# Patient Record
Sex: MALE | Race: WHITE | NOT HISPANIC OR LATINO | Employment: OTHER | URBAN - METROPOLITAN AREA
[De-identification: names, ages, dates, MRNs, and addresses within clinical notes are randomized per-mention and may not be internally consistent; named-entity substitution may affect disease eponyms.]

---

## 2018-01-15 ENCOUNTER — CONVERSION ENCOUNTER (OUTPATIENT)
Dept: PODIATRY | Facility: CLINIC | Age: 73
End: 2018-01-15

## 2018-01-15 ENCOUNTER — OFFICE VISIT CONVERTED (OUTPATIENT)
Dept: PODIATRY | Facility: CLINIC | Age: 73
End: 2018-01-15
Attending: PODIATRIST

## 2018-01-30 ENCOUNTER — CONVERSION ENCOUNTER (OUTPATIENT)
Dept: FAMILY MEDICINE CLINIC | Facility: CLINIC | Age: 73
End: 2018-01-30

## 2018-01-30 ENCOUNTER — OFFICE VISIT CONVERTED (OUTPATIENT)
Dept: FAMILY MEDICINE CLINIC | Facility: CLINIC | Age: 73
End: 2018-01-30
Attending: FAMILY MEDICINE

## 2018-02-07 ENCOUNTER — CONVERSION ENCOUNTER (OUTPATIENT)
Dept: PODIATRY | Facility: CLINIC | Age: 73
End: 2018-02-07

## 2018-02-07 ENCOUNTER — PROCEDURE VISIT CONVERTED (OUTPATIENT)
Dept: PODIATRY | Facility: CLINIC | Age: 73
End: 2018-02-07
Attending: PODIATRIST

## 2018-03-16 ENCOUNTER — PROCEDURE VISIT CONVERTED (OUTPATIENT)
Dept: PODIATRY | Facility: CLINIC | Age: 73
End: 2018-03-16
Attending: PODIATRIST

## 2018-03-21 ENCOUNTER — OFFICE VISIT CONVERTED (OUTPATIENT)
Dept: SURGERY | Facility: CLINIC | Age: 73
End: 2018-03-21
Attending: NURSE PRACTITIONER

## 2018-04-09 ENCOUNTER — CONVERSION ENCOUNTER (OUTPATIENT)
Dept: PODIATRY | Facility: CLINIC | Age: 73
End: 2018-04-09

## 2018-04-09 ENCOUNTER — PROCEDURE VISIT CONVERTED (OUTPATIENT)
Dept: PODIATRY | Facility: CLINIC | Age: 73
End: 2018-04-09
Attending: PODIATRIST

## 2018-04-23 ENCOUNTER — CONVERSION ENCOUNTER (OUTPATIENT)
Dept: PODIATRY | Facility: CLINIC | Age: 73
End: 2018-04-23

## 2018-04-23 ENCOUNTER — PROCEDURE VISIT CONVERTED (OUTPATIENT)
Dept: PODIATRY | Facility: CLINIC | Age: 73
End: 2018-04-23
Attending: PODIATRIST

## 2018-06-01 ENCOUNTER — PROCEDURE VISIT CONVERTED (OUTPATIENT)
Dept: PODIATRY | Facility: CLINIC | Age: 73
End: 2018-06-01
Attending: PODIATRIST

## 2018-06-22 ENCOUNTER — PROCEDURE VISIT CONVERTED (OUTPATIENT)
Dept: PODIATRY | Facility: CLINIC | Age: 73
End: 2018-06-22
Attending: PODIATRIST

## 2018-07-19 ENCOUNTER — PROCEDURE VISIT CONVERTED (OUTPATIENT)
Dept: PODIATRY | Facility: CLINIC | Age: 73
End: 2018-07-19
Attending: PODIATRIST

## 2018-08-06 ENCOUNTER — OFFICE VISIT CONVERTED (OUTPATIENT)
Dept: FAMILY MEDICINE CLINIC | Facility: CLINIC | Age: 73
End: 2018-08-06
Attending: FAMILY MEDICINE

## 2018-08-06 ENCOUNTER — CONVERSION ENCOUNTER (OUTPATIENT)
Dept: FAMILY MEDICINE CLINIC | Facility: CLINIC | Age: 73
End: 2018-08-06

## 2018-08-10 ENCOUNTER — PROCEDURE VISIT CONVERTED (OUTPATIENT)
Dept: PODIATRY | Facility: CLINIC | Age: 73
End: 2018-08-10
Attending: PODIATRIST

## 2018-08-31 ENCOUNTER — PROCEDURE VISIT CONVERTED (OUTPATIENT)
Dept: PODIATRY | Facility: CLINIC | Age: 73
End: 2018-08-31
Attending: PODIATRIST

## 2018-09-21 ENCOUNTER — PROCEDURE VISIT CONVERTED (OUTPATIENT)
Dept: PODIATRY | Facility: CLINIC | Age: 73
End: 2018-09-21
Attending: PODIATRIST

## 2018-09-28 ENCOUNTER — OFFICE VISIT CONVERTED (OUTPATIENT)
Dept: CARDIOLOGY | Facility: CLINIC | Age: 73
End: 2018-09-28
Attending: INTERNAL MEDICINE

## 2018-09-28 ENCOUNTER — CONVERSION ENCOUNTER (OUTPATIENT)
Dept: CARDIOLOGY | Facility: CLINIC | Age: 73
End: 2018-09-28

## 2018-10-31 ENCOUNTER — CONVERSION ENCOUNTER (OUTPATIENT)
Dept: CARDIOLOGY | Facility: CLINIC | Age: 73
End: 2018-10-31
Attending: INTERNAL MEDICINE

## 2018-11-02 ENCOUNTER — CONVERSION ENCOUNTER (OUTPATIENT)
Dept: PODIATRY | Facility: CLINIC | Age: 73
End: 2018-11-02

## 2018-11-02 ENCOUNTER — PROCEDURE VISIT CONVERTED (OUTPATIENT)
Dept: PODIATRY | Facility: CLINIC | Age: 73
End: 2018-11-02
Attending: PODIATRIST

## 2018-11-30 ENCOUNTER — CONVERSION ENCOUNTER (OUTPATIENT)
Dept: PODIATRY | Facility: CLINIC | Age: 73
End: 2018-11-30

## 2018-11-30 ENCOUNTER — PROCEDURE VISIT CONVERTED (OUTPATIENT)
Dept: PODIATRY | Facility: CLINIC | Age: 73
End: 2018-11-30
Attending: PODIATRIST

## 2018-12-21 ENCOUNTER — PROCEDURE VISIT CONVERTED (OUTPATIENT)
Dept: PODIATRY | Facility: CLINIC | Age: 73
End: 2018-12-21
Attending: PODIATRIST

## 2018-12-26 ENCOUNTER — OFFICE VISIT CONVERTED (OUTPATIENT)
Dept: FAMILY MEDICINE CLINIC | Facility: CLINIC | Age: 73
End: 2018-12-26
Attending: NURSE PRACTITIONER

## 2018-12-26 ENCOUNTER — CONVERSION ENCOUNTER (OUTPATIENT)
Dept: FAMILY MEDICINE CLINIC | Facility: CLINIC | Age: 73
End: 2018-12-26

## 2019-01-11 ENCOUNTER — PROCEDURE VISIT CONVERTED (OUTPATIENT)
Dept: PODIATRY | Facility: CLINIC | Age: 74
End: 2019-01-11
Attending: PODIATRIST

## 2019-01-11 ENCOUNTER — CONVERSION ENCOUNTER (OUTPATIENT)
Dept: PODIATRY | Facility: CLINIC | Age: 74
End: 2019-01-11

## 2019-02-15 ENCOUNTER — PROCEDURE VISIT CONVERTED (OUTPATIENT)
Dept: PODIATRY | Facility: CLINIC | Age: 74
End: 2019-02-15
Attending: PODIATRIST

## 2019-02-15 ENCOUNTER — CONVERSION ENCOUNTER (OUTPATIENT)
Dept: PODIATRY | Facility: CLINIC | Age: 74
End: 2019-02-15

## 2019-02-18 ENCOUNTER — OFFICE VISIT CONVERTED (OUTPATIENT)
Dept: FAMILY MEDICINE CLINIC | Facility: CLINIC | Age: 74
End: 2019-02-18
Attending: FAMILY MEDICINE

## 2019-02-18 ENCOUNTER — HOSPITAL ENCOUNTER (OUTPATIENT)
Dept: FAMILY MEDICINE CLINIC | Facility: CLINIC | Age: 74
Discharge: HOME | End: 2019-02-18
Attending: FAMILY MEDICINE

## 2019-02-18 ENCOUNTER — CONVERSION ENCOUNTER (OUTPATIENT)
Dept: FAMILY MEDICINE CLINIC | Facility: CLINIC | Age: 74
End: 2019-02-18

## 2019-02-18 LAB
ALBUMIN SERPL-MCNC: 4.3 G/DL (ref 3.5–5)
ALBUMIN/GLOB SERPL: 1.3 {RATIO} (ref 1.4–2.6)
ALP SERPL-CCNC: 75 U/L (ref 56–155)
ALT SERPL-CCNC: 20 U/L (ref 10–40)
ANION GAP SERPL CALC-SCNC: 15 MMOL/L (ref 8–19)
AST SERPL-CCNC: 27 U/L (ref 15–50)
BILIRUB SERPL-MCNC: 0.44 MG/DL (ref 0.2–1.3)
BUN SERPL-MCNC: 18 MG/DL (ref 5–25)
BUN/CREAT SERPL: 14 {RATIO} (ref 6–20)
CALCIUM SERPL-MCNC: 9.6 MG/DL (ref 8.7–10.4)
CHLORIDE SERPL-SCNC: 104 MMOL/L (ref 99–111)
CHOLEST SERPL-MCNC: 95 MG/DL (ref 107–200)
CHOLEST/HDLC SERPL: 2.4 {RATIO} (ref 3–6)
CONV CO2: 28 MMOL/L (ref 22–32)
CONV TOTAL PROTEIN: 7.6 G/DL (ref 6.3–8.2)
CREAT UR-MCNC: 1.25 MG/DL (ref 0.7–1.2)
EST. AVERAGE GLUCOSE BLD GHB EST-MCNC: 160 MG/DL
GFR SERPLBLD BASED ON 1.73 SQ M-ARVRAT: 57 ML/MIN/{1.73_M2}
GLOBULIN UR ELPH-MCNC: 3.3 G/DL (ref 2–3.5)
GLUCOSE SERPL-MCNC: 89 MG/DL (ref 70–99)
HBA1C MFR BLD: 7.2 % (ref 3.5–5.7)
HDLC SERPL-MCNC: 39 MG/DL (ref 40–60)
LDLC SERPL CALC-MCNC: 35 MG/DL (ref 70–100)
OSMOLALITY SERPL CALC.SUM OF ELEC: 297 MOSM/KG (ref 273–304)
POTASSIUM SERPL-SCNC: 3.9 MMOL/L (ref 3.5–5.3)
SODIUM SERPL-SCNC: 143 MMOL/L (ref 135–147)
TRIGL SERPL-MCNC: 104 MG/DL (ref 40–150)
URATE SERPL-MCNC: 5.1 MG/DL (ref 3.5–8.5)
VLDLC SERPL-MCNC: 21 MG/DL (ref 5–37)

## 2019-02-20 LAB
CONV HEPATITIS C AB WITH REFLEX TO CONFIRMATION: 0.1 S/CO RATIO (ref 0–0.9)
CONV HEPATITIS COMMENT: NORMAL

## 2019-03-01 ENCOUNTER — PROCEDURE VISIT CONVERTED (OUTPATIENT)
Dept: PODIATRY | Facility: CLINIC | Age: 74
End: 2019-03-01
Attending: PODIATRIST

## 2019-03-22 ENCOUNTER — PROCEDURE VISIT CONVERTED (OUTPATIENT)
Dept: PODIATRY | Facility: CLINIC | Age: 74
End: 2019-03-22
Attending: PODIATRIST

## 2019-03-22 ENCOUNTER — CONVERSION ENCOUNTER (OUTPATIENT)
Dept: PODIATRY | Facility: CLINIC | Age: 74
End: 2019-03-22

## 2019-04-15 ENCOUNTER — OFFICE VISIT CONVERTED (OUTPATIENT)
Dept: FAMILY MEDICINE CLINIC | Facility: CLINIC | Age: 74
End: 2019-04-15
Attending: FAMILY MEDICINE

## 2019-04-15 ENCOUNTER — CONVERSION ENCOUNTER (OUTPATIENT)
Dept: FAMILY MEDICINE CLINIC | Facility: CLINIC | Age: 74
End: 2019-04-15

## 2019-04-16 ENCOUNTER — PROCEDURE VISIT CONVERTED (OUTPATIENT)
Dept: PODIATRY | Facility: CLINIC | Age: 74
End: 2019-04-16
Attending: PODIATRIST

## 2019-05-03 ENCOUNTER — HOSPITAL ENCOUNTER (OUTPATIENT)
Dept: PERIOP | Facility: HOSPITAL | Age: 74
Setting detail: HOSPITAL OUTPATIENT SURGERY
Discharge: HOME | End: 2019-05-03
Attending: PODIATRIST

## 2019-05-03 LAB
GLUCOSE BLD-MCNC: 110 MG/DL (ref 70–99)
GLUCOSE BLD-MCNC: 125 MG/DL (ref 70–99)

## 2019-05-07 ENCOUNTER — OFFICE VISIT CONVERTED (OUTPATIENT)
Dept: PODIATRY | Facility: CLINIC | Age: 74
End: 2019-05-07
Attending: PODIATRIST

## 2019-05-08 ENCOUNTER — OFFICE VISIT CONVERTED (OUTPATIENT)
Dept: CARDIOLOGY | Facility: CLINIC | Age: 74
End: 2019-05-08
Attending: INTERNAL MEDICINE

## 2019-05-08 ENCOUNTER — CONVERSION ENCOUNTER (OUTPATIENT)
Dept: CARDIOLOGY | Facility: CLINIC | Age: 74
End: 2019-05-08

## 2019-05-21 ENCOUNTER — OFFICE VISIT CONVERTED (OUTPATIENT)
Dept: PODIATRY | Facility: CLINIC | Age: 74
End: 2019-05-21
Attending: PODIATRIST

## 2019-08-05 ENCOUNTER — PROCEDURE VISIT CONVERTED (OUTPATIENT)
Dept: PODIATRY | Facility: CLINIC | Age: 74
End: 2019-08-05
Attending: PODIATRIST

## 2019-08-26 ENCOUNTER — PROCEDURE VISIT CONVERTED (OUTPATIENT)
Dept: PODIATRY | Facility: CLINIC | Age: 74
End: 2019-08-26
Attending: PODIATRIST

## 2019-08-27 ENCOUNTER — OFFICE VISIT CONVERTED (OUTPATIENT)
Dept: FAMILY MEDICINE CLINIC | Facility: CLINIC | Age: 74
End: 2019-08-27
Attending: FAMILY MEDICINE

## 2019-08-27 ENCOUNTER — HOSPITAL ENCOUNTER (OUTPATIENT)
Dept: FAMILY MEDICINE CLINIC | Facility: CLINIC | Age: 74
Discharge: HOME | End: 2019-08-27
Attending: FAMILY MEDICINE

## 2019-08-27 ENCOUNTER — CONVERSION ENCOUNTER (OUTPATIENT)
Dept: FAMILY MEDICINE CLINIC | Facility: CLINIC | Age: 74
End: 2019-08-27

## 2019-08-27 LAB
EST. AVERAGE GLUCOSE BLD GHB EST-MCNC: 160 MG/DL
HBA1C MFR BLD: 7.2 % (ref 3.5–5.7)

## 2019-09-16 ENCOUNTER — PROCEDURE VISIT CONVERTED (OUTPATIENT)
Dept: PODIATRY | Facility: CLINIC | Age: 74
End: 2019-09-16
Attending: PODIATRIST

## 2019-09-16 ENCOUNTER — CONVERSION ENCOUNTER (OUTPATIENT)
Dept: PODIATRY | Facility: CLINIC | Age: 74
End: 2019-09-16

## 2019-10-21 ENCOUNTER — PROCEDURE VISIT CONVERTED (OUTPATIENT)
Dept: PODIATRY | Facility: CLINIC | Age: 74
End: 2019-10-21
Attending: PODIATRIST

## 2019-11-20 ENCOUNTER — OFFICE VISIT CONVERTED (OUTPATIENT)
Dept: CARDIOLOGY | Facility: CLINIC | Age: 74
End: 2019-11-20
Attending: INTERNAL MEDICINE

## 2019-11-25 ENCOUNTER — CONVERSION ENCOUNTER (OUTPATIENT)
Dept: PODIATRY | Facility: CLINIC | Age: 74
End: 2019-11-25

## 2019-11-25 ENCOUNTER — PROCEDURE VISIT CONVERTED (OUTPATIENT)
Dept: PODIATRY | Facility: CLINIC | Age: 74
End: 2019-11-25
Attending: PODIATRIST

## 2020-01-08 ENCOUNTER — PROCEDURE VISIT CONVERTED (OUTPATIENT)
Dept: PODIATRY | Facility: CLINIC | Age: 75
End: 2020-01-08
Attending: PODIATRIST

## 2020-02-10 ENCOUNTER — HOSPITAL ENCOUNTER (OUTPATIENT)
Dept: GENERAL RADIOLOGY | Facility: HOSPITAL | Age: 75
Discharge: HOME | End: 2020-02-10
Attending: PODIATRIST

## 2020-02-10 ENCOUNTER — OFFICE VISIT CONVERTED (OUTPATIENT)
Dept: PODIATRY | Facility: CLINIC | Age: 75
End: 2020-02-10
Attending: PODIATRIST

## 2020-02-14 ENCOUNTER — HOSPITAL ENCOUNTER (OUTPATIENT)
Dept: PERIOP | Facility: HOSPITAL | Age: 75
Setting detail: HOSPITAL OUTPATIENT SURGERY
Discharge: HOME | End: 2020-02-14
Attending: PODIATRIST

## 2020-02-14 LAB
GLUCOSE BLD-MCNC: 57 MG/DL (ref 70–99)
GLUCOSE BLD-MCNC: 96 MG/DL (ref 70–99)

## 2020-02-19 ENCOUNTER — CONVERSION ENCOUNTER (OUTPATIENT)
Dept: PODIATRY | Facility: CLINIC | Age: 75
End: 2020-02-19

## 2020-02-19 ENCOUNTER — OFFICE VISIT CONVERTED (OUTPATIENT)
Dept: PODIATRY | Facility: CLINIC | Age: 75
End: 2020-02-19
Attending: PODIATRIST

## 2020-02-27 ENCOUNTER — OFFICE VISIT CONVERTED (OUTPATIENT)
Dept: FAMILY MEDICINE CLINIC | Facility: CLINIC | Age: 75
End: 2020-02-27
Attending: FAMILY MEDICINE

## 2020-02-27 ENCOUNTER — CONVERSION ENCOUNTER (OUTPATIENT)
Dept: FAMILY MEDICINE CLINIC | Facility: CLINIC | Age: 75
End: 2020-02-27

## 2020-02-27 ENCOUNTER — HOSPITAL ENCOUNTER (OUTPATIENT)
Dept: FAMILY MEDICINE CLINIC | Facility: CLINIC | Age: 75
Discharge: HOME | End: 2020-02-27
Attending: FAMILY MEDICINE

## 2020-02-27 LAB
ALBUMIN SERPL-MCNC: 4.2 G/DL (ref 3.5–5)
ALBUMIN/GLOB SERPL: 1.3 {RATIO} (ref 1.4–2.6)
ALP SERPL-CCNC: 81 U/L (ref 56–155)
ALT SERPL-CCNC: 16 U/L (ref 10–40)
ANION GAP SERPL CALC-SCNC: 24 MMOL/L (ref 8–19)
AST SERPL-CCNC: 23 U/L (ref 15–50)
BASOPHILS # BLD AUTO: 0.06 10*3/UL (ref 0–0.2)
BASOPHILS NFR BLD AUTO: 0.6 % (ref 0–3)
BILIRUB SERPL-MCNC: 0.41 MG/DL (ref 0.2–1.3)
BUN SERPL-MCNC: 20 MG/DL (ref 5–25)
BUN/CREAT SERPL: 18 {RATIO} (ref 6–20)
CALCIUM SERPL-MCNC: 9.8 MG/DL (ref 8.7–10.4)
CHLORIDE SERPL-SCNC: 101 MMOL/L (ref 99–111)
CHOLEST SERPL-MCNC: 92 MG/DL (ref 107–200)
CHOLEST/HDLC SERPL: 2.5 {RATIO} (ref 3–6)
CONV ABS IMM GRAN: 0.08 10*3/UL (ref 0–0.2)
CONV CO2: 19 MMOL/L (ref 22–32)
CONV CREATININE URINE, RANDOM: 149.2 MG/DL (ref 10–300)
CONV IMMATURE GRAN: 0.8 % (ref 0–1.8)
CONV MICROALBUM.,U,RANDOM: <12 MG/L (ref 0–20)
CONV TOTAL PROTEIN: 7.5 G/DL (ref 6.3–8.2)
CREAT UR-MCNC: 1.11 MG/DL (ref 0.7–1.2)
DEPRECATED RDW RBC AUTO: 46.1 FL (ref 35.1–43.9)
EOSINOPHIL # BLD AUTO: 0.38 10*3/UL (ref 0–0.7)
EOSINOPHIL # BLD AUTO: 3.9 % (ref 0–7)
ERYTHROCYTE [DISTWIDTH] IN BLOOD BY AUTOMATED COUNT: 13.2 % (ref 11.6–14.4)
EST. AVERAGE GLUCOSE BLD GHB EST-MCNC: 157 MG/DL
GFR SERPLBLD BASED ON 1.73 SQ M-ARVRAT: >60 ML/MIN/{1.73_M2}
GLOBULIN UR ELPH-MCNC: 3.3 G/DL (ref 2–3.5)
GLUCOSE SERPL-MCNC: 150 MG/DL (ref 70–99)
HBA1C MFR BLD: 7.1 % (ref 3.5–5.7)
HCT VFR BLD AUTO: 42.3 % (ref 42–52)
HDLC SERPL-MCNC: 37 MG/DL (ref 40–60)
HGB BLD-MCNC: 14.1 G/DL (ref 14–18)
LDLC SERPL CALC-MCNC: 34 MG/DL (ref 70–100)
LYMPHOCYTES # BLD AUTO: 2.17 10*3/UL (ref 1–5)
LYMPHOCYTES NFR BLD AUTO: 22.3 % (ref 20–45)
MCH RBC QN AUTO: 31.7 PG (ref 27–31)
MCHC RBC AUTO-ENTMCNC: 33.3 G/DL (ref 33–37)
MCV RBC AUTO: 95.1 FL (ref 80–96)
MICROALBUMIN/CREAT UR: 8 MG/G{CRE} (ref 0–25)
MONOCYTES # BLD AUTO: 0.89 10*3/UL (ref 0.2–1.2)
MONOCYTES NFR BLD AUTO: 9.1 % (ref 3–10)
NEUTROPHILS # BLD AUTO: 6.17 10*3/UL (ref 2–8)
NEUTROPHILS NFR BLD AUTO: 63.3 % (ref 30–85)
NRBC CBCN: 0 % (ref 0–0.7)
OSMOLALITY SERPL CALC.SUM OF ELEC: 295 MOSM/KG (ref 273–304)
PLATELET # BLD AUTO: 241 10*3/UL (ref 130–400)
PMV BLD AUTO: 9.7 FL (ref 9.4–12.4)
POTASSIUM SERPL-SCNC: 4.4 MMOL/L (ref 3.5–5.3)
PSA SERPL-MCNC: 1.71 NG/ML (ref 0–4)
RBC # BLD AUTO: 4.45 10*6/UL (ref 4.7–6.1)
SODIUM SERPL-SCNC: 140 MMOL/L (ref 135–147)
TRIGL SERPL-MCNC: 104 MG/DL (ref 40–150)
TSH SERPL-ACNC: 2.52 M[IU]/L (ref 0.27–4.2)
URATE SERPL-MCNC: 4.8 MG/DL (ref 3.5–8.5)
VLDLC SERPL-MCNC: 21 MG/DL (ref 5–37)
WBC # BLD AUTO: 9.75 10*3/UL (ref 4.8–10.8)

## 2020-03-04 ENCOUNTER — OFFICE VISIT CONVERTED (OUTPATIENT)
Dept: PODIATRY | Facility: CLINIC | Age: 75
End: 2020-03-04
Attending: PODIATRIST

## 2020-08-13 ENCOUNTER — OFFICE VISIT CONVERTED (OUTPATIENT)
Dept: SURGERY | Facility: CLINIC | Age: 75
End: 2020-08-13
Attending: NURSE PRACTITIONER

## 2020-08-13 ENCOUNTER — CONVERSION ENCOUNTER (OUTPATIENT)
Dept: SURGERY | Facility: CLINIC | Age: 75
End: 2020-08-13

## 2020-08-25 ENCOUNTER — OFFICE VISIT CONVERTED (OUTPATIENT)
Dept: FAMILY MEDICINE CLINIC | Facility: CLINIC | Age: 75
End: 2020-08-25
Attending: FAMILY MEDICINE

## 2020-08-25 ENCOUNTER — CONVERSION ENCOUNTER (OUTPATIENT)
Dept: FAMILY MEDICINE CLINIC | Facility: CLINIC | Age: 75
End: 2020-08-25

## 2020-08-25 ENCOUNTER — HOSPITAL ENCOUNTER (OUTPATIENT)
Dept: LAB | Facility: HOSPITAL | Age: 75
Discharge: HOME | End: 2020-08-25
Attending: FAMILY MEDICINE

## 2020-08-25 LAB
EST. AVERAGE GLUCOSE BLD GHB EST-MCNC: 137 MG/DL
HBA1C MFR BLD: 6.4 % (ref 3.5–5.7)

## 2020-10-14 ENCOUNTER — HOSPITAL ENCOUNTER (OUTPATIENT)
Dept: PREADMISSION TESTING | Facility: HOSPITAL | Age: 75
Discharge: HOME | End: 2020-10-14
Attending: SURGERY

## 2020-10-16 LAB — SARS-COV-2 RNA SPEC QL NAA+PROBE: NOT DETECTED

## 2020-10-19 ENCOUNTER — HOSPITAL ENCOUNTER (OUTPATIENT)
Dept: GASTROENTEROLOGY | Facility: HOSPITAL | Age: 75
Setting detail: HOSPITAL OUTPATIENT SURGERY
Discharge: HOME | End: 2020-10-19
Attending: SURGERY

## 2020-10-19 LAB — GLUCOSE BLD-MCNC: 157 MG/DL (ref 70–99)

## 2020-11-16 ENCOUNTER — OFFICE VISIT CONVERTED (OUTPATIENT)
Dept: SURGERY | Facility: CLINIC | Age: 75
End: 2020-11-16
Attending: SURGERY

## 2021-01-21 ENCOUNTER — OFFICE VISIT CONVERTED (OUTPATIENT)
Dept: CARDIOLOGY | Facility: CLINIC | Age: 76
End: 2021-01-21
Attending: INTERNAL MEDICINE

## 2021-03-30 ENCOUNTER — CONVERSION ENCOUNTER (OUTPATIENT)
Dept: FAMILY MEDICINE CLINIC | Facility: CLINIC | Age: 76
End: 2021-03-30

## 2021-03-30 ENCOUNTER — HOSPITAL ENCOUNTER (OUTPATIENT)
Dept: FAMILY MEDICINE CLINIC | Facility: CLINIC | Age: 76
Discharge: HOME | End: 2021-03-30
Attending: FAMILY MEDICINE

## 2021-03-30 ENCOUNTER — OFFICE VISIT CONVERTED (OUTPATIENT)
Dept: FAMILY MEDICINE CLINIC | Facility: CLINIC | Age: 76
End: 2021-03-30
Attending: FAMILY MEDICINE

## 2021-03-30 LAB
BILIRUB UR QL STRIP: NEGATIVE
COLOR UR: YELLOW
CONV CLARITY OF URINE: CLEAR
CONV PROTEIN IN URINE BY AUTOMATED TEST STRIP: NEGATIVE
CONV UROBILINOGEN IN URINE BY AUTOMATED TEST STRIP: NORMAL
EST. AVERAGE GLUCOSE BLD GHB EST-MCNC: 128 MG/DL
GLUCOSE UR QL: NEGATIVE
HBA1C MFR BLD: 6.1 % (ref 3.5–5.7)
HGB UR QL STRIP: NEGATIVE
KETONES UR QL STRIP: NEGATIVE
LEUKOCYTE ESTERASE UR QL STRIP: NEGATIVE
NITRITE UR QL STRIP: NEGATIVE
PH UR STRIP.AUTO: 5.5 [PH]
SP GR UR: 1.03

## 2021-03-31 LAB
ALBUMIN SERPL-MCNC: 4.1 G/DL (ref 3.5–5)
ALBUMIN/GLOB SERPL: 1.3 {RATIO} (ref 1.4–2.6)
ALP SERPL-CCNC: 80 U/L (ref 56–155)
ALT SERPL-CCNC: 16 U/L (ref 10–40)
ANION GAP SERPL CALC-SCNC: 18 MMOL/L (ref 8–19)
AST SERPL-CCNC: 27 U/L (ref 15–50)
BASOPHILS # BLD AUTO: 0.05 10*3/UL (ref 0–0.2)
BASOPHILS NFR BLD AUTO: 0.6 % (ref 0–3)
BILIRUB SERPL-MCNC: 0.37 MG/DL (ref 0.2–1.3)
BUN SERPL-MCNC: 22 MG/DL (ref 5–25)
BUN/CREAT SERPL: 18 {RATIO} (ref 6–20)
CALCIUM SERPL-MCNC: 9.6 MG/DL (ref 8.7–10.4)
CHLORIDE SERPL-SCNC: 105 MMOL/L (ref 99–111)
CHOLEST SERPL-MCNC: 86 MG/DL (ref 107–200)
CHOLEST/HDLC SERPL: 2.1 {RATIO} (ref 3–6)
CONV ABS IMM GRAN: 0.06 10*3/UL (ref 0–0.2)
CONV CO2: 21 MMOL/L (ref 22–32)
CONV CREATININE URINE, RANDOM: 109.5 MG/DL (ref 10–300)
CONV IMMATURE GRAN: 0.7 % (ref 0–1.8)
CONV MICROALBUM.,U,RANDOM: <12 MG/L (ref 0–20)
CONV TOTAL PROTEIN: 7.2 G/DL (ref 6.3–8.2)
CREAT UR-MCNC: 1.22 MG/DL (ref 0.7–1.2)
DEPRECATED RDW RBC AUTO: 47.9 FL (ref 35.1–43.9)
EOSINOPHIL # BLD AUTO: 0.37 10*3/UL (ref 0–0.7)
EOSINOPHIL # BLD AUTO: 4.1 % (ref 0–7)
ERYTHROCYTE [DISTWIDTH] IN BLOOD BY AUTOMATED COUNT: 13.8 % (ref 11.6–14.4)
GFR SERPLBLD BASED ON 1.73 SQ M-ARVRAT: 58 ML/MIN/{1.73_M2}
GLOBULIN UR ELPH-MCNC: 3.1 G/DL (ref 2–3.5)
GLUCOSE SERPL-MCNC: 100 MG/DL (ref 70–99)
HCT VFR BLD AUTO: 41.7 % (ref 42–52)
HDLC SERPL-MCNC: 41 MG/DL (ref 40–60)
HGB BLD-MCNC: 13.4 G/DL (ref 14–18)
LDLC SERPL CALC-MCNC: 19 MG/DL (ref 70–100)
LYMPHOCYTES # BLD AUTO: 1.78 10*3/UL (ref 1–5)
LYMPHOCYTES NFR BLD AUTO: 20 % (ref 20–45)
MCH RBC QN AUTO: 30.5 PG (ref 27–31)
MCHC RBC AUTO-ENTMCNC: 32.1 G/DL (ref 33–37)
MCV RBC AUTO: 95 FL (ref 80–96)
MICROALBUMIN/CREAT UR: 11 MG/G{CRE} (ref 0–25)
MONOCYTES # BLD AUTO: 0.89 10*3/UL (ref 0.2–1.2)
MONOCYTES NFR BLD AUTO: 10 % (ref 3–10)
NEUTROPHILS # BLD AUTO: 5.77 10*3/UL (ref 2–8)
NEUTROPHILS NFR BLD AUTO: 64.6 % (ref 30–85)
NRBC CBCN: 0 % (ref 0–0.7)
OSMOLALITY SERPL CALC.SUM OF ELEC: 293 MOSM/KG (ref 273–304)
PLATELET # BLD AUTO: 242 10*3/UL (ref 130–400)
PMV BLD AUTO: 10 FL (ref 9.4–12.4)
POTASSIUM SERPL-SCNC: 4.2 MMOL/L (ref 3.5–5.3)
PSA SERPL-MCNC: 1.51 NG/ML (ref 0–4)
RBC # BLD AUTO: 4.39 10*6/UL (ref 4.7–6.1)
SODIUM SERPL-SCNC: 140 MMOL/L (ref 135–147)
TRIGL SERPL-MCNC: 132 MG/DL (ref 40–150)
TSH SERPL-ACNC: 2.06 M[IU]/L (ref 0.27–4.2)
VLDLC SERPL-MCNC: 26 MG/DL (ref 5–37)
WBC # BLD AUTO: 8.92 10*3/UL (ref 4.8–10.8)

## 2021-05-10 NOTE — H&P
History and Physical      Patient Name: Edi Weston   Patient ID: [id number]   Sex: Male   YOB: 1945    Primary Care Provider: Jean Marie Joaquin III MD   Referring Provider: Ralph Whyte DPM    Visit Date: August 13, 2020    Provider: DIONNA Marinelli   Location[facility]   Location Address: [address] [place] [address]  [id number]   Location Phone: [phone]          Chief Complaint  · Requesting colonoscopy  · Age 50 or over  · FH of colon cancer  · Here today for a pre-surgical colon screening visit  · Personal History of Polyps      History Of Present Illness  The patient is a 75 year old /White male presenting [facility] the [facility] on a referral from Dimitry Hughes MD.   Edi Weston needs to have a screening colonoscopy.   Patient states that they have had a colonoscopy. 2 years ago   Patient currently complains of: no complaints   Patient Does have family history of colon cancer. Father and Grandfather      Patient presents today on a referral from Dr. Dimitry Hughes. Patient denies any abdominal pain, diarrhea or rectal bleeding. Admits to a family history of colon cancer with his father and paternal grandfather. Admits to a history of colonic polyps.    6/2018 - Colonoscopy ([facility]): Proximal Ascending - Tubulovillous adenoma; Ascending - tubular adenoma; Hepatic flexure - tubular adenoma; grade I internal hemorrhoids.     9/2015 - Colonoscopy ([facility]): Hepatic flexure - tubular adenoma; Cecum - tubular adenoma; Proximal Ascending - tubular adenoma.    9/2010 - Colonoscopy ([facility]): Transverse - Hyperplastic.     12/2005 - Colonoscopy ([facility]): Sigmoid - tubular adenoma & hyperplastic.    10/1999 - Colonoscopy ([facility]): Normal colon.            Past Medical History  Disease Name Date Onset Notes   Aftercare following surgery 02/19/2020 --    Arthritis --  --    Cellulitis of right lower limb --  --    Colon Polyps --  --    Decubitus ulcer of foot,  stage 1 08/10/2018 --    Decubitus ulcer of foot, stage 3 02/07/2018 --    Diabetes --  --    Diabetes Mellitus, Type II, Uncontrolled --  --    Foot pain, left 08/10/2018 --    Foot pain, right --  --    Foot ulcer 07/19/2018 --    Gout --  --    Hammertoe 12/30/2019 --    High blood pressure --  --    High cholesterol 02/18/2019 --    Hyperlipidemia --  --    Hypertension, essential --  --    Ingrowing nail 11/02/2018 --    Medication management 02/18/2019 --    Nail dystrophy --  --    PAT (paroxysmal atrial tachycardia) 08/06/2018 --    Pressure ulcer, stage 1 --  --    Tinea unguium --  --    Type 2 diabetes mellitus with foot ulcer --  --    Type 2 diabetes mellitus with polyneuropathy --  --    Type 2 diabetes, controlled, with peripheral neuropathy 08/10/2018 --          Past Surgical History  Procedure Name Date Notes   Colonoscopy --  --    Gastric banding 2007 --          Medication List  Name Date Started Instructions   allopurinol 300 mg oral tablet 02/27/2020 TAKE 1 TABLET BY MOUTH ONCE DAILY   aspirin 81 mg oral tablet,delayed release (DR/EC)  take 1 tablet (81 mg) by oral route once daily   Centrum Silver 0.4-300-250 mg-mcg-mcg oral tablet  take 1 tablet by oral route daily   glyburide 5 mg oral tablet 02/27/2020 TAKE 1/2 (ONE-HALF) TABLET BY MOUTH ONCE DAILY BEFORE BREAKFAST   hydrochlorothiazide 12.5 mg oral tablet 02/27/2020 TAKE 1 TABLET BY MOUTH ONCE DAILY   lisinopril 20 mg oral tablet 02/27/2020 TAKE 1 TABLET BY MOUTH ONCE DAILY   metformin 500 mg oral tablet extended release 24 hr 02/27/2020 TAKE 1 TABLET BY MOUTH ONCE DAILY WITH EVENING MEAL   metoprolol succinate 25 mg oral tablet extended release 24 hr 06/08/2020 TAKE 1 TABLET BY MOUTH ONCE DAILY   mupirocin 2 % topical ointment 04/10/2019 APPLY TO THE AFFECTED AREA EVERY DAY FOR 14 DAYS.   One touch ultra blue Test Strips 08/08/2018 Test one time daily   simvastatin 20 mg oral tablet 02/27/2020 TAKE 1 TABLET BY MOUTH IN THE EVENING  "  Suprep Bowel Prep Kit 17.5-3.13-1.6 gram oral recon soln 08/13/2020 take as directed         Allergy List  Allergen Name Date Reaction Notes   SULFA (SULFONAMIDES) --  --  --        Allergies Reconciled  Family Medical History  Disease Name Relative/Age Notes   Colon Cancer Father/   --    Renal Calculus Mother/   Mother   Family history of colon cancer Father/50s  Grandfather (paternal)/60s   Father/50s; Grandfather (paternal)/60s   -Father's Family History Unknown Father/   Father   -Mother's Family History Unknown Mother/   Mother         Social History  Finding Status Start/Stop Quantity Notes   Advance Care Plan/Surrogate Decision Maker scanned into EMR --  --/-- --  --    Alcohol Current some day --/-- --  06/26/2017 -    Exercises regularly --  --/-- --  --     --  --/-- --  --    Moderate Amount of Exercise (1-3 times weekly) --  --/-- --  --    Tobacco Former 17/29 2 pk QD Smoked X 20 years Quit 1990         Review of Systems  · Constitutional  o Denies  o : fever, chills  · Eyes  o Denies  o : yellowish discoloration of eyes  · HENT  o Denies  o : difficulty swallowing  · Cardiovascular  o Denies  o : chest pain, chest pain on exertion  · Respiratory  o Denies  o : shortness of breath  · Gastrointestinal  o Denies  o : nausea, vomiting, diarrhea, constipation  · Genitourinary  o Denies  o : abnormal color of urine  · Integument  o Denies  o : rash  · Neurologic  o Denies  o : tingling or numbness  · Musculoskeletal  o Denies  o : joint pain  · Endocrine  o Denies  o : weight gain, weight loss      Vitals  Date Time BP Position Site L\R Cuff Size HR RR TEMP (F) WT  HT  BMI kg/m2 BSA m2 O2 Sat        08/13/2020 01:17 PM       16  259lbs 8oz 6'  5\" 30.77 2.53           Physical Examination  · Constitutional  o Appearance  o : well developed, well-nourished, patient in no apparent distress  · Head and Face  o Head  o :   § Inspection  § : atraumatic, normocephalic  o Face  o :   § Inspection  § : no " facial lesions  · Eyes  o Conjunctivae  o : conjunctivae normal  o Sclerae  o : sclerae white  · Neck  o Inspection/Palpation  o : normal appearance, no masses or tenderness, trachea midline  · Respiratory  o Respiratory Effort  o : breathing unlabored  · Skin and Subcutaneous Tissue  o General Inspection  o : no lesions present, no areas of discoloration, skin turgor normal, texture normal  · Neurologic  o Mental Status Examination  o :   § Orientation  § : grossly oriented to person, place and time  § Attention  § : attention normal, concentration abilities normal  § Fund of Knowledge  § : fund of knowledge within normal limits, patient aware of current events  o Gait and Station  o : normal gait, able to stand without difficulty  · Psychiatric  o Judgement and Insight  o : judgment and insight intact  o Mood and Affect  o : mood normal, affect appropriate              Assessment  · Family History of Colon Cancer     V16.0/Z80.0  · Personal history of colonic polyps     V12.72/Z86.010  · Screening for colon cancer     V76.51/Z12.11  · Pre-op testing     V72.84/Z01.818      Plan  · Orders  o Consent for Colonoscopy Screening-Possible risk/complications, benefits, and alternatives to surgical or invasive procedure have been explained to patient and/or legal guardian. -Patient has been evaluated and can tolerate anesthesia and/or sedation. Risks, benefits, and alternatives to anesthesia and sedation have been explained to patient and/or legal guardian. () - V76.51/Z12.11, V12.72/Z86.010, V16.0/Z80.0, V72.84/Z01.818 - 10/19/2020  o [facility] Pre-Op Covid-19 Screening ([id number]) - V72.84/Z01.818 - 10/14/2020   [address] @ 12:45pm  · Medications  o Medications have been Reconciled  o Transition of Care or Provider Policy  · Instructions  o Surgical Facility: [facility]  o Handouts Provided Pre-Procedure Instructions including date, time, and location of procedure.   o PLAN: Proceeed with colonoscopy.  Patient understands risks/benefits and is willing to proceed.   o ***Surgical Orders***  o RISK AND BENEFITS:  o Given these options, the patient has verbally expressed an understanding of the risks of the surgery and finds these risks acceptable. Will proceed with surgery as soon as possible.  o O.R. PREP: Per protocol   o IV: Per Anesthesia  o Please sign permit for: Colonoscopy with possible biopsies by Dr. Villarreal.  o The above History and Physical Examination has been completed within 30 days of admission.  o ***Patient Status***  o Outpatient  o Follow up in the in the office post procedure.  o Advise patient he would need COVID-19 testing prior to procedure. Encouraged patient to self-isolate in between testing and procedure. Patient verbalizes understanding and is willing to proceed.   o Electronically Identified Patient Education Materials Provided Electronically  · Disposition  o Call or Return if symptoms worsen or persist.            Electronically Signed by: DIONNA Marinelli -Author on August 13, 2020 03:19:09 PM

## 2021-05-13 NOTE — PROGRESS NOTES
Progress Note      Patient Name: Edi Weston   Patient ID: [id number]   Sex: Male   YOB: 1945    Primary Care Provider: Jean Marie Joaquin III MD   Referring Provider: Ralph Whyte DPM    Visit Date: November 16, 2020    Provider: Jed Villarreal MD   Location: [facility]   Location Address: [address] [place] [address]  [id number]   Location Phone: [phone]          Chief Complaint  · Follow Up Surgery      History Of Present Illness     Mr. Weston is seen in follow-up status post colonoscopy. He was found to have a normal colon. He has a strong family history of colon cancer and colonic polyps.       Past Medical History  Aftercare following surgery; Arthritis; Cellulitis of right lower limb; Colon Polyps; Decubitus ulcer of foot, stage 1; Decubitus ulcer of foot, stage 3; Diabetes; Diabetes Mellitus, Type II, Uncontrolled; Foot pain, left; Foot pain, right; Foot ulcer; Gout; Hammertoe; High blood pressure; High cholesterol; History of colonoscopy with polypectomy; Hyperlipidemia; Hypertension, essential; Ingrowing nail; Medication management; Nail dystrophy; PAT (paroxysmal atrial tachycardia); Pressure ulcer, stage 1; Tinea unguium; Type 2 diabetes mellitus with foot ulcer; Type 2 diabetes mellitus with polyneuropathy; Type 2 diabetes, controlled, with peripheral neuropathy         Past Surgical History  Colonoscopy; Gastric banding         Medication List  allopurinol 300 mg oral tablet; Centrum Silver 0.4-300-250 mg-mcg-mcg oral tablet; glyburide 5 mg oral tablet; hydrochlorothiazide 12.5 mg oral tablet; lisinopril 20 mg oral tablet; metformin 500 mg oral tablet extended release 24 hr; metoprolol succinate 25 mg oral tablet extended release 24 hr; mupirocin 2 % topical ointment; One touch ultra blue Test Strips; simvastatin 20 mg oral tablet         Allergy List  SULFA (SULFONAMIDES)         Family Medical History  Colon Cancer; Renal Calculus; Family history of  "colon cancer; -Father's Family History Unknown; -Mother's Family History Unknown         Social History  Advance Care Plan/Surrogate Decision Maker scanned into EMR; Alcohol (Current some day); Exercises regularly; ; Moderate Amount of Exercise (1-3 times weekly); Tobacco (Former)         Review of Systems  · Cardiovascular  o Denies  o : chest pain on exertion, shortness of breath, lower extremity swelling  · Respiratory  o Denies  o : wheezing, chronic cough, coughing up blood  · Gastrointestinal  o Denies  o : diarrhea, chronic abdominal pain, reflux symptoms      Vitals  Date Time BP Position Site L\R Cuff Size HR RR TEMP (F) WT  HT  BMI kg/m2 BSA m2 O2 Sat FR L/min FiO2 HC       11/16/2020 01:56 PM       14  257lbs 0oz 6'  5\" 30.48 2.52                 Assessment  · Encounter for examination following surgery     V67.00/Z09      Plan  · Medications  o Medications have been Reconciled  o Transition of Care or Provider Policy     I have recommended a follow-up colonoscopy in three years.             Electronically Signed by: Tracy Clark-, -Author on November 17, 2020 03:29:17 PM  Electronically Co-signed by: Jed Villarreal MD -Reviewer on November 18, 2020 11:28:35 AM  "

## 2021-05-13 NOTE — PROGRESS NOTES
Progress Note      Patient Name: Edi Weston   Patient ID: [id number]   Sex: Male   YOB: 1945    Primary Care Provider: Jean Marie Joaquin III MD   Referring Provider: Ralph Whyte DPLOPEZ    Visit Date: August 25, 2020    Provider: Jean Marie Joaquin III MD   Location: [facility]   Location Address: [address] [place] [address]  [id number]   Location Phone: [phone]          Chief Complaint  · 6 month follow up dm type II      History Of Present Illness  Edi Weston is a 74 year old /White male with a history of type 2 diabetes, gout and hypertension. The patient is here for a six month follow up.      HPI     patient is a 75-year-old with type 2 diabetes, has gout, has hypertension and has had  2018 3 tubular adenomas one tubulovillous.  here for his 6-month checkup.    Review of systems     cardiovascular no chest pain no palpitations patient is exercising  Respiratory no shortness of breath no dyspnea on exertion.  GI 1 tubulovillous adenoma and 2 tubular adenoma less than 2018  Another colonoscopy this year by Dr. Villarreal.    Musculoskeletal toes since he had the surgery On his toes.    Physical exam    pulse ox is 98% heart rate is 74 temperature 97.4 blood pressure 130/60 weight is 216 this is a 1 pound weight pain  General no distress  Cardiovascular regular rhythm no murmur  Respiratory no increased work of breathing lungs clear and equal bilaterally no wheezes no rales no rhonchi      Assessment     #1 type 2 diabetes needs A1c   #2 hypertension controlled   #3 3 tubular adenomas to be scoped this year      Plan     as above recheck 6 months a hemoglobin A1c   flu shot in October   coronavirus vaccine if available first part of the year       Past Medical History  Disease Name Date Onset Notes   Aftercare following surgery 02/19/2020 --    Arthritis --  --    Cellulitis of right lower limb --  --    Colon Polyps --  --    Decubitus ulcer of foot, stage 1  08/10/2018 --    Decubitus ulcer of foot, stage 3 02/07/2018 --    Diabetes --  --    Diabetes Mellitus, Type II, Uncontrolled --  --    Foot pain, left 08/10/2018 --    Foot pain, right --  --    Foot ulcer 07/19/2018 --    Gout --  --    Hammertoe 12/30/2019 --    High blood pressure --  --    High cholesterol 02/18/2019 --    History of colonoscopy with polypectomy 08/25/2020 2018 tubular adenoma. colonoscopy scheduled for 10/19/2020 with dr day   Hyperlipidemia --  --    Hypertension, essential --  --    Ingrowing nail 11/02/2018 --    Medication management 02/18/2019 --    Nail dystrophy --  --    PAT (paroxysmal atrial tachycardia) 08/06/2018 --    Pressure ulcer, stage 1 --  --    Tinea unguium --  --    Type 2 diabetes mellitus with foot ulcer --  --    Type 2 diabetes mellitus with polyneuropathy --  --    Type 2 diabetes, controlled, with peripheral neuropathy 08/10/2018 --          Past Surgical History  Procedure Name Date Notes   Colonoscopy --  --    Gastric banding 2007 --          Medication List  Name Date Started Instructions   allopurinol 300 mg oral tablet 08/25/2020 TAKE 1 TABLET BY MOUTH ONCE DAILY   Centrum Silver 0.4-300-250 mg-mcg-mcg oral tablet  take 1 tablet by oral route daily   glyburide 5 mg oral tablet 08/25/2020 TAKE 1/2 (ONE-HALF) TABLET BY MOUTH ONCE DAILY BEFORE BREAKFAST   hydrochlorothiazide 12.5 mg oral tablet 08/25/2020 TAKE 1 TABLET BY MOUTH ONCE DAILY   lisinopril 20 mg oral tablet 08/25/2020 TAKE 1 TABLET BY MOUTH ONCE DAILY   metformin 500 mg oral tablet extended release 24 hr 08/25/2020 TAKE 1 TABLET BY MOUTH ONCE DAILY WITH EVENING MEAL   metoprolol succinate 25 mg oral tablet extended release 24 hr 06/08/2020 TAKE 1 TABLET BY MOUTH ONCE DAILY   mupirocin 2 % topical ointment 04/10/2019 APPLY TO THE AFFECTED AREA EVERY DAY FOR 14 DAYS.   One touch ultra blue Test Strips 08/08/2018 Test one time daily   simvastatin 20 mg oral tablet 08/25/2020 TAKE 1 TABLET BY MOUTH IN  THE EVENING         Allergy List  Allergen Name Date Reaction Notes   SULFA (SULFONAMIDES) --  --  --          Family Medical History  Disease Name Relative/Age Notes   Colon Cancer Father/   --    Renal Calculus Mother/   Mother   Family history of colon cancer Father/50s  Grandfather (paternal)/60s   Father/50s; Grandfather (paternal)/60s   -Father's Family History Unknown Father/   Father   -Mother's Family History Unknown Mother/   Mother         Social History  Finding Status Start/Stop Quantity Notes   Advance Care Plan/Surrogate Decision Maker scanned into EMR --  --/-- --  --    Alcohol Current some day --/-- --  06/26/2017 -    Exercises regularly --  --/-- --  --     --  --/-- --  --    Moderate Amount of Exercise (1-3 times weekly) --  --/-- --  --    Tobacco Former 17/29 2 pk QD Smoked X 20 years Quit 1990         Immunizations  NameDate Admin Mfg Trade Name Lot Number Route Inj VIS Given VIS Publication   Hepatitis A02/19/2019 SKB HAVRIX-ADULT  NE NE 08/27/2019    Comments: pharmacy   Hepatitis A08/06/2018 NE Not Entered  NE NE     Comments: #1   Ykrtdtzij82/04/2019 [facility]C FLUZONE-HIGH DOSE [id number] IM LA 10/04/2019    Comments: Patient received HD flu vaccine at [facility].   Wppqeuanl84/16/2019 NE Fluarix, quadrivalent, preservative free  NE NE     Comments:    Bylcmdjem17/12/2013 NE Not Entered  NE NE 10/14/2013    Comments:    Prevnar 1301/05/2017 [facility] PREVNAR 13 [id number] IM LD 01/05/2017 11/05/2015   Comments:    Qwnlqybw05/02/2019 NE Not Entered  NE NE 02/18/2019    Comments: shingrix 2   Apxpqiqp88/12/2018 NE Not Entered  NE NE 02/18/2019    Comments: shingrix 1         Review of Systems  · Constitutional  o * See HPI  · Eyes  o * See HPI  · HENT  o * See HPI  · Breasts  o * See HPI  · Cardiovascular  o * See HPI  · Respiratory  o * See HPI  · Gastrointestinal  o * See HPI  · Genitourinary  o * See HPI  · Integument  o * See HPI  · Neurologic  o * See HPI  · Musculoskeletal  o * See  "HPI  · Endocrine  o * See HPI  · Psychiatric  o * See HPI  · Heme-Lymph  o * See HPI  · Allergic-Immunologic  o * See HPI      Vitals  Date Time BP Position Site L\R Cuff Size HR RR TEMP (F) WT  HT  BMI kg/m2 BSA m2 O2 Sat HC       08/27/2019 02:20 /60 Sitting       276lbs 0oz 6'  5\" 32.73 2.61     08/25/2020 02:03 /60 Sitting    74 - R  97.4 260lbs 0oz 6'  5\" 30.83 2.53 98 %              Results  · In-Office Procedures  o Lab procedure  § IOP - Urinalysis without Microscopy (Clinitek) [facility] ([id number])   § Color Ur: Yellow   § Clarity Ur: Clear   § Glucose Ur Ql Strip: Negative   § Bilirub Ur Ql Strip: Negative   § Ketones Ur Ql Strip: Negative   § Sp Gr Ur Qn: 1.020   § Hgb Ur Ql Strip: Negative   § pH Ur-LsCnc: 5.0   § Prot Ur Ql Strip: Negative   § Urobilinogen Ur Strip-mCnc: 0.2 E.U./dL   § Nitrite Ur Ql Strip: Negative   § WBC Est Ur Ql Strip: Negative       Assessment  · Screening for colon cancer     V76.51/Z12.11  · High cholesterol     272.0/E78.00  · Medication management     V58.69/Z79.899  · Type 2 diabetes, controlled, with peripheral neuropathy       Type 2 diabetes mellitus with diabetic polyneuropathy     250.60/E11.42  · Gout     274.9/M10.9  · History of colonoscopy with polypectomy       Other specified postprocedural states     V45.89/Z98.890  Personal history of colonic polyps     V45.89/Z86.010  2018 tubular adenoma. colonoscopy scheduled for 10/19/2020 with dr day    Problems Reconciled  Plan  · Orders  o Hgb A1c [facility] ([id number]) - V58.69/Z79.899, 250.60/E11.42 - 08/25/2020  o ACO-39: Current medications updated and reviewed () - - 08/25/2020  o ACO-19: Colorectal cancer screening results documented and reviewed (3017F) - - 08/25/2020  o ACO-41: Dilated Diabetic eye exam completed this year and results in chart/reviewed (2022F) - V58.69/Z79.899, 250.60/E11.42 - 08/25/2020  · Medications  o allopurinol 300 mg oral tablet   SIG: TAKE 1 TABLET BY MOUTH ONCE DAILY   DISP: (90) Tablet with " 1 refills  Refilled on 08/25/2020     o glyburide 5 mg oral tablet   SIG: TAKE 1/2 (ONE-HALF) TABLET BY MOUTH ONCE DAILY BEFORE BREAKFAST   DISP: (45) Tablet with 1 refills  Refilled on 08/25/2020     o hydrochlorothiazide 12.5 mg oral tablet   SIG: TAKE 1 TABLET BY MOUTH ONCE DAILY   DISP: (90) Tablet with 1 refills  Refilled on 08/25/2020     o lisinopril 20 mg oral tablet   SIG: TAKE 1 TABLET BY MOUTH ONCE DAILY   DISP: (90) Tablet with 1 refills  Refilled on 08/25/2020     o metformin 500 mg oral tablet extended release 24 hr   SIG: TAKE 1 TABLET BY MOUTH ONCE DAILY WITH EVENING MEAL   DISP: (90) Tablet with 1 refills  Refilled on 08/25/2020     o simvastatin 20 mg oral tablet   SIG: TAKE 1 TABLET BY MOUTH IN THE EVENING   DISP: (90) Tablet with 1 refills  Refilled on 08/25/2020     o Medications have been Reconciled  o Transition of Care or Provider Policy  · Instructions  o Patient is taking medications as prescribed and doing well.   o Take all medications as prescribed/directed.  o Patient instructed/educated on their diet and exercise program.  o Patient was educated/instructed on their diagnosis, treatment and medications prior to discharge from the clinic today.  o Bring all medicines with their bottles to each office visit.  o Time spent with the patient was 23 minutes, more than 50% face to face.  o Chronic conditions reviewed and taken into consideration for today's treatment plan.            Electronically Signed by: Fátima Summers, -Author on August 25, 2020 07:09:26 PM  Electronically Co-signed by: Jean Marie Joaquin III MD -Reviewer on August 26, 2020 12:06:47 PM

## 2021-05-14 VITALS
SYSTOLIC BLOOD PRESSURE: 112 MMHG | HEIGHT: 77 IN | TEMPERATURE: 96.5 F | WEIGHT: 262 LBS | DIASTOLIC BLOOD PRESSURE: 66 MMHG | OXYGEN SATURATION: 99 % | HEART RATE: 63 BPM | BODY MASS INDEX: 30.94 KG/M2

## 2021-05-14 VITALS
HEIGHT: 77 IN | HEART RATE: 63 BPM | WEIGHT: 256 LBS | BODY MASS INDEX: 30.23 KG/M2 | SYSTOLIC BLOOD PRESSURE: 118 MMHG | DIASTOLIC BLOOD PRESSURE: 56 MMHG

## 2021-05-14 VITALS
WEIGHT: 260 LBS | BODY MASS INDEX: 30.7 KG/M2 | TEMPERATURE: 97.4 F | HEART RATE: 74 BPM | SYSTOLIC BLOOD PRESSURE: 130 MMHG | DIASTOLIC BLOOD PRESSURE: 60 MMHG | OXYGEN SATURATION: 98 % | HEIGHT: 77 IN

## 2021-05-14 VITALS — RESPIRATION RATE: 14 BRPM | BODY MASS INDEX: 30.34 KG/M2 | HEIGHT: 77 IN | WEIGHT: 257 LBS

## 2021-05-14 NOTE — PROGRESS NOTES
Progress Note      Patient Name: Edi Weston   Patient ID: [id number]   Sex: Male   YOB: 1945    Primary Care Provider: Jean Marie Joaquin III MD   Referring Provider: Ralph Whyte DPLOPEZ    Visit Date: March 30, 2021    Provider: Jean Marie Joaquin III MD   Location: [facility]   Location Address: [address] [place] [address]  [id number]   Location Phone: [phone]          Chief Complaint  · Adult General Male Physical Exam      History Of Present Illness  Edi Weston is a 76 year old /White male who presents for evaluation and treatment of: complete male physical      HPI patient is 76 type 2 diabetes history of gout, had colon polyps colonoscopy 2020, has hypertension.   here for his yearly physical.    Review of systems     ENT patient is eyes checked yearly.    Cardiovascular no chest pain no palpitations  Respiratory no shortness of breath no dyspnea on exertion  GI patient colonoscopy in 2020  Musculoskeletal doing well since he had his toe surgery.  No ulcers.      Physical exam     weight is 262 this is a 6 pound weight gain  Blood pressure 112/66 temperature 96.5 pulse ox 99 heart rate 63  General no distress  HEENT sclera conjunctiva clear.  TMs are negative.  No oral lesions.  Neck no adenopathy no thyromegaly no bruits  Respiratory no increased work of breathing lungs clear and equal bilaterally no rales no rhonchi no wheezes  Cardiovascular regular rhythm no murmur  Abdomen soft nontender there is a mass where he has this balloon for his pants.  No abnormal pulsations  Genitalia testicles are normal this is normal  Rectal 1+ prostate hypertrophy no masses no nodules  Skin no lesions that are precancerous or cancerous  Neurologic mentation is normal speech is normal gait is normal  Feet filament test is 05 on the left and 05 on the right good pulses  Musculoskeletal hip show good rotation internal and external knees show no  significant crepitations    Assessment   #1type 2 diabetes blood work is done   #2 hypertension controlled   #3 status post gastric banding doing well    Plan   recheck 6 months   await blood work       Past Medical History  Disease Name Date Onset Notes   Aftercare following surgery 02/19/2020 --    Arthritis --  --    Cellulitis of right lower limb --  --    Colon Polyps --  --    Decubitus ulcer of foot, stage 1 08/10/2018 --    Decubitus ulcer of foot, stage 3 02/07/2018 --    Diabetes --  --    Diabetes Mellitus, Type II, Uncontrolled --  --    Foot pain, left 08/10/2018 --    Foot pain, right --  --    Foot ulcer 07/19/2018 --    Gout --  --    Hammertoe 12/30/2019 --    High blood pressure --  --    High cholesterol 02/18/2019 --    History of colonoscopy with polypectomy 08/25/2020 2018 tubular adenoma. colonoscopy scheduled for 10/19/2020 with dr day   Hyperlipidemia --  --    Hypertension, essential --  --    Ingrowing nail 11/02/2018 --    Medication management 02/18/2019 --    Nail dystrophy --  --    PAT (paroxysmal atrial tachycardia) 08/06/2018 --    Pressure ulcer, stage 1 --  --    Tinea unguium --  --    Type 2 diabetes mellitus with foot ulcer --  --    Type 2 diabetes mellitus with polyneuropathy --  --    Type 2 diabetes, controlled, with peripheral neuropathy 08/10/2018 --          Past Surgical History  Procedure Name Date Notes   Colonoscopy --  --    Gastric banding 2007 --          Medication List  Name Date Started Instructions   allopurinol 300 mg oral tablet 03/15/2021 Take 1 tablet by mouth once daily   Centrum Silver 0.4-300-250 mg-mcg-mcg oral tablet  take 1 tablet by oral route daily   glyburide 5 mg oral tablet 03/15/2021 TAKE 1/2 (ONE-HALF) TABLET BY MOUTH ONCE DAILY BEFORE BREAKFAST   hydrochlorothiazide 12.5 mg oral tablet 03/15/2021 Take 1 tablet by mouth once daily   lisinopril 20 mg oral tablet 03/15/2021 Take 1 tablet by mouth once daily   metformin 500 mg oral tablet  extended release 24 hr 08/25/2020 TAKE 1 TABLET BY MOUTH ONCE DAILY WITH EVENING MEAL   metoprolol succinate 25 mg oral tablet extended release 24 hr 06/08/2020 TAKE 1 TABLET BY MOUTH ONCE DAILY   mupirocin 2 % topical ointment 04/10/2019 APPLY TO THE AFFECTED AREA EVERY DAY FOR 14 DAYS.   One touch ultra blue Test Strips 08/08/2018 Test one time daily   simvastatin 20 mg oral tablet 03/15/2021 Take 1 tablet by mouth in the evening         Allergy List  Allergen Name Date Reaction Notes   SULFA (SULFONAMIDES) --  --  --        Allergies Reconciled  Family Medical History  Disease Name Relative/Age Notes   Colon Cancer Father/   --    Renal Calculus Mother/   Mother   Family history of colon cancer Father/50s  Grandfather (paternal)/60s   Father/50s; Grandfather (paternal)/60s   -Father's Family History Unknown Father/   Father   -Mother's Family History Unknown Mother/   Mother         Social History  Finding Status Start/Stop Quantity Notes   Advance Care Plan/Surrogate Decision Maker scanned into EMR --  --/-- --  --    Alcohol Current some day --/-- --  11/16/2020 - 06/26/2017 -    Exercises regularly --  --/-- --  --     --  --/-- --  --    Moderate Amount of Exercise (1-3 times weekly) --  --/-- --  --    Tobacco Former 17/29 2 pk QD Smoked X 20 years Quit 1990         Immunizations  NameDate Admin Mfg Trade Name Lot Number Route Inj VIS Given VIS Publication   COVID [id number]/12/2021 NE Pfizer-BioN[employer] COVID-19 Vaccine  NE NE 03/30/2021    Comments:    COVID Igpqdd1202/19/2021 NE Pfizer-BioN[employer] COVID-19 Vaccine  NE NE 03/30/2021    Comments:    Hepatitis A02/19/2019 SKB HAVRIX-ADULT  NE NE 08/27/2019    Comments: pharmacy   Hepatitis A08/06/2018 NE Not Entered  NE NE     Comments: #1   Rkvvnwjyf53/04/2019 PMC FLUZONE-HIGH DOSE [id number] IM LA 10/04/2019    Comments: Patient received HD flu vaccine at [facility].   Prevnar 1301/05/2017 [facility] PREVNAR 13 [id number] IM LD 01/05/2017 11/05/2015   Comments:   "  Jewhugzc80/02/2019 NE Not Entered  NE NE 02/18/2019    Comments: shingrix 2   Pjdjegeq22/12/2018 NE Not Entered  NE NE 02/18/2019    Comments: shingrix 1         Vitals  Date Time BP Position Site L\R Cuff Size HR RR TEMP (F) WT  HT  BMI kg/m2 BSA m2 O2 Sat FR L/min FiO2 HC       03/30/2021 03:47 /66 Sitting    63 - R  96.5 262lbs 0oz 6'  5\" 31.07 2.54 99 %  21%              Results  · In-Office Procedures  o Lab procedure  § IOP - Urinalysis without Microscopy (Clinitek) [facility] (06375)   § Color Ur: Yellow   § Clarity Ur: Clear   § Glucose Ur Ql Strip: Negative   § Bilirub Ur Ql Strip: Negative   § Ketones Ur Ql Strip: Negative   § Sp Gr Ur Qn: 1.030   § Hgb Ur Ql Strip: Negative   § pH Ur-LsCnc: 5.5   § Prot Ur Ql Strip: Negative   § Urobilinogen Ur Strip-mCnc: 0.2 E.U./dL   § Nitrite Ur Ql Strip: Negative   § WBC Est Ur Ql Strip: Negative       Assessment  · General Medical Exam, Adult (CPE)     V70.0/Z00.00  · Screening for depression     V79.0/Z13.89  · Annual physical exam     V70.0/Z00.00  · Medication management     V58.69/Z79.899  · Hypertension, essential     401.9/I10  · Diabetes mellitus out of control     250.02/E11.65  · Encounter for prostate cancer screening     V76.44/Z12.5  · Encounter for abdominal aortic aneurysm screening     V81.2/Z13.6      Plan  · Orders  o Hgb A1c [facility] (95625) - 250.02/E11.65, V58.69/Z79.899, V70.0/Z00.00 - 03/30/2021  o ACO-18: Negative screen for clinical depression using a standardized tool () - - 03/30/2021  o ACO-15: Pneumococcal Vaccine Administered or Previously Received [facility] (4040F) - - 03/30/2021  o ACO-14: Influenza immunization administered or previously received [facility] () - - 03/30/2021  o ACO-19: Colorectal cancer screening results documented and reviewed (3017F) - - 03/30/2021  o ACO-39: Current medications updated and reviewed (1159F, ) - - 03/30/2021  o Male Physical Primary Care Panel (CMP, CBC, TSH, Lipid, PSA) [facility] (73254, 77830, 52734, " 47564, 45002, ) - 250.02/E11.65, V76.44/Z12.5, V81.2/Z13.6, V70.0/Z00.00 - 03/30/2021  o Microalbumin urine (31360) - 250.02/E11.65, V58.69/Z79.899 - 03/30/2021  · Medications  o allopurinol 300 mg oral tablet   SIG: Take 1 tablet by mouth once daily   DISP: (90) Tablet with 3 refills  Refilled on 03/30/2021     o glyburide 5 mg oral tablet   SIG: TAKE 1/2 (ONE-HALF) TABLET BY MOUTH ONCE DAILY BEFORE BREAKFAST   DISP: (45) Tablet with 1 refills  Refilled on 03/30/2021     o hydrochlorothiazide 12.5 mg oral tablet   SIG: Take 1 tablet by mouth once daily   DISP: (90) Tablet with 3 refills  Refilled on 03/30/2021     o lisinopril 20 mg oral tablet   SIG: Take 1 tablet by mouth once daily   DISP: (90) Tablet with 3 refills  Refilled on 03/30/2021     o metformin 500 mg oral tablet extended release 24 hr   SIG: TAKE 1 TABLET BY MOUTH ONCE DAILY WITH EVENING MEAL for 90 days   DISP: (90) Tablet with 1 refills  Refilled on 03/30/2021     o simvastatin 20 mg oral tablet   SIG: Take 1 tablet by mouth in the evening   DISP: (90) Tablet with 1 refills  Refilled on 03/30/2021     o Medications have been Reconciled  o Transition of Care or Provider Policy  · Instructions  o Depression Screen completed and scanned into the EMR under the designated folder within the patient's documents.  o Today's PHQ-9 result is _0__  o Reviewed health maintenance flowsheet and updated information. Orders were placed and/or patient's response was documented.  o Patient is taking medications as prescribed and doing well.   o Take all medications as prescribed/directed.  o Patient was educated/instructed on their diagnosis, treatment and medications prior to discharge from the clinic today.  o Patient was instructed to exercise regularly.  o Call the office with any concerns or questions.  o Bring all medicines with their bottles to each office visit.  o Minutes spent with patient including greater than 50% in Education/Counseling/Care  Coordination.            Electronically Signed by: Lori Hernandez, -Author on March 30, 2021 05:58:05 PM  Electronically Co-signed by: Jean Marie Joaquin III MD -Reviewer on March 30, 2021 05:59:07 PM

## 2021-05-14 NOTE — PROGRESS NOTES
"   Progress Note      Patient Name: Edi Weston   Patient ID: [id number]   Sex: Male   YOB: 1945    Primary Care Provider: Jean Marie Joaquin III MD   Referring Provider: Ralph Whyte DPM    Visit Date: January 21, 2021    Provider: Kar Ledezma MD   Location: [facility]   Location Address: [address] [place] [address], [facility]   [place], [place]  [id number]   Location Phone: [phone]          Chief Complaint     SVT.       History Of Present Illness  REFERRING CARE PROVIDER: Ralph Whyte DPM   Edi Weston is a 75 year old /White male with diabetes, hypertension, paroxysmal SVT who has been doing very well symptom wise. He has not had any recurrent tachycardic episodes sustaining in nature. He has also been having increased exercise and weight loss of 34 pounds since the last visit. The patient denies any chest pain or shortness of breath.   PAST MEDICAL HISTORY: Diabetes mellitus; Hypertension; Paroxysmal supraventricular tachycardia; Polyps.   PSYCHOSOCIAL HISTORY: Denies mood changes or depression. Rarely consumes alcohol. Previously smoked, but quit.   CURRENT MEDICATIONS: include . The dosage and frequency of the medications were reviewed with the patient.      ALLERGIES: Sulfa (sulfonamides).       Review of Systems  · Cardiovascular  o Denies  o : palpitations (fast, fluttering, or skipping beats), swelling (feet, ankles, hands), shortness of breath while walking or lying flat, chest pain or angina pectoris   · Respiratory  o Admits  o : asthma or wheezing  o Denies  o : chronic or frequent cough      Vitals  Date Time BP Position Site L\R Cuff Size HR RR TEMP (F) WT  HT  BMI kg/m2 BSA m2 O2 Sat FR L/min FiO2 HC       01/21/2021 09:48 /56 Sitting    63 - R   256lbs 0oz 6'  5\" 30.36 2.51             Physical Examination  · Constitutional  o Appearance  o : Awake, alert, in no acute distress.   · Eyes  o Conjunctivae  o : Normal.  · Ears, Nose, Mouth and Throat  o Oral " Cavity  o :   § Oral Mucosa  § : Normal.  · Neck  o Inspection/Palpation  o : No JVD. Good carotid upstroke. No thyromegaly.  · Respiratory  o Respiratory  o : Good respiratory effort. Clear to percussion and auscultation.  · Cardiovascular  o Heart  o :   § Auscultation of Heart  § : S1, S2 normal. Regular rate and rhythm without murmurs, gallops, or rubs.  o Peripheral Vascular System  o :   § Extremities  § : Good femoral and pedal pulses. No pedal edema.  · Gastrointestinal  o Abdominal Examination  o : Soft. No tenderness or masses felt. No hepatosplenomegaly. Abdominal aorta is not palpable.  · EKG  o EKG  o : Was performed in the office today.  o Indications  o : SVT.  o Results  o : Showed normal sinus rhythm with first degree AV block and a nonspecific intraventricular conduction delay.   o Comparison  o : Essentially unchanged from his prior EKG, QRS is mildly increased in prolongation.          Assessment     ASSESSMENT AND PLAN:   1.  Paroxysmal SVT, symptomatically stable. No recurrent tachycardic spells. Continue with ongoing metoprolol dose.  2.  Obesity. The patient has had a significant amount of weight loss and increase in activity level since last visit.                Electronically Signed by: Rhea Pineda-Indu, OT -Author on January 29, 2021 09:06:58 AM  Electronically Co-signed by: Kar Ledezma MD -Reviewer on February 1, 2021 03:23:25 PM

## 2021-05-15 VITALS
WEIGHT: 279 LBS | SYSTOLIC BLOOD PRESSURE: 120 MMHG | DIASTOLIC BLOOD PRESSURE: 70 MMHG | HEIGHT: 77 IN | BODY MASS INDEX: 32.94 KG/M2

## 2021-05-15 VITALS
DIASTOLIC BLOOD PRESSURE: 60 MMHG | SYSTOLIC BLOOD PRESSURE: 120 MMHG | HEIGHT: 77 IN | BODY MASS INDEX: 32.59 KG/M2 | WEIGHT: 276 LBS

## 2021-05-15 VITALS — WEIGHT: 259.5 LBS | RESPIRATION RATE: 16 BRPM | HEIGHT: 77 IN | BODY MASS INDEX: 30.64 KG/M2

## 2021-05-15 VITALS
SYSTOLIC BLOOD PRESSURE: 119 MMHG | OXYGEN SATURATION: 98 % | HEART RATE: 54 BPM | BODY MASS INDEX: 34.83 KG/M2 | WEIGHT: 286 LBS | DIASTOLIC BLOOD PRESSURE: 70 MMHG | HEIGHT: 76 IN

## 2021-05-15 VITALS
HEIGHT: 77 IN | WEIGHT: 276 LBS | DIASTOLIC BLOOD PRESSURE: 80 MMHG | BODY MASS INDEX: 32.59 KG/M2 | SYSTOLIC BLOOD PRESSURE: 130 MMHG | HEART RATE: 62 BPM

## 2021-05-15 VITALS
SYSTOLIC BLOOD PRESSURE: 132 MMHG | HEART RATE: 64 BPM | OXYGEN SATURATION: 98 % | HEIGHT: 77 IN | DIASTOLIC BLOOD PRESSURE: 82 MMHG | WEIGHT: 280 LBS | BODY MASS INDEX: 33.06 KG/M2

## 2021-05-15 VITALS
DIASTOLIC BLOOD PRESSURE: 71 MMHG | HEART RATE: 59 BPM | SYSTOLIC BLOOD PRESSURE: 113 MMHG | WEIGHT: 278 LBS | HEIGHT: 77 IN | OXYGEN SATURATION: 100 % | BODY MASS INDEX: 32.82 KG/M2

## 2021-05-15 VITALS
HEART RATE: 63 BPM | DIASTOLIC BLOOD PRESSURE: 79 MMHG | SYSTOLIC BLOOD PRESSURE: 131 MMHG | WEIGHT: 286 LBS | HEIGHT: 77 IN | OXYGEN SATURATION: 96 % | BODY MASS INDEX: 33.77 KG/M2

## 2021-05-15 VITALS
SYSTOLIC BLOOD PRESSURE: 132 MMHG | DIASTOLIC BLOOD PRESSURE: 88 MMHG | WEIGHT: 280 LBS | BODY MASS INDEX: 33.06 KG/M2 | HEIGHT: 77 IN | HEART RATE: 62 BPM

## 2021-05-15 VITALS
WEIGHT: 284 LBS | OXYGEN SATURATION: 97 % | BODY MASS INDEX: 33.53 KG/M2 | HEART RATE: 51 BPM | SYSTOLIC BLOOD PRESSURE: 128 MMHG | DIASTOLIC BLOOD PRESSURE: 72 MMHG | HEIGHT: 77 IN

## 2021-05-15 VITALS
HEART RATE: 61 BPM | OXYGEN SATURATION: 98 % | SYSTOLIC BLOOD PRESSURE: 129 MMHG | HEIGHT: 77 IN | WEIGHT: 280 LBS | BODY MASS INDEX: 33.06 KG/M2 | DIASTOLIC BLOOD PRESSURE: 73 MMHG

## 2021-05-15 VITALS
BODY MASS INDEX: 32.47 KG/M2 | HEART RATE: 58 BPM | SYSTOLIC BLOOD PRESSURE: 140 MMHG | DIASTOLIC BLOOD PRESSURE: 73 MMHG | HEIGHT: 77 IN | OXYGEN SATURATION: 99 % | WEIGHT: 275 LBS

## 2021-05-15 VITALS
WEIGHT: 276 LBS | SYSTOLIC BLOOD PRESSURE: 120 MMHG | HEIGHT: 77 IN | BODY MASS INDEX: 32.59 KG/M2 | DIASTOLIC BLOOD PRESSURE: 70 MMHG

## 2021-05-15 VITALS
DIASTOLIC BLOOD PRESSURE: 72 MMHG | OXYGEN SATURATION: 100 % | HEART RATE: 66 BPM | HEIGHT: 77 IN | BODY MASS INDEX: 33.65 KG/M2 | SYSTOLIC BLOOD PRESSURE: 145 MMHG | WEIGHT: 285 LBS

## 2021-05-15 VITALS
SYSTOLIC BLOOD PRESSURE: 125 MMHG | HEIGHT: 77 IN | BODY MASS INDEX: 32.71 KG/M2 | HEART RATE: 59 BPM | WEIGHT: 277 LBS | OXYGEN SATURATION: 99 % | DIASTOLIC BLOOD PRESSURE: 71 MMHG

## 2021-05-15 VITALS
DIASTOLIC BLOOD PRESSURE: 64 MMHG | BODY MASS INDEX: 32.71 KG/M2 | HEART RATE: 57 BPM | OXYGEN SATURATION: 98 % | WEIGHT: 277 LBS | HEIGHT: 77 IN | SYSTOLIC BLOOD PRESSURE: 120 MMHG

## 2021-05-15 VITALS
DIASTOLIC BLOOD PRESSURE: 72 MMHG | BODY MASS INDEX: 33.65 KG/M2 | SYSTOLIC BLOOD PRESSURE: 113 MMHG | OXYGEN SATURATION: 99 % | HEIGHT: 77 IN | HEART RATE: 71 BPM | WEIGHT: 285 LBS

## 2021-05-15 VITALS
BODY MASS INDEX: 33.18 KG/M2 | HEART RATE: 61 BPM | HEIGHT: 77 IN | WEIGHT: 281 LBS | OXYGEN SATURATION: 97 % | SYSTOLIC BLOOD PRESSURE: 111 MMHG | DIASTOLIC BLOOD PRESSURE: 71 MMHG

## 2021-05-15 VITALS
HEART RATE: 58 BPM | WEIGHT: 276 LBS | SYSTOLIC BLOOD PRESSURE: 127 MMHG | OXYGEN SATURATION: 100 % | DIASTOLIC BLOOD PRESSURE: 61 MMHG | HEIGHT: 77 IN | BODY MASS INDEX: 32.59 KG/M2

## 2021-05-16 VITALS
WEIGHT: 275 LBS | SYSTOLIC BLOOD PRESSURE: 105 MMHG | BODY MASS INDEX: 32.47 KG/M2 | HEIGHT: 77 IN | OXYGEN SATURATION: 98 % | HEART RATE: 49 BPM | DIASTOLIC BLOOD PRESSURE: 64 MMHG

## 2021-05-16 VITALS
DIASTOLIC BLOOD PRESSURE: 64 MMHG | BODY MASS INDEX: 33.06 KG/M2 | WEIGHT: 280 LBS | OXYGEN SATURATION: 99 % | SYSTOLIC BLOOD PRESSURE: 108 MMHG | HEART RATE: 52 BPM | HEIGHT: 77 IN

## 2021-05-16 VITALS
DIASTOLIC BLOOD PRESSURE: 86 MMHG | WEIGHT: 282 LBS | SYSTOLIC BLOOD PRESSURE: 130 MMHG | HEART RATE: 66 BPM | HEIGHT: 77 IN | BODY MASS INDEX: 33.3 KG/M2

## 2021-05-16 VITALS — BODY MASS INDEX: 32.82 KG/M2 | WEIGHT: 278 LBS | HEIGHT: 77 IN | OXYGEN SATURATION: 97 % | HEART RATE: 85 BPM

## 2021-05-16 VITALS
SYSTOLIC BLOOD PRESSURE: 107 MMHG | OXYGEN SATURATION: 98 % | HEART RATE: 54 BPM | BODY MASS INDEX: 32.94 KG/M2 | DIASTOLIC BLOOD PRESSURE: 66 MMHG | HEIGHT: 77 IN | WEIGHT: 279 LBS

## 2021-05-16 VITALS
DIASTOLIC BLOOD PRESSURE: 72 MMHG | SYSTOLIC BLOOD PRESSURE: 120 MMHG | WEIGHT: 282 LBS | HEIGHT: 76 IN | BODY MASS INDEX: 34.34 KG/M2

## 2021-05-16 VITALS
HEIGHT: 76 IN | DIASTOLIC BLOOD PRESSURE: 74 MMHG | WEIGHT: 276 LBS | SYSTOLIC BLOOD PRESSURE: 110 MMHG | BODY MASS INDEX: 33.61 KG/M2

## 2021-05-16 VITALS — HEIGHT: 76 IN | BODY MASS INDEX: 30.44 KG/M2 | HEART RATE: 64 BPM | OXYGEN SATURATION: 98 % | WEIGHT: 250 LBS

## 2021-05-16 VITALS
HEART RATE: 59 BPM | HEIGHT: 77 IN | SYSTOLIC BLOOD PRESSURE: 113 MMHG | BODY MASS INDEX: 31.64 KG/M2 | DIASTOLIC BLOOD PRESSURE: 72 MMHG | OXYGEN SATURATION: 96 % | WEIGHT: 268 LBS

## 2021-05-16 VITALS
OXYGEN SATURATION: 97 % | DIASTOLIC BLOOD PRESSURE: 69 MMHG | WEIGHT: 281 LBS | BODY MASS INDEX: 33.18 KG/M2 | SYSTOLIC BLOOD PRESSURE: 117 MMHG | HEART RATE: 88 BPM | HEIGHT: 77 IN

## 2021-05-16 VITALS
SYSTOLIC BLOOD PRESSURE: 113 MMHG | RESPIRATION RATE: 18 BRPM | TEMPERATURE: 98 F | WEIGHT: 280 LBS | BODY MASS INDEX: 33.06 KG/M2 | DIASTOLIC BLOOD PRESSURE: 81 MMHG | HEART RATE: 72 BPM | OXYGEN SATURATION: 97 % | HEIGHT: 77 IN

## 2021-05-16 VITALS — HEIGHT: 77 IN | RESPIRATION RATE: 16 BRPM | WEIGHT: 279 LBS | BODY MASS INDEX: 32.94 KG/M2

## 2021-05-16 VITALS — WEIGHT: 285 LBS | HEART RATE: 87 BPM | BODY MASS INDEX: 33.65 KG/M2 | OXYGEN SATURATION: 96 % | HEIGHT: 77 IN

## 2021-05-16 VITALS
SYSTOLIC BLOOD PRESSURE: 118 MMHG | HEIGHT: 77 IN | WEIGHT: 280 LBS | DIASTOLIC BLOOD PRESSURE: 70 MMHG | BODY MASS INDEX: 33.06 KG/M2

## 2021-05-16 VITALS
DIASTOLIC BLOOD PRESSURE: 77 MMHG | HEIGHT: 77 IN | HEART RATE: 61 BPM | BODY MASS INDEX: 33.44 KG/M2 | OXYGEN SATURATION: 98 % | SYSTOLIC BLOOD PRESSURE: 127 MMHG | WEIGHT: 283.25 LBS | TEMPERATURE: 97.7 F

## 2021-05-16 VITALS — OXYGEN SATURATION: 98 % | WEIGHT: 280 LBS | HEIGHT: 77 IN | BODY MASS INDEX: 33.06 KG/M2 | HEART RATE: 65 BPM

## 2021-05-16 VITALS — WEIGHT: 273 LBS | HEIGHT: 77 IN | HEART RATE: 66 BPM | OXYGEN SATURATION: 100 % | BODY MASS INDEX: 32.23 KG/M2

## 2021-05-16 VITALS
HEART RATE: 55 BPM | WEIGHT: 282 LBS | OXYGEN SATURATION: 100 % | SYSTOLIC BLOOD PRESSURE: 126 MMHG | BODY MASS INDEX: 33.3 KG/M2 | DIASTOLIC BLOOD PRESSURE: 79 MMHG | HEIGHT: 77 IN

## 2021-05-16 VITALS — HEIGHT: 76 IN | WEIGHT: 282 LBS | BODY MASS INDEX: 34.34 KG/M2 | OXYGEN SATURATION: 97 % | HEART RATE: 69 BPM

## 2021-05-16 VITALS — HEART RATE: 74 BPM | WEIGHT: 278 LBS | HEIGHT: 77 IN | OXYGEN SATURATION: 97 % | BODY MASS INDEX: 32.82 KG/M2

## 2021-05-16 VITALS
HEIGHT: 77 IN | SYSTOLIC BLOOD PRESSURE: 122 MMHG | HEART RATE: 54 BPM | BODY MASS INDEX: 33.65 KG/M2 | OXYGEN SATURATION: 100 % | WEIGHT: 285 LBS | DIASTOLIC BLOOD PRESSURE: 77 MMHG

## 2021-05-16 VITALS — HEIGHT: 77 IN | BODY MASS INDEX: 33.06 KG/M2 | WEIGHT: 280 LBS | OXYGEN SATURATION: 98 % | HEART RATE: 68 BPM

## 2021-05-16 VITALS — BODY MASS INDEX: 34.46 KG/M2 | HEIGHT: 76 IN | WEIGHT: 283 LBS | OXYGEN SATURATION: 98 % | HEART RATE: 59 BPM

## 2021-09-30 ENCOUNTER — OFFICE VISIT (OUTPATIENT)
Dept: FAMILY MEDICINE CLINIC | Facility: CLINIC | Age: 76
End: 2021-09-30

## 2021-09-30 VITALS
BODY MASS INDEX: 31.76 KG/M2 | HEART RATE: 60 BPM | TEMPERATURE: 97.9 F | HEIGHT: 77 IN | OXYGEN SATURATION: 97 % | SYSTOLIC BLOOD PRESSURE: 120 MMHG | WEIGHT: 269 LBS | DIASTOLIC BLOOD PRESSURE: 68 MMHG

## 2021-09-30 DIAGNOSIS — Z79.899 MEDICATION MANAGEMENT: Primary | ICD-10-CM

## 2021-09-30 DIAGNOSIS — E11.42 TYPE 2 DIABETES, CONTROLLED, WITH PERIPHERAL NEUROPATHY (HCC): ICD-10-CM

## 2021-09-30 PROBLEM — M20.40 HAMMERTOE: Status: ACTIVE | Noted: 2019-12-30

## 2021-09-30 PROBLEM — Z98.890 HISTORY OF COLONOSCOPY WITH POLYPECTOMY: Status: ACTIVE | Noted: 2020-08-25

## 2021-09-30 PROBLEM — Z86.0100 HISTORY OF COLONOSCOPY WITH POLYPECTOMY: Status: ACTIVE | Noted: 2020-08-25

## 2021-09-30 PROBLEM — Z86.010 HISTORY OF COLONOSCOPY WITH POLYPECTOMY: Status: ACTIVE | Noted: 2020-08-25

## 2021-09-30 PROBLEM — I47.1 PAT (PAROXYSMAL ATRIAL TACHYCARDIA): Status: ACTIVE | Noted: 2018-08-06

## 2021-09-30 PROBLEM — I47.19 PAT (PAROXYSMAL ATRIAL TACHYCARDIA): Status: ACTIVE | Noted: 2018-08-06

## 2021-09-30 PROCEDURE — 99214 OFFICE O/P EST MOD 30 MIN: CPT | Performed by: FAMILY MEDICINE

## 2021-09-30 RX ORDER — GLYBURIDE 2.5 MG/1
2.5 TABLET ORAL
Qty: 90 TABLET | Refills: 3 | Status: SHIPPED | OUTPATIENT
Start: 2021-09-30 | End: 2022-03-29 | Stop reason: SDUPTHER

## 2021-09-30 RX ORDER — SIMVASTATIN 20 MG
20 TABLET ORAL EVERY EVENING
COMMUNITY
Start: 2021-09-09 | End: 2021-09-30 | Stop reason: SDUPTHER

## 2021-09-30 RX ORDER — METFORMIN HYDROCHLORIDE 500 MG/1
500 TABLET, EXTENDED RELEASE ORAL EVERY EVENING
COMMUNITY
End: 2021-09-30 | Stop reason: SDUPTHER

## 2021-09-30 RX ORDER — GLYBURIDE 2.5 MG/1
2.5 TABLET ORAL
COMMUNITY
End: 2021-09-30 | Stop reason: SDUPTHER

## 2021-09-30 RX ORDER — HYDROCHLOROTHIAZIDE 12.5 MG/1
12.5 TABLET ORAL DAILY
COMMUNITY
Start: 2021-09-09 | End: 2021-09-30 | Stop reason: DRUGHIGH

## 2021-09-30 RX ORDER — LISINOPRIL AND HYDROCHLOROTHIAZIDE 20; 12.5 MG/1; MG/1
1 TABLET ORAL DAILY
COMMUNITY
End: 2021-09-30 | Stop reason: SDUPTHER

## 2021-09-30 RX ORDER — METOPROLOL SUCCINATE 25 MG/1
25 TABLET, EXTENDED RELEASE ORAL DAILY
COMMUNITY
Start: 2021-07-29 | End: 2021-09-30 | Stop reason: SDUPTHER

## 2021-09-30 RX ORDER — METOPROLOL SUCCINATE 25 MG/1
25 TABLET, EXTENDED RELEASE ORAL DAILY
Qty: 30 TABLET | Refills: 4 | Status: SHIPPED | OUTPATIENT
Start: 2021-09-30 | End: 2022-02-08 | Stop reason: SDUPTHER

## 2021-09-30 RX ORDER — METFORMIN HYDROCHLORIDE 500 MG/1
500 TABLET, EXTENDED RELEASE ORAL EVERY EVENING
Qty: 90 TABLET | Refills: 3 | Status: SHIPPED | OUTPATIENT
Start: 2021-09-30 | End: 2022-03-29 | Stop reason: SDUPTHER

## 2021-09-30 RX ORDER — SIMVASTATIN 20 MG
20 TABLET ORAL EVERY EVENING
Qty: 90 TABLET | Refills: 3 | Status: SHIPPED | OUTPATIENT
Start: 2021-09-30 | End: 2022-03-29 | Stop reason: SDUPTHER

## 2021-09-30 RX ORDER — LISINOPRIL AND HYDROCHLOROTHIAZIDE 20; 12.5 MG/1; MG/1
1 TABLET ORAL DAILY
Qty: 90 TABLET | Refills: 3 | Status: SHIPPED | OUTPATIENT
Start: 2021-09-30 | End: 2022-03-29 | Stop reason: SDUPTHER

## 2021-09-30 RX ORDER — ALLOPURINOL 300 MG/1
300 TABLET ORAL DAILY
COMMUNITY
End: 2022-02-08

## 2021-09-30 NOTE — PROGRESS NOTES
Chief Complaint  Diabetes (6 month follow up)    Subjective          Edi Weston presents to [facility]  History of Present Illness  76-year-old type 2 diabetes history of gout hypertension here for his 6-month evaluation his diabetes    Allergies   Allergen Reactions   • Sulfa Antibiotics Unknown - Low Severity     Unsure of reaction        Past Surgical History:   • COLONOSCOPY   • GASTRIC BANDING       Social History     Tobacco Use   • Smoking status: Former Smoker     Packs/day: 2.00     Years: 20.00     Pack years: 40.00     Start date:      Quit date:      Years since quittin.7   • Smokeless tobacco: Never Used   • Tobacco comment: AGE STARTED 19 QUIT AGE 31 - SMOKED X 20 YEARS QUIT    Substance Use Topics   • Alcohol use: Yes     Comment: CURRENT SOME DAY 2020,2017       Family History   Problem Relation Age of Onset   • Nephrolithiasis Mother         RENAL CALCULUS   • Other Mother         MOTHERS'S FAMILY HISTORY UNKOWN   • Colon cancer Father 50        FAMILY HISTORY OF COLON CANCER   • Other Father         FATHER'S FAMILY HISTORY UNKNOWN   • Colon cancer Paternal Grandfather 60        FAMILY HISTORY OF COLON CANCER         Health Maintenance Due   Topic Date Due   • TDAP/TD VACCINES (1 - Tdap) Never done   • ZOSTER VACCINE (1 of 2) 1997   • Pneumococcal Vaccine 65+ (1 of 2 - PPSV23) 2017   • ANNUAL WELLNESS VISIT  Never done   • DIABETIC FOOT EXAM  2021   • DIABETIC EYE EXAM  2021   • LIPID PANEL  2021   • HEMOGLOBIN A1C  2021      Last Completed Colonoscopy          COLORECTAL CANCER SCREENING  Next due on 10/19/2030    10/19/2020  Outside Claim: CA COLORECTAL SCRN; HI RISK IND    2018  Outside Claim: CA COLONOSCOPY W/BIOPSY SINGLE/MULTIPLE,CA COLSC FLX W/RMVL OF TUMOR POLYP LESION SNARE TQ    2015  Outside Claim: CA COLONOSCOPY W/BIOPSY SINGLE/MULTIPLE                Review of Systems  "  GI patient had about a 7 pound weight gain is exercising understands diet patient had a tubular adenoma colonoscopy 2018 possibly 2020  Psych discussed cognitive issues discussed driving discussed medication for close relative of his  Cardiovascular patient is exercising no chest pain no palpitation  Respiratory no shortness of breath dyspnea on exertion  Feet no more problems with his toes and no attacks of gout since his been on the allopurinol wonders if he needs to stay on it and he certainly does will stay on the allopurinol 300 mg daily  Objective     Vitals:    09/30/21 1500   BP: 120/68   Pulse: 60   Temp: 97.9 °F (36.6 °C)   SpO2: 97%   Weight: 122 kg (269 lb)   Height: 195.6 cm (77\")     Body mass index is 31.9 kg/m².      Physical Exam  General no distress  Cardiovascular regular rhythm no murmur  Respiratory lungs clear and equal bilaterally  Result Review :              Assessment and Plan    Type 2 diabetes 6-month check patient is doing well needs A1c #2 hypertension well controlled #3 gout is well controlled continue allopurinol do not stop it #4 counseling cognitive issues with about close relative                Patient was given instructions and counseling regarding his condition or for health maintenance advice. Please see specific information pulled into the AVS if appropriate.     "

## 2021-09-30 NOTE — TELEPHONE ENCOUNTER
Patient stated that he has been taking 1-2 tablets a day of the Metoprolol 25 MG because he was having episodes of a rapid heart rate which he thinks is because he was drinking caffeine. He needs a refill on his medication because of how much he has been taking.      He is suppose to be on 25 MG 1 QD.     Patient last seen on 01.21.21. Medication was document at that visit.       Next OV  02.08.22

## 2021-10-06 ENCOUNTER — LAB (OUTPATIENT)
Dept: FAMILY MEDICINE CLINIC | Facility: CLINIC | Age: 76
End: 2021-10-06

## 2021-10-06 DIAGNOSIS — Z79.899 MEDICATION MANAGEMENT: ICD-10-CM

## 2021-10-06 DIAGNOSIS — Z23 NEED FOR INFLUENZA VACCINATION: Primary | ICD-10-CM

## 2021-10-06 DIAGNOSIS — E11.42 TYPE 2 DIABETES, CONTROLLED, WITH PERIPHERAL NEUROPATHY (HCC): ICD-10-CM

## 2021-10-06 LAB — HBA1C MFR BLD: 6.5 % (ref 4.8–5.6)

## 2021-10-06 PROCEDURE — 83036 HEMOGLOBIN GLYCOSYLATED A1C: CPT | Performed by: FAMILY MEDICINE

## 2021-10-06 PROCEDURE — 90686 IIV4 VACC NO PRSV 0.5 ML IM: CPT | Performed by: FAMILY MEDICINE

## 2021-10-06 PROCEDURE — G0008 ADMIN INFLUENZA VIRUS VAC: HCPCS | Performed by: FAMILY MEDICINE

## 2021-12-14 ENCOUNTER — TELEPHONE (OUTPATIENT)
Dept: CARDIOLOGY | Facility: CLINIC | Age: 76
End: 2021-12-14

## 2021-12-14 DIAGNOSIS — I47.1 PAROXYSMAL SVT (SUPRAVENTRICULAR TACHYCARDIA) (HCC): Primary | ICD-10-CM

## 2021-12-14 DIAGNOSIS — R00.2 PALPITATIONS: ICD-10-CM

## 2021-12-14 NOTE — TELEPHONE ENCOUNTER
Received VM from patient regarding afib symptoms    SW patient. Patient states the las 10 days he has been getting notifications from his watch that he is in afib. Patient states he is getting fatigued easily. Patient states when his watch alerts him sometimes his HR is in low 100s.     Advised I would return call with recommendations.

## 2021-12-14 NOTE — TELEPHONE ENCOUNTER
Could be his SVT making that alarm, will obtain 24 hour holter monitor and he can take extra dose of his metoprolol for now for HR over 100

## 2021-12-27 ENCOUNTER — TELEPHONE (OUTPATIENT)
Dept: CARDIOLOGY | Facility: CLINIC | Age: 76
End: 2021-12-27

## 2021-12-27 NOTE — TELEPHONE ENCOUNTER
Spoke to the pt about her holter results. Pt states he already knew he was in Afib. Pt is hesitant to start anticoagulant and wants to know what/if there are any alternatives.

## 2021-12-27 NOTE — TELEPHONE ENCOUNTER
----- Message from DIONNA Sexton sent at 12/27/2021  1:24 PM EST -----  Notify pt that he is having episodes of atrial fibrillation and needs to be on anticoagulation. CHADS VASC score 3. Recommend Eliquis to reduce risk of CVA. Continue current dose of metoprolol. Keep Feb appt

## 2021-12-27 NOTE — TELEPHONE ENCOUNTER
Have made multiple attempts to reach patient, I have left voicemail message to call me back. Will try again tomorrow

## 2021-12-28 ENCOUNTER — TELEPHONE (OUTPATIENT)
Dept: CARDIOLOGY | Facility: CLINIC | Age: 76
End: 2021-12-28

## 2021-12-28 NOTE — TELEPHONE ENCOUNTER
Spoke with patient regarding holter monitor results showing atrial fibrillation. His CHADS VASC score is 3 so anticoagulation is recommended. He agreed to Eliquis 5 mg twice daily. He verbalizes understanding of diagnosis and treatment plan. All questions answered at this time.

## 2021-12-30 ENCOUNTER — TELEPHONE (OUTPATIENT)
Dept: CARDIOLOGY | Facility: CLINIC | Age: 76
End: 2021-12-30

## 2021-12-30 ENCOUNTER — TELEPHONE (OUTPATIENT)
Dept: FAMILY MEDICINE CLINIC | Facility: CLINIC | Age: 76
End: 2021-12-30

## 2021-12-30 NOTE — TELEPHONE ENCOUNTER
Patient lvm stating that Eliquis is too expensive.     Tried to return his call, no answer. His VM system was not setup.

## 2021-12-30 NOTE — TELEPHONE ENCOUNTER
Caller: Edi Weston    Relationship to patient: Self    Best call back number: [phone]    Patient is needing: PATIENT HAS A-FIB AND HIS CARDIOLOGIST DOCTOR SEVEN WANTS TO PUT HIM ON A BLOOD THINNGER. PATIENT HAS SOME CONCERNS AND WOULD LIKE TO SPEAK WITH DOCTOR AMBROCIO BEFORE BEGINNING THIS MEDICATION. PLEASE CALL TO ADVISE.

## 2022-01-03 ENCOUNTER — TELEPHONE (OUTPATIENT)
Dept: FAMILY MEDICINE CLINIC | Facility: CLINIC | Age: 77
End: 2022-01-03

## 2022-01-03 NOTE — TELEPHONE ENCOUNTER
Caller: Edi Weston    Relationship to patient: Self    Best call back number:[phone] PATIENT WOULD LIKE A CALL BACK    Patient is needing: PATIENT CALLED IN AND SAID HE WOULD LIKE TO TALK WITH DR. ALBRECHT SPECIFICALLY ABOUT HIS AFIB AND HIS BLOOD THINNER. PLEASE CALL PATIENT AND ADVISE.

## 2022-02-08 ENCOUNTER — PREP FOR SURGERY (OUTPATIENT)
Dept: OTHER | Facility: HOSPITAL | Age: 77
End: 2022-02-08

## 2022-02-08 ENCOUNTER — OFFICE VISIT (OUTPATIENT)
Dept: CARDIOLOGY | Facility: CLINIC | Age: 77
End: 2022-02-08

## 2022-02-08 VITALS
SYSTOLIC BLOOD PRESSURE: 102 MMHG | WEIGHT: 277 LBS | DIASTOLIC BLOOD PRESSURE: 69 MMHG | HEIGHT: 77 IN | HEART RATE: 59 BPM | BODY MASS INDEX: 32.71 KG/M2

## 2022-02-08 DIAGNOSIS — I44.7 LBBB (LEFT BUNDLE BRANCH BLOCK): ICD-10-CM

## 2022-02-08 DIAGNOSIS — I48.19 PERSISTENT ATRIAL FIBRILLATION: Primary | ICD-10-CM

## 2022-02-08 PROCEDURE — 93000 ELECTROCARDIOGRAM COMPLETE: CPT | Performed by: INTERNAL MEDICINE

## 2022-02-08 PROCEDURE — 99214 OFFICE O/P EST MOD 30 MIN: CPT | Performed by: INTERNAL MEDICINE

## 2022-02-08 RX ORDER — SODIUM CHLORIDE 0.9 % (FLUSH) 0.9 %
3 SYRINGE (ML) INJECTION EVERY 12 HOURS SCHEDULED
Status: CANCELLED | OUTPATIENT
Start: 2022-02-08

## 2022-02-08 RX ORDER — SODIUM CHLORIDE 0.9 % (FLUSH) 0.9 %
10 SYRINGE (ML) INJECTION AS NEEDED
Status: CANCELLED | OUTPATIENT
Start: 2022-02-08

## 2022-02-08 RX ORDER — METOPROLOL SUCCINATE 25 MG/1
25 TABLET, EXTENDED RELEASE ORAL DAILY
Qty: 90 TABLET | Refills: 3 | Status: SHIPPED | OUTPATIENT
Start: 2022-02-08 | End: 2022-03-29 | Stop reason: SDUPTHER

## 2022-02-08 NOTE — ASSESSMENT & PLAN NOTE
Chronic in nature atypical left bundle branch block unchanged from prior EKG previous ischemic work-up has been normal repeat echocardiogram

## 2022-02-08 NOTE — PROGRESS NOTES
Chief Complaint  Paroxysmal SVT and PAT    Subjective    Patient developing increased symptoms of fatigue in December Holter monitor at that time revealed evidence of atrial fibrillation with fairly well controlled rate.  The patient has been started on Eliquis therapy and tolerating well but still persistent shortness of breath and dyspnea exertion he can no longer do his previous activities such as walking 3 miles a day only can make it about a mile or mile and a half before becoming too tired.  He denies any anginal chest discomfort if any syncope or presyncopal spells      Past Medical History:   Diagnosis Date   • Arthritis    • Cellulitis of right lower limb    • Colon polyps    • Decubitus ulcer of foot, stage 1 08/10/2018   • Decubitus ulcer of foot, stage 3 ([facility]) 02/07/2018   • Foot pain, left 08/10/2018   • Foot pain, right    • Gout    • Hammertoe 12/30/2019   • High blood pressure    • History of colonoscopy with polypectomy 08/25/2020    2018 TUBULAR ADENOMA, COLONOSCOPY SCHEDULED FOR 10/19/2020 WITH DR SULTANA   • Ingrowing nail 11/02/2018   • Medication management 02/18/2019   • Nail dystrophy    • PAT (paroxysmal atrial tachycardia) ([facility]) 08/06/2018   • Pressure ulcer, stage 1    • Tinea unguium    • Type 2 diabetes mellitus with foot ulcer ([facility])    • Type 2 diabetes mellitus with polyneuropathy ([facility])    • Type 2 diabetes, controlled, with peripheral neuropathy ([facility]) 08/10/2018         Current Outpatient Medications:   •  apixaban (ELIQUIS) 5 MG tablet tablet, Take 1 tablet by mouth 2 (Two) Times a Day., Disp: 180 tablet, Rfl: 1  •  glyburide (DIAbeta) 2.5 MG tablet, Take 1 tablet by mouth Daily With Breakfast., Disp: 90 tablet, Rfl: 3  •  lisinopril-hydrochlorothiazide (PRINZIDE,ZESTORETIC) 20-12.5 MG per tablet, Take 1 tablet by mouth Daily., Disp: 90 tablet, Rfl: 3  •  metFORMIN ER (GLUCOPHAGE-XR) 500 MG 24 hr tablet, Take 1 tablet by mouth Every Evening., Disp: 90 tablet, Rfl: 3  •  metoprolol  "succinate XL (TOPROL-XL) 25 MG 24 hr tablet, Take 1 tablet by mouth Daily., Disp: 90 tablet, Rfl: 3  •  simvastatin (ZOCOR) 20 MG tablet, Take 1 tablet by mouth Every Evening., Disp: 90 tablet, Rfl: 3    Medications Discontinued During This Encounter   Medication Reason   • allopurinol (ZYLOPRIM) 300 MG tablet *Therapy completed   • metoprolol succinate XL (TOPROL-XL) 25 MG 24 hr tablet Reorder     Allergies   Allergen Reactions   • Sulfa Antibiotics Unknown - Low Severity     Unsure of reaction        Social History     Tobacco Use   • Smoking status: Former Smoker     Packs/day: 2.00     Years: 20.00     Pack years: 40.00     Start date:      Quit date:      Years since quittin.1   • Smokeless tobacco: Never Used   • Tobacco comment: AGE STARTED 19 QUIT AGE 31 - SMOKED X 20 YEARS QUIT    Vaping Use   • Vaping Use: Never used   Substance Use Topics   • Alcohol use: Yes     Comment: CURRENT SOME DAY 2020,2017   • Drug use: Never       Family History   Problem Relation Age of Onset   • Nephrolithiasis Mother         RENAL CALCULUS   • Other Mother         MOTHERS'S FAMILY HISTORY UNKOWN   • Colon cancer Father 50        FAMILY HISTORY OF COLON CANCER   • Other Father         FATHER'S FAMILY HISTORY UNKNOWN   • Colon cancer Paternal Grandfather 60        FAMILY HISTORY OF COLON CANCER         Objective     /69   Pulse 59   Ht 195.6 cm (77\")   Wt 126 kg (277 lb)   BMI 32.85 kg/m²       Physical Exam    General Appearance:   · no acute distress  · Alert and oriented x3  HENT:   · lips not cyanotic  · Atraumatic  Neck:  · No jvd   · supple  Respiratory:  · no respiratory distress  · normal breath sounds  · no rales  Cardiovascular:  · Irregularly irregular  · no S3, no S4   · no murmur  · no rub  Extremities  · No cyanosis  · lower extremity edema: none    Skin:   · warm, dry  · No rashes      Result Review :     No results found for: PROBNP  CMP    CMP 3/30/21   Glucose 100 (A) "   BUN 22   Creatinine 1.22 (A)   Sodium 140   Potassium 4.2   Chloride 105   Calcium 9.6   Albumin 4.1   Total Bilirubin 0.37   Alkaline Phosphatase 80   AST (SGOT) 27   ALT (SGPT) 16   (A) Abnormal value            CBC w/diff    CBC w/Diff 3/30/21   WBC 8.92   RBC 4.39 (A)   Hemoglobin 13.4 (A)   Hematocrit 41.7 (A)   MCV 95.0   MCH 30.5   MCHC 32.1 (A)   RDW 13.8   Platelets 242   Neutrophil Rel % 64.6   Lymphocyte Rel % 20.0   Monocyte Rel % 10.0   Eosinophil Rel % 4.1   Basophil Rel % 0.6   (A) Abnormal value             Lab Results   Component Value Date    TSH 2.060 03/30/2021      No results found for: FREET4   No results found for: DDIMERQUANT  No results found for: MG   No results found for: DIGOXIN   No results found for: TROPONINT        Lipid Panel    Lipid Panel 3/30/21   Total Cholesterol 86 (A)   Triglycerides 132   HDL Cholesterol 41   VLDL Cholesterol 26   LDL Cholesterol  19 (A)   (A) Abnormal value       Comments are available for some flowsheets but are not being displayed.           No results found for: POCTROP         ECG 12 Lead    Date/Time: 2/8/2022 2:04 PM  Performed by: Kar Ledezma MD  Authorized by: Kar Ledezma MD   Comparison: compared with previous ECG   Comparison to previous ECG: New onset atrial fibrillation  Rhythm: atrial fibrillation  Comments: Left bundle branch block                   Diagnoses and all orders for this visit:    1. Persistent atrial fibrillation (HCC) (Primary)  Assessment & Plan:  Patient with new onset persistent atrial fibrillation which is a progression from her previous SVT and ectopy with symptomatic change recommended a trial of cardioversion discussed risk-benefit alternatives he was agreeable he has been anticoagulated for over 4 weeks on Eliquis so no MALU is needed.  We will continue just with Toprol for the time being with a possible addition of antiarrhythmics based on how he does clinically after cardioversion.  Recommend repeating  echocardiogram as well as has been several years    Orders:  -     Adult Transthoracic Echo Complete W/ Cont if Necessary Per Protocol; Future    2. LBBB (left bundle branch block)  Assessment & Plan:  Chronic in nature atypical left bundle branch block unchanged from prior EKG previous ischemic work-up has been normal repeat echocardiogram      Other orders  -     metoprolol succinate XL (TOPROL-XL) 25 MG 24 hr tablet; Take 1 tablet by mouth Daily.  Dispense: 90 tablet; Refill: 3  -     ECG 12 Lead          Follow Up     Return in about 3 months (around 5/8/2022) for EKG with F/U.          Patient was given instructions and counseling regarding his condition or for health maintenance advice. Please see specific information pulled into the AVS if appropriate.

## 2022-02-08 NOTE — ASSESSMENT & PLAN NOTE
Patient with new onset persistent atrial fibrillation which is a progression from her previous SVT and ectopy with symptomatic change recommended a trial of cardioversion discussed risk-benefit alternatives he was agreeable he has been anticoagulated for over 4 weeks on Eliquis so no MALU is needed.  We will continue just with Toprol for the time being with a possible addition of antiarrhythmics based on how he does clinically after cardioversion.  Recommend repeating echocardiogram as well as has been several years

## 2022-02-08 NOTE — H&P (VIEW-ONLY)
Chief Complaint  Paroxysmal SVT and PAT    Subjective    Patient developing increased symptoms of fatigue in December Holter monitor at that time revealed evidence of atrial fibrillation with fairly well controlled rate.  The patient has been started on Eliquis therapy and tolerating well but still persistent shortness of breath and dyspnea exertion he can no longer do his previous activities such as walking 3 miles a day only can make it about a mile or mile and a half before becoming too tired.  He denies any anginal chest discomfort if any syncope or presyncopal spells      Past Medical History:   Diagnosis Date   • Arthritis    • Cellulitis of right lower limb    • Colon polyps    • Decubitus ulcer of foot, stage 1 08/10/2018   • Decubitus ulcer of foot, stage 3 ([facility]) 02/07/2018   • Foot pain, left 08/10/2018   • Foot pain, right    • Gout    • Hammertoe 12/30/2019   • High blood pressure    • History of colonoscopy with polypectomy 08/25/2020    2018 TUBULAR ADENOMA, COLONOSCOPY SCHEDULED FOR 10/19/2020 WITH DR SULTANA   • Ingrowing nail 11/02/2018   • Medication management 02/18/2019   • Nail dystrophy    • PAT (paroxysmal atrial tachycardia) ([facility]) 08/06/2018   • Pressure ulcer, stage 1    • Tinea unguium    • Type 2 diabetes mellitus with foot ulcer ([facility])    • Type 2 diabetes mellitus with polyneuropathy ([facility])    • Type 2 diabetes, controlled, with peripheral neuropathy ([facility]) 08/10/2018         Current Outpatient Medications:   •  apixaban (ELIQUIS) 5 MG tablet tablet, Take 1 tablet by mouth 2 (Two) Times a Day., Disp: 180 tablet, Rfl: 1  •  glyburide (DIAbeta) 2.5 MG tablet, Take 1 tablet by mouth Daily With Breakfast., Disp: 90 tablet, Rfl: 3  •  lisinopril-hydrochlorothiazide (PRINZIDE,ZESTORETIC) 20-12.5 MG per tablet, Take 1 tablet by mouth Daily., Disp: 90 tablet, Rfl: 3  •  metFORMIN ER (GLUCOPHAGE-XR) 500 MG 24 hr tablet, Take 1 tablet by mouth Every Evening., Disp: 90 tablet, Rfl: 3  •  metoprolol  "succinate XL (TOPROL-XL) 25 MG 24 hr tablet, Take 1 tablet by mouth Daily., Disp: 90 tablet, Rfl: 3  •  simvastatin (ZOCOR) 20 MG tablet, Take 1 tablet by mouth Every Evening., Disp: 90 tablet, Rfl: 3    Medications Discontinued During This Encounter   Medication Reason   • allopurinol (ZYLOPRIM) 300 MG tablet *Therapy completed   • metoprolol succinate XL (TOPROL-XL) 25 MG 24 hr tablet Reorder     Allergies   Allergen Reactions   • Sulfa Antibiotics Unknown - Low Severity     Unsure of reaction        Social History     Tobacco Use   • Smoking status: Former Smoker     Packs/day: 2.00     Years: 20.00     Pack years: 40.00     Start date:      Quit date:      Years since quittin.1   • Smokeless tobacco: Never Used   • Tobacco comment: AGE STARTED 19 QUIT AGE 31 - SMOKED X 20 YEARS QUIT    Vaping Use   • Vaping Use: Never used   Substance Use Topics   • Alcohol use: Yes     Comment: CURRENT SOME DAY 2020,2017   • Drug use: Never       Family History   Problem Relation Age of Onset   • Nephrolithiasis Mother         RENAL CALCULUS   • Other Mother         MOTHERS'S FAMILY HISTORY UNKOWN   • Colon cancer Father 50        FAMILY HISTORY OF COLON CANCER   • Other Father         FATHER'S FAMILY HISTORY UNKNOWN   • Colon cancer Paternal Grandfather 60        FAMILY HISTORY OF COLON CANCER         Objective     /69   Pulse 59   Ht 195.6 cm (77\")   Wt 126 kg (277 lb)   BMI 32.85 kg/m²       Physical Exam    General Appearance:   · no acute distress  · Alert and oriented x3  HENT:   · lips not cyanotic  · Atraumatic  Neck:  · No jvd   · supple  Respiratory:  · no respiratory distress  · normal breath sounds  · no rales  Cardiovascular:  · Irregularly irregular  · no S3, no S4   · no murmur  · no rub  Extremities  · No cyanosis  · lower extremity edema: none    Skin:   · warm, dry  · No rashes      Result Review :     No results found for: PROBNP  CMP    CMP 3/30/21   Glucose 100 (A) "   BUN 22   Creatinine 1.22 (A)   Sodium 140   Potassium 4.2   Chloride 105   Calcium 9.6   Albumin 4.1   Total Bilirubin 0.37   Alkaline Phosphatase 80   AST (SGOT) 27   ALT (SGPT) 16   (A) Abnormal value            CBC w/diff    CBC w/Diff 3/30/21   WBC 8.92   RBC 4.39 (A)   Hemoglobin 13.4 (A)   Hematocrit 41.7 (A)   MCV 95.0   MCH 30.5   MCHC 32.1 (A)   RDW 13.8   Platelets 242   Neutrophil Rel % 64.6   Lymphocyte Rel % 20.0   Monocyte Rel % 10.0   Eosinophil Rel % 4.1   Basophil Rel % 0.6   (A) Abnormal value             Lab Results   Component Value Date    TSH 2.060 03/30/2021      No results found for: FREET4   No results found for: DDIMERQUANT  No results found for: MG   No results found for: DIGOXIN   No results found for: TROPONINT        Lipid Panel    Lipid Panel 3/30/21   Total Cholesterol 86 (A)   Triglycerides 132   HDL Cholesterol 41   VLDL Cholesterol 26   LDL Cholesterol  19 (A)   (A) Abnormal value       Comments are available for some flowsheets but are not being displayed.           No results found for: POCTROP         ECG 12 Lead    Date/Time: 2/8/2022 2:04 PM  Performed by: Kar Ledezma MD  Authorized by: Kar Ledezma MD   Comparison: compared with previous ECG   Comparison to previous ECG: New onset atrial fibrillation  Rhythm: atrial fibrillation  Comments: Left bundle branch block                   Diagnoses and all orders for this visit:    1. Persistent atrial fibrillation (HCC) (Primary)  Assessment & Plan:  Patient with new onset persistent atrial fibrillation which is a progression from her previous SVT and ectopy with symptomatic change recommended a trial of cardioversion discussed risk-benefit alternatives he was agreeable he has been anticoagulated for over 4 weeks on Eliquis so no MALU is needed.  We will continue just with Toprol for the time being with a possible addition of antiarrhythmics based on how he does clinically after cardioversion.  Recommend repeating  echocardiogram as well as has been several years    Orders:  -     Adult Transthoracic Echo Complete W/ Cont if Necessary Per Protocol; Future    2. LBBB (left bundle branch block)  Assessment & Plan:  Chronic in nature atypical left bundle branch block unchanged from prior EKG previous ischemic work-up has been normal repeat echocardiogram      Other orders  -     metoprolol succinate XL (TOPROL-XL) 25 MG 24 hr tablet; Take 1 tablet by mouth Daily.  Dispense: 90 tablet; Refill: 3  -     ECG 12 Lead          Follow Up     Return in about 3 months (around 5/8/2022) for EKG with F/U.          Patient was given instructions and counseling regarding his condition or for health maintenance advice. Please see specific information pulled into the AVS if appropriate.

## 2022-02-14 ENCOUNTER — HOSPITAL ENCOUNTER (OUTPATIENT)
Dept: INFUSION THERAPY | Facility: HOSPITAL | Age: 77
Discharge: HOME | End: 2022-02-14
Attending: INTERNAL MEDICINE | Admitting: INTERNAL MEDICINE

## 2022-02-14 VITALS
HEART RATE: 58 BPM | HEIGHT: 77 IN | DIASTOLIC BLOOD PRESSURE: 82 MMHG | SYSTOLIC BLOOD PRESSURE: 103 MMHG | WEIGHT: 274.47 LBS | RESPIRATION RATE: 20 BRPM | BODY MASS INDEX: 32.41 KG/M2 | TEMPERATURE: 97.8 F | OXYGEN SATURATION: 99 %

## 2022-02-14 DIAGNOSIS — I48.19 PERSISTENT ATRIAL FIBRILLATION: ICD-10-CM

## 2022-02-14 LAB
ANION GAP SERPL CALCULATED.3IONS-SCNC: 8.8 MMOL/L (ref 5–15)
BUN SERPL-MCNC: 26 MG/DL (ref 8–23)
BUN/CREAT SERPL: 20.8 (ref 7–25)
CALCIUM SPEC-SCNC: 8.9 MG/DL (ref 8.6–10.5)
CHLORIDE SERPL-SCNC: 108 MMOL/L (ref 98–107)
CO2 SERPL-SCNC: 23.2 MMOL/L (ref 22–29)
CREAT SERPL-MCNC: 1.25 MG/DL (ref 0.76–1.27)
GFR SERPL CREATININE-BSD FRML MDRD: 56 ML/MIN/1.73
GLUCOSE SERPL-MCNC: 152 MG/DL (ref 65–99)
INR PPP: 1.03 (ref 2–3)
POTASSIUM SERPL-SCNC: 4.1 MMOL/L (ref 3.5–5.2)
PROTHROMBIN TIME: 10.8 SECONDS (ref 9.4–12)
QT INTERVAL: 470 MS
SODIUM SERPL-SCNC: 140 MMOL/L (ref 136–145)

## 2022-02-14 PROCEDURE — 92960 CARDIOVERSION ELECTRIC EXT: CPT | Performed by: INTERNAL MEDICINE

## 2022-02-14 PROCEDURE — 93010 ELECTROCARDIOGRAM REPORT: CPT | Performed by: INTERNAL MEDICINE

## 2022-02-14 PROCEDURE — 25010000002 MORPHINE PER 10 MG: Performed by: INTERNAL MEDICINE

## 2022-02-14 PROCEDURE — 93005 ELECTROCARDIOGRAM TRACING: CPT | Performed by: INTERNAL MEDICINE

## 2022-02-14 PROCEDURE — 80048 BASIC METABOLIC PNL TOTAL CA: CPT | Performed by: INTERNAL MEDICINE

## 2022-02-14 PROCEDURE — 25010000002 AMIODARONE IN DEXTROSE 5% 150-4.21 MG/100ML-% SOLUTION: Performed by: INTERNAL MEDICINE

## 2022-02-14 PROCEDURE — 85610 PROTHROMBIN TIME: CPT | Performed by: INTERNAL MEDICINE

## 2022-02-14 PROCEDURE — 25010000002 MIDAZOLAM PER 1 MG: Performed by: INTERNAL MEDICINE

## 2022-02-14 PROCEDURE — 25010000002 AMIODARONE PER 30 MG: Performed by: INTERNAL MEDICINE

## 2022-02-14 PROCEDURE — 92960 CARDIOVERSION ELECTRIC EXT: CPT

## 2022-02-14 RX ORDER — SODIUM CHLORIDE 0.9 % (FLUSH) 0.9 %
3 SYRINGE (ML) INJECTION EVERY 12 HOURS SCHEDULED
Status: DISCONTINUED | OUTPATIENT
Start: 2022-02-14 | End: 2022-02-14 | Stop reason: HOSPADM

## 2022-02-14 RX ORDER — MIDAZOLAM HYDROCHLORIDE 1 MG/ML
8 INJECTION INTRAMUSCULAR; INTRAVENOUS ONCE
Status: COMPLETED | OUTPATIENT
Start: 2022-02-14 | End: 2022-02-14

## 2022-02-14 RX ORDER — SODIUM CHLORIDE 0.9 % (FLUSH) 0.9 %
10 SYRINGE (ML) INJECTION AS NEEDED
Status: DISCONTINUED | OUTPATIENT
Start: 2022-02-14 | End: 2022-02-14 | Stop reason: HOSPADM

## 2022-02-14 RX ADMIN — MIDAZOLAM HYDROCHLORIDE 4 MG: 1 INJECTION, SOLUTION INTRAMUSCULAR; INTRAVENOUS at 10:33

## 2022-02-14 RX ADMIN — SODIUM CHLORIDE, PRESERVATIVE FREE 3 ML: 5 INJECTION INTRAVENOUS at 10:34

## 2022-02-14 RX ADMIN — AMIODARONE HYDROCHLORIDE 150 MG: 1.5 INJECTION, SOLUTION INTRAVENOUS at 11:22

## 2022-02-14 RX ADMIN — AMIODARONE HYDROCHLORIDE 1 MG/MIN: 50 INJECTION, SOLUTION INTRAVENOUS at 11:34

## 2022-02-14 RX ADMIN — MORPHINE SULFATE 2 MG: 4 INJECTION INTRAVENOUS at 10:34

## 2022-02-14 NOTE — PROCEDURES
[facility]   CARDIOVERSION PROCEDURE REPORT      Patient: Edi Weston  : 1945  MRN: [id number]    Procedure Date:  22    Cardiologist:   Kar Ledezma MD    Indication:  1. Persistent atrial flutter, symptomatic    Procedure performed: The patient signed a written informed consent and was brought to a monitored telemetry bed. They were given IV conscious sedation per the nursing notes. They underwent synchronized DC cardioversion with 200 joules which was successful in converting to normal sinus rhythm.  He was noted to have intermittent brief SVT episodes in the 120s for 10-20 beats reportedly on the monitor for which he was started on amiodarone therapy.  The patient was monitored afterwards for an uneventful course. They were no immediate complications.      Conclusions: Atrial flutter status post cardioversion successfully back to normal sinus rhythm but with paroxysmal SVT episodes in the 120s range converting to normal sinus rhythm intermittently    Recommendations:   1. IV amiodarone load  2. Eliquis 5 twice daily  3. Toprol 25 mg once a day  4. Possible addition of Multaq depending on whether or not SVT settle down        Kar Ledezma MD    22  10:43 EST

## 2022-03-01 ENCOUNTER — OFFICE VISIT (OUTPATIENT)
Dept: CARDIOLOGY | Facility: CLINIC | Age: 77
End: 2022-03-01

## 2022-03-01 VITALS
SYSTOLIC BLOOD PRESSURE: 144 MMHG | DIASTOLIC BLOOD PRESSURE: 74 MMHG | HEIGHT: 77 IN | WEIGHT: 277 LBS | HEART RATE: 61 BPM | BODY MASS INDEX: 32.71 KG/M2

## 2022-03-01 DIAGNOSIS — I48.19 PERSISTENT ATRIAL FIBRILLATION: Primary | ICD-10-CM

## 2022-03-01 DIAGNOSIS — I10 HYPERTENSION, ESSENTIAL: ICD-10-CM

## 2022-03-01 DIAGNOSIS — E78.2 MIXED HYPERLIPIDEMIA: ICD-10-CM

## 2022-03-01 PROBLEM — K63.5 COLON POLYPS: Status: ACTIVE | Noted: 2022-03-01

## 2022-03-01 PROBLEM — E78.5 HYPERLIPIDEMIA: Status: ACTIVE | Noted: 2022-03-01

## 2022-03-01 PROCEDURE — 93000 ELECTROCARDIOGRAM COMPLETE: CPT | Performed by: NURSE PRACTITIONER

## 2022-03-01 PROCEDURE — 99213 OFFICE O/P EST LOW 20 MIN: CPT | Performed by: NURSE PRACTITIONER

## 2022-03-01 NOTE — PROGRESS NOTES
Chief Complaint  Atrial Fibrillation    Subjective     {Problem List  Visit Diagnosis   Encounters  Notes  Medications  Labs  Result Review Imaging  Media :23}       History of Present Illness  Edi Weston is a 77-year-old white/ male patient who presents to the office today for follow-up after having cardioversion on 2/14/2022. He has persistent atrial fibrillation, hypertension, and hyperlipidemia. He reports that he has been able to steadily increase his physical activity without any issues. He reports compliance with his medications. He denies any chest pain, shortness of breath, lightheadedness/dizziness, palpitations, or edema.    PMH  Past Medical History:   Diagnosis Date   • Arthritis    • Atrial fibrillation ([facility])    • Cellulitis of right lower limb    • Colon polyps    • Decubitus ulcer of foot, stage 1 08/10/2018   • Decubitus ulcer of foot, stage 3 ([facility]) 02/07/2018   • Foot pain, left 08/10/2018   • Foot pain, right    • Gout    • Hammertoe 12/30/2019   • High blood pressure    • History of colonoscopy with polypectomy 08/25/2020    2018 TUBULAR ADENOMA, COLONOSCOPY SCHEDULED FOR 10/19/2020 WITH DR SULTANA   • Ingrowing nail 11/02/2018   • Medication management 02/18/2019   • Nail dystrophy    • PAT (paroxysmal atrial tachycardia) ([facility]) 08/06/2018   • Pressure ulcer, stage 1    • Tinea unguium    • Type 2 diabetes mellitus with foot ulcer ([facility])    • Type 2 diabetes mellitus with polyneuropathy ([facility])    • Type 2 diabetes, controlled, with peripheral neuropathy ([facility]) 08/10/2018         ALLERGY  Allergies   Allergen Reactions   • Sulfa Antibiotics Unknown - Low Severity     Unsure of reaction          SURGICALHX  Past Surgical History:   Procedure Laterality Date   • ABDOMINAL SURGERY      lap band   • COLONOSCOPY     • GASTRIC BANDING  2007          SOC  Social History     Socioeconomic History   • Marital status:    Tobacco Use   • Smoking status: Former Smoker     Packs/day:  "2.00     Years: 20.00     Pack years: 40.00     Start date:      Quit date:      Years since quittin.1   • Smokeless tobacco: Never Used   • Tobacco comment: AGE STARTED 19 QUIT AGE 31 - SMOKED X 20 YEARS QUIT    Vaping Use   • Vaping Use: Never used   Substance and Sexual Activity   • Alcohol use: Yes     Comment: CURRENT SOME DAY 2020,2017   • Drug use: Never     Comment: almost never drinks anymore   • Sexual activity: Defer         FAMHX  Family History   Problem Relation Age of Onset   • Nephrolithiasis Mother         RENAL CALCULUS   • Other Mother         MOTHERS'S FAMILY HISTORY UNKOWN   • Colon cancer Father 50        FAMILY HISTORY OF COLON CANCER   • Other Father         FATHER'S FAMILY HISTORY UNKNOWN   • Colon cancer Paternal Grandfather 60        FAMILY HISTORY OF COLON CANCER         Current outpatient and discharge medications have been reconciled for the patient.  Reviewed by: DIONNA Sexton  Current Outpatient Medications on File Prior to Visit   Medication Sig   • apixaban (ELIQUIS) 5 MG tablet tablet Take 1 tablet by mouth 2 (Two) Times a Day.   • glyburide (DIAbeta) 2.5 MG tablet Take 1 tablet by mouth Daily With Breakfast.   • lisinopril-hydrochlorothiazide (PRINZIDE,ZESTORETIC) 20-12.5 MG per tablet Take 1 tablet by mouth Daily.   • metFORMIN ER (GLUCOPHAGE-XR) 500 MG 24 hr tablet Take 1 tablet by mouth Every Evening.   • metoprolol succinate XL (TOPROL-XL) 25 MG 24 hr tablet Take 1 tablet by mouth Daily.   • simvastatin (ZOCOR) 20 MG tablet Take 1 tablet by mouth Every Evening.     No current facility-administered medications on file prior to visit.         Objective   /74   Pulse 61   Ht 195.6 cm (77\")   Wt 126 kg (277 lb)   BMI 32.85 kg/m²       Physical Exam  Constitutional:       Appearance: He is obese.   HENT:      Head: Normocephalic.   Neck:      Vascular: No carotid bruit.   Cardiovascular:      Rate and Rhythm: Normal " rate and regular rhythm.      Pulses: Normal pulses.      Heart sounds: Normal heart sounds. No murmur heard.      Pulmonary:      Effort: Pulmonary effort is normal.      Breath sounds: Normal breath sounds.   Musculoskeletal:      Cervical back: Neck supple.      Right lower leg: No edema.      Left lower leg: No edema.   Skin:     General: Skin is dry.      Capillary Refill: Capillary refill takes less than 2 seconds.   Neurological:      Mental Status: He is alert and oriented to person, place, and time.   Psychiatric:         Behavior: Behavior normal.       ECG 12 Lead    Date/Time: 3/1/2022 9:29 AM  Performed by: Jamila Ty APRN  Authorized by: Florencio Castro MD   Rhythm: sinus rhythm  Rate: normal  BPM: 58  Conduction: left anterior fascicular block  ST Segments: ST segments normal  T Waves: T waves normal  QRS axis: normal  Other findings comments: TN interval 248    Clinical impression: abnormal EKG          Result Review :   The following data was reviewed by: DIONNA Mcpherson on 03/01/2022:  No results found for: PROBNP  CMP    CMP 2/14/22   Glucose 152 (A)   BUN 26 (A)   Creatinine 1.25   eGFR Non African Am 56 (A)   Sodium 140   Potassium 4.1   Chloride 108 (A)   Calcium 8.9   Albumin    Total Bilirubin    Alkaline Phosphatase    AST (SGOT)    ALT (SGPT)    (A) Abnormal value            CBC w/diff    CBC w/Diff 3/30/21   WBC 8.92   RBC 4.39 (A)   Hemoglobin 13.4 (A)   Hematocrit 41.7 (A)   MCV 95.0   MCH 30.5   MCHC 32.1 (A)   RDW 13.8   Platelets 242   Neutrophil Rel % 64.6   Lymphocyte Rel % 20.0   Monocyte Rel % 10.0   Eosinophil Rel % 4.1   Basophil Rel % 0.6   (A) Abnormal value             Lab Results   Component Value Date    TSH 2.060 03/30/2021      No results found for: FREET4   No results found for: DDIMERQUANT  No results found for: MG   No results found for: DIGOXIN   No results found for: TROPONINT        Lipid Panel    Lipid Panel 3/30/21   Total Cholesterol 86 (A)    Triglycerides 132   HDL Cholesterol 41   VLDL Cholesterol 26   LDL Cholesterol  19 (A)   (A) Abnormal value       Comments are available for some flowsheets but are not being displayed.              Assessment and Plan    Diagnoses and all orders for this visit:    1. Persistent atrial fibrillation  (Primary)  EKG in office today shows sinus rhythm with rate of 58 bpm. Patient denies any symptoms of going back into atrial fibrillation since his cardioversion. For now we will continue metoprolol 25 mg daily and Eliquis 5 mg twice daily.    2. Hypertension, essential  Currently controlled and without adverse effects from medication, continue lisinopril HCTZ 20-12.5 mg daily.    3. Mixed hyperlipidemia  Last lipid panel was 3/30/2021 with LDL of 19 which is within his goal range, continue simvastatin 20 mg nightly.    Other orders  -     ECG 12 Lead      Follow Up   Return for Keep May follow up with Dr Ledezma.    Patient was given instructions and counseling regarding his condition or for health maintenance advice. Please see specific information pulled into the AVS if appropriate.     Edi Weston  reports that he quit smoking about 35 years ago. He started smoking about 57 years ago. He has a 40.00 pack-year smoking history. He has never used smokeless tobacco.           Jamila Ty, DIONNA  03/01/22  08:37 EST    Dictated Utilizing Dragon Dictation

## 2022-03-29 DIAGNOSIS — E11.65 TYPE 2 DIABETES MELLITUS WITH HYPERGLYCEMIA, WITHOUT LONG-TERM CURRENT USE OF INSULIN: ICD-10-CM

## 2022-03-29 DIAGNOSIS — I10 HYPERTENSION, ESSENTIAL: Primary | ICD-10-CM

## 2022-03-29 DIAGNOSIS — E11.42 TYPE 2 DIABETES, CONTROLLED, WITH PERIPHERAL NEUROPATHY: ICD-10-CM

## 2022-03-29 DIAGNOSIS — E78.2 MIXED HYPERLIPIDEMIA: ICD-10-CM

## 2022-03-29 RX ORDER — METOPROLOL SUCCINATE 25 MG/1
25 TABLET, EXTENDED RELEASE ORAL DAILY
Qty: 90 TABLET | Refills: 0 | Status: SHIPPED | OUTPATIENT
Start: 2022-03-29 | End: 2022-09-27

## 2022-03-29 RX ORDER — GLYBURIDE 2.5 MG/1
2.5 TABLET ORAL
Qty: 90 TABLET | Refills: 0 | Status: SHIPPED | OUTPATIENT
Start: 2022-03-29 | End: 2022-04-04 | Stop reason: SDUPTHER

## 2022-03-29 RX ORDER — LISINOPRIL AND HYDROCHLOROTHIAZIDE 20; 12.5 MG/1; MG/1
1 TABLET ORAL DAILY
Qty: 90 TABLET | Refills: 0 | Status: SHIPPED | OUTPATIENT
Start: 2022-03-29 | End: 2022-04-04 | Stop reason: SDUPTHER

## 2022-03-29 RX ORDER — SIMVASTATIN 20 MG
20 TABLET ORAL EVERY EVENING
Qty: 90 TABLET | Refills: 0 | Status: SHIPPED | OUTPATIENT
Start: 2022-03-29 | End: 2022-04-04 | Stop reason: SDUPTHER

## 2022-03-29 RX ORDER — METFORMIN HYDROCHLORIDE 500 MG/1
500 TABLET, EXTENDED RELEASE ORAL EVERY EVENING
Qty: 90 TABLET | Refills: 0 | Status: SHIPPED | OUTPATIENT
Start: 2022-03-29 | End: 2022-04-04 | Stop reason: SDUPTHER

## 2022-04-04 ENCOUNTER — OFFICE VISIT (OUTPATIENT)
Dept: FAMILY MEDICINE CLINIC | Facility: CLINIC | Age: 77
End: 2022-04-04

## 2022-04-04 VITALS
OXYGEN SATURATION: 98 % | HEART RATE: 50 BPM | DIASTOLIC BLOOD PRESSURE: 70 MMHG | BODY MASS INDEX: 33.06 KG/M2 | HEIGHT: 77 IN | SYSTOLIC BLOOD PRESSURE: 122 MMHG | TEMPERATURE: 98.5 F | WEIGHT: 280 LBS

## 2022-04-04 DIAGNOSIS — Z79.899 MEDICATION MANAGEMENT: ICD-10-CM

## 2022-04-04 DIAGNOSIS — I48.19 PERSISTENT ATRIAL FIBRILLATION: ICD-10-CM

## 2022-04-04 DIAGNOSIS — Z00.00 ROUTINE ADULT HEALTH MAINTENANCE: Primary | ICD-10-CM

## 2022-04-04 DIAGNOSIS — E11.42 TYPE 2 DIABETES, CONTROLLED, WITH PERIPHERAL NEUROPATHY: ICD-10-CM

## 2022-04-04 DIAGNOSIS — I10 HYPERTENSION, ESSENTIAL: ICD-10-CM

## 2022-04-04 DIAGNOSIS — E11.65 TYPE 2 DIABETES MELLITUS WITH HYPERGLYCEMIA, WITHOUT LONG-TERM CURRENT USE OF INSULIN: ICD-10-CM

## 2022-04-04 DIAGNOSIS — E78.2 MIXED HYPERLIPIDEMIA: ICD-10-CM

## 2022-04-04 PROCEDURE — 99214 OFFICE O/P EST MOD 30 MIN: CPT | Performed by: FAMILY MEDICINE

## 2022-04-04 RX ORDER — SIMVASTATIN 20 MG
20 TABLET ORAL EVERY EVENING
Qty: 90 TABLET | Refills: 3 | Status: SHIPPED | OUTPATIENT
Start: 2022-04-04 | End: 2023-04-04 | Stop reason: SDUPTHER

## 2022-04-04 RX ORDER — METFORMIN HYDROCHLORIDE 500 MG/1
500 TABLET, EXTENDED RELEASE ORAL EVERY EVENING
Qty: 90 TABLET | Refills: 3 | Status: SHIPPED | OUTPATIENT
Start: 2022-04-04 | End: 2023-04-04 | Stop reason: SDUPTHER

## 2022-04-04 RX ORDER — GLYBURIDE 2.5 MG/1
2.5 TABLET ORAL
Qty: 90 TABLET | Refills: 3 | Status: SHIPPED | OUTPATIENT
Start: 2022-04-04 | End: 2023-03-08

## 2022-04-04 RX ORDER — LISINOPRIL AND HYDROCHLOROTHIAZIDE 20; 12.5 MG/1; MG/1
1 TABLET ORAL DAILY
Qty: 90 TABLET | Refills: 3 | Status: SHIPPED | OUTPATIENT
Start: 2022-04-04 | End: 2023-03-08

## 2022-04-04 NOTE — PROGRESS NOTES
Chief Complaint  Med Management (Yearly routine check and lab work)    SUBJECTIVE  Edi Weston presents to [facility]    77-year-old type 2 diabetes atrial fibrillation on Eliquis 5 mg twice daily history of colon polyps gout    PAST MEDICAL HISTORY  Allergies   Allergen Reactions   • Sulfa Antibiotics Unknown - Low Severity     Unsure of reaction        Past Surgical History:   • ABDOMINAL SURGERY    lap band   • COLONOSCOPY   • GASTRIC BANDING       Social History     Tobacco Use   • Smoking status: Former Smoker     Packs/day: 2.00     Years: 20.00     Pack years: 40.00     Start date:      Quit date:      Years since quittin.2   • Smokeless tobacco: Never Used   • Tobacco comment: AGE STARTED 19 QUIT AGE 31 - SMOKED X 20 YEARS QUIT    Substance Use Topics   • Alcohol use: Not Currently     Comment: CURRENT SOME DAY 2020,2017       Family History   Problem Relation Age of Onset   • Nephrolithiasis Mother         RENAL CALCULUS   • Other Mother         MOTHERS'S FAMILY HISTORY UNKOWN   • Colon cancer Father 50        FAMILY HISTORY OF COLON CANCER   • Other Father         FATHER'S FAMILY HISTORY UNKNOWN   • Colon cancer Paternal Grandfather 60        FAMILY HISTORY OF COLON CANCER         Health Maintenance Due   Topic Date Due   • TDAP/TD VACCINES (1 - Tdap) Never done   • ZOSTER VACCINE (1 of 2) 1997   • Pneumococcal Vaccine 65+ (1 of 1 - PPSV23) 2018   • ANNUAL WELLNESS VISIT  Never done   • LIPID PANEL  2021   • URINE MICROALBUMIN  2022        Last Completed Colonoscopy          COLORECTAL CANCER SCREENING (COLONOSCOPY - Every 10 Years) Next due on 2018  Outside Claim: [place] COLONOSCOPY W/BIOPSY SINGLE/MULTIPLE,PA COLSC FLX W/RMVL OF TUMOR POLYP LESION SNARE TQ    2015  Outside Claim: [place] COLONOSCOPY W/BIOPSY SINGLE/MULTIPLE                REVIEW OF SYSTEMS    Cardiovascular no chest pain no  "palpitation discussed exercise discussed increasing the level of a double time walk would be very quite safe last echocardiogram showed problems with systolic function  Respiratory no shortness of breath or dyspnea exertion  GI discussed plant-based diet history of polyps colonoscopy 2018 not due till 2023    OBJECTIVE  Vitals:    04/04/22 1548   BP: 122/70   Pulse: 50   Temp: 98.5 °F (36.9 °C)   SpO2: 98%   Weight: 127 kg (280 lb)   Height: 195.6 cm (77\")     Body mass index is 33.2 kg/m².    PHYSICAL EXAM    General no distress  Cardiovascular regular rhythm no murmur  Lungs clear equal bilaterally  Abdomen soft nontender no abnormal pulsations ultrasound 2.04 cm here in the office  Skin showed no cancerous or precancerous lesions    ASSESSMENT & PLAN  Diagnoses and all orders for this visit:    1. Routine adult health maintenance (Primary)  -     Lipid Panel; Future  -     Comprehensive Metabolic Panel; Future  -     TSH; Future  -     CBC & Differential; Future  -     Microalbumin / Creatinine Urine Ratio - Urine, Clean Catch    2. Medication management  -     Lipid Panel; Future  -     Comprehensive Metabolic Panel; Future  -     TSH; Future  -     CBC & Differential; Future  -     Microalbumin / Creatinine Urine Ratio - Urine, Clean Catch    3. Mixed hyperlipidemia  -     Lipid Panel; Future  -     Comprehensive Metabolic Panel; Future  -     TSH; Future  -     CBC & Differential; Future  -     Microalbumin / Creatinine Urine Ratio - Urine, Clean Catch  -     simvastatin (ZOCOR) 20 MG tablet; Take 1 tablet by mouth Every Evening.  Dispense: 90 tablet; Refill: 3    4. Hypertension, essential  -     Lipid Panel; Future  -     Comprehensive Metabolic Panel; Future  -     TSH; Future  -     CBC & Differential; Future  -     Microalbumin / Creatinine Urine Ratio - Urine, Clean Catch  -     lisinopril-hydrochlorothiazide (PRINZIDE,ZESTORETIC) 20-12.5 MG per tablet; Take 1 tablet by mouth Daily.  Dispense: 90 tablet; " Refill: 3    5. Type 2 diabetes, controlled, with peripheral neuropathy (HCC)  -     Lipid Panel; Future  -     Comprehensive Metabolic Panel; Future  -     TSH; Future  -     CBC & Differential; Future  -     Microalbumin / Creatinine Urine Ratio - Urine, Clean Catch    6. Persistent atrial fibrillation  -     Lipid Panel; Future  -     Comprehensive Metabolic Panel; Future  -     TSH; Future  -     CBC & Differential; Future  -     Microalbumin / Creatinine Urine Ratio - Urine, Clean Catch    7. Type 2 diabetes mellitus with hyperglycemia, without long-term current use of insulin (HCC)  -     Lipid Panel; Future  -     Comprehensive Metabolic Panel; Future  -     TSH; Future  -     CBC & Differential; Future  -     Microalbumin / Creatinine Urine Ratio - Urine, Clean Catch  -     glyburide (DIAbeta) 2.5 MG tablet; Take 1 tablet by mouth Daily With Breakfast.  Dispense: 90 tablet; Refill: 3  -     metFORMIN ER (GLUCOPHAGE-XR) 500 MG 24 hr tablet; Take 1 tablet by mouth Every Evening.  Dispense: 90 tablet; Refill: 3          2 diabetes needs an A1c hypertension well controlled #3 colon polyps last colonoscopy 2018 not due 2023  Obesity plant-based          Patient was given instructions and counseling regarding his condition or for health maintenance advice. Please see specific information pulled into the AVS if appropriate.

## 2022-04-13 ENCOUNTER — CLINICAL SUPPORT (OUTPATIENT)
Dept: FAMILY MEDICINE CLINIC | Facility: CLINIC | Age: 77
End: 2022-04-13

## 2022-04-13 DIAGNOSIS — E11.65 TYPE 2 DIABETES MELLITUS WITH HYPERGLYCEMIA, WITHOUT LONG-TERM CURRENT USE OF INSULIN: ICD-10-CM

## 2022-04-13 DIAGNOSIS — Z79.899 MEDICATION MANAGEMENT: ICD-10-CM

## 2022-04-13 DIAGNOSIS — I10 HYPERTENSION, ESSENTIAL: ICD-10-CM

## 2022-04-13 DIAGNOSIS — E11.42 TYPE 2 DIABETES, CONTROLLED, WITH PERIPHERAL NEUROPATHY: ICD-10-CM

## 2022-04-13 DIAGNOSIS — Z00.00 ROUTINE ADULT HEALTH MAINTENANCE: ICD-10-CM

## 2022-04-13 DIAGNOSIS — I48.19 PERSISTENT ATRIAL FIBRILLATION: ICD-10-CM

## 2022-04-13 DIAGNOSIS — E78.2 MIXED HYPERLIPIDEMIA: ICD-10-CM

## 2022-04-13 LAB
ALBUMIN UR-MCNC: <1.2 MG/DL
BASOPHILS # BLD AUTO: 0.04 10*3/MM3 (ref 0–0.2)
BASOPHILS NFR BLD AUTO: 0.4 % (ref 0–1.5)
CREAT UR-MCNC: 136.2 MG/DL
DEPRECATED RDW RBC AUTO: 46.4 FL (ref 37–54)
EOSINOPHIL # BLD AUTO: 0.3 10*3/MM3 (ref 0–0.4)
EOSINOPHIL NFR BLD AUTO: 2.9 % (ref 0.3–6.2)
ERYTHROCYTE [DISTWIDTH] IN BLOOD BY AUTOMATED COUNT: 13.9 % (ref 12.3–15.4)
HCT VFR BLD AUTO: 39.5 % (ref 37.5–51)
HGB BLD-MCNC: 13.3 G/DL (ref 13–17.7)
IMM GRANULOCYTES # BLD AUTO: 0.05 10*3/MM3 (ref 0–0.05)
IMM GRANULOCYTES NFR BLD AUTO: 0.5 % (ref 0–0.5)
LYMPHOCYTES # BLD AUTO: 1.51 10*3/MM3 (ref 0.7–3.1)
LYMPHOCYTES NFR BLD AUTO: 14.7 % (ref 19.6–45.3)
MCH RBC QN AUTO: 30.9 PG (ref 26.6–33)
MCHC RBC AUTO-ENTMCNC: 33.7 G/DL (ref 31.5–35.7)
MCV RBC AUTO: 91.6 FL (ref 79–97)
MICROALBUMIN/CREAT UR: NORMAL MG/G{CREAT}
MONOCYTES # BLD AUTO: 1.01 10*3/MM3 (ref 0.1–0.9)
MONOCYTES NFR BLD AUTO: 9.9 % (ref 5–12)
NEUTROPHILS NFR BLD AUTO: 7.33 10*3/MM3 (ref 1.7–7)
NEUTROPHILS NFR BLD AUTO: 71.6 % (ref 42.7–76)
NRBC BLD AUTO-RTO: 0 /100 WBC (ref 0–0.2)
PLATELET # BLD AUTO: 201 10*3/MM3 (ref 140–450)
PMV BLD AUTO: 9.4 FL (ref 6–12)
RBC # BLD AUTO: 4.31 10*6/MM3 (ref 4.14–5.8)
WBC NRBC COR # BLD: 10.24 10*3/MM3 (ref 3.4–10.8)

## 2022-04-13 PROCEDURE — 84443 ASSAY THYROID STIM HORMONE: CPT | Performed by: FAMILY MEDICINE

## 2022-04-13 PROCEDURE — 85025 COMPLETE CBC W/AUTO DIFF WBC: CPT | Performed by: FAMILY MEDICINE

## 2022-04-13 PROCEDURE — 82570 ASSAY OF URINE CREATININE: CPT | Performed by: FAMILY MEDICINE

## 2022-04-13 PROCEDURE — 82043 UR ALBUMIN QUANTITATIVE: CPT | Performed by: FAMILY MEDICINE

## 2022-04-13 PROCEDURE — 36415 COLL VENOUS BLD VENIPUNCTURE: CPT | Performed by: FAMILY MEDICINE

## 2022-04-13 PROCEDURE — 80053 COMPREHEN METABOLIC PANEL: CPT | Performed by: FAMILY MEDICINE

## 2022-04-13 PROCEDURE — 80061 LIPID PANEL: CPT | Performed by: FAMILY MEDICINE

## 2022-04-14 LAB
ALBUMIN SERPL-MCNC: 4.2 G/DL (ref 3.5–5.2)
ALBUMIN/GLOB SERPL: 1.9 G/DL
ALP SERPL-CCNC: 67 U/L (ref 39–117)
ALT SERPL W P-5'-P-CCNC: 20 U/L (ref 1–41)
ANION GAP SERPL CALCULATED.3IONS-SCNC: 11.1 MMOL/L (ref 5–15)
AST SERPL-CCNC: 28 U/L (ref 1–40)
BILIRUB SERPL-MCNC: 0.4 MG/DL (ref 0–1.2)
BUN SERPL-MCNC: 28 MG/DL (ref 8–23)
BUN/CREAT SERPL: 23.9 (ref 7–25)
CALCIUM SPEC-SCNC: 9.4 MG/DL (ref 8.6–10.5)
CHLORIDE SERPL-SCNC: 106 MMOL/L (ref 98–107)
CHOLEST SERPL-MCNC: 86 MG/DL (ref 0–200)
CO2 SERPL-SCNC: 24.9 MMOL/L (ref 22–29)
CREAT SERPL-MCNC: 1.17 MG/DL (ref 0.76–1.27)
EGFRCR SERPLBLD CKD-EPI 2021: 65 ML/MIN/1.73
GLOBULIN UR ELPH-MCNC: 2.2 GM/DL
GLUCOSE SERPL-MCNC: 94 MG/DL (ref 65–99)
HDLC SERPL-MCNC: 33 MG/DL (ref 40–60)
LDLC SERPL CALC-MCNC: 31 MG/DL (ref 0–100)
LDLC/HDLC SERPL: 0.87 {RATIO}
POTASSIUM SERPL-SCNC: 3.9 MMOL/L (ref 3.5–5.2)
PROT SERPL-MCNC: 6.4 G/DL (ref 6–8.5)
SODIUM SERPL-SCNC: 142 MMOL/L (ref 136–145)
TRIGL SERPL-MCNC: 122 MG/DL (ref 0–150)
TSH SERPL DL<=0.05 MIU/L-ACNC: 2.08 UIU/ML (ref 0.27–4.2)
VLDLC SERPL-MCNC: 22 MG/DL (ref 5–40)

## 2022-05-11 ENCOUNTER — OFFICE VISIT (OUTPATIENT)
Dept: CARDIOLOGY | Facility: CLINIC | Age: 77
End: 2022-05-11

## 2022-05-11 VITALS
SYSTOLIC BLOOD PRESSURE: 124 MMHG | BODY MASS INDEX: 32.82 KG/M2 | WEIGHT: 278 LBS | HEIGHT: 77 IN | DIASTOLIC BLOOD PRESSURE: 70 MMHG | HEART RATE: 56 BPM

## 2022-05-11 DIAGNOSIS — I44.7 LBBB (LEFT BUNDLE BRANCH BLOCK): ICD-10-CM

## 2022-05-11 DIAGNOSIS — I10 HYPERTENSION, ESSENTIAL: ICD-10-CM

## 2022-05-11 DIAGNOSIS — I48.0 PAROXYSMAL ATRIAL FIBRILLATION: Primary | ICD-10-CM

## 2022-05-11 DIAGNOSIS — E11.42 TYPE 2 DIABETES, CONTROLLED, WITH PERIPHERAL NEUROPATHY: ICD-10-CM

## 2022-05-11 PROCEDURE — 99214 OFFICE O/P EST MOD 30 MIN: CPT | Performed by: INTERNAL MEDICINE

## 2022-05-11 NOTE — PROGRESS NOTES
Chief Complaint  Atrial Fibrillation, Hypertension, and Hyperlipidemia    Subjective    Patient is feeling better symptomatically after cardioversion has maintained normal sinus rhythm since then denies any tachycardia problems    Past Medical History:   Diagnosis Date   • Arthritis    • Atrial fibrillation (Formerly [facility])    • Cellulitis of right lower limb    • Colon polyps    • Decubitus ulcer of foot, stage 1 08/10/2018   • Decubitus ulcer of foot, stage 3 (Formerly [facility]) 02/07/2018   • Foot pain, left 08/10/2018   • Foot pain, right    • Gout    • Hammertoe 12/30/2019   • High blood pressure    • History of colonoscopy with polypectomy 08/25/2020    2018 TUBULAR ADENOMA, COLONOSCOPY SCHEDULED FOR 10/19/2020 WITH DR SULTANA   • Ingrowing nail 11/02/2018   • Medication management 02/18/2019   • Nail dystrophy    • PAT (paroxysmal atrial tachycardia) (Formerly [facility]) 08/06/2018   • Pressure ulcer, stage 1    • Tinea unguium    • Type 2 diabetes mellitus with foot ulcer (Formerly [facility])    • Type 2 diabetes mellitus with polyneuropathy (Formerly [facility])    • Type 2 diabetes, controlled, with peripheral neuropathy ([facility]ly [facility]) 08/10/2018         Current Outpatient Medications:   •  apixaban (ELIQUIS) 5 MG tablet tablet, Take 1 tablet by mouth 2 (Two) Times a Day., Disp: 180 tablet, Rfl: 1  •  glyburide (DIAbeta) 2.5 MG tablet, Take 1 tablet by mouth Daily With Breakfast., Disp: 90 tablet, Rfl: 3  •  lisinopril-hydrochlorothiazide (PRINZIDE,ZESTORETIC) 20-12.5 MG per tablet, Take 1 tablet by mouth Daily., Disp: 90 tablet, Rfl: 3  •  metFORMIN ER (GLUCOPHAGE-XR) 500 MG 24 hr tablet, Take 1 tablet by mouth Every Evening., Disp: 90 tablet, Rfl: 3  •  metoprolol succinate XL (TOPROL-XL) 25 MG 24 hr tablet, Take 1 tablet by mouth Daily., Disp: 90 tablet, Rfl: 0  •  simvastatin (ZOCOR) 20 MG tablet, Take 1 tablet by mouth Every Evening., Disp: 90 tablet, Rfl: 3    There are no discontinued medications.  Allergies   Allergen Reactions   • Sulfa Antibiotics Unknown - Low Severity      "Unsure of reaction        Social History     Tobacco Use   • Smoking status: Former Smoker     Packs/day: 2.00     Years: 20.00     Pack years: 40.00     Start date:      Quit date:      Years since quittin.3   • Smokeless tobacco: Never Used   • Tobacco comment: AGE STARTED 19 QUIT AGE 31 - SMOKED X 20 YEARS QUIT    Vaping Use   • Vaping Use: Never used   Substance Use Topics   • Alcohol use: Not Currently     Comment: CURRENT SOME DAY 2020,2017   • Drug use: Never       Family History   Problem Relation Age of Onset   • Nephrolithiasis Mother         RENAL CALCULUS   • Other Mother         MOTHERS'S FAMILY HISTORY UNKOWN   • Colon cancer Father 50        FAMILY HISTORY OF COLON CANCER   • Other Father         FATHER'S FAMILY HISTORY UNKNOWN   • Colon cancer Paternal Grandfather 60        FAMILY HISTORY OF COLON CANCER         Objective     /70   Pulse 56   Ht 195.6 cm (77\")   Wt 126 kg (278 lb)   BMI 32.97 kg/m²       Physical Exam    General Appearance:   · no acute distress  · Alert and oriented x3  HENT:   · lips not cyanotic  · Atraumatic  Neck:  · No jvd   · supple  Respiratory:  · no respiratory distress  · normal breath sounds  · no rales  Cardiovascular:  · Regular rate and rhythm  · no S3, no S4   · no murmur  · no rub  Extremities  · No cyanosis  · lower extremity edema: none    Skin:   · warm, dry  · No rashes      Result Review :     No results found for: PROBNP  CMP    CMP 22   Glucose 152 (A) 94   BUN 26 (A) 28 (A)   Creatinine 1.25 1.17   eGFR Non African Am 56 (A)    Sodium 140 142   Potassium 4.1 3.9   Chloride 108 (A) 106   Calcium 8.9 9.4   Albumin  4.20   Total Bilirubin  0.4   Alkaline Phosphatase  67   AST (SGOT)  28   ALT (SGPT)  20   (A) Abnormal value            CBC w/diff    CBC w/Diff 22   WBC 10.24   RBC 4.31   Hemoglobin 13.3   Hematocrit 39.5   MCV 91.6   MCH 30.9   MCHC 33.7   RDW 13.9   Platelets 201   Neutrophil Rel % 71.6 "   Immature Granulocyte Rel % 0.5   Lymphocyte Rel % 14.7 (A)   Monocyte Rel % 9.9   Eosinophil Rel % 2.9   Basophil Rel % 0.4   (A) Abnormal value             Lab Results   Component Value Date    TSH 2.080 04/13/2022      No results found for: FREET4   No results found for: DDIMERQUANT  No results found for: MG   No results found for: DIGOXIN   No results found for: TROPONINT        Lipid Panel    Lipid Panel 4/13/22   Total Cholesterol 86   Triglycerides 122   HDL Cholesterol 33 (A)   VLDL Cholesterol 22   LDL Cholesterol  31   LDL/HDL Ratio 0.87   (A) Abnormal value            No results found for: POCTROP    Results for orders placed in visit on 04/05/22    Adult Transthoracic Echo Complete W/ Cont if Necessary Per Protocol    Interpretation Summary  · Left ventricular wall thickness is consistent with mild concentric hypertrophy.  · Estimated left ventricular EF was in agreement with the calculated left ventricular EF. Left ventricular ejection fraction appears to be 56 - 60%. Left ventricular systolic function is normal.  · Left ventricular diastolic function was normal.  · Estimated right ventricular systolic pressure from tricuspid regurgitation is normal (<35 mmHg).                 Diagnoses and all orders for this visit:    1. Paroxysmal atrial fibrillation (HCC) (Primary)  Assessment & Plan:  Maintaining normal sinus rhythm continue Toprol 25 once a day and Eliquis 5 twice daily for CVA prevention.  We will hold off on antiarrhythmics to see how long patient maintains normal sinus rhythm encourage exercise and weight loss      2. Type 2 diabetes, controlled, with peripheral neuropathy (HCC)    3. Hypertension, essential  Assessment & Plan:  Well-controlled continue lisinopril hydrochlorothiazide 20/12.5 mg and Toprol 25 once a day dosing      4. LBBB (left bundle branch block)          Follow Up     Return in about 6 months (around 11/11/2022) for EKG with F/U.          Patient was given instructions and  counseling regarding his condition or for health maintenance advice. Please see specific information pulled into the AVS if appropriate.

## 2022-05-11 NOTE — ASSESSMENT & PLAN NOTE
Well-controlled continue lisinopril hydrochlorothiazide 20/12.5 mg and Toprol 25 once a day dosing

## 2022-05-11 NOTE — ASSESSMENT & PLAN NOTE
Maintaining normal sinus rhythm continue Toprol 25 once a day and Eliquis 5 twice daily for CVA prevention.  We will hold off on antiarrhythmics to see how long patient maintains normal sinus rhythm encourage exercise and weight loss

## 2022-05-17 ENCOUNTER — OFFICE VISIT (OUTPATIENT)
Dept: FAMILY MEDICINE CLINIC | Facility: CLINIC | Age: 77
End: 2022-05-17

## 2022-05-17 VITALS
HEART RATE: 64 BPM | SYSTOLIC BLOOD PRESSURE: 136 MMHG | TEMPERATURE: 97.2 F | HEIGHT: 77 IN | BODY MASS INDEX: 32.59 KG/M2 | DIASTOLIC BLOOD PRESSURE: 70 MMHG | WEIGHT: 276 LBS | OXYGEN SATURATION: 99 %

## 2022-05-17 DIAGNOSIS — R05.9 COUGH: ICD-10-CM

## 2022-05-17 DIAGNOSIS — E11.42 TYPE 2 DIABETES, CONTROLLED, WITH PERIPHERAL NEUROPATHY: Primary | ICD-10-CM

## 2022-05-17 DIAGNOSIS — Z79.899 MEDICATION MANAGEMENT: ICD-10-CM

## 2022-05-17 LAB
EXPIRATION DATE: NORMAL
HBA1C MFR BLD: 6.5 % (ref 4.8–5.6)
INTERNAL CONTROL: NORMAL
Lab: NORMAL
SARS-COV-2 AG UPPER RESP QL IA.RAPID: NOT DETECTED

## 2022-05-17 PROCEDURE — 36415 COLL VENOUS BLD VENIPUNCTURE: CPT | Performed by: FAMILY MEDICINE

## 2022-05-17 PROCEDURE — 87426 SARSCOV CORONAVIRUS AG IA: CPT | Performed by: FAMILY MEDICINE

## 2022-05-17 PROCEDURE — 83036 HEMOGLOBIN GLYCOSYLATED A1C: CPT | Performed by: FAMILY MEDICINE

## 2022-05-17 PROCEDURE — 99214 OFFICE O/P EST MOD 30 MIN: CPT | Performed by: FAMILY MEDICINE

## 2022-05-17 RX ORDER — PREDNISONE 20 MG/1
20 TABLET ORAL DAILY
Qty: 7 TABLET | Refills: 0 | Status: SHIPPED | OUTPATIENT
Start: 2022-05-17 | End: 2022-05-24

## 2022-05-17 NOTE — PROGRESS NOTES
Chief Complaint  Cough (Cough for several weeks and is moving into the bronchials.  )    SUBJECTIVE  Edi Weston presents to [facility]    77-year-old type 2 diabetes history of A. fib still on Eliquis told him not to stop the Eliquis till Dr. Ledezma told him to pretension has been picking at his right great toe got infected but is looking better I was not hurting him he was just trying to peel off some cuticle had a cough for several weeks not getting worse no fever no chills tired of cough    PAST MEDICAL HISTORY  Allergies   Allergen Reactions   • Sulfa Antibiotics Unknown - Low Severity     Unsure of reaction        Past Surgical History:   • ABDOMINAL SURGERY    lap band   • COLONOSCOPY   • GASTRIC BANDING       Social History     Tobacco Use   • Smoking status: Former Smoker     Packs/day: 2.00     Years: 20.00     Pack years: 40.00     Start date:      Quit date:      Years since quittin.3   • Smokeless tobacco: Never Used   • Tobacco comment: AGE STARTED 19 QUIT AGE 31 - SMOKED X 20 YEARS QUIT    Substance Use Topics   • Alcohol use: Not Currently     Comment: CURRENT SOME DAY 2020,2017       Family History   Problem Relation Age of Onset   • Nephrolithiasis Mother         RENAL CALCULUS   • Other Mother         MOTHERS'S FAMILY HISTORY UNKOWN   • Colon cancer Father 50        FAMILY HISTORY OF COLON CANCER   • Other Father         FATHER'S FAMILY HISTORY UNKNOWN   • Colon cancer Paternal Grandfather 60        FAMILY HISTORY OF COLON CANCER         Health Maintenance Due   Topic Date Due   • TDAP/TD VACCINES (1 - Tdap) Never done   • ZOSTER VACCINE (1 of 2) 1997   • Pneumococcal Vaccine 65+ (2 - PPSV23 or PCV20) 2018   • ANNUAL WELLNESS VISIT  Never done   • HEMOGLOBIN A1C  2022        Last Completed Colonoscopy          COLORECTAL CANCER SCREENING (COLONOSCOPY - Every 10 Years) Next due on 2018  Outside  "Claim: [place] COLONOSCOPY W/BIOPSY SINGLE/MULTIPLE,[place] COLSC FLX W/RMVL OF TUMOR POLYP LESION SNARE TQ    09/08/2015  Outside Claim: [place] COLONOSCOPY W/BIOPSY SINGLE/MULTIPLE                REVIEW OF SYSTEMS    Cardiovascular no chest pain no palpitations  Respiratory no shortness of breath or dyspnea on exertion  Skin right great toe did bleed some but is looking better now been putting ointment on it    OBJECTIVE  Vitals:    05/17/22 1418   BP: 136/70   Pulse: 64   Temp: 97.2 °F (36.2 °C)   SpO2: 99%   Weight: 125 kg (276 lb)   Height: 195.6 cm (77\")     Body mass index is 32.73 kg/m².    PHYSICAL EXAM    General no distress  Cardiovascular regular rhythm no murmur no atrial fib now lungs clear and equal bilaterally no rales no rhonchi no wheezes  Skin toe shows areas clean some old blood trimmed off a bit of excess cuticle no reason for any antibiotics no sign of an ingrown nail    Patient swabbed for COVID and was negative    ASSESSMENT & PLAN  Diagnoses and all orders for this visit:    1. Type 2 diabetes, controlled, with peripheral neuropathy (HCC) (Primary)  -     ORDER: Hemoglobin A1c    2. Medication management  -     ORDER: Hemoglobin A1c    3. Cough  -     POCT SARS-CoV-2 Antigen SADE    Other orders  -     glucose blood test strip; 1 each by Other route Daily. Use as instructed  Dispense: 100 each; Refill: 12  -     predniSONE (DELTASONE) 20 MG tablet; Take 1 tablet by mouth Daily for 7 days.  Dispense: 7 tablet; Refill: 0          URI not COVID prednisone 20 mg daily for 7 days return if fever #2 trauma the cuticle self-induced wound looks clean no reason for any further therapy type 2 diabetes needs an A1c            Patient was given instructions and counseling regarding his condition or for health maintenance advice. Please see specific information pulled into the AVS if appropriate.   "

## 2022-05-19 DIAGNOSIS — E11.65 TYPE 2 DIABETES MELLITUS WITH HYPERGLYCEMIA, WITHOUT LONG-TERM CURRENT USE OF INSULIN: Primary | ICD-10-CM

## 2022-09-27 ENCOUNTER — TELEPHONE (OUTPATIENT)
Dept: CARDIOLOGY | Facility: CLINIC | Age: 77
End: 2022-09-27

## 2022-09-27 RX ORDER — METOPROLOL SUCCINATE 50 MG/1
50 TABLET, EXTENDED RELEASE ORAL DAILY
Qty: 90 TABLET | Refills: 3 | Status: SHIPPED | OUTPATIENT
Start: 2022-09-27

## 2022-09-27 RX ORDER — APIXABAN 5 MG/1
TABLET, FILM COATED ORAL
Qty: 180 TABLET | Refills: 3 | Status: SHIPPED | OUTPATIENT
Start: 2022-09-27

## 2022-09-27 NOTE — TELEPHONE ENCOUNTER
Received VM from patient     PRINCESS patient. Patient states he is having a lot of break through increased HR of 160 and is having to take a extra metoprolol frequently. Patient currently taking Toprol 25 mg daily.    Please advise

## 2022-10-04 ENCOUNTER — OFFICE VISIT (OUTPATIENT)
Dept: FAMILY MEDICINE CLINIC | Facility: CLINIC | Age: 77
End: 2022-10-04

## 2022-10-04 VITALS
HEIGHT: 77 IN | WEIGHT: 286 LBS | HEART RATE: 65 BPM | DIASTOLIC BLOOD PRESSURE: 72 MMHG | BODY MASS INDEX: 33.77 KG/M2 | OXYGEN SATURATION: 95 % | SYSTOLIC BLOOD PRESSURE: 124 MMHG | TEMPERATURE: 98.5 F

## 2022-10-04 DIAGNOSIS — Z23 NEED FOR INFLUENZA VACCINATION: ICD-10-CM

## 2022-10-04 DIAGNOSIS — Z79.899 MEDICATION MANAGEMENT: ICD-10-CM

## 2022-10-04 DIAGNOSIS — E11.42 TYPE 2 DIABETES, CONTROLLED, WITH PERIPHERAL NEUROPATHY: Primary | ICD-10-CM

## 2022-10-04 LAB — HBA1C MFR BLD: 7.6 % (ref 4.8–5.6)

## 2022-10-04 PROCEDURE — 90677 PCV20 VACCINE IM: CPT | Performed by: FAMILY MEDICINE

## 2022-10-04 PROCEDURE — 91312 COVID-19 (PFIZER) BIVALENT BOOSTER 12+YRS: CPT | Performed by: FAMILY MEDICINE

## 2022-10-04 PROCEDURE — 90686 IIV4 VACC NO PRSV 0.5 ML IM: CPT | Performed by: FAMILY MEDICINE

## 2022-10-04 PROCEDURE — 83036 HEMOGLOBIN GLYCOSYLATED A1C: CPT | Performed by: FAMILY MEDICINE

## 2022-10-04 PROCEDURE — 0124A COVID-19 (PFIZER) BIVALENT BOOSTER 12+YRS: CPT | Performed by: FAMILY MEDICINE

## 2022-10-04 PROCEDURE — G0008 ADMIN INFLUENZA VIRUS VAC: HCPCS | Performed by: FAMILY MEDICINE

## 2022-10-04 PROCEDURE — 99213 OFFICE O/P EST LOW 20 MIN: CPT | Performed by: FAMILY MEDICINE

## 2022-10-04 PROCEDURE — 36415 COLL VENOUS BLD VENIPUNCTURE: CPT | Performed by: FAMILY MEDICINE

## 2022-10-04 PROCEDURE — G0009 ADMIN PNEUMOCOCCAL VACCINE: HCPCS | Performed by: FAMILY MEDICINE

## 2022-10-04 NOTE — PROGRESS NOTES
Chief Complaint  Diabetes and Hypertension    SUBJECTIVE  Edi Weston presents to [facility]    Patient is 77 years old type 2 diabetes hypertension atrial fib now in sinus after cardioversion tubular adenomas      PAST MEDICAL HISTORY  Allergies   Allergen Reactions   • Sulfa Antibiotics Unknown - Low Severity     Unsure of reaction        Past Surgical History:   • ABDOMINAL SURGERY    lap band   • COLONOSCOPY   • GASTRIC BANDING       Social History     Tobacco Use   • Smoking status: Former Smoker     Packs/day: 2.00     Years: 20.00     Pack years: 40.00     Start date:      Quit date:      Years since quittin.7   • Smokeless tobacco: Never Used   • Tobacco comment: AGE STARTED 19 QUIT AGE 31 - SMOKED X 20 YEARS QUIT    Substance Use Topics   • Alcohol use: Not Currently     Comment: CURRENT SOME DAY 2020,2017       Family History   Problem Relation Age of Onset   • Nephrolithiasis Mother         RENAL CALCULUS   • Other Mother         MOTHERS'S FAMILY HISTORY UNKOWN   • Colon cancer Father 50        FAMILY HISTORY OF COLON CANCER   • Other Father         FATHER'S FAMILY HISTORY UNKNOWN   • Colon cancer Paternal Grandfather 60        FAMILY HISTORY OF COLON CANCER         Health Maintenance Due   Topic Date Due   • TDAP/TD VACCINES (1 - Tdap) Never done   • ZOSTER VACCINE (1 of 2) 1997   • ANNUAL WELLNESS VISIT  Never done   • AAA SCREEN (ONE-TIME)  Never done        Last Completed Colonoscopy          COLORECTAL CANCER SCREENING (COLONOSCOPY - Every 10 Years) Next due on 2018  Outside Claim: [place] COLONOSCOPY W/BIOPSY SINGLE/MULTIPLE,[place] COLSC FLX W/RMVL OF TUMOR POLYP LESION SNARE TQ    2015  Outside Claim: [place] COLONOSCOPY W/BIOPSY SINGLE/MULTIPLE                REVIEW OF SYSTEMS    Cardiovascular no chest pain sometimes has palpitations with heart rate going up to 150 cardiologist is increase his beta-blocker line    "respiratory no shortness of breath dyspnea exertion  Musculoskeletal feet are both doing well  GI colonoscopy 2020 due again in 2025 no polyps in 2020 but history of polyps before the    OBJECTIVE  Vitals:    10/04/22 1306   BP: 124/72   BP Location: Left arm   Patient Position: Sitting   Cuff Size: Adult   Pulse: 65   Temp: 98.5 °F (36.9 °C)   TempSrc: Temporal   SpO2: 95%   Weight: 130 kg (286 lb)   Height: 195.6 cm (77\")     Body mass index is 33.91 kg/m².    PHYSICAL EXAM    General no distress  Cardiovascular regular rhythm no murmur patient is now in sinus and not atrial fib  Respiratory lungs clear and equal bilaterally    ASSESSMENT & PLAN  Diagnoses and all orders for this visit:    1. Type 2 diabetes, controlled, with peripheral neuropathy (HCC) (Primary)  -     Hemoglobin A1c    2. Medication management  -     Hemoglobin A1c    3. Need for influenza vaccination  -     FluLaval/Fluzone >6 mos (6338-0520)    Other orders  -     Cancel: Fluzone High-Dose 65+yrs (1716-3631)  -     COVID-19 Bivalent Booster (Pfizer) 12+yrs  -     Pneumococcal Conjugate Vaccine 20-Valent (PCV20)          Type 2 diabetes needs an A1c pretension well controlled tubular adenomas colonoscopy colonoscopy due 2025 needs his omicron booster his flu and his pneumococcal vaccine          Patient was given instructions and counseling regarding his condition or for health maintenance advice. Please see specific information pulled into the AVS if appropriate.   "

## 2022-10-05 DIAGNOSIS — R73.09 ELEVATED GLUCOSE: Primary | ICD-10-CM

## 2022-11-30 ENCOUNTER — OFFICE VISIT (OUTPATIENT)
Dept: CARDIOLOGY | Facility: CLINIC | Age: 77
End: 2022-11-30

## 2022-11-30 VITALS
HEIGHT: 77 IN | WEIGHT: 289.8 LBS | HEART RATE: 56 BPM | DIASTOLIC BLOOD PRESSURE: 77 MMHG | BODY MASS INDEX: 34.22 KG/M2 | SYSTOLIC BLOOD PRESSURE: 134 MMHG

## 2022-11-30 DIAGNOSIS — I48.0 PAROXYSMAL ATRIAL FIBRILLATION: Primary | ICD-10-CM

## 2022-11-30 DIAGNOSIS — I44.7 LBBB (LEFT BUNDLE BRANCH BLOCK): ICD-10-CM

## 2022-11-30 DIAGNOSIS — I10 HYPERTENSION, ESSENTIAL: ICD-10-CM

## 2022-11-30 PROCEDURE — 93000 ELECTROCARDIOGRAM COMPLETE: CPT | Performed by: INTERNAL MEDICINE

## 2022-11-30 PROCEDURE — 99214 OFFICE O/P EST MOD 30 MIN: CPT | Performed by: INTERNAL MEDICINE

## 2022-11-30 NOTE — ASSESSMENT & PLAN NOTE
Currently maintaining normal sinus rhythm continue with his Toprol 50 daily dosing.  In addition patient Eliquis 5 twice daily

## 2022-11-30 NOTE — PROGRESS NOTES
Chief Complaint  Atrial Fibrillation, Follow-up, Hypertension, and Hyperlipidemia    Subjective    Patient has done well since last visit has had 1 breakthrough episode of atrial fibrillation since increasing his beta-blocker.  Denies any change in his breathing capacity no chest pain    Past Medical History:   Diagnosis Date   • Arthritis    • Atrial fibrillation ([facility])    • Cellulitis of right lower limb    • Colon polyps    • Decubitus ulcer of foot, stage 1 08/10/2018   • Decubitus ulcer of foot, stage 3 ([facility]) 02/07/2018   • Foot pain, left 08/10/2018   • Foot pain, right    • Gout    • Hammertoe 12/30/2019   • High blood pressure    • History of colonoscopy with polypectomy 08/25/2020    2018 TUBULAR ADENOMA, COLONOSCOPY SCHEDULED FOR 10/19/2020 WITH DR SULTANA   • Ingrowing nail 11/02/2018   • Medication management 02/18/2019   • Nail dystrophy    • PAT (paroxysmal atrial tachycardia) ([facility]) 08/06/2018   • Pressure ulcer, stage 1    • Tinea unguium    • Type 2 diabetes mellitus with foot ulcer ([facility])    • Type 2 diabetes mellitus with polyneuropathy ([facility])    • Type 2 diabetes, controlled, with peripheral neuropathy ([facility]) 08/10/2018         Current Outpatient Medications:   •  Eliquis 5 MG tablet tablet, Take 1 tablet by mouth twice daily, Disp: 180 tablet, Rfl: 3  •  glucose blood test strip, 1 each by Other route Daily. Testing blood sugar daily, Disp: 100 each, Rfl: 3  •  glyburide (DIAbeta) 2.5 MG tablet, Take 1 tablet by mouth Daily With Breakfast., Disp: 90 tablet, Rfl: 3  •  lisinopril-hydrochlorothiazide (PRINZIDE,ZESTORETIC) 20-12.5 MG per tablet, Take 1 tablet by mouth Daily., Disp: 90 tablet, Rfl: 3  •  metFORMIN ER (GLUCOPHAGE-XR) 500 MG 24 hr tablet, Take 1 tablet by mouth Every Evening., Disp: 90 tablet, Rfl: 3  •  metoprolol succinate XL (TOPROL-XL) 50 MG 24 hr tablet, Take 1 tablet by mouth Daily., Disp: 90 tablet, Rfl: 3  •  mupirocin (BACTROBAN) 2 % ointment, Apply 1 application topically to the  "appropriate area as directed 3 (Three) Times a Day., Disp: , Rfl:   •  simvastatin (ZOCOR) 20 MG tablet, Take 1 tablet by mouth Every Evening., Disp: 90 tablet, Rfl: 3    There are no discontinued medications.  Allergies   Allergen Reactions   • Sulfa Antibiotics Unknown - Low Severity     Unsure of reaction        Social History     Tobacco Use   • Smoking status: Former     Packs/day: 2.00     Years: 20.00     Pack years: 40.00     Types: Cigarettes     Start date:      Quit date:      Years since quittin.9   • Smokeless tobacco: Never   • Tobacco comments:     AGE STARTED 19 QUIT AGE 31 - SMOKED X 20 YEARS QUIT    Vaping Use   • Vaping Use: Never used   Substance Use Topics   • Alcohol use: Not Currently     Comment: CURRENT SOME DAY 2020,2017   • Drug use: Never       Family History   Problem Relation Age of Onset   • Nephrolithiasis Mother         RENAL CALCULUS   • Other Mother         MOTHERS'S FAMILY HISTORY UNKOWN   • Colon cancer Father 50        FAMILY HISTORY OF COLON CANCER   • Other Father         FATHER'S FAMILY HISTORY UNKNOWN   • Colon cancer Paternal Grandfather 60        FAMILY HISTORY OF COLON CANCER         Objective     /77   Pulse 56   Ht 195.6 cm (77\")   Wt 131 kg (289 lb 12.8 oz)   BMI 34.37 kg/m²       Physical Exam    General Appearance:   · no acute distress  · Alert and oriented x3  HENT:   · lips not cyanotic  · Atraumatic  Neck:  · No jvd   · supple  Respiratory:  · no respiratory distress  · normal breath sounds  · no rales  Cardiovascular:  · Regular rate and rhythm  · no S3, no S4   · no murmur  · no rub  Extremities  · No cyanosis  · lower extremity edema: none    Skin:   · warm, dry  · No rashes      Result Review :     No results found for: PROBNP  CMP    CMP 22   Glucose 152 (A) 94   BUN 26 (A) 28 (A)   Creatinine 1.25 1.17   eGFR Non African Am 56 (A)    Sodium 140 142   Potassium 4.1 3.9   Chloride 108 (A) 106   Calcium 8.9 " 9.4   Albumin  4.20   Total Bilirubin  0.4   Alkaline Phosphatase  67   AST (SGOT)  28   ALT (SGPT)  20   (A) Abnormal value            CBC w/diff    CBC w/Diff 4/13/22   WBC 10.24   RBC 4.31   Hemoglobin 13.3   Hematocrit 39.5   MCV 91.6   MCH 30.9   MCHC 33.7   RDW 13.9   Platelets 201   Neutrophil Rel % 71.6   Immature Granulocyte Rel % 0.5   Lymphocyte Rel % 14.7 (A)   Monocyte Rel % 9.9   Eosinophil Rel % 2.9   Basophil Rel % 0.4   (A) Abnormal value             Lab Results   Component Value Date    TSH 2.080 04/13/2022      No results found for: FREET4   No results found for: DDIMERQUANT  No results found for: MG   No results found for: DIGOXIN   No results found for: TROPONINT        Lipid Panel    Lipid Panel 4/13/22   Total Cholesterol 86   Triglycerides 122   HDL Cholesterol 33 (A)   VLDL Cholesterol 22   LDL Cholesterol  31   LDL/HDL Ratio 0.87   (A) Abnormal value            No results found for: POCTROP    Results for orders placed in visit on 04/05/22    Adult Transthoracic Echo Complete W/ Cont if Necessary Per Protocol    Interpretation Summary  · Left ventricular wall thickness is consistent with mild concentric hypertrophy.  · Estimated left ventricular EF was in agreement with the calculated left ventricular EF. Left ventricular ejection fraction appears to be 56 - 60%. Left ventricular systolic function is normal.  · Left ventricular diastolic function was normal.  · Estimated right ventricular systolic pressure from tricuspid regurgitation is normal (<35 mmHg).       ECG 12 Lead    Date/Time: 11/30/2022 3:35 PM  Performed by: Kar Ledezma MD  Authorized by: Kar Ledezma MD   Comparison: compared with previous ECG   Similar to previous ECG  Rhythm: sinus rhythm  Conduction: 1st degree AV block and non-specific intraventricular conduction delay                   Diagnoses and all orders for this visit:    1. Paroxysmal atrial fibrillation (HCC) (Primary)  Assessment & Plan:  Currently  maintaining normal sinus rhythm continue with his Toprol 50 daily dosing.  In addition patient Eliquis 5 twice daily          2. LBBB (left bundle branch block)    3. Hypertension, essential  Assessment & Plan:  Blood pressure at goal range continue with lisinopril HCTZ 20/12.5 daily and Toprol 50 mg a day dosing            Follow Up     Return in about 6 months (around 5/30/2023).          Patient was given instructions and counseling regarding his condition or for health maintenance advice. Please see specific information pulled into the AVS if appropriate.

## 2022-11-30 NOTE — ASSESSMENT & PLAN NOTE
Blood pressure at goal range continue with lisinopril HCTZ 20/12.5 daily and Toprol 50 mg a day dosing

## 2022-12-19 DIAGNOSIS — E11.65 TYPE 2 DIABETES MELLITUS WITH HYPERGLYCEMIA, WITHOUT LONG-TERM CURRENT USE OF INSULIN: ICD-10-CM

## 2023-01-24 ENCOUNTER — TELEPHONE (OUTPATIENT)
Dept: FAMILY MEDICINE CLINIC | Facility: CLINIC | Age: 78
End: 2023-01-24
Payer: MEDICARE

## 2023-01-24 DIAGNOSIS — E11.65 TYPE 2 DIABETES MELLITUS WITH HYPERGLYCEMIA, WITHOUT LONG-TERM CURRENT USE OF INSULIN: ICD-10-CM

## 2023-01-24 NOTE — TELEPHONE ENCOUNTER
Caller: Edi Weston    Relationship: Self    Best call back number: [phone]    What is the best time to reach you: ANYTIME    Who are you requesting to speak with (clinical staff, provider,  specific staff member): HARDEEP    Do you know the name of the person who called:PATIENT    What was the call regarding: PATIENT WOULD LIKE A CALLBACK TO DISCUSS ISSUES WITH HARDEEP. PLEASE CALL PATIENT BACK AND ADVISE.    I WAS UNABLE TO WARM TRANSFER THIS CALL.    Do you require a callback: YES

## 2023-03-08 DIAGNOSIS — I10 HYPERTENSION, ESSENTIAL: ICD-10-CM

## 2023-03-08 DIAGNOSIS — E11.65 TYPE 2 DIABETES MELLITUS WITH HYPERGLYCEMIA, WITHOUT LONG-TERM CURRENT USE OF INSULIN: ICD-10-CM

## 2023-03-08 RX ORDER — GLYBURIDE 2.5 MG/1
TABLET ORAL
Qty: 90 TABLET | Refills: 0 | Status: SHIPPED | OUTPATIENT
Start: 2023-03-08 | End: 2023-04-04 | Stop reason: SDUPTHER

## 2023-03-08 RX ORDER — LISINOPRIL AND HYDROCHLOROTHIAZIDE 20; 12.5 MG/1; MG/1
TABLET ORAL
Qty: 90 TABLET | Refills: 0 | Status: SHIPPED | OUTPATIENT
Start: 2023-03-08 | End: 2023-03-16

## 2023-03-13 PROCEDURE — 93005 ELECTROCARDIOGRAM TRACING: CPT | Performed by: EMERGENCY MEDICINE

## 2023-03-13 PROCEDURE — 99284 EMERGENCY DEPT VISIT MOD MDM: CPT

## 2023-03-13 PROCEDURE — 93005 ELECTROCARDIOGRAM TRACING: CPT

## 2023-03-13 RX ORDER — SODIUM CHLORIDE 0.9 % (FLUSH) 0.9 %
10 SYRINGE (ML) INJECTION AS NEEDED
Status: DISCONTINUED | OUTPATIENT
Start: 2023-03-13 | End: 2023-03-14 | Stop reason: HOSPADM

## 2023-03-14 ENCOUNTER — HOSPITAL ENCOUNTER (EMERGENCY)
Facility: HOSPITAL | Age: 78
Discharge: HOME | End: 2023-03-14
Attending: EMERGENCY MEDICINE | Admitting: EMERGENCY MEDICINE
Payer: MEDICARE

## 2023-03-14 ENCOUNTER — APPOINTMENT (OUTPATIENT)
Dept: GENERAL RADIOLOGY | Facility: HOSPITAL | Age: 78
End: 2023-03-14
Payer: MEDICARE

## 2023-03-14 VITALS
OXYGEN SATURATION: 95 % | HEIGHT: 76 IN | DIASTOLIC BLOOD PRESSURE: 92 MMHG | HEART RATE: 81 BPM | BODY MASS INDEX: 35.33 KG/M2 | WEIGHT: 290.13 LBS | TEMPERATURE: 98 F | RESPIRATION RATE: 16 BRPM | SYSTOLIC BLOOD PRESSURE: 131 MMHG

## 2023-03-14 DIAGNOSIS — J40 BRONCHITIS: Primary | ICD-10-CM

## 2023-03-14 DIAGNOSIS — I50.9 CONGESTIVE HEART FAILURE, UNSPECIFIED HF CHRONICITY, UNSPECIFIED HEART FAILURE TYPE: ICD-10-CM

## 2023-03-14 LAB
ALBUMIN SERPL-MCNC: 4 G/DL (ref 3.5–5.2)
ALBUMIN/GLOB SERPL: 1.3 G/DL
ALP SERPL-CCNC: 106 U/L (ref 39–117)
ALT SERPL W P-5'-P-CCNC: 17 U/L (ref 1–41)
ANION GAP SERPL CALCULATED.3IONS-SCNC: 11.2 MMOL/L (ref 5–15)
AST SERPL-CCNC: 22 U/L (ref 1–40)
BASOPHILS # BLD AUTO: 0.05 10*3/MM3 (ref 0–0.2)
BASOPHILS NFR BLD AUTO: 0.6 % (ref 0–1.5)
BILIRUB SERPL-MCNC: 0.5 MG/DL (ref 0–1.2)
BUN SERPL-MCNC: 20 MG/DL (ref 8–23)
BUN/CREAT SERPL: 19.4 (ref 7–25)
CALCIUM SPEC-SCNC: 8.8 MG/DL (ref 8.6–10.5)
CHLORIDE SERPL-SCNC: 104 MMOL/L (ref 98–107)
CO2 SERPL-SCNC: 23.8 MMOL/L (ref 22–29)
CREAT SERPL-MCNC: 1.03 MG/DL (ref 0.76–1.27)
D-LACTATE SERPL-SCNC: 1.2 MMOL/L (ref 0.5–2)
DEPRECATED RDW RBC AUTO: 44.2 FL (ref 37–54)
EGFRCR SERPLBLD CKD-EPI 2021: 75.3 ML/MIN/1.73
EOSINOPHIL # BLD AUTO: 0.61 10*3/MM3 (ref 0–0.4)
EOSINOPHIL NFR BLD AUTO: 7.5 % (ref 0.3–6.2)
ERYTHROCYTE [DISTWIDTH] IN BLOOD BY AUTOMATED COUNT: 13.7 % (ref 12.3–15.4)
FLUAV AG NPH QL: NEGATIVE
FLUBV AG NPH QL IA: NEGATIVE
GLOBULIN UR ELPH-MCNC: 3 GM/DL
GLUCOSE SERPL-MCNC: 142 MG/DL (ref 65–99)
HCT VFR BLD AUTO: 38 % (ref 37.5–51)
HGB BLD-MCNC: 12.7 G/DL (ref 13–17.7)
HOLD SPECIMEN: NORMAL
IMM GRANULOCYTES # BLD AUTO: 0.04 10*3/MM3 (ref 0–0.05)
IMM GRANULOCYTES NFR BLD AUTO: 0.5 % (ref 0–0.5)
LYMPHOCYTES # BLD AUTO: 1.11 10*3/MM3 (ref 0.7–3.1)
LYMPHOCYTES NFR BLD AUTO: 13.7 % (ref 19.6–45.3)
MCH RBC QN AUTO: 29.8 PG (ref 26.6–33)
MCHC RBC AUTO-ENTMCNC: 33.4 G/DL (ref 31.5–35.7)
MCV RBC AUTO: 89.2 FL (ref 79–97)
MONOCYTES # BLD AUTO: 0.75 10*3/MM3 (ref 0.1–0.9)
MONOCYTES NFR BLD AUTO: 9.3 % (ref 5–12)
NEUTROPHILS NFR BLD AUTO: 5.52 10*3/MM3 (ref 1.7–7)
NEUTROPHILS NFR BLD AUTO: 68.4 % (ref 42.7–76)
NRBC BLD AUTO-RTO: 0 /100 WBC (ref 0–0.2)
NT-PROBNP SERPL-MCNC: 1489 PG/ML (ref 0–1800)
PLATELET # BLD AUTO: 162 10*3/MM3 (ref 140–450)
PMV BLD AUTO: 8.9 FL (ref 6–12)
POTASSIUM SERPL-SCNC: 3.9 MMOL/L (ref 3.5–5.2)
PROT SERPL-MCNC: 7 G/DL (ref 6–8.5)
QT INTERVAL: 389 MS
RBC # BLD AUTO: 4.26 10*6/MM3 (ref 4.14–5.8)
S PYO AG THROAT QL: NEGATIVE
SARS-COV-2 RNA RESP QL NAA+PROBE: NOT DETECTED
SODIUM SERPL-SCNC: 139 MMOL/L (ref 136–145)
TROPONIN T SERPL HS-MCNC: 20 NG/L
TROPONIN T SERPL HS-MCNC: 22 NG/L
WBC NRBC COR # BLD: 8.08 10*3/MM3 (ref 3.4–10.8)
WHOLE BLOOD HOLD COAG: NORMAL
WHOLE BLOOD HOLD SPECIMEN: NORMAL

## 2023-03-14 PROCEDURE — 83880 ASSAY OF NATRIURETIC PEPTIDE: CPT

## 2023-03-14 PROCEDURE — 87880 STREP A ASSAY W/OPTIC: CPT

## 2023-03-14 PROCEDURE — 96374 THER/PROPH/DIAG INJ IV PUSH: CPT

## 2023-03-14 PROCEDURE — 71045 X-RAY EXAM CHEST 1 VIEW: CPT

## 2023-03-14 PROCEDURE — 87081 CULTURE SCREEN ONLY: CPT | Performed by: EMERGENCY MEDICINE

## 2023-03-14 PROCEDURE — U0004 COV-19 TEST NON-CDC HGH THRU: HCPCS

## 2023-03-14 PROCEDURE — 87804 INFLUENZA ASSAY W/OPTIC: CPT

## 2023-03-14 PROCEDURE — 84484 ASSAY OF TROPONIN QUANT: CPT

## 2023-03-14 PROCEDURE — U0005 INFEC AGEN DETEC AMPLI PROBE: HCPCS

## 2023-03-14 PROCEDURE — 36415 COLL VENOUS BLD VENIPUNCTURE: CPT

## 2023-03-14 PROCEDURE — 85025 COMPLETE CBC W/AUTO DIFF WBC: CPT

## 2023-03-14 PROCEDURE — 94640 AIRWAY INHALATION TREATMENT: CPT

## 2023-03-14 PROCEDURE — 25010000002 METHYLPREDNISOLONE PER 125 MG: Performed by: NURSE PRACTITIONER

## 2023-03-14 PROCEDURE — 84484 ASSAY OF TROPONIN QUANT: CPT | Performed by: EMERGENCY MEDICINE

## 2023-03-14 PROCEDURE — 87040 BLOOD CULTURE FOR BACTERIA: CPT

## 2023-03-14 PROCEDURE — 83605 ASSAY OF LACTIC ACID: CPT

## 2023-03-14 PROCEDURE — C9803 HOPD COVID-19 SPEC COLLECT: HCPCS | Performed by: EMERGENCY MEDICINE

## 2023-03-14 PROCEDURE — 94799 UNLISTED PULMONARY SVC/PX: CPT

## 2023-03-14 PROCEDURE — 80053 COMPREHEN METABOLIC PANEL: CPT

## 2023-03-14 PROCEDURE — 83036 HEMOGLOBIN GLYCOSYLATED A1C: CPT | Performed by: FAMILY MEDICINE

## 2023-03-14 RX ORDER — IPRATROPIUM BROMIDE AND ALBUTEROL SULFATE 2.5; .5 MG/3ML; MG/3ML
3 SOLUTION RESPIRATORY (INHALATION) ONCE
Status: COMPLETED | OUTPATIENT
Start: 2023-03-14 | End: 2023-03-14

## 2023-03-14 RX ORDER — AZITHROMYCIN 250 MG/1
TABLET, FILM COATED ORAL
Qty: 6 TABLET | Refills: 0 | Status: SHIPPED | OUTPATIENT
Start: 2023-03-14 | End: 2023-04-04

## 2023-03-14 RX ORDER — METHYLPREDNISOLONE SODIUM SUCCINATE 125 MG/2ML
125 INJECTION, POWDER, LYOPHILIZED, FOR SOLUTION INTRAMUSCULAR; INTRAVENOUS ONCE
Status: COMPLETED | OUTPATIENT
Start: 2023-03-14 | End: 2023-03-14

## 2023-03-14 RX ORDER — ALBUTEROL SULFATE 90 UG/1
2 AEROSOL, METERED RESPIRATORY (INHALATION) EVERY 4 HOURS PRN
Qty: 8 G | Refills: 0 | Status: SHIPPED | OUTPATIENT
Start: 2023-03-14

## 2023-03-14 RX ADMIN — METHYLPREDNISOLONE SODIUM SUCCINATE 125 MG: 125 INJECTION, POWDER, FOR SOLUTION INTRAMUSCULAR; INTRAVENOUS at 02:01

## 2023-03-14 RX ADMIN — IPRATROPIUM BROMIDE AND ALBUTEROL SULFATE 3 ML: .5; 2.5 SOLUTION RESPIRATORY (INHALATION) at 02:03

## 2023-03-14 NOTE — ED NOTES
Nurses Note:     Patient to ed via triage with c/o shortness of breath; patient bought a home O2 monitor that showed his O2 saturation at 88%; Patient admits to have had congestion for a few days. Patient states that he took another mucinex prior to arrival and feels much better.     Patient does have a very slight wheeze bilaterally on expiration. Patient admits to having a mild productive cough but suggests that it is mucus that he swallows.

## 2023-03-14 NOTE — ED NOTES
Nurses Note:     After administration of duo-neb patient has extensively more expiratory wheezing bilaterally.

## 2023-03-14 NOTE — ED PROVIDER NOTES
Time: 3:48 AM EDT  Date of encounter:  3/13/2023  Independent Historian/Clinical History and Information was obtained by:   Patient  Chief Complaint: shortness of breath    History is limited by: N/A    History of Present Illness:  Patient is a 78 y.o. year old male who presents to the emergency department for evaluation of shortness of breath.  Patient states he checked his oxygen at home and it was upper 80s.  He does admit to some congestion.  He has had a nonproductive cough.  He has been taking decongestant for it.  He denies any known history of lung disease.  He does have a history of atrial fibrillation.  He denies any fever, chills, nausea, vomiting, diarrhea, abdominal pain. He is on eliquis      HPI    Patient Care Team  Primary Care Provider: Jean Marie Joaquin III, MD    Past Medical History:     Allergies   Allergen Reactions   • Sulfa Antibiotics Unknown - Low Severity     Unsure of reaction     Past Medical History:   Diagnosis Date   • Arthritis    • Atrial fibrillation ([facility])    • Cellulitis of right lower limb    • Colon polyps    • Decubitus ulcer of foot, stage 1 08/10/2018   • Decubitus ulcer of foot, stage 3 ([facility]) 02/07/2018   • Foot pain, left 08/10/2018   • Foot pain, right    • Gout    • Hammertoe 12/30/2019   • High blood pressure    • History of colonoscopy with polypectomy 08/25/2020    2018 TUBULAR ADENOMA, COLONOSCOPY SCHEDULED FOR 10/19/2020 WITH DR SULTANA   • Ingrowing nail 11/02/2018   • Medication management 02/18/2019   • Nail dystrophy    • PAT (paroxysmal atrial tachycardia) ([facility]) 08/06/2018   • Pressure ulcer, stage 1    • Tinea unguium    • Type 2 diabetes mellitus with foot ulcer ([facility])    • Type 2 diabetes mellitus with polyneuropathy ([facility])    • Type 2 diabetes, controlled, with peripheral neuropathy ([facility]) 08/10/2018     Past Surgical History:   Procedure Laterality Date   • ABDOMINAL SURGERY      lap band   • COLONOSCOPY     • GASTRIC BANDING  2007     Family History   Problem  Relation Age of Onset   • Nephrolithiasis Mother         RENAL CALCULUS   • Other Mother         MOTHERS'S FAMILY HISTORY UNKOWN   • Colon cancer Father 50        FAMILY HISTORY OF COLON CANCER   • Other Father         FATHER'S FAMILY HISTORY UNKNOWN   • Colon cancer Paternal Grandfather 60        FAMILY HISTORY OF COLON CANCER        Home Medications:  Prior to Admission medications    Medication Sig Start Date End Date Taking? Authorizing Provider   Eliquis 5 MG tablet tablet Take 1 tablet by mouth twice daily 22   Jamila Ty APRN   glucose blood test strip 1 each by Other route Daily. Testing blood sugar daily 23   Jean Marie Joaquin III, MD   glyburide (DIAbeta) 2.5 MG tablet Take 1 tablet by mouth once daily with breakfast 3/8/23   Jean Marie Joaquin III, MD   lisinopril-hydrochlorothiazide (PRINZIDE,ZESTORETIC) 20-12.5 MG per tablet Take 1 tablet by mouth once daily 3/8/23   Jean Marie Joaquin III, MD   metFORMIN ER (GLUCOPHAGE-XR) 500 MG 24 hr tablet Take 1 tablet by mouth Every Evening. 22   Jean Marie Joaquin III, MD   metoprolol succinate XL (TOPROL-XL) 50 MG 24 hr tablet Take 1 tablet by mouth Daily. 22   Jamila Ty APRN   mupirocin (BACTROBAN) 2 % ointment Apply 1 application topically to the appropriate area as directed 3 (Three) Times a Day.    Provider, MD Cesar   simvastatin (ZOCOR) 20 MG tablet Take 1 tablet by mouth Every Evening. 22   Jean Marie Joaquin III, MD        Social History:   Social History     Tobacco Use   • Smoking status: Former     Packs/day: 2.00     Years: 20.00     Pack years: 40.00     Types: Cigarettes     Start date:      Quit date:      Years since quittin.2   • Smokeless tobacco: Never   • Tobacco comments:     AGE STARTED 19 QUIT AGE 31 - SMOKED X 20 YEARS QUIT    Vaping Use   • Vaping Use: Never used   Substance Use Topics   • Alcohol use: Not Currently     Comment: CURRENT SOME DAY 2020,2017   • Drug use: Never  "        Review of Systems:  Review of Systems   Constitutional: Negative for chills and fever.   HENT: Positive for congestion. Negative for ear pain and sore throat.    Eyes: Negative for pain.   Respiratory: Positive for cough and shortness of breath. Negative for chest tightness.    Cardiovascular: Negative for chest pain.   Gastrointestinal: Negative for abdominal pain, diarrhea, nausea and vomiting.   Genitourinary: Negative for flank pain and hematuria.   Musculoskeletal: Negative for joint swelling.   Skin: Negative for pallor.   Neurological: Negative for seizures and headaches.   All other systems reviewed and are negative.       Physical Exam:  /92   Pulse 81   Temp 98 °F (36.7 °C) (Oral)   Resp 16   Ht 193 cm (76\")   Wt 132 kg (290 lb 2 oz)   SpO2 95%   BMI 35.32 kg/m²     Physical Exam  Constitutional:       Appearance: Normal appearance.   HENT:      Head: Normocephalic and atraumatic.      Nose: Nose normal.      Mouth/Throat:      Mouth: Mucous membranes are moist.   Eyes:      Extraocular Movements: Extraocular movements intact.      Conjunctiva/sclera: Conjunctivae normal.      Pupils: Pupils are equal, round, and reactive to light.   Cardiovascular:      Rate and Rhythm: Normal rate. Rhythm irregular.      Pulses: Normal pulses.      Heart sounds: Normal heart sounds.   Pulmonary:      Effort: Pulmonary effort is normal.      Breath sounds: Wheezing present.   Abdominal:      General: There is no distension.      Palpations: Abdomen is soft.      Tenderness: There is no abdominal tenderness.   Musculoskeletal:         General: Normal range of motion.      Cervical back: Normal range of motion.   Skin:     General: Skin is warm and dry.      Capillary Refill: Capillary refill takes less than 2 seconds.   Neurological:      General: No focal deficit present.      Mental Status: He is alert and oriented to person, place, and time. Mental status is at baseline.   Psychiatric:         Mood " and Affect: Mood normal.         Behavior: Behavior normal.                  Procedures:  Procedures      Medical Decision Making:      Comorbidities that affect care:    Atrial Fibrillation, Diabetes    External Notes reviewed:    Previous Clinic Note: Patient was seen by cardiology on 11/30/22 for HTN and atrial fibrillation      The following orders were placed and all results were independently analyzed by me:  Orders Placed This Encounter   Procedures   • Blood Culture - Blood,   • Blood Culture - Blood,   • Influenza Antigen, Rapid - Swab, Nasopharynx   • Rapid Strep A Screen - Swab, Throat   • COVID-19,APTIMA PANTHER(TIMMY),[facility], NP/OP SWAB IN UTM/VTM/SALINE TRANSPORT MEDIA,24 HR TAT - Swab, Nasopharynx   • Beta Strep Culture, Throat - Swab, Throat   • XR Chest 1 View   • [employer] Draw   • Comprehensive Metabolic Panel   • BNP   • Single High Sensitivity Troponin T   • Lactic Acid, Plasma   • CBC Auto Differential   • High Sensitivity Troponin T   • Undress & Gown   • Continuous Pulse Oximetry   • Vital Signs   • ECG 12 Lead ED Triage Standing Order; SOA   • CBC & Differential   • Green Top (Gel)   • Lavender Top   • Gold Top - SST   • Light Blue Top   • Extra Tubes   • Red Top       Medications Given in the Emergency Department:  Medications   methylPREDNISolone sodium succinate (SOLU-Medrol) injection 125 mg (125 mg Intravenous Given 3/14/23 0201)   ipratropium-albuterol (DUO-NEB) nebulizer solution 3 mL (3 mL Nebulization Given 3/14/23 0203)        ED Course:    ED Course as of 03/14/23 1508   Tue Mar 14, 2023   0354 ECG 12 Lead ED Triage Standing Order; SOA  Atrial fibrillation with rate of 84.  No acute ST elevation.  Nonspecific T wave abnormality.  Normal QTc.  EKG interpreted by me. [LD]      ED Course User Index  [LD] Todd Nguyen MD       Labs:    Lab Results (last 24 hours)     Procedure Component Value Units Date/Time    CBC & Differential [[id number]]  (Abnormal) Collected: 03/14/23  0005    Specimen: Blood Updated: 03/14/23 0016    Narrative:      The following orders were created for panel order CBC & Differential.  Procedure                               Abnormality         Status                     ---------                               -----------         ------                     CBC Auto Differential[[id number]]        Abnormal            Final result                 Please view results for these tests on the individual orders.    Comprehensive Metabolic Panel [[id number]]  (Abnormal) Collected: 03/14/23 0005    Specimen: Blood Updated: 03/14/23 0043     Glucose 142 mg/dL      BUN 20 mg/dL      Creatinine 1.03 mg/dL      Sodium 139 mmol/L      Potassium 3.9 mmol/L      Chloride 104 mmol/L      CO2 23.8 mmol/L      Calcium 8.8 mg/dL      Total Protein 7.0 g/dL      Albumin 4.0 g/dL      ALT (SGPT) 17 U/L      AST (SGOT) 22 U/L      Alkaline Phosphatase 106 U/L      Total Bilirubin 0.5 mg/dL      Globulin 3.0 gm/dL      A/G Ratio 1.3 g/dL      BUN/Creatinine Ratio 19.4     Anion Gap 11.2 mmol/L      eGFR 75.3 mL/min/1.73     Narrative:      GFR Normal >60  Chronic Kidney Disease <60  Kidney Failure <15    The GFR formula is only valid for adults with stable renal function between ages 18 and 70.    BNP [[id number]]  (Normal) Collected: 03/14/23 0005    Specimen: Blood Updated: 03/14/23 0039     proBNP 1,489.0 pg/mL     Narrative:      Among patients with dyspnea, NT-proBNP is highly sensitive for the detection of acute congestive heart failure. In addition NT-proBNP of <300 pg/ml effectively rules out acute congestive heart failure with 99% negative predictive value.      Single High Sensitivity Troponin T [[id number]]  (Abnormal) Collected: 03/14/23 0005    Specimen: Blood Updated: 03/14/23 0043     HS Troponin T 22 ng/L     Narrative:      High Sensitive Troponin T Reference Range:  <10.0 ng/L- Negative Female for AMI  <15.0 ng/L- Negative Male for AMI  >=10 - Abnormal Female indicating  possible myocardial injury.  >=15 - Abnormal Male indicating possible myocardial injury.   Clinicians would have to utilize clinical acumen, EKG, Troponin, and serial changes to determine if it is an Acute Myocardial Infarction or myocardial injury due to an underlying chronic condition.         Blood Culture - Blood, Arm, Left [[id number]] Collected: 03/14/23 0005    Specimen: Blood from Arm, Left Updated: 03/14/23 0011    Lactic Acid, Plasma [[id number]]  (Normal) Collected: 03/14/23 0005    Specimen: Blood Updated: 03/14/23 0041     Lactate 1.2 mmol/L     CBC Auto Differential [[id number]]  (Abnormal) Collected: 03/14/23 0005    Specimen: Blood Updated: 03/14/23 0016     WBC 8.08 10*3/mm3      RBC 4.26 10*6/mm3      Hemoglobin 12.7 g/dL      Hematocrit 38.0 %      MCV 89.2 fL      MCH 29.8 pg      MCHC 33.4 g/dL      RDW 13.7 %      RDW-SD 44.2 fl      MPV 8.9 fL      Platelets 162 10*3/mm3      Neutrophil % 68.4 %      Lymphocyte % 13.7 %      Monocyte % 9.3 %      Eosinophil % 7.5 %      Basophil % 0.6 %      Immature Grans % 0.5 %      Neutrophils, Absolute 5.52 10*3/mm3      Lymphocytes, Absolute 1.11 10*3/mm3      Monocytes, Absolute 0.75 10*3/mm3      Eosinophils, Absolute 0.61 10*3/mm3      Basophils, Absolute 0.05 10*3/mm3      Immature Grans, Absolute 0.04 10*3/mm3      nRBC 0.0 /100 WBC     Influenza Antigen, Rapid - Swab, Nasopharynx [[id number]]  (Normal) Collected: 03/14/23 0013    Specimen: Swab from Nasopharynx Updated: 03/14/23 0050     Influenza A Ag, EIA Negative     Influenza B Ag, EIA Negative    Rapid Strep A Screen - Swab, Throat [[id number]]  (Normal) Collected: 03/14/23 0013    Specimen: Swab from Throat Updated: 03/14/23 0041     Strep A Ag Negative    COVID-19,APTIMA PANTHER(TIMMY),[facility], NP/OP SWAB IN UTM/VTM/SALINE TRANSPORT MEDIA,24 HR TAT - Swab, Nasopharynx [[id number]]  (Normal) Collected: 03/14/23 0013    Specimen: Swab from Nasopharynx Updated: 03/14/23 0620     COVID19 Not  Detected    Narrative:      Fact sheet for providers: https://www.fda.gov/media/629593/download     Fact sheet for patients: https://www.fda.gov/media/372167/download    Test performed by RT PCR.    Beta Strep Culture, Throat - Swab, Throat [[id number]] Collected: 03/14/23 0013    Specimen: Swab from Throat Updated: 03/14/23 0041    Blood Culture - Blood, Arm, Right [[id number]] Collected: 03/14/23 0142    Specimen: Blood from Arm, Right Updated: 03/14/23 0149    High Sensitivity Troponin T [[id number]]  (Abnormal) Collected: 03/14/23 0408    Specimen: Blood Updated: 03/14/23 0435     HS Troponin T 20 ng/L     Narrative:      High Sensitive Troponin T Reference Range:  <10.0 ng/L- Negative Female for AMI  <15.0 ng/L- Negative Male for AMI  >=10 - Abnormal Female indicating possible myocardial injury.  >=15 - Abnormal Male indicating possible myocardial injury.   Clinicians would have to utilize clinical acumen, EKG, Troponin, and serial changes to determine if it is an Acute Myocardial Infarction or myocardial injury due to an underlying chronic condition.                Imaging:    XR Chest 1 View    Result Date: 3/14/2023  PROCEDURE: XR CHEST 1 VW  COMPARISON: 7/18/2018.  INDICATIONS: SHORTNESS OF BREATH.  FINDINGS: 2 PA upright portable views of the chest are provided for review.  Bilateral infiltrates are seen, which may represent pulmonary edema with vascular congestion.  Pneumonia cannot be excluded.  Borderline cardiac enlargement is suggested.  The thoracic aorta is prominent, atherosclerotic, and ectatic, as before.  No pneumothorax is seen.  No pleural effusion is identified.  Chronic calcified granulomatous disease involves the chest.        New mild pulmonary edema with vascular congestion is suggested radiographically.  Pneumonia is thought to be less likely.  There is borderline cardiac enlargement.      Please note that portions of this note were completed with a voice recognition program.  CORNELIUS FLORES  OSEAS FERGUSON MD       Electronically Signed and Approved By: CORNELIUS FAROOQ JR, MD on 3/14/2023 at 1:43                  Differential Diagnosis and Discussion:    Dyspnea: Differential diagnosis includes but is not limited to metabolic acidosis, neurological disorders, psychogenic, asthma, pneumothorax, upper airway obstruction, COPD, pneumonia, noncardiogenic pulmonary edema, interstitial lung disease, anemia, congestive heart failure, and pulmonary embolism    All labs were reviewed and interpreted by me.  All X-rays were independently reviewed by me.  EKG was interpreted by me.    MDM  Number of Diagnoses or Management Options  Bronchitis  Congestive heart failure, unspecified HF chronicity, unspecified heart failure type (HCC)  Diagnosis management comments: On arrival patient is afebrile.  He is tachycardic.  EKG showed atrial fibrillation.  Patient does have a known history of atrial fibrillation.  He is currently on Eliquis.  Prior to my evaluation DuoNeb was ordered for wheezes.  At time of evaluation patient still has mild wheezes but significant improvement.  Labs showed no significant abnormality.  Chest x-ray showed mild pulmonary edema.  BNP slightly elevated at 1400.  Patient reports a cough and congestion.  He currently states he feels well.  Patient able to ambulate with O2 sat remaining in mid 90s.  Will give prescription for inhaler and antibiotic for possible bronchitis.  Recommend close follow-up with his primary physician.  Discussed return precautions, discharge instructions and answered all his questions.       Amount and/or Complexity of Data Reviewed  Clinical lab tests: reviewed  Tests in the radiology section of CPT®: reviewed  Review and summarize past medical records: yes  Independent visualization of images, tracings, or specimens: yes    Risk of Complications, Morbidity, and/or Mortality  Presenting problems: moderate  Management options: moderate    Patient Progress  Patient progress:  stable           Patient Care Considerations:    CT CHEST: I considered ordering a CT scan of the chest, however O2 sat mid 90s on room air. Patient is on eliquis suspicion for PE is low      Consultants/Shared Management Plan:    None    Social Determinants of Health:    Patient is independent, reliable, and has access to care.       Disposition and Care Coordination:    Discharged: I considered escalation of care by admitting this patient for observation, however the patient has improved and is suitable and  stable for discharge.    I have explained the patient´s condition, diagnoses and treatment plan based on the information available to me at this time. I have answered questions and addressed any concerns. The patient has a good  understanding of the patient´s diagnosis, condition, and treatment plan as can be expected at this point. The vital signs have been stable. The patient´s condition is stable and appropriate for discharge from the emergency department.      The patient will pursue further outpatient evaluation with the primary care physician or other designated or consulting physician as outlined in the discharge instructions. They are agreeable to this plan of care and follow-up instructions have been explained in detail. The patient has received these instructions in written format and have expressed an understanding of the discharge instructions. The patient is aware that any significant change in condition or worsening of symptoms should prompt an immediate return to this or the closest emergency department or call to 911.  I have explained discharge medications and the need for follow up with the patient/caretakers. This was also printed in the discharge instructions. Patient was discharged with the following medications and follow up:      Medication List      New Prescriptions    albuterol sulfate  (90 Base) MCG/ACT inhaler  Commonly known as: PROVENTIL HFA;VENTOLIN HFA;PROAIR HFA  Inhale  2 puffs Every 4 (Four) Hours As Needed for Wheezing.     azithromycin 250 MG tablet  Commonly known as: Zithromax Z-Geronimo  Take 2 tablets by mouth on day 1, then 1 tablet daily on days 2-5           Where to Get Your Medications      These medications were sent to [facility]/pharmacy #[id number] - [place], [place] - [address] - [phone]  - [phone]   [address] [place] [address]    Hours: 24-hours Phone: [phone]   · albuterol sulfate  (90 Base) MCG/ACT inhaler  · azithromycin 250 MG tablet      Jean Marie Joaquin III, MD  [address] [place] [address] [facility] [address] [phone]    In 2 days         Final diagnoses:   Bronchitis   Congestive heart failure, unspecified HF chronicity, unspecified heart failure type (HCC)        ED Disposition     ED Disposition   Discharge    Condition   Stable    Comment   --             This medical record created using voice recognition software.             Todd Nguyen MD  03/14/23 9676

## 2023-03-16 ENCOUNTER — OFFICE VISIT (OUTPATIENT)
Dept: FAMILY MEDICINE CLINIC | Facility: CLINIC | Age: 78
End: 2023-03-16
Payer: MEDICARE

## 2023-03-16 VITALS
WEIGHT: 285 LBS | BODY MASS INDEX: 34.7 KG/M2 | HEIGHT: 76 IN | DIASTOLIC BLOOD PRESSURE: 78 MMHG | SYSTOLIC BLOOD PRESSURE: 130 MMHG | HEART RATE: 72 BPM | TEMPERATURE: 97.5 F | OXYGEN SATURATION: 97 %

## 2023-03-16 DIAGNOSIS — R73.09 ELEVATED GLUCOSE: ICD-10-CM

## 2023-03-16 DIAGNOSIS — H61.23 BILATERAL IMPACTED CERUMEN: ICD-10-CM

## 2023-03-16 DIAGNOSIS — I50.9 ACUTE CONGESTIVE HEART FAILURE, UNSPECIFIED HEART FAILURE TYPE: Primary | ICD-10-CM

## 2023-03-16 LAB
BACTERIA SPEC AEROBE CULT: NORMAL
HBA1C MFR BLD: 7.3 % (ref 4.8–5.6)

## 2023-03-16 PROCEDURE — 3078F DIAST BP <80 MM HG: CPT | Performed by: FAMILY MEDICINE

## 2023-03-16 PROCEDURE — 69209 REMOVE IMPACTED EAR WAX UNI: CPT | Performed by: FAMILY MEDICINE

## 2023-03-16 PROCEDURE — 99214 OFFICE O/P EST MOD 30 MIN: CPT | Performed by: FAMILY MEDICINE

## 2023-03-16 PROCEDURE — 3075F SYST BP GE 130 - 139MM HG: CPT | Performed by: FAMILY MEDICINE

## 2023-03-16 RX ORDER — LISINOPRIL 20 MG/1
20 TABLET ORAL DAILY
Qty: 90 TABLET | Refills: 3 | Status: SHIPPED | OUTPATIENT
Start: 2023-03-16

## 2023-03-16 RX ORDER — PREDNISONE 20 MG/1
20 TABLET ORAL DAILY
Qty: 7 TABLET | Refills: 0 | Status: SHIPPED | OUTPATIENT
Start: 2023-03-16 | End: 2023-04-04

## 2023-03-16 RX ORDER — FUROSEMIDE 40 MG/1
40 TABLET ORAL DAILY
Qty: 90 TABLET | Refills: 3 | Status: SHIPPED | OUTPATIENT
Start: 2023-03-16

## 2023-03-16 NOTE — PROGRESS NOTES
Chief Complaint  Transitional Care Management (ED 3/14/2023), Congestive Heart Failure, Ear Fullness, and Insomnia (No rest.  )    SUBJECTIVE  Edi Weston presents to [facility]  Patient is a 78-year-old type II diabetic has been atrial fibs echo about a year ago showed 50 to 60% ejection fraction no diastolic dysfunction no significant valve problems patient is on Eliquis no chest pain just noticed he was short of breath coughing up a little phlegm thought he had a URI in the emergency room he is chest x-ray shows some mild pulmonary edema and his BNP was 1400 has not gained any weight discussed low-salt diet we will change him to Lasix 40 and lisinopril 20 need to get a echo and we will get that through Dr. Ledezma in his office next week    PAST MEDICAL HISTORY  Allergies   Allergen Reactions   • Sulfa Antibiotics Unknown - Low Severity     Unsure of reaction        Past Surgical History:   • ABDOMINAL SURGERY    lap band   • COLONOSCOPY   • GASTRIC BANDING       Social History     Tobacco Use   • Smoking status: Former     Packs/day: 2.00     Years: 20.00     Pack years: 40.00     Types: Cigarettes     Start date:      Quit date:      Years since quittin.2   • Smokeless tobacco: Never   • Tobacco comments:     AGE STARTED 19 QUIT AGE 31 - SMOKED X 20 YEARS QUIT    Substance Use Topics   • Alcohol use: Not Currently     Comment: CURRENT SOME DAY 2020,2017       Family History   Problem Relation Age of Onset   • Nephrolithiasis Mother         RENAL CALCULUS   • Other Mother         MOTHERS'S FAMILY HISTORY UNKOWN   • Colon cancer Father 50        FAMILY HISTORY OF COLON CANCER   • Other Father         FATHER'S FAMILY HISTORY UNKNOWN   • Colon cancer Paternal Grandfather 60        FAMILY HISTORY OF COLON CANCER         Health Maintenance Due   Topic Date Due   • TDAP/TD VACCINES (1 - Tdap) Never done   • ANNUAL WELLNESS VISIT  Never done        Last  "Completed Colonoscopy          Discontinued - COLORECTAL CANCER SCREENING  Discontinued      Frequency changed to Never automatically (Topic No Longer Applies)    06/20/2018  Outside Claim[place]/BIOPSY [place]INGLE[place]IP[place] COLSC FLX W/RMVL OF TUMOR POLYP LESION SNARE TQ    09/08/2015  Outside Claim[place]/BIOPSY SINGLE/MULTIPLE                REVIEW OF SYSTEMS    Respiratory pulse ox when he went to the emergency room was 89 and now it is 97  Cardiovascular no chest pains no palpitations he notices  GI discussed low-salt diet discussed diabetes may be able to use Jardiance which would help with his diabetes and his heart failure if it is systolic failure  ENT decreased hearing in both ears    OBJECTIVE  Vitals:    03/16/23 0930   BP: 130/78   Pulse: 72   Temp: 97.5 °F (36.4 °C)   SpO2: 97%   Weight: 129 kg (285 lb)   Height: 193 cm (76\")     Body mass index is 34.69 kg/m².    PHYSICAL EXAM    General no distress  ENT soft impactions in both ears  Lungs rales both lung fields few wheezes  Cardiovascular irregular rhythm no murmur no JVD is noted  Abdomen soft and nontender      Procedure bilateral ear lavage complete removal of impactions TMs are negative was dry by me and inspected tolerated the procedure well  ASSESSMENT & PLAN  Diagnoses and all orders for this visit:    1. Acute congestive heart failure, unspecified heart failure type (HCC) (Primary)    2. Elevated glucose  -     Hemoglobin A1c    3. Bilateral impacted cerumen    Other orders  -     furosemide (Lasix) 40 MG tablet; Take 1 tablet by mouth Daily.  Dispense: 90 tablet; Refill: 3  -     lisinopril (PRINIVIL,ZESTRIL) 20 MG tablet; Take 1 tablet by mouth Daily.  Dispense: 90 tablet; Refill: 3  -     predniSONE (DELTASONE) 20 MG tablet; Take 1 tablet by mouth Daily.  Dispense: 7 tablet; Refill: 0  -     Cerumen Removal  -     Cerumen Removal          CHF to see Dr. Ledezma next week for echo and fine-tuning his medication  Bilateral " cerumen impaction type 2 diabetes get an A1c  Ear Cerumen Removal    Date/Time: 3/16/2023 10:44 AM  Performed by: Jean Marie Joaquin III, MD  Authorized by: Jean Marie Joaquin III, MD     Anesthesia:  Local Anesthetic: none  Location details: left ear  Patient tolerance: patient tolerated the procedure well with no immediate complications  Procedure type: irrigation   Sedation:  Patient sedated: no      Ear Cerumen Removal    Date/Time: 3/16/2023 10:44 AM  Performed by: Jean Marie Joaquin III, MD  Authorized by: Jean Marie Joaquin III, MD     Anesthesia:  Local Anesthetic: none  Location details: right ear  Patient tolerance: patient tolerated the procedure well with no immediate complications  Procedure type: irrigation   Sedation:  Patient sedated: no               Patient was given instructions and counseling regarding his condition or for health maintenance advice. Please see specific information pulled into the AVS if appropriate.

## 2023-03-19 LAB
BACTERIA SPEC AEROBE CULT: NORMAL
BACTERIA SPEC AEROBE CULT: NORMAL

## 2023-03-20 ENCOUNTER — OFFICE VISIT (OUTPATIENT)
Dept: CARDIOLOGY | Facility: CLINIC | Age: 78
End: 2023-03-20
Payer: MEDICARE

## 2023-03-20 VITALS
HEART RATE: 58 BPM | WEIGHT: 282.2 LBS | BODY MASS INDEX: 34.36 KG/M2 | DIASTOLIC BLOOD PRESSURE: 73 MMHG | HEIGHT: 76 IN | SYSTOLIC BLOOD PRESSURE: 132 MMHG

## 2023-03-20 DIAGNOSIS — R06.02 SOB (SHORTNESS OF BREATH): Primary | ICD-10-CM

## 2023-03-20 DIAGNOSIS — I50.33 ACUTE ON CHRONIC HEART FAILURE WITH PRESERVED EJECTION FRACTION (HFPEF): ICD-10-CM

## 2023-03-20 PROCEDURE — 99214 OFFICE O/P EST MOD 30 MIN: CPT | Performed by: INTERNAL MEDICINE

## 2023-03-20 PROCEDURE — 3078F DIAST BP <80 MM HG: CPT | Performed by: INTERNAL MEDICINE

## 2023-03-20 PROCEDURE — 3075F SYST BP GE 130 - 139MM HG: CPT | Performed by: INTERNAL MEDICINE

## 2023-03-20 NOTE — PROGRESS NOTES
Chief Complaint  Atrial Fibrillation, Hypertension, Hyperlipidemia, and Follow-up    Subjective    Patient is a 78-year-old with a history of paroxysmal atrial fibrillation essential hypertension type 2 diabetes and chronic left bundle branch block abnormality who developed a symptomatic cough which was nonproductive.  Seen in the emergency room on 3/13.  He was noted to have some mild edema on his chest x-ray with a normal proBNP concerning for new onset of CHF.  The patient was given an inhaler later when he follow-up with his PCP he was also given steroids and antibiotics.  He has not been placed on Lasix 40 mg once a day and does report increased urinary output.  He has had some PND symptoms but not necessarily a significant mount of lower extremity edema.    Past Medical History:   Diagnosis Date   • Arthritis    • Atrial fibrillation (Formerly [facility])    • Cellulitis of right lower limb    • Colon polyps    • Decubitus ulcer of foot, stage 1 08/10/2018   • Decubitus ulcer of foot, stage 3 (Formerly [facility]) 02/07/2018   • Foot pain, left 08/10/2018   • Foot pain, right    • Gout    • Hammertoe 12/30/2019   • High blood pressure    • History of colonoscopy with polypectomy 08/25/2020    2018 TUBULAR ADENOMA, COLONOSCOPY SCHEDULED FOR 10/19/2020 WITH DR SULTANA   • Ingrowing nail 11/02/2018   • Medication management 02/18/2019   • Nail dystrophy    • PAT (paroxysmal atrial tachycardia) (Formerly [facility]) 08/06/2018   • Pressure ulcer, stage 1    • Tinea unguium    • Type 2 diabetes mellitus with foot ulcer (Formerly [facility])    • Type 2 diabetes mellitus with polyneuropathy (Formerly [facility])    • Type 2 diabetes, controlled, with peripheral neuropathy (Formerly [facility]) 08/10/2018         Current Outpatient Medications:   •  albuterol sulfate  (90 Base) MCG/ACT inhaler, Inhale 2 puffs Every 4 (Four) Hours As Needed for Wheezing., Disp: 8 g, Rfl: 0  •  Dextromethorphan-guaiFENesin (MUCINEX DM PO), Take  by mouth., Disp: , Rfl:   •  Eliquis 5 MG tablet tablet, Take 1 tablet by mouth  twice daily, Disp: 180 tablet, Rfl: 3  •  furosemide (Lasix) 40 MG tablet, Take 1 tablet by mouth Daily., Disp: 90 tablet, Rfl: 3  •  glucose blood test strip, 1 each by Other route Daily. Testing blood sugar daily, Disp: 100 each, Rfl: 3  •  glyburide (DIAbeta) 2.5 MG tablet, Take 1 tablet by mouth once daily with breakfast, Disp: 90 tablet, Rfl: 0  •  lisinopril (PRINIVIL,ZESTRIL) 20 MG tablet, Take 1 tablet by mouth Daily., Disp: 90 tablet, Rfl: 3  •  metFORMIN ER (GLUCOPHAGE-XR) 500 MG 24 hr tablet, Take 1 tablet by mouth Every Evening., Disp: 90 tablet, Rfl: 3  •  metoprolol succinate XL (TOPROL-XL) 50 MG 24 hr tablet, Take 1 tablet by mouth Daily., Disp: 90 tablet, Rfl: 3  •  mupirocin (BACTROBAN) 2 % ointment, Apply 1 application topically to the appropriate area as directed 3 (Three) Times a Day., Disp: , Rfl:   •  predniSONE (DELTASONE) 20 MG tablet, Take 1 tablet by mouth Daily., Disp: 7 tablet, Rfl: 0  •  simvastatin (ZOCOR) 20 MG tablet, Take 1 tablet by mouth Every Evening., Disp: 90 tablet, Rfl: 3  •  azithromycin (Zithromax Z-Geornimo) 250 MG tablet, Take 2 tablets by mouth on day 1, then 1 tablet daily on days 2-5 (Patient not taking: Reported on 3/20/2023), Disp: 6 tablet, Rfl: 0  No current facility-administered medications for this visit.    There are no discontinued medications.  Allergies   Allergen Reactions   • Sulfa Antibiotics Unknown - Low Severity     Unsure of reaction        Social History     Tobacco Use   • Smoking status: Former     Packs/day: 2.00     Years: 20.00     Pack years: 40.00     Types: Cigarettes     Start date:      Quit date:      Years since quittin.2   • Smokeless tobacco: Never   • Tobacco comments:     AGE STARTED 19 QUIT AGE 31 - SMOKED X 20 YEARS QUIT    Vaping Use   • Vaping Use: Never used   Substance Use Topics   • Alcohol use: Not Currently     Comment: CURRENT SOME DAY 2020,2017   • Drug use: Never       Family History   Problem  "Relation Age of Onset   • Nephrolithiasis Mother         RENAL CALCULUS   • Other Mother         MOTHERS'S FAMILY HISTORY UNKOWN   • Colon cancer Father 50        FAMILY HISTORY OF COLON CANCER   • Other Father         FATHER'S FAMILY HISTORY UNKNOWN   • Colon cancer Paternal Grandfather 60        FAMILY HISTORY OF COLON CANCER         Objective     /73   Pulse 58   Ht 193 cm (76\")   Wt 128 kg (282 lb 3.2 oz)   BMI 34.35 kg/m²       Physical Exam    General Appearance:   · no acute distress  · Alert and oriented x3  HENT:   · lips not cyanotic  · Atraumatic  Neck:  · Positive JVD   · supple  Respiratory:  · no respiratory distress  · normal breath sounds  · no rales  Cardiovascular:  · Regular rate and rhythm  · no S3, no S4   · no murmur  · no rub  Extremities  · No cyanosis  · lower extremity edema: Trace to mild  Skin:   · warm, dry  · No rashes      Result Review :     proBNP   Date Value Ref Range Status   03/14/2023 1,489.0 0.0 - 1,800.0 pg/mL Final     CMP    CMP 4/13/22 3/14/23   Glucose 94 142 (A)   BUN 28 (A) 20   Creatinine 1.17 1.03   eGFR 65.0 75.3   Sodium 142 139   Potassium 3.9 3.9   Chloride 106 104   Calcium 9.4 8.8   Total Protein 6.4 7.0   Albumin 4.20 4.0   Globulin 2.2 3.0   Total Bilirubin 0.4 0.5   Alkaline Phosphatase 67 106   AST (SGOT) 28 22   ALT (SGPT) 20 17   Albumin/Globulin Ratio 1.9 1.3   BUN/Creatinine Ratio 23.9 19.4   Anion Gap 11.1 11.2   (A) Abnormal value       Comments are available for some flowsheets but are not being displayed.           CBC w/diff    CBC w/Diff 4/13/22 3/14/23   WBC 10.24 8.08   RBC 4.31 4.26   Hemoglobin 13.3 12.7 (A)   Hematocrit 39.5 38.0   MCV 91.6 89.2   MCH 30.9 29.8   MCHC 33.7 33.4   RDW 13.9 13.7   Platelets 201 162   Neutrophil Rel % 71.6 68.4   Immature Granulocyte Rel % 0.5 0.5   Lymphocyte Rel % 14.7 (A) 13.7 (A)   Monocyte Rel % 9.9 9.3   Eosinophil Rel % 2.9 7.5 (A)   Basophil Rel % 0.4 0.6   (A) Abnormal value             Lab " Results   Component Value Date    TSH 2.080 04/13/2022      No results found for: FREET4   No results found for: DDIMERQUANT  No results found for: MG   No results found for: DIGOXIN   Lab Results   Component Value Date    TROPONINT 20 (H) 03/14/2023          Lab 03/14/23  0005   HEMOGLOBIN A1C 7.30*      Lipid Panel    Lipid Panel 4/13/22   Total Cholesterol 86   Triglycerides 122   HDL Cholesterol 33 (A)   VLDL Cholesterol 22   LDL Cholesterol  31   LDL/HDL Ratio 0.87   (A) Abnormal value            No results found for: POCTROP    Results for orders placed in visit on 04/05/22    Adult Transthoracic Echo Complete W/ Cont if Necessary Per Protocol    Interpretation Summary  · Left ventricular wall thickness is consistent with mild concentric hypertrophy.  · Estimated left ventricular EF was in agreement with the calculated left ventricular EF. Left ventricular ejection fraction appears to be 56 - 60%. Left ventricular systolic function is normal.  · Left ventricular diastolic function was normal.  · Estimated right ventricular systolic pressure from tricuspid regurgitation is normal (<35 mmHg).                 Diagnoses and all orders for this visit:    1. SOB (shortness of breath) (Primary)  -     Adult Transthoracic Echo Complete W/ Cont if Necessary Per Protocol; Future    2. Acute on chronic heart failure with preserved ejection fraction (HFpEF) ([facility])  Assessment & Plan:  Patient with new onset of pulmonary edema on chest x-ray his echocardiogram shows intact LV function with some features concerning for diastolic dysfunction such as left atrial enlargement.  Overall given his underlying atrial fibrillation and left atrial enlargement along with chest x-ray findings do feel patient has some diastolic dysfunction issues currently been started on Lasix 40 a day we will continue with this for the next 2 weeks repeat a BMP to decide about further chronic daily diuretic use versus as needed dosing.  Could consider  the addition of Aldactone on 2-week follow-up chronically with as needed dosing of Lasix depending on patient's clinical response.  He currently has adequate control blood pressure and is maintaining normal sinus rhythm.  Did  patient on low-sodium diet as well as keeping a daily weight log            Follow Up     No follow-ups on file.          Patient was given instructions and counseling regarding his condition or for health maintenance advice. Please see specific information pulled into the AVS if appropriate.

## 2023-03-20 NOTE — ASSESSMENT & PLAN NOTE
Patient with new onset of pulmonary edema on chest x-ray his echocardiogram shows intact LV function with some features concerning for diastolic dysfunction such as left atrial enlargement.  Overall given his underlying atrial fibrillation and left atrial enlargement along with chest x-ray findings do feel patient has some diastolic dysfunction issues currently been started on Lasix 40 a day we will continue with this for the next 2 weeks repeat a BMP to decide about further chronic daily diuretic use versus as needed dosing.  Could consider the addition of Aldactone on 2-week follow-up chronically with as needed dosing of Lasix depending on patient's clinical response.  He currently has adequate control blood pressure and is maintaining normal sinus rhythm.  Did  patient on low-sodium diet as well as keeping a daily weight log

## 2023-04-04 ENCOUNTER — OFFICE VISIT (OUTPATIENT)
Dept: FAMILY MEDICINE CLINIC | Facility: CLINIC | Age: 78
End: 2023-04-04
Payer: MEDICARE

## 2023-04-04 VITALS
DIASTOLIC BLOOD PRESSURE: 64 MMHG | HEART RATE: 95 BPM | OXYGEN SATURATION: 99 % | TEMPERATURE: 98.3 F | SYSTOLIC BLOOD PRESSURE: 112 MMHG | BODY MASS INDEX: 34.58 KG/M2 | WEIGHT: 284 LBS | HEIGHT: 76 IN

## 2023-04-04 DIAGNOSIS — I50.9 DYSPNEA DUE TO CONGESTIVE HEART FAILURE: ICD-10-CM

## 2023-04-04 DIAGNOSIS — E78.2 MIXED HYPERLIPIDEMIA: ICD-10-CM

## 2023-04-04 DIAGNOSIS — Z79.899 MEDICATION MANAGEMENT: ICD-10-CM

## 2023-04-04 DIAGNOSIS — E11.65 TYPE 2 DIABETES MELLITUS WITH HYPERGLYCEMIA, WITHOUT LONG-TERM CURRENT USE OF INSULIN: Primary | ICD-10-CM

## 2023-04-04 DIAGNOSIS — H61.22 IMPACTED CERUMEN OF LEFT EAR: ICD-10-CM

## 2023-04-04 PROBLEM — I50.31 ACUTE DIASTOLIC CHF (CONGESTIVE HEART FAILURE): Status: ACTIVE | Noted: 2023-04-04

## 2023-04-04 RX ORDER — METFORMIN HYDROCHLORIDE 500 MG/1
500 TABLET, EXTENDED RELEASE ORAL EVERY EVENING
Qty: 90 TABLET | Refills: 3 | Status: SHIPPED | OUTPATIENT
Start: 2023-04-04 | End: 2023-04-04

## 2023-04-04 RX ORDER — SIMVASTATIN 20 MG
20 TABLET ORAL EVERY EVENING
Qty: 90 TABLET | Refills: 3 | Status: SHIPPED | OUTPATIENT
Start: 2023-04-04 | End: 2023-04-04

## 2023-04-04 RX ORDER — GLYBURIDE 2.5 MG/1
2.5 TABLET ORAL
Qty: 90 TABLET | Refills: 3 | Status: SHIPPED | OUTPATIENT
Start: 2023-04-04 | End: 2023-04-04

## 2023-04-04 RX ORDER — METFORMIN HYDROCHLORIDE 500 MG/1
500 TABLET, EXTENDED RELEASE ORAL EVERY EVENING
Qty: 90 TABLET | Refills: 3 | Status: SHIPPED | OUTPATIENT
Start: 2023-04-04

## 2023-04-04 RX ORDER — HYDROCORTISONE AND ACETIC ACID 20.75; 10.375 MG/ML; MG/ML
3 SOLUTION AURICULAR (OTIC) 2 TIMES DAILY
Qty: 10 ML | Refills: 1 | Status: SHIPPED | OUTPATIENT
Start: 2023-04-04 | End: 2023-04-04

## 2023-04-04 RX ORDER — GLYBURIDE 2.5 MG/1
2.5 TABLET ORAL
Qty: 90 TABLET | Refills: 3 | Status: SHIPPED | OUTPATIENT
Start: 2023-04-04

## 2023-04-04 RX ORDER — SIMVASTATIN 20 MG
20 TABLET ORAL EVERY EVENING
Qty: 90 TABLET | Refills: 3 | Status: SHIPPED | OUTPATIENT
Start: 2023-04-04

## 2023-04-04 RX ORDER — HYDROCORTISONE AND ACETIC ACID 20.75; 10.375 MG/ML; MG/ML
3 SOLUTION AURICULAR (OTIC) 2 TIMES DAILY
Qty: 10 ML | Refills: 1 | Status: SHIPPED | OUTPATIENT
Start: 2023-04-04

## 2023-04-04 NOTE — PROGRESS NOTES
Chief Complaint  Diabetes (6 month follow up)    SUBJECTIVE  Edi Weston presents to [facility]    Patient 78-year-old diabetic history of going to emergency room a couple weeks ago had some mild pulmonary edema was considerably dyspneic now is on Lasix 40 feeling much better weight tenderness to 79 this is lost about 4 pounds and is down to about 275 saw Dr. Ledezma did not know echocardiogram has a an ejection fraction of 61% no significant valvular problems therefore this is diastolic CHF patient is on a low-salt diet A1c from 3 weeks ago was 7.3 down from 7.6 still having some congestion in his ears left worse than right    PAST MEDICAL HISTORY  Allergies   Allergen Reactions   • Sulfa Antibiotics Unknown - Low Severity     Unsure of reaction        Past Surgical History:   • ABDOMINAL SURGERY    lap band   • COLONOSCOPY   • GASTRIC BANDING       Social History     Tobacco Use   • Smoking status: Former     Packs/day: 2.00     Years: 20.00     Pack years: 40.00     Types: Cigarettes     Start date:      Quit date:      Years since quittin.2   • Smokeless tobacco: Never   • Tobacco comments:     AGE STARTED 19 QUIT AGE 31 - SMOKED X 20 YEARS QUIT    Substance Use Topics   • Alcohol use: Not Currently     Comment: CURRENT SOME DAY 2020,2017       Family History   Problem Relation Age of Onset   • Nephrolithiasis Mother         RENAL CALCULUS   • Other Mother         MOTHERS'S FAMILY HISTORY UNKOWN   • Colon cancer Father 50        FAMILY HISTORY OF COLON CANCER   • Other Father         FATHER'S FAMILY HISTORY UNKNOWN   • Colon cancer Paternal Grandfather 60        FAMILY HISTORY OF COLON CANCER         Health Maintenance Due   Topic Date Due   • TDAP/TD VACCINES (1 - Tdap) Never done   • ANNUAL WELLNESS VISIT  Never done        Last Completed Colonoscopy          Discontinued - COLORECTAL CANCER SCREENING  Discontinued      Frequency changed to Never  "automatically (Topic No Longer Applies)    06/20/2018  Outside Claim: [place] COL[place]OSCOPY W/BIOPSY SINGLE/MULTIPLE,[place] COLSC FLX W/RMVL OF TUMOR POLYP LESION SNARE TQ    09/08/2015  Outside Claim: [place] COL[place]OSCOPY W/BIOPSY SINGLE/MULTIPLE                REVIEW OF SYSTEMS    Cardiovascular no chest pain no palpitations  Lungs no dyspnea at all now    OBJECTIVE  Vitals:    04/04/23 1435   BP: 112/64   Pulse: 95   Temp: 98.3 °F (36.8 °C)   SpO2: 99%   Weight: 129 kg (284 lb)   Height: 193 cm (76\")     Body mass index is 34.57 kg/m².    PHYSICAL EXAM    General no distress  ENT some soft wax in the left ear very little in the right  Lungs clear and equal bilaterally    Cardiovascular irregular rhythm he is in atrial fibs with his is on Eliquis      Left ear lavage and clear  ASSESSMENT & PLAN  Diagnoses and all orders for this visit:    1. Type 2 diabetes mellitus with hyperglycemia, without long-term current use of insulin (Primary)  -     metFORMIN ER (GLUCOPHAGE-XR) 500 MG 24 hr tablet; Take 1 tablet by mouth Every Evening.  Dispense: 90 tablet; Refill: 3  -     glyburide (DIAbeta) 2.5 MG tablet; Take 1 tablet by mouth Daily With Breakfast.  Dispense: 90 tablet; Refill: 3    2. Mixed hyperlipidemia  -     simvastatin (ZOCOR) 20 MG tablet; Take 1 tablet by mouth Every Evening.  Dispense: 90 tablet; Refill: 3    3. Medication management  -     Cancel: Basic Metabolic Panel  -     Cancel: BNP  -     Basic Metabolic Panel; Future  -     BNP; Future    4. Dyspnea due to congestive heart failure  -     Cancel: BNP  -     BNP; Future    5. Impacted cerumen of left ear  -     Cerumen Removal          Diastolic CHF on Lasix 40 need a BNP and a BMP diabetes doing better A1c down to 7.3 recheck in 3 months left cerumen impaction lavaged out started on Acetasol and some mild external otitis  Ear Cerumen Removal    Date/Time: 4/4/2023 3:20 PM  Performed by: Jean Marie Joaquin III, MD  Authorized by: Jean Marie Joaquin III, MD "     Anesthesia:  Local Anesthetic: none  Location details: left ear  Patient tolerance: patient tolerated the procedure well with no immediate complications  Procedure type: irrigation   Sedation:  Patient sedated: no               Patient was given instructions and counseling regarding his condition or for health maintenance advice. Please see specific information pulled into the AVS if appropriate.

## 2023-04-05 ENCOUNTER — LAB (OUTPATIENT)
Dept: LAB | Facility: HOSPITAL | Age: 78
End: 2023-04-05
Payer: MEDICARE

## 2023-04-05 DIAGNOSIS — I50.9 DYSPNEA DUE TO CONGESTIVE HEART FAILURE: ICD-10-CM

## 2023-04-05 DIAGNOSIS — R06.02 SOB (SHORTNESS OF BREATH): ICD-10-CM

## 2023-04-05 DIAGNOSIS — I50.33 ACUTE ON CHRONIC HEART FAILURE WITH PRESERVED EJECTION FRACTION (HFPEF): ICD-10-CM

## 2023-04-05 DIAGNOSIS — Z79.899 MEDICATION MANAGEMENT: ICD-10-CM

## 2023-04-05 LAB
ANION GAP SERPL CALCULATED.3IONS-SCNC: 11 MMOL/L (ref 5–15)
BUN SERPL-MCNC: 22 MG/DL (ref 8–23)
BUN/CREAT SERPL: 16.7 (ref 7–25)
CALCIUM SPEC-SCNC: 8.9 MG/DL (ref 8.6–10.5)
CHLORIDE SERPL-SCNC: 105 MMOL/L (ref 98–107)
CO2 SERPL-SCNC: 29 MMOL/L (ref 22–29)
CREAT SERPL-MCNC: 1.32 MG/DL (ref 0.76–1.27)
EGFRCR SERPLBLD CKD-EPI 2021: 55.9 ML/MIN/1.73
GLUCOSE SERPL-MCNC: 258 MG/DL (ref 65–99)
NT-PROBNP SERPL-MCNC: 446 PG/ML (ref 0–1800)
POTASSIUM SERPL-SCNC: 3.9 MMOL/L (ref 3.5–5.2)
SODIUM SERPL-SCNC: 145 MMOL/L (ref 136–145)

## 2023-04-05 PROCEDURE — 80048 BASIC METABOLIC PNL TOTAL CA: CPT

## 2023-04-05 PROCEDURE — 36415 COLL VENOUS BLD VENIPUNCTURE: CPT

## 2023-04-05 PROCEDURE — 83880 ASSAY OF NATRIURETIC PEPTIDE: CPT

## 2023-04-06 ENCOUNTER — TELEPHONE (OUTPATIENT)
Dept: CARDIOLOGY | Facility: CLINIC | Age: 78
End: 2023-04-06
Payer: COMMERCIAL

## 2023-04-06 NOTE — TELEPHONE ENCOUNTER
----- Message from DIONNA Majano sent at 4/6/2023 10:08 AM EDT -----  Recent lab work shows normal electrolytes, renal function is stable, with only minor change which is related to his recent addition of diuretics.  His BNP level is an indicator of fluid retention, and it has  improved.  From a cardiac standpoint recommend to continue current medications.

## 2023-05-11 ENCOUNTER — OFFICE VISIT (OUTPATIENT)
Dept: FAMILY MEDICINE CLINIC | Facility: CLINIC | Age: 78
End: 2023-05-11
Payer: MEDICARE

## 2023-05-11 VITALS
HEART RATE: 104 BPM | HEIGHT: 76 IN | DIASTOLIC BLOOD PRESSURE: 70 MMHG | BODY MASS INDEX: 34.58 KG/M2 | WEIGHT: 284 LBS | OXYGEN SATURATION: 98 % | SYSTOLIC BLOOD PRESSURE: 128 MMHG | TEMPERATURE: 97.8 F

## 2023-05-11 DIAGNOSIS — H60.503 ACUTE OTITIS EXTERNA OF BOTH EARS, UNSPECIFIED TYPE: Primary | ICD-10-CM

## 2023-05-11 DIAGNOSIS — E11.65 TYPE 2 DIABETES MELLITUS WITH HYPERGLYCEMIA, WITHOUT LONG-TERM CURRENT USE OF INSULIN: ICD-10-CM

## 2023-05-11 RX ORDER — METFORMIN HYDROCHLORIDE 500 MG/1
1000 TABLET, EXTENDED RELEASE ORAL EVERY EVENING
Qty: 180 TABLET | Refills: 3 | Status: SHIPPED | OUTPATIENT
Start: 2023-05-11

## 2023-05-11 RX ORDER — CIPROFLOXACIN HYDROCHLORIDE 3.5 MG/ML
2 SOLUTION/ DROPS TOPICAL 2 TIMES DAILY
Qty: 5 ML | Refills: 1 | Status: SHIPPED | OUTPATIENT
Start: 2023-05-11 | End: 2023-05-18

## 2023-05-11 NOTE — PROGRESS NOTES
"Chief Complaint  Hearing Problem (Difficulty hearing in both ear, constantly has \"liquid\" wax coming out of ears)    SUBJECTIVE  Edi Weston presents to [facility]    Patient is 78 years old type 2 diabetes is A1c last was up a little for him 7.3 having decreased hearing in his ears and some drainage history of CHF doing better breathing is better can exercise slowly heart failure with preserved ejection fraction    PAST MEDICAL HISTORY  Allergies   Allergen Reactions   • Sulfa Antibiotics Unknown - Low Severity     Unsure of reaction        Past Surgical History:   • ABDOMINAL SURGERY    lap band   • COLONOSCOPY   • GASTRIC BANDING       Social History     Tobacco Use   • Smoking status: Former     Packs/day: 2.00     Years: 20.00     Pack years: 40.00     Types: Cigarettes     Start date:      Quit date:      Years since quittin.3   • Smokeless tobacco: Never   • Tobacco comments:     AGE STARTED 19 QUIT AGE 31 - SMOKED X 20 YEARS QUIT    Substance Use Topics   • Alcohol use: Not Currently     Comment: CURRENT SOME DAY 2020,2017       Family History   Problem Relation Age of Onset   • Nephrolithiasis Mother         RENAL CALCULUS   • Other Mother         MOTHERS'S FAMILY HISTORY UNKOWN   • Colon cancer Father 50        FAMILY HISTORY OF COLON CANCER   • Other Father         FATHER'S FAMILY HISTORY UNKNOWN   • Colon cancer Paternal Grandfather 60        FAMILY HISTORY OF COLON CANCER         Health Maintenance Due   Topic Date Due   • TDAP/TD VACCINES (1 - Tdap) Never done   • ANNUAL WELLNESS VISIT  Never done   • LIPID PANEL  2023   • URINE MICROALBUMIN  2023        Last Completed Colonoscopy          Discontinued - COLORECTAL CANCER SCREENING  Discontinued      Frequency changed to Never automatically (Topic No Longer Applies)    2018  Outside Claim: [place] COL[place] W/BIOPSY SINGLE[place]MULTIPLE,[place] COLSC FLX W/RMVL OF TUMOR POLYP " "LESION SNARE TQ    09/08/2015  Outside Claim: [place] COL[place] W/BI[place]SY SINGLE/MULTIPLE                REVIEW OF SYSTEMS    Endocrine discussed the more exercise decreasing calories will increase metformin to 2 daily 500 cassia-grams XR  ENT drainage and decreased hearing both ears    OBJECTIVE  Vitals:    05/11/23 1019   BP: 128/70   Pulse: 104   Temp: 97.8 °F (36.6 °C)   SpO2: 98%   Weight: 129 kg (284 lb)   Height: 193 cm (76\")     Body mass index is 34.57 kg/m².    PHYSICAL EXAM    General no distress   ENT both ears have pus and erythema cleaned out with a cotton tampon  Cardiovascular regular rhythm no murmur  Lungs clear and equal bilaterally    ASSESSMENT & PLAN  Diagnoses and all orders for this visit:    1. Acute otitis externa of both ears, unspecified type (Primary)  -     ciprofloxacin (CILOXAN) 0.3 % ophthalmic solution; Administer 2 drops into ear(s) as directed by provider 2 (Two) Times a Day for 7 days.  Dispense: 5 mL; Refill: 1    2. Type 2 diabetes mellitus with hyperglycemia, without long-term current use of insulin  -     metFORMIN ER (GLUCOPHAGE-XR) 500 MG 24 hr tablet; Take 2 tablets by mouth Every Evening.  Dispense: 180 tablet; Refill: 3          Bilateral external otitis will need antibiotic drops with steroid increase metformin XR diabetes 2 daily CHF reasonably well controlled            Patient was given instructions and counseling regarding his condition or for health maintenance advice. Please see specific information pulled into the AVS if appropriate.   "

## 2023-05-23 ENCOUNTER — APPOINTMENT (OUTPATIENT)
Dept: GENERAL RADIOLOGY | Facility: HOSPITAL | Age: 78
End: 2023-05-23
Payer: MEDICARE

## 2023-05-23 ENCOUNTER — APPOINTMENT (OUTPATIENT)
Dept: CT IMAGING | Facility: HOSPITAL | Age: 78
End: 2023-05-23
Payer: MEDICARE

## 2023-05-23 ENCOUNTER — HOSPITAL ENCOUNTER (INPATIENT)
Facility: HOSPITAL | Age: 78
LOS: 2 days | Discharge: HOME | End: 2023-05-25
Attending: EMERGENCY MEDICINE | Admitting: INTERNAL MEDICINE
Payer: MEDICARE

## 2023-05-23 DIAGNOSIS — J18.9 PNEUMONIA DUE TO INFECTIOUS ORGANISM, UNSPECIFIED LATERALITY, UNSPECIFIED PART OF LUNG: ICD-10-CM

## 2023-05-23 DIAGNOSIS — R09.02 HYPOXIA: Primary | ICD-10-CM

## 2023-05-23 DIAGNOSIS — R26.2 DIFFICULTY WALKING: ICD-10-CM

## 2023-05-23 LAB
ALBUMIN SERPL-MCNC: 4 G/DL (ref 3.5–5.2)
ALBUMIN/GLOB SERPL: 1.4 G/DL
ALP SERPL-CCNC: 88 U/L (ref 39–117)
ALT SERPL W P-5'-P-CCNC: 15 U/L (ref 1–41)
ANION GAP SERPL CALCULATED.3IONS-SCNC: 14.8 MMOL/L (ref 5–15)
AST SERPL-CCNC: 23 U/L (ref 1–40)
BASOPHILS # BLD AUTO: 0.04 10*3/MM3 (ref 0–0.2)
BASOPHILS NFR BLD AUTO: 0.4 % (ref 0–1.5)
BILIRUB SERPL-MCNC: 0.5 MG/DL (ref 0–1.2)
BUN SERPL-MCNC: 23 MG/DL (ref 8–23)
BUN/CREAT SERPL: 19.2 (ref 7–25)
CALCIUM SPEC-SCNC: 9.2 MG/DL (ref 8.6–10.5)
CHLORIDE SERPL-SCNC: 102 MMOL/L (ref 98–107)
CO2 SERPL-SCNC: 25.2 MMOL/L (ref 22–29)
CREAT SERPL-MCNC: 1.2 MG/DL (ref 0.76–1.27)
D-LACTATE SERPL-SCNC: 2.3 MMOL/L (ref 0.5–2)
D-LACTATE SERPL-SCNC: 2.5 MMOL/L (ref 0.5–2)
D-LACTATE SERPL-SCNC: 2.6 MMOL/L (ref 0.5–2)
D-LACTATE SERPL-SCNC: 3.3 MMOL/L (ref 0.5–2)
D-LACTATE SERPL-SCNC: 4 MMOL/L (ref 0.5–2)
DEPRECATED RDW RBC AUTO: 49.1 FL (ref 37–54)
EGFRCR SERPLBLD CKD-EPI 2021: 62.7 ML/MIN/1.73
EOSINOPHIL # BLD AUTO: 0.16 10*3/MM3 (ref 0–0.4)
EOSINOPHIL NFR BLD AUTO: 1.6 % (ref 0.3–6.2)
ERYTHROCYTE [DISTWIDTH] IN BLOOD BY AUTOMATED COUNT: 15 % (ref 12.3–15.4)
FLUAV AG NPH QL: NEGATIVE
FLUBV AG NPH QL IA: NEGATIVE
GEN 5 2HR TROPONIN T REFLEX: 19 NG/L
GLOBULIN UR ELPH-MCNC: 2.9 GM/DL
GLUCOSE BLDC GLUCOMTR-MCNC: 144 MG/DL (ref 70–99)
GLUCOSE BLDC GLUCOMTR-MCNC: 165 MG/DL (ref 70–99)
GLUCOSE BLDC GLUCOMTR-MCNC: 215 MG/DL (ref 70–99)
GLUCOSE BLDC GLUCOMTR-MCNC: 244 MG/DL (ref 70–99)
GLUCOSE BLDC GLUCOMTR-MCNC: 64 MG/DL (ref 70–99)
GLUCOSE SERPL-MCNC: 263 MG/DL (ref 65–99)
HCT VFR BLD AUTO: 40.5 % (ref 37.5–51)
HGB BLD-MCNC: 13.5 G/DL (ref 13–17.7)
HOLD SPECIMEN: NORMAL
HOLD SPECIMEN: NORMAL
IMM GRANULOCYTES # BLD AUTO: 0.04 10*3/MM3 (ref 0–0.05)
IMM GRANULOCYTES NFR BLD AUTO: 0.4 % (ref 0–0.5)
L PNEUMO1 AG UR QL IA: NEGATIVE
LYMPHOCYTES # BLD AUTO: 1.13 10*3/MM3 (ref 0.7–3.1)
LYMPHOCYTES NFR BLD AUTO: 11.1 % (ref 19.6–45.3)
MCH RBC QN AUTO: 29.9 PG (ref 26.6–33)
MCHC RBC AUTO-ENTMCNC: 33.3 G/DL (ref 31.5–35.7)
MCV RBC AUTO: 89.8 FL (ref 79–97)
MONOCYTES # BLD AUTO: 0.3 10*3/MM3 (ref 0.1–0.9)
MONOCYTES NFR BLD AUTO: 2.9 % (ref 5–12)
NEUTROPHILS NFR BLD AUTO: 8.54 10*3/MM3 (ref 1.7–7)
NEUTROPHILS NFR BLD AUTO: 83.6 % (ref 42.7–76)
NRBC BLD AUTO-RTO: 0 /100 WBC (ref 0–0.2)
NT-PROBNP SERPL-MCNC: 687.8 PG/ML (ref 0–1800)
PLATELET # BLD AUTO: 241 10*3/MM3 (ref 140–450)
PMV BLD AUTO: 9.2 FL (ref 6–12)
POTASSIUM SERPL-SCNC: 4.1 MMOL/L (ref 3.5–5.2)
PROCALCITONIN SERPL-MCNC: 19.35 NG/ML (ref 0–0.25)
PROT SERPL-MCNC: 6.9 G/DL (ref 6–8.5)
QT INTERVAL: 339 MS
RBC # BLD AUTO: 4.51 10*6/MM3 (ref 4.14–5.8)
S PNEUM AG SPEC QL LA: NEGATIVE
S PYO AG THROAT QL: NEGATIVE
SARS-COV-2 RNA RESP QL NAA+PROBE: NOT DETECTED
SODIUM SERPL-SCNC: 142 MMOL/L (ref 136–145)
TROPONIN T DELTA: -3 NG/L
TROPONIN T SERPL HS-MCNC: 20 NG/L
TROPONIN T SERPL HS-MCNC: 22 NG/L
WBC NRBC COR # BLD: 10.21 10*3/MM3 (ref 3.4–10.8)
WHOLE BLOOD HOLD COAG: NORMAL
WHOLE BLOOD HOLD SPECIMEN: NORMAL

## 2023-05-23 PROCEDURE — 82948 REAGENT STRIP/BLOOD GLUCOSE: CPT

## 2023-05-23 PROCEDURE — 87040 BLOOD CULTURE FOR BACTERIA: CPT | Performed by: EMERGENCY MEDICINE

## 2023-05-23 PROCEDURE — 84484 ASSAY OF TROPONIN QUANT: CPT | Performed by: INTERNAL MEDICINE

## 2023-05-23 PROCEDURE — 25010000002 AZITHROMYCIN PER 500 MG: Performed by: EMERGENCY MEDICINE

## 2023-05-23 PROCEDURE — 83605 ASSAY OF LACTIC ACID: CPT | Performed by: EMERGENCY MEDICINE

## 2023-05-23 PROCEDURE — 87804 INFLUENZA ASSAY W/OPTIC: CPT | Performed by: EMERGENCY MEDICINE

## 2023-05-23 PROCEDURE — 93005 ELECTROCARDIOGRAM TRACING: CPT | Performed by: EMERGENCY MEDICINE

## 2023-05-23 PROCEDURE — 87899 AGENT NOS ASSAY W/OPTIC: CPT | Performed by: INTERNAL MEDICINE

## 2023-05-23 PROCEDURE — 87880 STREP A ASSAY W/OPTIC: CPT | Performed by: EMERGENCY MEDICINE

## 2023-05-23 PROCEDURE — 84145 PROCALCITONIN (PCT): CPT | Performed by: INTERNAL MEDICINE

## 2023-05-23 PROCEDURE — 83880 ASSAY OF NATRIURETIC PEPTIDE: CPT | Performed by: EMERGENCY MEDICINE

## 2023-05-23 PROCEDURE — 25010000002 FUROSEMIDE PER 20 MG: Performed by: INTERNAL MEDICINE

## 2023-05-23 PROCEDURE — 63710000001 INSULIN LISPRO (HUMAN) PER 5 UNITS: Performed by: INTERNAL MEDICINE

## 2023-05-23 PROCEDURE — 63710000001 INSULIN DETEMIR PER 5 UNITS: Performed by: INTERNAL MEDICINE

## 2023-05-23 PROCEDURE — 87449 NOS EACH ORGANISM AG IA: CPT | Performed by: INTERNAL MEDICINE

## 2023-05-23 PROCEDURE — 71250 CT THORAX DX C-: CPT

## 2023-05-23 PROCEDURE — 71045 X-RAY EXAM CHEST 1 VIEW: CPT

## 2023-05-23 PROCEDURE — 87081 CULTURE SCREEN ONLY: CPT | Performed by: EMERGENCY MEDICINE

## 2023-05-23 PROCEDURE — 99223 1ST HOSP IP/OBS HIGH 75: CPT | Performed by: INTERNAL MEDICINE

## 2023-05-23 PROCEDURE — 99285 EMERGENCY DEPT VISIT HI MDM: CPT

## 2023-05-23 PROCEDURE — 93005 ELECTROCARDIOGRAM TRACING: CPT

## 2023-05-23 PROCEDURE — 87635 SARS-COV-2 COVID-19 AMP PRB: CPT | Performed by: EMERGENCY MEDICINE

## 2023-05-23 PROCEDURE — 85025 COMPLETE CBC W/AUTO DIFF WBC: CPT | Performed by: EMERGENCY MEDICINE

## 2023-05-23 PROCEDURE — 84484 ASSAY OF TROPONIN QUANT: CPT

## 2023-05-23 PROCEDURE — 36415 COLL VENOUS BLD VENIPUNCTURE: CPT | Performed by: EMERGENCY MEDICINE

## 2023-05-23 PROCEDURE — 25010000002 CEFTRIAXONE PER 250 MG: Performed by: EMERGENCY MEDICINE

## 2023-05-23 PROCEDURE — 80053 COMPREHEN METABOLIC PANEL: CPT

## 2023-05-23 RX ORDER — AMOXICILLIN 250 MG
2 CAPSULE ORAL 2 TIMES DAILY
Status: DISCONTINUED | OUTPATIENT
Start: 2023-05-23 | End: 2023-05-25 | Stop reason: HOSPADM

## 2023-05-23 RX ORDER — SODIUM CHLORIDE 0.9 % (FLUSH) 0.9 %
10 SYRINGE (ML) INJECTION AS NEEDED
Status: DISCONTINUED | OUTPATIENT
Start: 2023-05-23 | End: 2023-05-25 | Stop reason: HOSPADM

## 2023-05-23 RX ORDER — NICOTINE POLACRILEX 4 MG
15 LOZENGE BUCCAL
Status: DISCONTINUED | OUTPATIENT
Start: 2023-05-23 | End: 2023-05-25 | Stop reason: HOSPADM

## 2023-05-23 RX ORDER — ACETAMINOPHEN 325 MG/1
650 TABLET ORAL EVERY 4 HOURS PRN
Status: DISCONTINUED | OUTPATIENT
Start: 2023-05-23 | End: 2023-05-25 | Stop reason: HOSPADM

## 2023-05-23 RX ORDER — CEFTRIAXONE SODIUM 1 G/50ML
1 INJECTION, SOLUTION INTRAVENOUS ONCE
Status: COMPLETED | OUTPATIENT
Start: 2023-05-23 | End: 2023-05-23

## 2023-05-23 RX ORDER — INSULIN LISPRO 100 [IU]/ML
3-14 INJECTION, SOLUTION INTRAVENOUS; SUBCUTANEOUS
Status: DISCONTINUED | OUTPATIENT
Start: 2023-05-23 | End: 2023-05-25 | Stop reason: HOSPADM

## 2023-05-23 RX ORDER — SODIUM CHLORIDE 9 MG/ML
40 INJECTION, SOLUTION INTRAVENOUS AS NEEDED
Status: DISCONTINUED | OUTPATIENT
Start: 2023-05-23 | End: 2023-05-25 | Stop reason: HOSPADM

## 2023-05-23 RX ORDER — ATORVASTATIN CALCIUM 10 MG/1
10 TABLET, FILM COATED ORAL NIGHTLY
Status: DISCONTINUED | OUTPATIENT
Start: 2023-05-23 | End: 2023-05-25 | Stop reason: HOSPADM

## 2023-05-23 RX ORDER — POLYETHYLENE GLYCOL 3350 17 G/17G
17 POWDER, FOR SOLUTION ORAL DAILY PRN
Status: DISCONTINUED | OUTPATIENT
Start: 2023-05-23 | End: 2023-05-25 | Stop reason: HOSPADM

## 2023-05-23 RX ORDER — CEFTRIAXONE SODIUM 1 G/50ML
1 INJECTION, SOLUTION INTRAVENOUS EVERY 24 HOURS
Status: DISCONTINUED | OUTPATIENT
Start: 2023-05-24 | End: 2023-05-24

## 2023-05-23 RX ORDER — ONDANSETRON 2 MG/ML
4 INJECTION INTRAMUSCULAR; INTRAVENOUS EVERY 4 HOURS PRN
Status: DISCONTINUED | OUTPATIENT
Start: 2023-05-23 | End: 2023-05-25 | Stop reason: HOSPADM

## 2023-05-23 RX ORDER — FUROSEMIDE 10 MG/ML
40 INJECTION INTRAMUSCULAR; INTRAVENOUS ONCE
Status: COMPLETED | OUTPATIENT
Start: 2023-05-23 | End: 2023-05-23

## 2023-05-23 RX ORDER — BISACODYL 5 MG/1
5 TABLET, DELAYED RELEASE ORAL DAILY PRN
Status: DISCONTINUED | OUTPATIENT
Start: 2023-05-23 | End: 2023-05-25 | Stop reason: HOSPADM

## 2023-05-23 RX ORDER — SODIUM CHLORIDE 9 MG/ML
75 INJECTION, SOLUTION INTRAVENOUS CONTINUOUS
Status: DISCONTINUED | OUTPATIENT
Start: 2023-05-23 | End: 2023-05-24

## 2023-05-23 RX ORDER — METOPROLOL SUCCINATE 50 MG/1
50 TABLET, EXTENDED RELEASE ORAL DAILY
Status: DISCONTINUED | OUTPATIENT
Start: 2023-05-23 | End: 2023-05-23

## 2023-05-23 RX ORDER — MIDODRINE HYDROCHLORIDE 2.5 MG/1
2.5 TABLET ORAL
Status: DISCONTINUED | OUTPATIENT
Start: 2023-05-23 | End: 2023-05-23

## 2023-05-23 RX ORDER — CHOLECALCIFEROL (VITAMIN D3) 125 MCG
5 CAPSULE ORAL NIGHTLY PRN
Status: DISCONTINUED | OUTPATIENT
Start: 2023-05-23 | End: 2023-05-25 | Stop reason: HOSPADM

## 2023-05-23 RX ORDER — DEXTROSE MONOHYDRATE 25 G/50ML
25 INJECTION, SOLUTION INTRAVENOUS
Status: DISCONTINUED | OUTPATIENT
Start: 2023-05-23 | End: 2023-05-25 | Stop reason: HOSPADM

## 2023-05-23 RX ORDER — ENOXAPARIN SODIUM 100 MG/ML
40 INJECTION SUBCUTANEOUS DAILY
Status: DISCONTINUED | OUTPATIENT
Start: 2023-05-23 | End: 2023-05-23

## 2023-05-23 RX ORDER — LISINOPRIL 10 MG/1
20 TABLET ORAL DAILY
Status: DISCONTINUED | OUTPATIENT
Start: 2023-05-23 | End: 2023-05-23

## 2023-05-23 RX ORDER — BISACODYL 10 MG
10 SUPPOSITORY, RECTAL RECTAL DAILY PRN
Status: DISCONTINUED | OUTPATIENT
Start: 2023-05-23 | End: 2023-05-25 | Stop reason: HOSPADM

## 2023-05-23 RX ORDER — AZITHROMYCIN 250 MG/1
500 TABLET, FILM COATED ORAL EVERY 24 HOURS
Status: DISCONTINUED | OUTPATIENT
Start: 2023-05-24 | End: 2023-05-24

## 2023-05-23 RX ORDER — CALCIUM CARBONATE 500 MG/1
2 TABLET, CHEWABLE ORAL 2 TIMES DAILY PRN
Status: DISCONTINUED | OUTPATIENT
Start: 2023-05-23 | End: 2023-05-25 | Stop reason: HOSPADM

## 2023-05-23 RX ORDER — SODIUM CHLORIDE 0.9 % (FLUSH) 0.9 %
10 SYRINGE (ML) INJECTION EVERY 12 HOURS SCHEDULED
Status: DISCONTINUED | OUTPATIENT
Start: 2023-05-23 | End: 2023-05-25 | Stop reason: HOSPADM

## 2023-05-23 RX ORDER — MIDODRINE HYDROCHLORIDE 5 MG/1
5 TABLET ORAL
Status: DISCONTINUED | OUTPATIENT
Start: 2023-05-23 | End: 2023-05-24

## 2023-05-23 RX ADMIN — MIDODRINE HYDROCHLORIDE 2.5 MG: 2.5 TABLET ORAL at 13:07

## 2023-05-23 RX ADMIN — DOCUSATE SODIUM 50MG AND SENNOSIDES 8.6MG 2 TABLET: 8.6; 5 TABLET, FILM COATED ORAL at 10:29

## 2023-05-23 RX ADMIN — CEFTRIAXONE SODIUM 1 G: 1 INJECTION, SOLUTION INTRAVENOUS at 07:28

## 2023-05-23 RX ADMIN — INSULIN DETEMIR 15 UNITS: 100 INJECTION, SOLUTION SUBCUTANEOUS at 17:10

## 2023-05-23 RX ADMIN — APIXABAN 5 MG: 5 TABLET, FILM COATED ORAL at 21:11

## 2023-05-23 RX ADMIN — INSULIN LISPRO 5 UNITS: 100 INJECTION, SOLUTION INTRAVENOUS; SUBCUTANEOUS at 10:30

## 2023-05-23 RX ADMIN — SODIUM CHLORIDE 500 MG: 9 INJECTION, SOLUTION INTRAVENOUS at 08:13

## 2023-05-23 RX ADMIN — FUROSEMIDE 40 MG: 10 INJECTION, SOLUTION INTRAMUSCULAR; INTRAVENOUS at 08:14

## 2023-05-23 RX ADMIN — SODIUM CHLORIDE 75 ML/HR: 9 INJECTION, SOLUTION INTRAVENOUS at 10:28

## 2023-05-23 RX ADMIN — DOCUSATE SODIUM 50MG AND SENNOSIDES 8.6MG 2 TABLET: 8.6; 5 TABLET, FILM COATED ORAL at 21:10

## 2023-05-23 RX ADMIN — SODIUM CHLORIDE 75 ML/HR: 9 INJECTION, SOLUTION INTRAVENOUS at 17:10

## 2023-05-23 RX ADMIN — APIXABAN 5 MG: 5 TABLET, FILM COATED ORAL at 10:29

## 2023-05-23 RX ADMIN — INSULIN LISPRO 5 UNITS: 100 INJECTION, SOLUTION INTRAVENOUS; SUBCUTANEOUS at 13:07

## 2023-05-23 RX ADMIN — ATORVASTATIN CALCIUM 10 MG: 10 TABLET, FILM COATED ORAL at 21:11

## 2023-05-23 RX ADMIN — Medication 10 ML: at 10:28

## 2023-05-23 RX ADMIN — MIDODRINE HYDROCHLORIDE 5 MG: 5 TABLET ORAL at 17:51

## 2023-05-23 RX ADMIN — SODIUM CHLORIDE 500 ML: 9 INJECTION, SOLUTION INTRAVENOUS at 10:28

## 2023-05-23 RX ADMIN — Medication 10 ML: at 21:11

## 2023-05-23 RX ADMIN — INSULIN LISPRO 3 UNITS: 100 INJECTION, SOLUTION INTRAVENOUS; SUBCUTANEOUS at 17:10

## 2023-05-23 NOTE — H&P
[facility] HISTORY AND PHYSICAL  Date: 2023   Patient Name: Edi Weston  : 1945  MRN: [id number]  Primary Care Physician:  Jean Marie Joaquin III, MD  Date of admission: 2023    Subjective   Subjective     Chief Complaint: Shortness of breath    HPI:    Edi Weston is a 78 y.o. male PMH atrial fibrillation on Eliquis, type 2 diabetes mellitus, CKD, heart failure with preserved EF who presented to the ED on  due to complaints of cough, congestion, and chills.  At baseline, he is not on oxygen.  For the past few days he has been developing worsening coughing with congestion, weakness and shortness of air.  Shortness of air continues to get worse with exertion.  He had intermittent chills but no fever.  He checked his home oxygen today and sats were 86% prompting him to seek medical attention in the ED.  He was hypoxic requiring nasal cannula in the ED.  Chest x-ray concerning for pneumonia, he is admitted for further care.    Personal History     Past Medical History:  Past Medical History:   Diagnosis Date   • Arthritis    • Atrial fibrillation    • Cellulitis of right lower limb    • Colon polyps    • Decubitus ulcer of foot, stage 1 08/10/2018   • Decubitus ulcer of foot, stage 3 2018   • Foot pain, left 08/10/2018   • Foot pain, right    • Gout    • Hammertoe 2019   • High blood pressure    • History of colonoscopy with polypectomy 2020    2018 TUBULAR ADENOMA, COLONOSCOPY SCHEDULED FOR 10/19/2020 WITH DR SULTANA   • Ingrowing nail 2018   • Medication management 2019   • Nail dystrophy    • PAT (paroxysmal atrial tachycardia) 2018   • Pressure ulcer, stage 1    • Tinea unguium    • Type 2 diabetes mellitus with foot ulcer    • Type 2 diabetes mellitus with polyneuropathy    • Type 2 diabetes, controlled, with peripheral neuropathy 08/10/2018       Past Surgical History:  Past Surgical History:   Procedure Laterality Date   • ABDOMINAL  SURGERY      lap band   • COLONOSCOPY     • GASTRIC BANDING         Family History:   Family History   Problem Relation Age of Onset   • Nephrolithiasis Mother         RENAL CALCULUS   • Other Mother         MOTHERS'S FAMILY HISTORY UNKOWN   • Colon cancer Father 50        FAMILY HISTORY OF COLON CANCER   • Other Father         FATHER'S FAMILY HISTORY UNKNOWN   • Colon cancer Paternal Grandfather 60        FAMILY HISTORY OF COLON CANCER         Social History:   Social History     Socioeconomic History   • Marital status:    Tobacco Use   • Smoking status: Former     Packs/day: 2.00     Years: 20.00     Pack years: 40.00     Types: Cigarettes     Start date:      Quit date:      Years since quittin.4   • Smokeless tobacco: Never   • Tobacco comments:     AGE STARTED 19 QUIT AGE 31 - SMOKED X 20 YEARS QUIT    Vaping Use   • Vaping Use: Never used   Substance and Sexual Activity   • Alcohol use: Not Currently     Comment: CURRENT SOME DAY 2020,2017   • Drug use: Never   • Sexual activity: Defer        Home Medications:  apixaban, furosemide, glyburide, lisinopril, metFORMIN ER, metoprolol succinate XL, and simvastatin    Allergies:  Allergies   Allergen Reactions   • Sulfa Antibiotics Unknown - Low Severity     Unsure of reaction       Review of Systems   A 14 point review of systems was obtained and otherwise negative unless stated in the HPI    Objective   Objective     Vitals:   Temp:  [98.4 °F (36.9 °C)-99 °F (37.2 °C)] 99 °F (37.2 °C)  Heart Rate:  [116-122] 117  Resp:  [20] 20  BP: ()/(60-91) 86/61  Flow (L/min):  [2] 2    Physical Exam    Constitutional: Awake, alert, no acute distress, appears fatigued   HENT: NCAT, mucous membranes moist   Respiratory: Rhonchi in right lower lobe, otherwise clear to auscultation bilaterally, nasal cannula in place, nonlabored respirations    Cardiovascular: IR/IR, tachycardic, no MRG   Gastrointestinal: Positive bowel sounds,  soft, nontender, nondistended   Musculoskeletal: No bilateral ankle edema, no clubbing or cyanosis to extremities   Psychiatric: Appropriate affect, cooperative   Neurologic: Oriented x 3, strength symmetric in all extremities, Cranial Nerves grossly intact to confrontation, speech clear   Skin: No rashes     Result Review    Result Review:  I have personally reviewed the results from the time of this admission to 5/23/2023 13:44 EDT and agree with these findings:  []  Laboratory personally reviewed CBC, lactic acid level, BMP, flu swab  CBC        3/14/2023    00:05 5/23/2023    05:11   CBC   WBC 8.08   10.21     RBC 4.26   4.51     Hemoglobin 12.7   13.5     Hematocrit 38.0   40.5     MCV 89.2   89.8     MCH 29.8   29.9     MCHC 33.4   33.3     RDW 13.7   15.0     Platelets 162   241       CMP        3/14/2023    00:05 4/5/2023    12:34 5/23/2023    05:11   CMP   Glucose 142   258   263     BUN 20   22   23     Creatinine 1.03   1.32   1.20     EGFR 75.3   55.9   62.7     Sodium 139   145   142     Potassium 3.9   3.9   4.1     Chloride 104   105   102     Calcium 8.8   8.9   9.2     Total Protein 7.0    6.9     Albumin 4.0    4.0     Globulin 3.0    2.9     Total Bilirubin 0.5    0.5     Alkaline Phosphatase 106    88     AST (SGOT) 22    23     ALT (SGPT) 17    15     Albumin/Globulin Ratio 1.3    1.4     BUN/Creatinine Ratio 19.4   16.7   19.2     Anion Gap 11.2   11.0   14.8       []  Microbiology  [x]  Radiology chest x-ray personally reviewed with evidence of right-sided pneumonia  [x]  EKG/Telemetry Telemetry personally reviewed showing A-fib with elevated rates in the 120s  []  Cardiology/Vascular   []  Pathology  []  Old records  []  Other:      Assessment & Plan   Assessment / Plan     Assessment/Plan:   Community-acquired pneumonia due to unknown bacterial source, presumed gram-negative  Acute hypoxemia  Sepsis, present on admission secondary to community-acquired pneumonia  Paroxysmal A-fib with  RVR  Lactic acidosis, clinically significant  Hypotension  Elevated troponin of unclear significance  Type 2 diabetes mellitus with hyperglycemia  Acute diastolic congestive heart failure without acute exacerbation    Admit to the hospital on telemetry for management of the above  Bolus normal saline, start NS at 75 cc/h  Trend lactic acid levels until clear  Start Rocephin and azithromycin, de-escalate based on cultures  Obtain CT chest to further evaluate pneumonia  Obtain strep and Legionella antigens, procalcitonin, sputum culture, blood cultures  Start midodrine 5 mg 3 times daily, hold antihypertensives for now  IV metoprolol if needed for sustained heart rate greater than 140  Will restart home beta-blocker when blood pressures tolerate  Restart home Eliquis 5 mg twice daily for atrial fibrillation  Start detemir 15 units daily, moderate sliding scale insulin  Trend renal function and electrolytes with a.m. BMP, magnesium   Trend Hgb and WBC with a.m. CBC    Discussed case with: ED physician, bedside RN    DVT prophylaxis:  Medical and mechanical DVT prophylaxis orders are present.    CODE STATUS:    Level Of Support Discussed With: Patient  Code Status (Patient has no pulse and is not breathing): CPR (Attempt to Resuscitate)  Medical Interventions (Patient has pulse or is breathing): Full Support      Electronically signed by Delbert Pierce MD, 05/23/23, 1:44 PM EDT.

## 2023-05-23 NOTE — PLAN OF CARE
Goal Outcome Evaluation:  Plan of Care Reviewed With: patient        Progress: no change  Outcome Evaluation: Patient has been hypotensive today. Started on midodrine. Covid negative. Blood glucose monitored per order. Patient is Afib on cardiac monitor. Jo Gray RN

## 2023-05-23 NOTE — ED NOTES
RN gave patient a diet [place] with some ice chips, patient offered food but declined at this time stating he isn't hungry.

## 2023-05-23 NOTE — PLAN OF CARE
Goal Outcome Evaluation:  Plan of Care Reviewed With: patient        Progress: improving          Patient admitted from ED. Jo Gray RN

## 2023-05-23 NOTE — ED PROVIDER NOTES
Time: 10:12 AM EDT  Date of encounter:  5/23/2023  Independent Historian/Clinical History and Information was obtained by:   Patient  Chief Complaint: Shortness of breath    History is limited by: N/A    History of Present Illness:  Patient is a 78 y.o. year old male who presents to the emergency department for evaluation of shortness of breath.  Patient reports increasing shortness of breath lately.  He does have a history of congestive heart failure but shows no other signs of fluid overload.  He endorses cough but denies fever.    HPI    Patient Care Team  Primary Care Provider: Jean Marie Joaquin III, MD    Past Medical History:     Allergies   Allergen Reactions   • Sulfa Antibiotics Unknown - Low Severity     Unsure of reaction     Past Medical History:   Diagnosis Date   • Arthritis    • Atrial fibrillation    • Cellulitis of right lower limb    • Colon polyps    • Decubitus ulcer of foot, stage 1 08/10/2018   • Decubitus ulcer of foot, stage 3 02/07/2018   • Foot pain, left 08/10/2018   • Foot pain, right    • Gout    • Hammertoe 12/30/2019   • High blood pressure    • History of colonoscopy with polypectomy 08/25/2020    2018 TUBULAR ADENOMA, COLONOSCOPY SCHEDULED FOR 10/19/2020 WITH DR SULTANA   • Ingrowing nail 11/02/2018   • Medication management 02/18/2019   • Nail dystrophy    • PAT (paroxysmal atrial tachycardia) 08/06/2018   • Pressure ulcer, stage 1    • Tinea unguium    • Type 2 diabetes mellitus with foot ulcer    • Type 2 diabetes mellitus with polyneuropathy    • Type 2 diabetes, controlled, with peripheral neuropathy 08/10/2018     Past Surgical History:   Procedure Laterality Date   • ABDOMINAL SURGERY      lap band   • COLONOSCOPY     • GASTRIC BANDING  2007     Family History   Problem Relation Age of Onset   • Nephrolithiasis Mother         RENAL CALCULUS   • Other Mother         MOTHERS'S FAMILY HISTORY UNKOWN   • Colon cancer Father 50        FAMILY HISTORY OF COLON CANCER   • Other Father          FATHER'S FAMILY HISTORY UNKNOWN   • Colon cancer Paternal Grandfather 60        FAMILY HISTORY OF COLON CANCER        Home Medications:  Prior to Admission medications    Medication Sig Start Date End Date Taking? Authorizing Provider   Eliquis 5 MG tablet tablet Take 1 tablet by mouth twice daily 9/27/22  Yes Jamila Ty APRN   furosemide (Lasix) 40 MG tablet Take 1 tablet by mouth Daily. 3/16/23  Yes Jean Marie Joaquin III, MD   glyburide (DIAbeta) 2.5 MG tablet Take 1 tablet by mouth Daily With Breakfast. 4/4/23  Yes Jean Marie Joaquin III, MD   lisinopril (PRINIVIL,ZESTRIL) 20 MG tablet Take 1 tablet by mouth Daily. 3/16/23  Yes Jean Marie Joaquin III, MD   metFORMIN ER (GLUCOPHAGE-XR) 500 MG 24 hr tablet Take 2 tablets by mouth Every Evening. 5/11/23  Yes Jean Marie Joaquin III, MD   metoprolol succinate XL (TOPROL-XL) 50 MG 24 hr tablet Take 1 tablet by mouth Daily. 9/27/22  Yes Jamila Ty APRN   simvastatin (ZOCOR) 20 MG tablet Take 1 tablet by mouth Every Evening. 4/4/23  Yes Jean Marie Joaquin III, MD   acetic acid-hydrocortisone (VOSOL-HC) 1-2 % otic solution Administer 3 drops into the left ear 2 (Two) Times a Day. Use for 10 days 4/4/23 5/23/23  Jean Marie Joaquin III, MD   albuterol sulfate  (90 Base) MCG/ACT inhaler Inhale 2 puffs Every 4 (Four) Hours As Needed for Wheezing. 3/14/23 5/23/23  Todd Nguyen MD   glucose blood test strip 1 each by Other route Daily. Testing blood sugar daily 1/24/23 5/23/23  Jean Marie Joaquin III, MD   mupirocin (BACTROBAN) 2 % ointment Apply 1 application topically to the appropriate area as directed 3 (Three) Times a Day.  5/23/23  ProviderCesar MD   neomycin-polymyxin-hydrocortisone (CORTISPORIN) 3.5-58635-2 otic solution Administer 3 drops into ear(s) as directed by provider 4 (Four) Times a Day. 5/11/23 5/23/23  Jean aMrie Joaquin III, MD        Social History:   Social History     Tobacco Use   • Smoking status: Former     Packs/day: 2.00     Years:  "20.00     Pack years: 40.00     Types: Cigarettes     Start date:      Quit date:      Years since quittin.4   • Smokeless tobacco: Never   • Tobacco comments:     AGE STARTED 19 QUIT AGE 31 - SMOKED X 20 YEARS QUIT    Vaping Use   • Vaping Use: Never used   Substance Use Topics   • Alcohol use: Not Currently     Comment: CURRENT SOME DAY 2020,2017   • Drug use: Never         Review of Systems:  Review of Systems   Constitutional: Negative for chills and fever.   HENT: Negative for congestion, rhinorrhea and sore throat.    Eyes: Negative for pain and visual disturbance.   Respiratory: Positive for cough and shortness of breath. Negative for apnea and chest tightness.    Cardiovascular: Negative for chest pain and palpitations.   Gastrointestinal: Negative for abdominal pain, diarrhea, nausea and vomiting.   Genitourinary: Negative for difficulty urinating and dysuria.   Musculoskeletal: Negative for joint swelling and myalgias.   Skin: Negative for color change.   Neurological: Negative for seizures and headaches.   Psychiatric/Behavioral: Negative.    All other systems reviewed and are negative.       Physical Exam:  BP (!) 84/60 (BP Location: Left arm, Patient Position: Lying)   Pulse 117   Temp 99 °F (37.2 °C) (Oral)   Resp 20   Ht 193 cm (76\")   Wt 128 kg (283 lb 1.1 oz)   SpO2 96%   BMI 34.46 kg/m²     Physical Exam  Vitals and nursing note reviewed.   Constitutional:       General: He is not in acute distress.     Appearance: Normal appearance. He is not toxic-appearing.   HENT:      Head: Normocephalic and atraumatic.      Jaw: There is normal jaw occlusion.   Eyes:      General: Lids are normal.      Extraocular Movements: Extraocular movements intact.      Conjunctiva/sclera: Conjunctivae normal.      Pupils: Pupils are equal, round, and reactive to light.   Cardiovascular:      Rate and Rhythm: Normal rate and regular rhythm.      Pulses: Normal pulses.      Heart " sounds: Normal heart sounds.   Pulmonary:      Effort: Pulmonary effort is normal. No respiratory distress.      Breath sounds: Normal breath sounds. No wheezing or rhonchi.   Abdominal:      General: Abdomen is flat.      Palpations: Abdomen is soft.      Tenderness: There is no abdominal tenderness. There is no guarding or rebound.   Musculoskeletal:         General: Normal range of motion.      Cervical back: Normal range of motion and neck supple.      Right lower leg: No edema.      Left lower leg: No edema.   Skin:     General: Skin is warm and dry.   Neurological:      Mental Status: He is alert and oriented to person, place, and time. Mental status is at baseline.   Psychiatric:         Mood and Affect: Mood normal.                  Procedures:  Procedures      Medical Decision Making:      Comorbidities that affect care:    Congestive Heart Failure    External Notes reviewed:    Previous Clinic Note: Family medicine office visit for general management of diabetes, CHF      The following orders were placed and all results were independently analyzed by me:  Orders Placed This Encounter   Procedures   • Influenza Antigen, Rapid - Swab, Nasopharynx   • Rapid Strep A Screen - Swab, Throat   • COVID-19,APTIMA PANTHER(TIMMY), [facility]/ [facility], NP/OP SWAB IN UTM/VTM/SALINE TRANSPORT MEDIA,24 HR TAT - Swab, Nasopharynx   • Beta Strep Culture, Throat - Swab, Throat   • Blood Culture - Blood,   • Blood Culture - Blood,   • XR Chest 1 View   • CT Chest Without Contrast Diagnostic   • [place] Draw   • Comprehensive Metabolic Panel   • BNP   • Single High Sensitivity Troponin T   • CBC Auto Differential   • Lactic Acid, Plasma   • Basic Metabolic Panel   • CBC Auto Differential   • Magnesium   • STAT Lactic Acid, Reflex   • Diet: Cardiac Diets; Healthy Heart (2-3 Na+); Texture: Regular Texture (IDDSI 7); Fluid Consistency: Thin (IDDSI 0)   • Undress & Gown   • Continuous Pulse Oximetry   • Vital Signs   • Vital Signs   •  Notify Provider (With Default Parameters)   • Intake & Output   • Weigh Patient   • Oral Care   • Saline Lock & Maintain IV Access   • Place Sequential Compression Device   • Maintain Sequential Compression Device   • Up in Chair   • Activity - Ad Daly   • Strict Intake & Output   • Code Status and Medical Interventions:   • Inpatient Hospitalist Consult   • Oxygen Therapy- Nasal Cannula; Titrate 1-6 LPM Per SpO2; 90 - 95%   • Pulse Oximetry,  Spot   • POC Glucose 4x Daily AC & at Bedtime   • POC Glucose Once   • ECG 12 Lead ED Triage Standing Order; SOA   • Insert Peripheral IV   • Insert Peripheral IV   • Inpatient Admission   • CBC & Differential   • Green Top (Gel)   • Lavender Top   • Gold Top - SST   • Light Blue Top       Medications Given in the Emergency Department:  Medications   sodium chloride 0.9 % flush 10 mL (has no administration in time range)   sodium chloride 0.9 % flush 10 mL (has no administration in time range)   sodium chloride 0.9 % flush 10 mL (has no administration in time range)   sodium chloride 0.9 % infusion 40 mL (has no administration in time range)   acetaminophen (TYLENOL) tablet 650 mg (has no administration in time range)   calcium carbonate (TUMS) chewable tablet 500 mg (200 mg elemental) (has no administration in time range)   sennosides-docusate (PERICOLACE) 8.6-50 MG per tablet 2 tablet (has no administration in time range)     And   polyethylene glycol (MIRALAX) packet 17 g (has no administration in time range)     And   bisacodyl (DULCOLAX) EC tablet 5 mg (has no administration in time range)     And   bisacodyl (DULCOLAX) suppository 10 mg (has no administration in time range)   ondansetron (ZOFRAN) injection 4 mg (has no administration in time range)   melatonin tablet 5 mg (has no administration in time range)   apixaban (ELIQUIS) tablet 5 mg (has no administration in time range)   atorvastatin (LIPITOR) tablet 10 mg (has no administration in time range)   dextrose  (GLUTOSE) oral gel 15 g (has no administration in time range)   dextrose (D50W) (25 g/50 mL) IV injection 25 g (has no administration in time range)   glucagon (GLUCAGEN) injection 1 mg (has no administration in time range)   Insulin Lispro (humaLOG) injection 3-14 Units (has no administration in time range)   cefTRIAXone (ROCEPHIN) IVPB 1 g (has no administration in time range)   AZITHROMYCIN 500 MG/250 ML 0.9% NS IVPB (vial-mate) (has no administration in time range)   midodrine (PROAMATINE) tablet 2.5 mg (has no administration in time range)   sodium chloride 0.9 % bolus 500 mL (has no administration in time range)   sodium chloride 0.9 % infusion (has no administration in time range)   cefTRIAXone (ROCEPHIN) IVPB 1 g (1 g Intravenous New Bag 5/23/23 0728)   AZITHROMYCIN 500 MG/250 ML 0.9% NS IVPB (vial-mate) (500 mg Intravenous New Bag 5/23/23 0813)   furosemide (LASIX) injection 40 mg (40 mg Intravenous Given 5/23/23 0814)        ED Course:         Labs:    Lab Results (last 24 hours)     Procedure Component Value Units Date/Time    CBC & Differential [[id number]]  (Abnormal) Collected: 05/23/23 0511    Specimen: Blood Updated: 05/23/23 0521    Narrative:      The following orders were created for panel order CBC & Differential.  Procedure                               Abnormality         Status                     ---------                               -----------         ------                     CBC Auto Differential[[id number]]        Abnormal            Final result                 Please view results for these tests on the individual orders.    Comprehensive Metabolic Panel [[id number]]  (Abnormal) Collected: 05/23/23 0511    Specimen: Blood Updated: 05/23/23 0553     Glucose 263 mg/dL      BUN 23 mg/dL      Creatinine 1.20 mg/dL      Sodium 142 mmol/L      Potassium 4.1 mmol/L      Chloride 102 mmol/L      CO2 25.2 mmol/L      Calcium 9.2 mg/dL      Total Protein 6.9 g/dL      Albumin 4.0 g/dL      ALT  (SGPT) 15 U/L      AST (SGOT) 23 U/L      Alkaline Phosphatase 88 U/L      Total Bilirubin 0.5 mg/dL      Globulin 2.9 gm/dL      A/G Ratio 1.4 g/dL      BUN/Creatinine Ratio 19.2     Anion Gap 14.8 mmol/L      eGFR 62.7 mL/min/1.73     Narrative:      GFR Normal >60  Chronic Kidney Disease <60  Kidney Failure <15    The GFR formula is only valid for adults with stable renal function between ages 18 and 70.    BNP [[id number]]  (Normal) Collected: 05/23/23 0511    Specimen: Blood Updated: 05/23/23 0535     proBNP 687.8 pg/mL     Narrative:      Among patients with dyspnea, NT-proBNP is highly sensitive for the detection of acute congestive heart failure. In addition NT-proBNP of <300 pg/ml effectively rules out acute congestive heart failure with 99% negative predictive value.      Single High Sensitivity Troponin T [[id number]]  (Abnormal) Collected: 05/23/23 0511    Specimen: Blood Updated: 05/23/23 0553     HS Troponin T 20 ng/L     Narrative:      High Sensitive Troponin T Reference Range:  <10.0 ng/L- Negative Female for AMI  <15.0 ng/L- Negative Male for AMI  >=10 - Abnormal Female indicating possible myocardial injury.  >=15 - Abnormal Male indicating possible myocardial injury.   Clinicians would have to utilize clinical acumen, EKG, Troponin, and serial changes to determine if it is an Acute Myocardial Infarction or myocardial injury due to an underlying chronic condition.         CBC Auto Differential [[id number]]  (Abnormal) Collected: 05/23/23 0511    Specimen: Blood Updated: 05/23/23 0521     WBC 10.21 10*3/mm3      RBC 4.51 10*6/mm3      Hemoglobin 13.5 g/dL      Hematocrit 40.5 %      MCV 89.8 fL      MCH 29.9 pg      MCHC 33.3 g/dL      RDW 15.0 %      RDW-SD 49.1 fl      MPV 9.2 fL      Platelets 241 10*3/mm3      Neutrophil % 83.6 %      Lymphocyte % 11.1 %      Monocyte % 2.9 %      Eosinophil % 1.6 %      Basophil % 0.4 %      Immature Grans % 0.4 %      Neutrophils, Absolute 8.54 10*3/mm3       Lymphocytes, Absolute 1.13 10*3/mm3      Monocytes, Absolute 0.30 10*3/mm3      Eosinophils, Absolute 0.16 10*3/mm3      Basophils, Absolute 0.04 10*3/mm3      Immature Grans, Absolute 0.04 10*3/mm3      nRBC 0.0 /100 WBC     Influenza Antigen, Rapid - Swab, Nasopharynx [[id number]]  (Normal) Collected: 05/23/23 0511    Specimen: Swab from Nasopharynx Updated: 05/23/23 0539     Influenza A Ag, EIA Negative     Influenza B Ag, EIA Negative    Rapid Strep A Screen - Swab, Throat [[id number]]  (Normal) Collected: 05/23/23 0511    Specimen: Swab from Throat Updated: 05/23/23 0532     Strep A Ag Negative    COVID-19,APTIMA PANTHER(TIMMY),[facility], NP/OP SWAB IN UTM/VTM/SALINE TRANSPORT MEDIA,24 HR TAT - Swab, Nasopharynx [[id number]] Collected: 05/23/23 0511    Specimen: Swab from Nasopharynx Updated: 05/23/23 0514    Beta Strep Culture, Throat - Swab, Throat [[id number]] Collected: 05/23/23 0511    Specimen: Swab from Throat Updated: 05/23/23 0531    Lactic Acid, Plasma [[id number]]  (Abnormal) Collected: 05/23/23 0730    Specimen: Blood Updated: 05/23/23 0813     Lactate 2.6 mmol/L     Blood Culture - Blood, Arm, Left [[id number]] Collected: 05/23/23 0730    Specimen: Blood from Arm, Left Updated: 05/23/23 0735    Blood Culture - Blood, Arm, Left [[id number]] Collected: 05/23/23 0730    Specimen: Blood from Arm, Left Updated: 05/23/23 0735    POC Glucose Once [[id number]]  (Abnormal) Collected: 05/23/23 0847    Specimen: Blood Updated: 05/23/23 0848     Glucose 215 mg/dL      Comment: Serial Number: [id number]:  [id number]              Imaging:    XR Chest 1 View    Result Date: 5/23/2023  PROCEDURE: XR CHEST 1 VW  COMPARISON: 3/14/2023.  INDICATIONS: SHORTNESS OF BREATH; POSSIBLE COPD, ACUTE EXACERBATION.  FINDINGS: Two AP upright portable chest radiographs reveal new or increased airspace disease in the right lung, especially involving its ydl-ae-xnfbdzy aspects.  The findings suggest pneumonia.  Aspiration  pneumonia would be in the differential diagnosis.  Asymmetric pulmonary edema is possible but thought to be less likely.  There is also thought to be some degree of interval improvement in aeration of the left lung, especially the left lung base, since 3/14/2023.  Mild cardiomegaly is suspected.  The thoracic aorta is atherosclerotic and ectatic.  External artifacts obscure detail.  The patient is slightly rotated to the left.  There may be chronic calcified granulomatous disease of the chest.       New or increased right-sided pneumonia is suggested radiographically since 3/14/2023.  Consider close interval clinical and imaging follow-up to ensure a benign progression and to exclude an underlying malignant process.      Please note that portions of this note were completed with a voice recognition program.  CORNELIUS FAROOQ JR, MD       Electronically Signed and Approved By: CORNELIUS FAROOQ JR, MD on 5/23/2023 at 6:00                  Differential Diagnosis and Discussion:    Dyspnea: Differential diagnosis includes but is not limited to metabolic acidosis, neurological disorders, psychogenic, asthma, pneumothorax, upper airway obstruction, COPD, pneumonia, noncardiogenic pulmonary edema, interstitial lung disease, anemia, congestive heart failure, and pulmonary embolism    All labs were reviewed and interpreted by me.  All X-rays impressions were independently interpreted by me.    MDM  Number of Diagnoses or Management Options  Hypoxia  Pneumonia due to infectious organism, unspecified laterality, unspecified part of lung  Diagnosis management comments: In summary this is a 78-year-old male who presents to the emergency department for evaluation of shortness of breath and cough.  On evaluation patient's been found to be hypoxic and is requiring supplemental oxygen due to room air saturation of 86%.  Chest x-ray reveals concerns for lower lobe pneumonia.  CBC independently reviewed by me and shows no critical  abnormalities.  CMP independently reviewed by me and shows no critical abnormalities.  BNP normal.  Patient's been given broad-spectrum antibiotics for community-acquired pneumonia.  Lactate normal. patient case has been discussed with the hospitalist team who will admit to the hospital for further evaluation and continuation of treatment.  No signs of sepsis at this time.           Patient Care Considerations:    CT CHEST: I considered ordering a CT scan of the chest, however This can be performed inpatient if deemed necessary      Consultants/Shared Management Plan:    Hospitalist: I have discussed the case with Dr. Jaffe who agrees to accept the patient for admission.    Social Determinants of Health:    Patient is independent, reliable, and has access to care.       Disposition and Care Coordination:    Admit:   Through independent evaluation of the patient's history, physical, and imperical data, the patient meets criteria for observation/admission to the hospital.        Final diagnoses:   Hypoxia   Pneumonia due to infectious organism, unspecified laterality, unspecified part of lung        ED Disposition     ED Disposition   Decision to Admit    Condition   --    Comment   Level of Care: Telemetry [5]   Diagnosis: Hypoxia [603210]   Admitting Physician: ANALI JAFFE [[id number]]   Attending Physician: ANALI JAFFE [[id number]]   Certification: I Certify That Inpatient Hospital Services Are Medically Necessary For Greater Than 2 Midnights               This medical record created using voice recognition software.           Chaz Azul MD  05/23/23 1016

## 2023-05-23 NOTE — ED TRIAGE NOTES
Pt comes to the ER tonight for SOA. Pt states that it started yesterday with congestion and cough. Pt states he has been taking Mucus DM but checked his O2 at home and it was 86. Pt states he threw up at home and it had bright red blood in it. Pt states when he blows his nose it has blood in it as well.

## 2023-05-24 LAB
ANION GAP SERPL CALCULATED.3IONS-SCNC: 6.3 MMOL/L (ref 5–15)
BASOPHILS # BLD AUTO: 0.04 10*3/MM3 (ref 0–0.2)
BASOPHILS NFR BLD AUTO: 0.3 % (ref 0–1.5)
BUN SERPL-MCNC: 14 MG/DL (ref 8–23)
BUN/CREAT SERPL: 13.2 (ref 7–25)
CALCIUM SPEC-SCNC: 8.3 MG/DL (ref 8.6–10.5)
CHLORIDE SERPL-SCNC: 106 MMOL/L (ref 98–107)
CO2 SERPL-SCNC: 24.7 MMOL/L (ref 22–29)
CREAT SERPL-MCNC: 1.06 MG/DL (ref 0.76–1.27)
D-LACTATE SERPL-SCNC: 1 MMOL/L (ref 0.5–2)
DEPRECATED RDW RBC AUTO: 50.6 FL (ref 37–54)
EGFRCR SERPLBLD CKD-EPI 2021: 72.7 ML/MIN/1.73
EOSINOPHIL # BLD AUTO: 0.27 10*3/MM3 (ref 0–0.4)
EOSINOPHIL NFR BLD AUTO: 2.2 % (ref 0.3–6.2)
ERYTHROCYTE [DISTWIDTH] IN BLOOD BY AUTOMATED COUNT: 15.3 % (ref 12.3–15.4)
GLUCOSE BLDC GLUCOMTR-MCNC: 124 MG/DL (ref 70–99)
GLUCOSE BLDC GLUCOMTR-MCNC: 168 MG/DL (ref 70–99)
GLUCOSE BLDC GLUCOMTR-MCNC: 177 MG/DL (ref 70–99)
GLUCOSE BLDC GLUCOMTR-MCNC: 177 MG/DL (ref 70–99)
GLUCOSE SERPL-MCNC: 112 MG/DL (ref 65–99)
HCT VFR BLD AUTO: 31.5 % (ref 37.5–51)
HGB BLD-MCNC: 10.5 G/DL (ref 13–17.7)
IMM GRANULOCYTES # BLD AUTO: 0.04 10*3/MM3 (ref 0–0.05)
IMM GRANULOCYTES NFR BLD AUTO: 0.3 % (ref 0–0.5)
LYMPHOCYTES # BLD AUTO: 1.73 10*3/MM3 (ref 0.7–3.1)
LYMPHOCYTES NFR BLD AUTO: 14 % (ref 19.6–45.3)
MAGNESIUM SERPL-MCNC: 1.5 MG/DL (ref 1.6–2.4)
MCH RBC QN AUTO: 30.3 PG (ref 26.6–33)
MCHC RBC AUTO-ENTMCNC: 33.3 G/DL (ref 31.5–35.7)
MCV RBC AUTO: 90.8 FL (ref 79–97)
MONOCYTES # BLD AUTO: 0.98 10*3/MM3 (ref 0.1–0.9)
MONOCYTES NFR BLD AUTO: 7.9 % (ref 5–12)
NEUTROPHILS NFR BLD AUTO: 75.3 % (ref 42.7–76)
NEUTROPHILS NFR BLD AUTO: 9.29 10*3/MM3 (ref 1.7–7)
NRBC BLD AUTO-RTO: 0 /100 WBC (ref 0–0.2)
PLATELET # BLD AUTO: 170 10*3/MM3 (ref 140–450)
PMV BLD AUTO: 9.3 FL (ref 6–12)
POTASSIUM SERPL-SCNC: 3.8 MMOL/L (ref 3.5–5.2)
RBC # BLD AUTO: 3.47 10*6/MM3 (ref 4.14–5.8)
SODIUM SERPL-SCNC: 137 MMOL/L (ref 136–145)
WBC NRBC COR # BLD: 12.35 10*3/MM3 (ref 3.4–10.8)

## 2023-05-24 PROCEDURE — 85025 COMPLETE CBC W/AUTO DIFF WBC: CPT | Performed by: INTERNAL MEDICINE

## 2023-05-24 PROCEDURE — 99233 SBSQ HOSP IP/OBS HIGH 50: CPT | Performed by: INTERNAL MEDICINE

## 2023-05-24 PROCEDURE — 0 MAGNESIUM SULFATE 4 GM/100ML SOLUTION: Performed by: INTERNAL MEDICINE

## 2023-05-24 PROCEDURE — 94799 UNLISTED PULMONARY SVC/PX: CPT

## 2023-05-24 PROCEDURE — 36415 COLL VENOUS BLD VENIPUNCTURE: CPT | Performed by: INTERNAL MEDICINE

## 2023-05-24 PROCEDURE — 82948 REAGENT STRIP/BLOOD GLUCOSE: CPT

## 2023-05-24 PROCEDURE — 94640 AIRWAY INHALATION TREATMENT: CPT

## 2023-05-24 PROCEDURE — 80048 BASIC METABOLIC PNL TOTAL CA: CPT | Performed by: INTERNAL MEDICINE

## 2023-05-24 PROCEDURE — 25010000002 AMPICILLIN-SULBACTAM PER 1.5 G: Performed by: INTERNAL MEDICINE

## 2023-05-24 PROCEDURE — 83605 ASSAY OF LACTIC ACID: CPT | Performed by: EMERGENCY MEDICINE

## 2023-05-24 PROCEDURE — 63710000001 INSULIN LISPRO (HUMAN) PER 5 UNITS: Performed by: INTERNAL MEDICINE

## 2023-05-24 PROCEDURE — 63710000001 INSULIN DETEMIR PER 5 UNITS: Performed by: INTERNAL MEDICINE

## 2023-05-24 PROCEDURE — 83735 ASSAY OF MAGNESIUM: CPT | Performed by: INTERNAL MEDICINE

## 2023-05-24 RX ORDER — LEVALBUTEROL INHALATION SOLUTION 1.25 MG/3ML
1.25 SOLUTION RESPIRATORY (INHALATION)
Status: DISCONTINUED | OUTPATIENT
Start: 2023-05-24 | End: 2023-05-25

## 2023-05-24 RX ORDER — METOPROLOL SUCCINATE 50 MG/1
50 TABLET, EXTENDED RELEASE ORAL DAILY
Status: DISCONTINUED | OUTPATIENT
Start: 2023-05-24 | End: 2023-05-25 | Stop reason: HOSPADM

## 2023-05-24 RX ORDER — MAGNESIUM SULFATE HEPTAHYDRATE 40 MG/ML
4 INJECTION, SOLUTION INTRAVENOUS ONCE
Status: COMPLETED | OUTPATIENT
Start: 2023-05-24 | End: 2023-05-24

## 2023-05-24 RX ORDER — PANTOPRAZOLE SODIUM 40 MG/1
40 TABLET, DELAYED RELEASE ORAL
Status: DISCONTINUED | OUTPATIENT
Start: 2023-05-24 | End: 2023-05-25 | Stop reason: HOSPADM

## 2023-05-24 RX ADMIN — ATORVASTATIN CALCIUM 10 MG: 10 TABLET, FILM COATED ORAL at 20:51

## 2023-05-24 RX ADMIN — MIDODRINE HYDROCHLORIDE 5 MG: 5 TABLET ORAL at 07:48

## 2023-05-24 RX ADMIN — AMPICILLIN SODIUM AND SULBACTAM SODIUM 1.5 G: 1; .5 INJECTION, POWDER, FOR SOLUTION INTRAMUSCULAR; INTRAVENOUS at 09:14

## 2023-05-24 RX ADMIN — PANTOPRAZOLE SODIUM 40 MG: 40 TABLET, DELAYED RELEASE ORAL at 13:26

## 2023-05-24 RX ADMIN — APIXABAN 5 MG: 5 TABLET, FILM COATED ORAL at 09:14

## 2023-05-24 RX ADMIN — SODIUM CHLORIDE 75 ML/HR: 9 INJECTION, SOLUTION INTRAVENOUS at 03:54

## 2023-05-24 RX ADMIN — APIXABAN 5 MG: 5 TABLET, FILM COATED ORAL at 20:51

## 2023-05-24 RX ADMIN — MIDODRINE HYDROCHLORIDE 5 MG: 5 TABLET ORAL at 11:50

## 2023-05-24 RX ADMIN — Medication 10 ML: at 20:51

## 2023-05-24 RX ADMIN — MAGNESIUM SULFATE HEPTAHYDRATE 4 G: 4 INJECTION, SOLUTION INTRAVENOUS at 10:19

## 2023-05-24 RX ADMIN — LEVALBUTEROL HYDROCHLORIDE 1.25 MG: 1.25 SOLUTION RESPIRATORY (INHALATION) at 11:00

## 2023-05-24 RX ADMIN — INSULIN LISPRO 3 UNITS: 100 INJECTION, SOLUTION INTRAVENOUS; SUBCUTANEOUS at 17:17

## 2023-05-24 RX ADMIN — LEVALBUTEROL HYDROCHLORIDE 1.25 MG: 1.25 SOLUTION RESPIRATORY (INHALATION) at 19:15

## 2023-05-24 RX ADMIN — AMPICILLIN SODIUM AND SULBACTAM SODIUM 1.5 G: 1; .5 INJECTION, POWDER, FOR SOLUTION INTRAMUSCULAR; INTRAVENOUS at 20:51

## 2023-05-24 RX ADMIN — AMPICILLIN SODIUM AND SULBACTAM SODIUM 1.5 G: 1; .5 INJECTION, POWDER, FOR SOLUTION INTRAMUSCULAR; INTRAVENOUS at 15:52

## 2023-05-24 RX ADMIN — METOPROLOL SUCCINATE 50 MG: 50 TABLET, EXTENDED RELEASE ORAL at 13:26

## 2023-05-24 RX ADMIN — DOCUSATE SODIUM 50MG AND SENNOSIDES 8.6MG 2 TABLET: 8.6; 5 TABLET, FILM COATED ORAL at 09:14

## 2023-05-24 RX ADMIN — INSULIN LISPRO 3 UNITS: 100 INJECTION, SOLUTION INTRAVENOUS; SUBCUTANEOUS at 20:51

## 2023-05-24 RX ADMIN — INSULIN DETEMIR 15 UNITS: 100 INJECTION, SOLUTION SUBCUTANEOUS at 09:13

## 2023-05-24 RX ADMIN — INSULIN LISPRO 3 UNITS: 100 INJECTION, SOLUTION INTRAVENOUS; SUBCUTANEOUS at 11:57

## 2023-05-24 NOTE — PLAN OF CARE
Problem: Adult Inpatient Plan of Care  Goal: Plan of Care Review  Outcome: Ongoing, Progressing  Flowsheets  Taken 5/24/2023 0458 by Kamala Arvizu, RN  Progress: improving  Taken 5/23/2023 2845 by Jo Gray, RN  Plan of Care Reviewed With: patient   Goal Outcome Evaluation:           Progress: improving          Pt a&o x4. On 1L NC. B/P has been stable.

## 2023-05-24 NOTE — SIGNIFICANT NOTE
05/24/23 1100   Coping/Psychosocial   Observed Emotional State calm;cooperative;happy   Verbalized Emotional State relief;hopefulness   Trust Relationship/Rapport empathic listening provided   Involvement in Care interacting with patient   Additional Documentation Spiritual Care (Group)   Spiritual Care   Use of Spiritual Resources non-Rastafari use of spiritual care   Spiritual Care Source  initiative   Spiritual Care Follow-Up follow-up, none required as presently assessed   Response to Spiritual Care receptive of support;engaged in conversation   Spiritual Care Interventions supportive conversation provided   Spiritual Care Visit Type initial   Receptivity to Spiritual Care visit welcomed

## 2023-05-24 NOTE — PROGRESS NOTES
[facility]   Hospitalist Progress Note  Date: 2023  Patient Name: Edi Weston  : 1945  MRN: [id number]  Date of admission: 2023      Subjective   Subjective     Chief Complaint: Shortness of air    Summary: 78 y.o. male PMH atrial fibrillation on Eliquis, type 2 diabetes mellitus, CKD, heart failure with preserved EF who presented to the ED on  due to complaints of cough, congestion, and chills. For the past few days he has been developing worsening coughing with congestion, weakness and shortness of air.  He checked his home oxygen and sats were 86% prompting him to seek medical attention in the ED.  He was hypoxic requiring nasal cannula in the ED.  Chest x-ray concerning for pneumonia.  He was admitted for further care, started on antibiotics with frequent bronchodilators.  Oxygen was weaned.    Interval Followup: No events overnight.  He states he feels about the same as he did yesterday but still overall much improved from admission.  Still requiring supplemental oxygen.  Continues with nonproductive cough.    Objective   Objective     Vitals:   Temp:  [97.3 °F (36.3 °C)-99.2 °F (37.3 °C)] 98.1 °F (36.7 °C)  Heart Rate:  [76-96] 90  Resp:  [18] 18  BP: (111-139)/(58-93) 134/77  Flow (L/min):  [1] 1  Physical Exam    Constitutional: Awake, alert, no acute distress, pleasant   Respiratory: Diminished in bilateral bases, otherwise clear to auscultation bilaterally, nasal cannula in place, nonlabored respirations    Cardiovascular: RRR, no MRG   Gastrointestinal: Positive bowel sounds, soft, nontender, nondistended   Neurologic: Oriented x 3, strength symmetric in all extremities, Cranial Nerves grossly intact to confrontation, speech clear    Result Review    Result Review:  I have personally reviewed the results below:  [x]  Laboratory personally reviewed blood sugars, CBC, magnesium, BMP  []  Microbiology  [x]  Radiology personally reviewed CT chest with right lower lobe pneumonia,  concern for aspiration.  Suspect questionable small pericardial effusion  [x]  EKG/Telemetry telemetry reviewed showing normal sinus rhythm  []  Cardiology/Vascular   []  Pathology  []  Old records  []  Other:  CBC        3/14/2023    00:05 5/23/2023    05:11 5/24/2023    06:12   CBC   WBC 8.08   10.21   12.35     RBC 4.26   4.51   3.47     Hemoglobin 12.7   13.5   10.5     Hematocrit 38.0   40.5   31.5     MCV 89.2   89.8   90.8     MCH 29.8   29.9   30.3     MCHC 33.4   33.3   33.3     RDW 13.7   15.0   15.3     Platelets 162   241   170       CMP        4/5/2023    12:34 5/23/2023    05:11 5/24/2023    04:35   CMP   Glucose 258   263   112     BUN 22   23   14     Creatinine 1.32   1.20   1.06     EGFR 55.9   62.7   72.7     Sodium 145   142   137     Potassium 3.9   4.1   3.8     Chloride 105   102   106     Calcium 8.9   9.2   8.3     Total Protein  6.9      Albumin  4.0      Globulin  2.9      Total Bilirubin  0.5      Alkaline Phosphatase  88      AST (SGOT)  23      ALT (SGPT)  15      Albumin/Globulin Ratio  1.4      BUN/Creatinine Ratio 16.7   19.2   13.2     Anion Gap 11.0   14.8   6.3         Assessment & Plan   Assessment / Plan     Assessment/Plan:  Community-acquired pneumonia due to unknown bacterial source, presumed gram-negative  Acute hypoxemia  Trace pericardial effusion  Sepsis, present on admission secondary to community-acquired pneumonia  Paroxysmal A-fib with RVR  Lactic acidosis, clinically significant  Hypotension  GERD  Elevated troponin of unclear significance  Type 2 diabetes mellitus with hyperglycemia  Acute diastolic congestive heart failure without acute exacerbation     Continue to monitor in the hospital on telemetry for management of the above  DC IV fluids, lactic acid levels have normalized  Infectious work-up largely negative, will de-escalate Rocephin and azithromycin to Unasyn alone  CT chest concerning for aspiration pneumonia and a small pericardial effusion  Has a history  of gastric banding which may increase his risk of aspiration  Start PPI daily  Obtain 2D echo to further classify and patient can likely follow-up with his primary cardiologist Dr. Ledezma on outpatient basis for follow-up  Blood pressures improved, DC midodrine and restart home oral metoprolol  IV metoprolol if needed for sustained heart rate greater than 140  Continue Eliquis 5 mg twice daily for atrial fibrillation  Replace magnesium IV  Continue detemir 15 units daily, moderate sliding scale insulin  Trend renal function and electrolytes with a.m. BMP, magnesium   Trend Hgb and WBC with a.m. CBC     Discussed case with: Bedside RN    DVT prophylaxis:  Medical and mechanical DVT prophylaxis orders are present.    CODE STATUS:   Level Of Support Discussed With: Patient  Code Status (Patient has no pulse and is not breathing): CPR (Attempt to Resuscitate)  Medical Interventions (Patient has pulse or is breathing): Full Support      Electronically signed by Delbert Pierce MD, 05/24/23, 12:36 PM EDT.

## 2023-05-24 NOTE — PLAN OF CARE
Goal Outcome Evaluation:      Patient continues on iv antibiotics. He received iv magnesium. Oxygen weaned and patient is on room air. Blood sugars elevated x2, sliding scale insulin given.

## 2023-05-25 ENCOUNTER — READMISSION MANAGEMENT (OUTPATIENT)
Dept: CALL CENTER | Facility: HOSPITAL | Age: 78
End: 2023-05-25
Payer: COMMERCIAL

## 2023-05-25 ENCOUNTER — APPOINTMENT (OUTPATIENT)
Dept: CARDIOLOGY | Facility: HOSPITAL | Age: 78
End: 2023-05-25
Payer: MEDICARE

## 2023-05-25 VITALS
DIASTOLIC BLOOD PRESSURE: 82 MMHG | HEART RATE: 63 BPM | OXYGEN SATURATION: 100 % | RESPIRATION RATE: 18 BRPM | WEIGHT: 283.07 LBS | TEMPERATURE: 97.3 F | BODY MASS INDEX: 34.47 KG/M2 | HEIGHT: 76 IN | SYSTOLIC BLOOD PRESSURE: 142 MMHG

## 2023-05-25 LAB
ANION GAP SERPL CALCULATED.3IONS-SCNC: 9.1 MMOL/L (ref 5–15)
BACTERIA SPEC AEROBE CULT: NORMAL
BASOPHILS # BLD AUTO: 0.03 10*3/MM3 (ref 0–0.2)
BASOPHILS NFR BLD AUTO: 0.3 % (ref 0–1.5)
BUN SERPL-MCNC: 17 MG/DL (ref 8–23)
BUN/CREAT SERPL: 17.5 (ref 7–25)
CALCIUM SPEC-SCNC: 8.7 MG/DL (ref 8.6–10.5)
CHLORIDE SERPL-SCNC: 107 MMOL/L (ref 98–107)
CO2 SERPL-SCNC: 22.9 MMOL/L (ref 22–29)
CREAT SERPL-MCNC: 0.97 MG/DL (ref 0.76–1.27)
DEPRECATED RDW RBC AUTO: 50.3 FL (ref 37–54)
EGFRCR SERPLBLD CKD-EPI 2021: 80.9 ML/MIN/1.73
EOSINOPHIL # BLD AUTO: 0.33 10*3/MM3 (ref 0–0.4)
EOSINOPHIL NFR BLD AUTO: 3.8 % (ref 0.3–6.2)
ERYTHROCYTE [DISTWIDTH] IN BLOOD BY AUTOMATED COUNT: 15.1 % (ref 12.3–15.4)
GLUCOSE BLDC GLUCOMTR-MCNC: 145 MG/DL (ref 70–99)
GLUCOSE BLDC GLUCOMTR-MCNC: 203 MG/DL (ref 70–99)
GLUCOSE SERPL-MCNC: 108 MG/DL (ref 65–99)
HCT VFR BLD AUTO: 32.7 % (ref 37.5–51)
HGB BLD-MCNC: 10.7 G/DL (ref 13–17.7)
IMM GRANULOCYTES # BLD AUTO: 0.03 10*3/MM3 (ref 0–0.05)
IMM GRANULOCYTES NFR BLD AUTO: 0.3 % (ref 0–0.5)
LYMPHOCYTES # BLD AUTO: 1.34 10*3/MM3 (ref 0.7–3.1)
LYMPHOCYTES NFR BLD AUTO: 15.2 % (ref 19.6–45.3)
MAGNESIUM SERPL-MCNC: 2 MG/DL (ref 1.6–2.4)
MCH RBC QN AUTO: 29.8 PG (ref 26.6–33)
MCHC RBC AUTO-ENTMCNC: 32.7 G/DL (ref 31.5–35.7)
MCV RBC AUTO: 91.1 FL (ref 79–97)
MONOCYTES # BLD AUTO: 0.76 10*3/MM3 (ref 0.1–0.9)
MONOCYTES NFR BLD AUTO: 8.6 % (ref 5–12)
NEUTROPHILS NFR BLD AUTO: 6.31 10*3/MM3 (ref 1.7–7)
NEUTROPHILS NFR BLD AUTO: 71.8 % (ref 42.7–76)
NRBC BLD AUTO-RTO: 0 /100 WBC (ref 0–0.2)
PHOSPHATE SERPL-MCNC: 3.1 MG/DL (ref 2.5–4.5)
PLATELET # BLD AUTO: 174 10*3/MM3 (ref 140–450)
PMV BLD AUTO: 9.2 FL (ref 6–12)
POTASSIUM SERPL-SCNC: 3.9 MMOL/L (ref 3.5–5.2)
RBC # BLD AUTO: 3.59 10*6/MM3 (ref 4.14–5.8)
SODIUM SERPL-SCNC: 139 MMOL/L (ref 136–145)
WBC NRBC COR # BLD: 8.8 10*3/MM3 (ref 3.4–10.8)

## 2023-05-25 PROCEDURE — 82948 REAGENT STRIP/BLOOD GLUCOSE: CPT

## 2023-05-25 PROCEDURE — 85025 COMPLETE CBC W/AUTO DIFF WBC: CPT | Performed by: INTERNAL MEDICINE

## 2023-05-25 PROCEDURE — 94799 UNLISTED PULMONARY SVC/PX: CPT

## 2023-05-25 PROCEDURE — 99239 HOSP IP/OBS DSCHRG MGMT >30: CPT | Performed by: INTERNAL MEDICINE

## 2023-05-25 PROCEDURE — 97161 PT EVAL LOW COMPLEX 20 MIN: CPT

## 2023-05-25 PROCEDURE — 63710000001 INSULIN DETEMIR PER 5 UNITS: Performed by: INTERNAL MEDICINE

## 2023-05-25 PROCEDURE — 84100 ASSAY OF PHOSPHORUS: CPT | Performed by: INTERNAL MEDICINE

## 2023-05-25 PROCEDURE — 80048 BASIC METABOLIC PNL TOTAL CA: CPT | Performed by: INTERNAL MEDICINE

## 2023-05-25 PROCEDURE — 63710000001 INSULIN LISPRO (HUMAN) PER 5 UNITS: Performed by: INTERNAL MEDICINE

## 2023-05-25 PROCEDURE — 83735 ASSAY OF MAGNESIUM: CPT | Performed by: INTERNAL MEDICINE

## 2023-05-25 PROCEDURE — 25010000002 AMPICILLIN-SULBACTAM PER 1.5 G: Performed by: INTERNAL MEDICINE

## 2023-05-25 PROCEDURE — 93306 TTE W/DOPPLER COMPLETE: CPT

## 2023-05-25 PROCEDURE — 94664 DEMO&/EVAL PT USE INHALER: CPT

## 2023-05-25 RX ORDER — PANTOPRAZOLE SODIUM 40 MG/1
40 TABLET, DELAYED RELEASE ORAL DAILY
Qty: 30 TABLET | Refills: 0 | Status: SHIPPED | OUTPATIENT
Start: 2023-05-25 | End: 2023-06-05 | Stop reason: SDUPTHER

## 2023-05-25 RX ORDER — AMOXICILLIN AND CLAVULANATE POTASSIUM 875; 125 MG/1; MG/1
1 TABLET, FILM COATED ORAL 2 TIMES DAILY
Qty: 10 TABLET | Refills: 0 | Status: SHIPPED | OUTPATIENT
Start: 2023-05-25 | End: 2023-05-30

## 2023-05-25 RX ADMIN — Medication 10 ML: at 08:41

## 2023-05-25 RX ADMIN — APIXABAN 5 MG: 5 TABLET, FILM COATED ORAL at 08:39

## 2023-05-25 RX ADMIN — PANTOPRAZOLE SODIUM 40 MG: 40 TABLET, DELAYED RELEASE ORAL at 05:08

## 2023-05-25 RX ADMIN — AMPICILLIN SODIUM AND SULBACTAM SODIUM 1.5 G: 1; .5 INJECTION, POWDER, FOR SOLUTION INTRAMUSCULAR; INTRAVENOUS at 09:53

## 2023-05-25 RX ADMIN — INSULIN LISPRO 5 UNITS: 100 INJECTION, SOLUTION INTRAVENOUS; SUBCUTANEOUS at 12:13

## 2023-05-25 RX ADMIN — INSULIN DETEMIR 15 UNITS: 100 INJECTION, SOLUTION SUBCUTANEOUS at 08:38

## 2023-05-25 RX ADMIN — AMPICILLIN SODIUM AND SULBACTAM SODIUM 1.5 G: 1; .5 INJECTION, POWDER, FOR SOLUTION INTRAMUSCULAR; INTRAVENOUS at 03:01

## 2023-05-25 RX ADMIN — METOPROLOL SUCCINATE 50 MG: 50 TABLET, EXTENDED RELEASE ORAL at 08:39

## 2023-05-25 RX ADMIN — LEVALBUTEROL HYDROCHLORIDE 1.25 MG: 1.25 SOLUTION RESPIRATORY (INHALATION) at 06:38

## 2023-05-25 NOTE — THERAPY EVALUATION
Acute Care - Physical Therapy Initial Evaluation   [facility]     Patient Name: Edi Weston  : 1945  MRN: [id number]  Today's Date: 2023      Visit Dx: Admit date: 2023     Referring Physician: Delbert Jaffe MD     Surgery Date:* No surgery found *            ICD-10-CM ICD-9-CM   1. Hypoxia  R09.02 799.02   2. Pneumonia due to infectious organism, unspecified laterality, unspecified part of lung  J18.9 486   3. Difficulty walking  R26.2 719.7     Patient Active Problem List   Diagnosis   • Type 2 diabetes, controlled, with peripheral neuropathy (HCC)   • History of colonoscopy with polypectomy   • Gout   • Hammertoe   • Arthritis   • Paroxysmal atrial fibrillation (HCC)   • LBBB (left bundle branch block)   • Colon polyps   • Hyperlipidemia   • Hypertension, essential   • Acute on chronic heart failure with preserved ejection fraction (HFpEF)   • Acute diastolic CHF (congestive heart failure)   • Hypoxia     Past Medical History:   Diagnosis Date   • Arthritis    • Atrial fibrillation    • Cellulitis of right lower limb    • Colon polyps    • Decubitus ulcer of foot, stage 1 08/10/2018   • Decubitus ulcer of foot, stage 3 2018   • Foot pain, left 08/10/2018   • Foot pain, right    • Gout    • Hammertoe 2019   • High blood pressure    • History of colonoscopy with polypectomy 2020    2018 TUBULAR ADENOMA, COLONOSCOPY SCHEDULED FOR 10/19/2020 WITH DR SULTANA   • Ingrowing nail 2018   • Medication management 2019   • Nail dystrophy    • PAT (paroxysmal atrial tachycardia) 2018   • Pressure ulcer, stage 1    • Tinea unguium    • Type 2 diabetes mellitus with foot ulcer    • Type 2 diabetes mellitus with polyneuropathy    • Type 2 diabetes, controlled, with peripheral neuropathy 08/10/2018     Past Surgical History:   Procedure Laterality Date   • ABDOMINAL SURGERY      lap band   • COLONOSCOPY     • GASTRIC BANDING       PT Assessment (last 12 hours)     PT  Evaluation and Treatment     Row Name 05/25/23 1129          Physical Therapy Time and Intention    Subjective Information no complaints  -DP     Document Type evaluation  -DP     Mode of Treatment individual therapy;physical therapy  -DP     Patient Effort good  -DP     Symptoms Noted During/After Treatment none  -DP     Row Name 05/25/23 1129          General Information    Patient Profile Reviewed yes  -DP     Patient Observations alert;cooperative;agree to therapy  -DP     Prior Level of Function independent:;gait;transfer;bed mobility;ADL's  -DP     Equipment Currently Used at Home none  -DP     Existing Precautions/Restrictions no known precautions/restrictions  -DP     Barriers to Rehab none identified  -DP     Row Name 05/25/23 1129          Living Environment    Current Living Arrangements home  -DP     Home Accessibility wheelchair accessible  -DP     People in Home spouse  -DP     Primary Care Provided by self  -DP     Row Name 05/25/23 1129          Range of Motion (ROM)    Range of Motion ROM is WFL;bilateral lower extremities  -DP     Row Name 05/25/23 1129          Strength (Manual Muscle Testing)    Strength (Manual Muscle Testing) bilateral lower extremities  BLE: 5/5  -DP     Row Name 05/25/23 1129          Bed Mobility    Bed Mobility bed mobility (all) activities  -DP     All Activities, [place] (Bed Mobility) not tested  Patient sitting upright in recliner upon entering the room  -DP     Row Name 05/25/23 1129          Transfers    Transfers sit-stand transfer  -DP     Row Name 05/25/23 1129          Sit-Stand Transfer    Sit-Stand [place] (Transfers) independent  -DP     Assistive Device (Sit-Stand Transfers) --  none  -DP     Row Name 05/25/23 1129          Gait/Stairs (Locomotion)    Gait/Stairs Locomotion gait/ambulation independence  -DP     [place] Level (Gait) standby assist  -DP     Assistive Device (Gait) --  none  -DP     Distance in Feet (Gait) 300  -DP     Row Name  05/25/23 1129          Balance    Balance Assessment standing dynamic balance  -DP     Dynamic Standing Balance standby assist  -DP     Position/Device Used, Standing Balance unsupported  -DP     Row Name 05/25/23 1129          Plan of Care Review    Plan of Care Reviewed With patient  -DP     Outcome Evaluation Patient does not present with limitations at the time of evaluation that would require inpatient physical therapy services. Upon discharge, it is recommended he returns home with his wife.  -DP     Row Name 05/25/23 1129          Therapy Assessment/Plan (PT)    Criteria for Skilled Interventions Met (PT) no problems identified which require skilled intervention  -DP     Therapy Frequency (PT) evaluation only  -DP     Row Name 05/25/23 1129          PT Evaluation Complexity    History, PT Evaluation Complexity no personal factors and/or comorbidities  -DP     Examination of Body Systems (PT Eval Complexity) total of 4 or more elements  -DP     Clinical Presentation (PT Evaluation Complexity) stable  -DP     Clinical Decision Making (PT Evaluation Complexity) low complexity  -DP     Overall Complexity (PT Evaluation Complexity) low complexity  -DP     Row Name 05/25/23 1129          Therapy Plan Review/Discharge Plan (PT)    Therapy Plan Review (PT) evaluation/treatment results reviewed;patient  -DP           User Key  (r) = Recorded By, (t) = Taken By, (c) = Cosigned By    Initials Name Provider Type    Saima Carrasco, PT Physical Therapist                  PT Recommendation and Plan  Anticipated Discharge Disposition (PT): home with assist  Therapy Frequency (PT): evaluation only  Plan of Care Reviewed With: patient  Outcome Evaluation: Patient does not present with limitations at the time of evaluation that would require inpatient physical therapy services. Upon discharge, it is recommended he returns home with his wife.   Outcome Measures     Row Name 05/25/23 1100             How much help from  another person do you currently need...    Turning from your back to your side while in flat bed without using bedrails? 4  -DP      Moving from lying on back to sitting on the side of a flat bed without bedrails? 4  -DP      Moving to and from a bed to a chair (including a wheelchair)? 4  -DP      Standing up from a chair using your arms (e.g., wheelchair, bedside chair)? 4  -DP      Climbing 3-5 steps with a railing? 4  -DP      To walk in hospital room? 4  -DP      AM-PAC 6 Clicks Score (PT) 24  -DP         Functional Assessment    Outcome Measure Options AM-PAC 6 Clicks Basic Mobility (PT)  -DP            User Key  (r) = Recorded By, (t) = Taken By, (c) = Cosigned By    Initials Name Provider Type    Saima Carrasco, PT Physical Therapist                 Time Calculation:    PT Charges     Row Name 05/25/23 1133             Time Calculation    PT Received On 05/25/23  -DP         Untimed Charges    PT Eval/Re-eval Minutes 35  -DP         Total Minutes    Untimed Charges Total Minutes 35  -DP       Total Minutes 35  -DP            User Key  (r) = Recorded By, (t) = Taken By, (c) = Cosigned By    Initials Name Provider Type    Saima Carrasoc, PT Physical Therapist              Therapy Charges for Today     Code Description Service Date Service Provider Modifiers Qty    [id number] HC PT EVAL LOW COMPLEXITY 3 5/25/2023 Saima Miranda, PT GP 1          PT G-Codes  Outcome Measure Options: AM-PAC 6 Clicks Basic Mobility (PT)  AM-PAC 6 Clicks Score (PT): 24    Saima Miranda PT  5/25/2023

## 2023-05-25 NOTE — DISCHARGE SUMMARY
[facility]         HOSPITALIST  DISCHARGE SUMMARY    Patient Name: Edi Weston  : 1945  MRN: [id number]    Date of Admission: 2023  Date of Discharge:  23  Primary Care Physician: Jean Marie Joaquin III, MD    Consults     Date and Time Order Name Status Description    2023  7:14 AM Inpatient Hospitalist Consult            Active and Resolved Hospital Problems:  Active Hospital Problems    Diagnosis POA   • **Hypoxia [R09.02] Yes      Resolved Hospital Problems   No resolved problems to display.   Community-acquired pneumonia due to unknown bacterial source, presumed gram-negative  Acute hypoxemia  Trace pericardial effusion  Sepsis, present on admission secondary to community-acquired pneumonia  Paroxysmal A-fib with RVR  Lactic acidosis, clinically significant  Hypotension  GERD  Elevated troponin of unclear significance  Type 2 diabetes mellitus with hyperglycemia  Acute diastolic congestive heart failure without acute exacerbation    Hospital Course     Hospital Course:  Edi Weston is a 78 y.o. male PMH atrial fibrillation on Eliquis, type 2 diabetes mellitus, CKD, heart failure with preserved EF who presented to the ED on  due to complaints of cough, congestion, and chills. For the past few days he has been developing worsening coughing with congestion, weakness and shortness of air.  He checked his home oxygen and sats were 86% prompting him to seek medical attention in the ED.  He was hypoxic requiring nasal cannula in the ED.  Chest x-ray concerning for pneumonia.  He was admitted for further care, started on antibiotics with frequent bronchodilators.  Oxygen was weaned.  CT chest revealed concern for GERD with aspiration pneumonia which may be related to his lap band.  Small pericardial effusion noted with no evidence of tamponade.  2D echo obtained and he will follow-up with primary cardiologist Dr. Ledezma to ensure resolution of the trace effusion.  He was  started on a PPI to help prevent further aspiration episodes and reflux.  He was weaned off oxygen and passed a walk test on the day of discharge.  He was discharged home in stable condition he will complete a course of Augmentin.  Recommend follow-up with PCP within 1 week, cardiology within 2 weeks      Day of Discharge     Vital Signs:  Temp:  [97 °F (36.1 °C)-98.6 °F (37 °C)] 97.3 °F (36.3 °C)  Heart Rate:  [56-80] 63  Resp:  [18] 18  BP: (121-142)/(53-90) 142/82  Physical Exam:   Gen: NAD, WDWN  ENT: PERRL, EOMI   CV: RRR no MRG  Pulm: CTAB, no w/r/r  GI: Abd soft, NTND, +bs  Neuro: Moving all extremities spontaneously, CN II-XII grossly intact   Psych: A&O*3, normal mood and affect  Skin: No lesions or rashes noted    Discharge Details        Discharge Medications      New Medications      Instructions Start Date   amoxicillin-clavulanate 875-125 MG per tablet  Commonly known as: AUGMENTIN   1 tablet, Oral, 2 Times Daily      pantoprazole 40 MG EC tablet  Commonly known as: PROTONIX   40 mg, Oral, Daily         Continue These Medications      Instructions Start Date   Eliquis 5 MG tablet tablet  Generic drug: apixaban   Take 1 tablet by mouth twice daily      furosemide 40 MG tablet  Commonly known as: Lasix   40 mg, Oral, Daily      glyburide 2.5 MG tablet  Commonly known as: DIAbeta   2.5 mg, Oral, Daily With Breakfast      lisinopril 20 MG tablet  Commonly known as: PRINIVIL,ZESTRIL   20 mg, Oral, Daily      metFORMIN  MG 24 hr tablet  Commonly known as: GLUCOPHAGE-XR   1,000 mg, Oral, Every Evening      metoprolol succinate XL 50 MG 24 hr tablet  Commonly known as: TOPROL-XL   50 mg, Oral, Daily      simvastatin 20 MG tablet  Commonly known as: ZOCOR   20 mg, Oral, Every Evening             Allergies   Allergen Reactions   • Sulfa Antibiotics Unknown - Low Severity     Unsure of reaction       Discharge Disposition:  Home or Self Care    Diet:  Hospital:  Diet Order   Procedures   • Diet: Cardiac  Diets; Healthy Heart (2-3 Na+); Texture: Regular Texture (IDDSI 7); Fluid Consistency: Thin (IDDSI 0)       Discharge Activity:   Activity Instructions     Activity as Tolerated            CODE STATUS:  Code Status and Medical Interventions:   Ordered at: 05/23/23 0746     Level Of Support Discussed With:    Patient     Code Status (Patient has no pulse and is not breathing):    CPR (Attempt to Resuscitate)     Medical Interventions (Patient has pulse or is breathing):    Full Support         Future Appointments   Date Time Provider Department Center   6/21/2023  3:15 PM [place]ede[place] [facility] [place] [facility]   7/6/2023  2:30 PM Jean Marie Joaquin III, MD [facility]       Additional Instructions for the Follow-ups that You Need to Schedule     Discharge Follow-up with PCP   As directed       Currently Documented PCP:    Jean Marie Joaquin III, MD    PCP Phone Number:    [phone]     Follow Up Details: 3-5 days         Discharge Follow-up with Specified Provider: Cardiology Dr. Ledezma; 2 Weeks   As directed      To: Cardiology Dr. Ledezma    Follow Up: 2 Weeks               Pertinent  and/or Most Recent Results     PROCEDURES:   None    LAB RESULTS:      Lab 05/25/23  0310 05/24/23  0612 05/24/23  0435 05/23/23  2225 05/23/23  1602 05/23/23  1424 05/23/23  1331 05/23/23  1029 05/23/23  0730 05/23/23  0511   WBC 8.80 12.35*  --   --   --   --   --   --   --  10.21   HEMOGLOBIN 10.7* 10.5*  --   --   --   --   --   --   --  13.5   HEMATOCRIT 32.7* 31.5*  --   --   --   --   --   --   --  40.5   PLATELETS 174 170  --   --   --   --   --   --   --  241   NEUTROS ABS 6.31 9.29*  --   --   --   --   --   --   --  8.54*   IMMATURE GRANS (ABS) 0.03 0.04  --   --   --   --   --   --   --  0.04   LYMPHS ABS 1.34 1.73  --   --   --   --   --   --   --  1.13   MONOS ABS 0.76 0.98*  --   --   --   --   --   --   --  0.30   EOS ABS 0.33 0.27  --   --   --   --   --   --   --  0.16   MCV 91.1 90.8  --   --   --   --   --   --   --  89.8    PROCALCITONIN  --   --   --   --   --  19.35*  --   --   --   --    LACTATE  --   --  1.0 2.3* 3.3*  --  4.0* 2.5*   < >  --     < > = values in this interval not displayed.         Lab 05/25/23  0310 05/24/23  0435 05/23/23  0511   SODIUM 139 137 142   POTASSIUM 3.9 3.8 4.1   CHLORIDE 107 106 102   CO2 22.9 24.7 25.2   ANION GAP 9.1 6.3 14.8   BUN 17 14 23   CREATININE 0.97 1.06 1.20   EGFR 80.9 72.7 62.7   GLUCOSE 108* 112* 263*   CALCIUM 8.7 8.3* 9.2   MAGNESIUM 2.0 1.5*  --    PHOSPHORUS 3.1  --   --          Lab 05/23/23  0511   TOTAL PROTEIN 6.9   ALBUMIN 4.0   GLOBULIN 2.9   ALT (SGPT) 15   AST (SGOT) 23   BILIRUBIN 0.5   ALK PHOS 88         Lab 05/23/23  1602 05/23/23  1424 05/23/23  0511   PROBNP  --   --  687.8   HSTROP T 19* 22* 20*                 Brief Urine Lab Results     None        Microbiology Results (last 10 days)     Procedure Component Value - Date/Time    Legionella Antigen, Urine - Urine, Urine, Clean Catch [[id number]]  (Normal) Collected: 05/23/23 1534    Lab Status: Final result Specimen: Urine, Clean Catch Updated: 05/23/23 1625     LEGIONELLA ANTIGEN, URINE Negative    S. Pneumo Ag Urine or CSF - Urine, Urine, Clean Catch [[id number]]  (Normal) Collected: 05/23/23 1534    Lab Status: Final result Specimen: Urine, Clean Catch Updated: 05/23/23 1625     Strep Pneumo Ag Negative    Blood Culture - Blood, Arm, Left [[id number]]  (Normal) Collected: 05/23/23 0730    Lab Status: Preliminary result Specimen: Blood from Arm, Left Updated: 05/25/23 0746     Blood Culture No growth at 2 days    Blood Culture - Blood, Arm, Left [[id number]]  (Normal) Collected: 05/23/23 0730    Lab Status: Preliminary result Specimen: Blood from Arm, Left Updated: 05/25/23 0746     Blood Culture No growth at 2 days    Influenza Antigen, Rapid - Swab, Nasopharynx [[id number]]  (Normal) Collected: 05/23/23 0511    Lab Status: Final result Specimen: Swab from Nasopharynx Updated: 05/23/23 0539     Influenza A Ag, EIA  Negative     Influenza B Ag, EIA Negative    Rapid Strep A Screen - Swab, Throat [[id number]]  (Normal) Collected: 05/23/23 0511    Lab Status: Final result Specimen: Swab from Throat Updated: 05/23/23 0532     Strep A Ag Negative    COVID-19,APTIMA PANTHER(TIMMY),[facility], NP/OP SWAB IN UTM/VTM/SALINE TRANSPORT MEDIA,24 HR TAT - Swab, Nasopharynx [[id number]]  (Normal) Collected: 05/23/23 0511    Lab Status: Final result Specimen: Swab from Nasopharynx Updated: 05/23/23 1423     COVID19 Not Detected    Narrative:      Fact sheet for providers: https://www.fda.gov/media/248929/download     Fact sheet for patients: https://www.fda.gov/media/950478/download    Test performed by RT PCR.    Beta Strep Culture, Throat - Swab, Throat [[id number]]  (Normal) Collected: 05/23/23 0511    Lab Status: Final result Specimen: Swab from Throat Updated: 05/25/23 0832     Throat Culture, Beta Strep No Beta Hemolytic Streptococcus Isolated    Narrative:      Group A Strep incidence is low in adults. Positive culture for Beta hemolytic Streptococcus species can reflect colonization and not true infection. Please correlate clinically.            CT Chest Without Contrast Diagnostic    Result Date: 5/24/2023  Impression:   1. Right-sided pneumonia is seen, greatest in the right lower lobe.  The findings may be related to aspiration pneumonia or infectious multifocal pneumonia.  2. There is a small hiatal hernia.  3. There is a dilated esophagus with an air-fluid or air-debris level(s).  Gastroesophageal reflux disease (GERD) may be present.  Age-indeterminate nonspecific infectious/inflammatory esophagitis cannot be excluded.  There is no convincing nonenhanced CT evidence of infectious mediastinitis.  Please correlate clinically, especially with the degree of constriction of the gastric band, as detailed above.  4. There may be gallbladder sludge.  Gallstones cannot be excluded.  No acute cholecystitis is appreciated.  No definite  acute pancreatitis.  No definite biliary ductal dilatation.  5. There is diffuse hepatic steatosis with probably no hepatomegaly.  No splenomegaly is suspected.  6. There is possible minimal acute infiltrate in the lower lobe of the left lung.  7. Extensive coronary artery calcifications are seen.  There may be coronary artery stents.  8. A trace amount of pericardial effusion is seen.  There is minimal, if any, pleural effusion.  9. The maximum diameter of the ascending aorta is 3.9 cm, which is at the upper limits of normal.  Consider close interval clinical and imaging follow-up of this finding to ensure a benign progression.  10. No cardiac enlargement. 11. Please see above comments for further detail.  1.   Please note that portions of this note were completed with a voice recognition program.  CORNELIUS FAROOQ JR, MD       Electronically Signed and Approved By: CORNELIUS FAROOQ JR, MD on 5/24/2023 at 4:52              XR Chest 1 View    Result Date: 5/23/2023  Impression:  New or increased right-sided pneumonia is suggested radiographically since 3/14/2023.  Consider close interval clinical and imaging follow-up to ensure a benign progression and to exclude an underlying malignant process.      Please note that portions of this note were completed with a voice recognition program.  CORNELIUS FAROOQ JR, MD       Electronically Signed and Approved By: CORNELIUS FAROOQ JR, MD on 5/23/2023 at 6:00                  CT Chest Without Contrast Diagnostic    Result Date: 5/24/2023  Narrative: PROCEDURE: CT CHEST WO CONTRAST DIAGNOSTIC  COMPARISON: 5/23/2023; 3/14/2023.  INDICATIONS: shortness of breath; pneumonia; imaging follow-up; h/o a gastric band  TECHNIQUE: 769 CT images were created without the administration of contrast material.   PROTOCOL:   Standard CT imaging protocol performed.    RADIATION:   Total DLP: 386 mGy*cm   Automated exposure control was utilized to minimize radiation dose.  FINDINGS: Extensive pulmonary  consolidation is seen in the right lung, involving the right lower lobe, right middle lobe, and right upper lobe.  The greatest degree of pulmonary consolidation is in the posterior, inferior right lower lobe, such as seen on image 155 of series 5 and image 99 of series 3 and image 137 of series 4.  The findings may represent infectious multifocal pneumonia.  Aspiration pneumonia cannot be excluded.  There is peribronchial thickening, especially on the right.  There may be minimal infiltrate in the medial, inferior left lower lobe, such as seen on image 104 series 3 and adjacent images.  Pneumonia in this region cannot be excluded.  Atelectasis and/or fibrosis is (are) seen in the inferior lingula.  The central tracheobronchial tree is well aerated without filling defect.  There are nonspecific small-to-moderate-sized mediastinal and hilar lymph nodes, which may be reactive in nature.  There is bilateral gynecomastia.  No thyroid enlargement.  Probably no enlarged axillary lymph nodes are seen.  Extensive coronary artery calcifications are present.  The maximum diameter of the ascending aorta is at the upper limits of normal, estimated at 3.9 cm.  Consider close interval clinical and imaging follow-up of this finding to exclude a developing or expanding thoracic aortic aneurysm.  There is chronic calcified granulomatous disease of the chest and the abdomen.  A gastric band is in place.  There is a small hiatal hernia.  The esophagus is dilated with an air-fluid and/or air-debris level(s).  Age-indeterminate esophagitis is possible.  Gastroesophageal reflux disease (GERD) may be present.  Please correlate clinically, especially with the degree of constriction of the gastric band.  For instance, is it too tight and, thus, elevating the risk of pulmonary aspiration?  Diffuse hepatic steatosis is suggested.  There is probably a benign posterolateral 4.2 cm partially exophytic upper-to-mid left renal cyst, as seen on  image 160 of series 2 and adjacent images.  It has a benign CT number reading near 0 Hounsfield units.  Gallbladder sludge may be present.  No definite acute findings are seen in the partially imaged upper abdomen.  Degenerative changes involve the imaged spine, especially at T8-9, with severe disc and endplate degenerative changes suggested.  Ossification of the anterior longitudinal ligament is seen and may represent diffuse idiopathic skeletal hyperostosis (DISH).  No definite acute fracture or aggressive osseous lesion is appreciated.  Degenerative changes also involve the bilateral shoulders.      Impression:   1. Right-sided pneumonia is seen, greatest in the right lower lobe.  The findings may be related to aspiration pneumonia or infectious multifocal pneumonia.  2. There is a small hiatal hernia.  3. There is a dilated esophagus with an air-fluid or air-debris level(s).  Gastroesophageal reflux disease (GERD) may be present.  Age-indeterminate nonspecific infectious/inflammatory esophagitis cannot be excluded.  There is no convincing nonenhanced CT evidence of infectious mediastinitis.  Please correlate clinically, especially with the degree of constriction of the gastric band, as detailed above.  4. There may be gallbladder sludge.  Gallstones cannot be excluded.  No acute cholecystitis is appreciated.  No definite acute pancreatitis.  No definite biliary ductal dilatation.  5. There is diffuse hepatic steatosis with probably no hepatomegaly.  No splenomegaly is suspected.  6. There is possible minimal acute infiltrate in the lower lobe of the left lung.  7. Extensive coronary artery calcifications are seen.  There may be coronary artery stents.  8. A trace amount of pericardial effusion is seen.  There is minimal, if any, pleural effusion.  9. The maximum diameter of the ascending aorta is 3.9 cm, which is at the upper limits of normal.  Consider close interval clinical and imaging follow-up of this  finding to ensure a benign progression.  10. No cardiac enlargement. 11. Please see above comments for further detail.  1.   Please note that portions of this note were completed with a voice recognition program.  CORNELIUS FAROOQ JR, MD       Electronically Signed and Approved By: CORNELIUS FAROOQ JR, MD on 5/24/2023 at 4:52              XR Chest 1 View    Result Date: 5/23/2023  Narrative: PROCEDURE: XR CHEST 1 VW  COMPARISON: 3/14/2023.  INDICATIONS: SHORTNESS OF BREATH; POSSIBLE COPD, ACUTE EXACERBATION.  FINDINGS: Two AP upright portable chest radiographs reveal new or increased airspace disease in the right lung, especially involving its [id number] aspects.  The findings suggest pneumonia.  Aspiration pneumonia would be in the differential diagnosis.  Asymmetric pulmonary edema is possible but thought to be less likely.  There is also thought to be some degree of interval improvement in aeration of the left lung, especially the left lung base, since 3/14/2023.  Mild cardiomegaly is suspected.  The thoracic aorta is atherosclerotic and ectatic.  External artifacts obscure detail.  The patient is slightly rotated to the left.  There may be chronic calcified granulomatous disease of the chest.      Impression:  New or increased right-sided pneumonia is suggested radiographically since 3/14/2023.  Consider close interval clinical and imaging follow-up to ensure a benign progression and to exclude an underlying malignant process.      Please note that portions of this note were completed with a voice recognition program.  CORNELIUS FAROOQ JR, MD       Electronically Signed and Approved By: CORNELIUS FAROOQ JR, MD on 5/23/2023 at 6:00                    Results for orders placed in visit on 03/20/23    Adult Transthoracic Echo Complete W/ Cont if Necessary Per Protocol    Interpretation Summary  •  Left ventricular systolic function is normal. Calculated left ventricular EF = 61%  •  Left ventricular wall thickness is  consistent with borderline concentric hypertrophy.  •  Left ventricular diastolic function was indeterminate.  •  The left atrial cavity is moderately dilated.      Labs Pending at Discharge:  Pending Labs     Order Current Status    Blood Culture - Blood, Arm, Left Preliminary result    Blood Culture - Blood, Arm, Left Preliminary result            Time spent on Discharge including face to face service:  34 minutes    Electronically signed by Delbert Pierce MD, 05/25/23, 11:54 AM EDT.

## 2023-05-25 NOTE — PLAN OF CARE
Problem: Adult Inpatient Plan of Care  Goal: Plan of Care Review  Outcome: Ongoing, Progressing  Flowsheets  Taken 5/25/2023 0417 by Kamala Arvizu, RN  Progress: improving  Taken 5/23/2023 1825 by Jo Gray, RN  Plan of Care Reviewed With: patient   Goal Outcome Evaluation:           Progress: improving          Pt a&o x4. IV abx per MAR. Uneventful shift.

## 2023-05-25 NOTE — NURSING NOTE
Exercise Oximetry    Patient Name:Edi Weston   MRN: [id number]   Date: 05/25/23             ROOM AIR BASELINE   SpO2% 97   Heart Rate 76   Blood Pressure 142/82     EXERCISE ON ROOM AIR SpO2% EXERCISE ON O2 @  LPM SpO2%   1 MINUTE 97 1 MINUTE    2 MINUTES 98 2 MINUTES    3 MINUTES 96 3 MINUTES    4 MINUTES 97 4 MINUTES    5 MINUTES 98 5 MINUTES    6 MINUTES 95 6 MINUTES               Distance Walked  400 feet Distance Walked   Dyspnea (Xander Scale)   Dyspnea (Xander Scale)   Fatigue (Xander Scale)   Fatigue (Xander Scale)   SpO2% Post Exercise  96 SpO2% Post Exercise   HR Post Exercise   HR Post Exercise   Time to Recovery   Time to Recovery     Comments: Patient ambulated in hallway of unit without any shortness of breath or oxygen desaturation noted.

## 2023-05-25 NOTE — OUTREACH NOTE
Prep Survey    Flowsheet Row Responses   [facility] patient discharged from? [facility]   Is LACE score < 7 ? No   Eligibility [facility]   Date of Admission 05/23/23   Date of Discharge 05/25/23   Discharge Disposition Home or Self Care   Discharge diagnosis Hypoxia, Community-acquired pneumonia   Does the patient have one of the following disease processes/diagnoses(primary or secondary)? Pneumonia   Does the patient have Home health ordered? No   Is there a DME ordered? No   Prep survey completed? Yes          Karime CALLOWAY - Registered Nurse

## 2023-05-25 NOTE — PLAN OF CARE
Goal Outcome Evaluation:  Plan of Care Reviewed With: patient           Outcome Evaluation: Patient does not present with limitations at the time of evaluation that would require inpatient physical therapy services. Upon discharge, it is recommended he returns home with his wife.

## 2023-05-26 ENCOUNTER — TRANSITIONAL CARE MANAGEMENT TELEPHONE ENCOUNTER (OUTPATIENT)
Dept: CALL CENTER | Facility: HOSPITAL | Age: 78
End: 2023-05-26
Payer: COMMERCIAL

## 2023-05-26 LAB
BH CV ECHO MEAS - AO MAX PG: 7 MMHG
BH CV ECHO MEAS - AO MEAN PG: 4 MMHG
BH CV ECHO MEAS - AO ROOT DIAM: 3.4 CM
BH CV ECHO MEAS - AO V2 MAX: 130 CM/SEC
BH CV ECHO MEAS - AO V2 VTI: 21.4 CM
BH CV ECHO MEAS - AVA(I,D): 3.8 CM2
BH CV ECHO MEAS - EDV(CUBED): 68.9 ML
BH CV ECHO MEAS - EDV(MOD-SP2): 74.6 ML
BH CV ECHO MEAS - EDV(MOD-SP4): 76 ML
BH CV ECHO MEAS - EF(MOD-BP): 64 %
BH CV ECHO MEAS - EF(MOD-SP2): 63.1 %
BH CV ECHO MEAS - EF(MOD-SP4): 63.6 %
BH CV ECHO MEAS - ESV(CUBED): 19.7 ML
BH CV ECHO MEAS - ESV(MOD-SP2): 27.5 ML
BH CV ECHO MEAS - ESV(MOD-SP4): 27.7 ML
BH CV ECHO MEAS - FS: 34.1 %
BH CV ECHO MEAS - IVS/LVPW: 1.21 CM
BH CV ECHO MEAS - IVSD: 1.7 CM
BH CV ECHO MEAS - LA DIMENSION: 4.3 CM
BH CV ECHO MEAS - LAT PEAK E' VEL: 14.1 CM/SEC
BH CV ECHO MEAS - LV MASS(C)D: 253.8 GRAMS
BH CV ECHO MEAS - LV MAX PG: 4.2 MMHG
BH CV ECHO MEAS - LV MEAN PG: 2 MMHG
BH CV ECHO MEAS - LV V1 MAX: 102 CM/SEC
BH CV ECHO MEAS - LV V1 VTI: 17.8 CM
BH CV ECHO MEAS - LVIDD: 4.1 CM
BH CV ECHO MEAS - LVIDS: 2.7 CM
BH CV ECHO MEAS - LVOT AREA: 4.5 CM2
BH CV ECHO MEAS - LVOT DIAM: 2.4 CM
BH CV ECHO MEAS - LVPWD: 1.4 CM
BH CV ECHO MEAS - MED PEAK E' VEL: 12.6 CM/SEC
BH CV ECHO MEAS - MR MAX PG: 138.3 MMHG
BH CV ECHO MEAS - MR MAX VEL: 588 CM/SEC
BH CV ECHO MEAS - MR MEAN PG: 80 MMHG
BH CV ECHO MEAS - MR MEAN VEL: 398 CM/SEC
BH CV ECHO MEAS - MR VTI: 170 CM
BH CV ECHO MEAS - MV DEC TIME: 0.16 MSEC
BH CV ECHO MEAS - MV E MAX VEL: 122 CM/SEC
BH CV ECHO MEAS - RAP SYSTOLE: 3 MMHG
BH CV ECHO MEAS - RVDD: 2.8 CM
BH CV ECHO MEAS - RVSP: 42 MMHG
BH CV ECHO MEAS - SV(LVOT): 80.5 ML
BH CV ECHO MEAS - SV(MOD-SP2): 47.1 ML
BH CV ECHO MEAS - SV(MOD-SP4): 48.3 ML
BH CV ECHO MEAS - TR MAX PG: 39.4 MMHG
BH CV ECHO MEAS - TR MAX VEL: 314 CM/SEC
BH CV ECHO MEASUREMENTS AVERAGE E/E' RATIO: 9.14
IVRT: 66 MSEC
LEFT ATRIUM VOLUME INDEX: 20.2 ML/M2
LEFT ATRIUM VOLUME: 51.9 ML
MAXIMAL PREDICTED HEART RATE: 144 BPM
STRESS TARGET HR: 122 BPM

## 2023-05-26 NOTE — OUTREACH NOTE
Call Center TCM Note    Flowsheet Row Responses   [facility] patient discharged from? [facility]   Does the patient have one of the following disease processes/diagnoses(primary or secondary)? Pneumonia   TCM attempt successful? Yes   Call start time 1053   Call end time 1055   Discharge diagnosis Hypoxia, Community-acquired pneumonia   Person spoke with today (if not patient) and relationship pt   Meds reviewed with patient/caregiver? Yes   Is the patient having any side effects they believe may be caused by any medication additions or changes? No   Does the patient have all medications ordered at discharge? Yes   Is the patient taking all medications as directed (includes completed medication regime)? Yes   Comments Cardiology 6/21/23   Does the patient have an appointment with their PCP within 7 days of discharge? Yes  [6/5/2023 10:00 AM]   Psychosocial issues? No   Did the patient receive a copy of their discharge instructions? Yes   Nursing interventions Reviewed instructions with patient   What is the patient's perception of their health status since discharge? Improving   Nursing Interventions Nurse provided patient education   If the patient is a current smoker, are they able to teach back resources for cessation? Not a smoker   Is the patient/caregiver able to teach back the hierarchy of who to call/visit for symptoms/problems? PCP, Specialist, Home health nurse, Urgent Care, ED, 911 Yes   Is the patient/caregiver able to teach back signs and symptoms of worsening condition: Fever/chills, Shortness of breath, Chest pain   Is the patient/caregiver able to teach back importance of completing antibiotic course of treatment? Yes   TCM call completed? Yes   Wrap up additional comments Pt states he is doing better, and SOB is improving. Reviewed AVS/medications with pt. Pt verified PCP hospital fu appt on 6/5/23.   Call end time 1055   Would this patient benefit from a Referral to [facility] Social Work? No   Is the  patient interested in additional calls from an ambulatory ?  NOTE:  applies to high risk patients requiring additional follow-up. Sushma Antunez RN    5/26/2023, 10:56 EDT       [de-identified] : \par RIGHT  SHOULDER\par Inspection: incisions c/d/i\par Palpation: No Tenderness is noted \par Range of motion:  in sling\par Strength:  \par Neurological testing: motor and sensor intact distally.\par Ligament Stability and Special Tests: \par Shoulder apprehension: \par Shoulder relocation: \par Harris’s test:\par Biceps Active test:\par Jaffe Labral Shear: \par Impingement testing: \par Katya testing:\par Cross Body Adduction:\par \par

## 2023-05-28 LAB
BACTERIA SPEC AEROBE CULT: NORMAL
BACTERIA SPEC AEROBE CULT: NORMAL

## 2023-06-05 ENCOUNTER — TELEPHONE (OUTPATIENT)
Dept: CARDIOLOGY | Facility: CLINIC | Age: 78
End: 2023-06-05
Payer: COMMERCIAL

## 2023-06-05 ENCOUNTER — OFFICE VISIT (OUTPATIENT)
Dept: FAMILY MEDICINE CLINIC | Facility: CLINIC | Age: 78
End: 2023-06-05
Payer: MEDICARE

## 2023-06-05 VITALS
SYSTOLIC BLOOD PRESSURE: 130 MMHG | TEMPERATURE: 97.9 F | OXYGEN SATURATION: 98 % | BODY MASS INDEX: 33.12 KG/M2 | HEART RATE: 95 BPM | WEIGHT: 272 LBS | HEIGHT: 76 IN | DIASTOLIC BLOOD PRESSURE: 72 MMHG

## 2023-06-05 DIAGNOSIS — J18.9 PNEUMONIA DUE TO INFECTIOUS ORGANISM, UNSPECIFIED LATERALITY, UNSPECIFIED PART OF LUNG: ICD-10-CM

## 2023-06-05 DIAGNOSIS — Z76.89 ENCOUNTER FOR SUPPORT AND COORDINATION OF TRANSITION OF CARE: Primary | ICD-10-CM

## 2023-06-05 LAB — QT INTERVAL: 339 MS

## 2023-06-05 RX ORDER — PANTOPRAZOLE SODIUM 40 MG/1
40 TABLET, DELAYED RELEASE ORAL DAILY
Qty: 30 TABLET | Refills: 5 | Status: SHIPPED | OUTPATIENT
Start: 2023-06-05

## 2023-06-05 NOTE — PROGRESS NOTES
"Transitional Care Follow Up Visit  Subjective     Edi Weston is a 78 y.o. male who presents for a transitional care management visit.    Within 48 business hours after discharge our office contacted him via telephone to coordinate his care and needs.      I reviewed and discussed the details of that call along with the discharge summary, hospital problems, inpatient lab results, inpatient diagnostic studies, and consultation reports with Edi.     Current outpatient and discharge medications have been reconciled for the patient.  Reviewed by: Jean Marie Joaquin III, MD          5/25/2023     7:01 PM   Date of TCM Phone Call   [facility]   Date of Admission 5/23/2023   Date of Discharge 5/25/2023   Discharge Disposition Home or Self Care     Risk for Readmission (LACE) Score: 10 (5/25/2023  6:00 AM)      Course During Hospital Stay: Patient is 78 years old had a right sided pneumonia is treated for 3 days in the hospital     The following portions of the patient's history were reviewed and updated as appropriate: allergies, current medications, past family history, past medical history, past social history, past surgical history, and problem list.    REVIEW OF SYSTEMS  Nneka had a pneumonia in the right lung patient thinks it may do due to gastroesophageal reflux  GI discussed gastric or esophageal reflux discussed reflux discussed not eating within 3 hours of going to bed discussed having the bed discussed taking Protonix discussed fatty foods chocolate    OBJECTIVE  Vitals:    06/05/23 1032   BP: 130/72   Pulse: 95   Temp: 97.9 °F (36.6 °C)   SpO2: 98%   Weight: 123 kg (272 lb)   Height: 193 cm (76\")     Body mass index is 33.11 kg/m².    PHYSICAL EXAM  General no distress  ENT external otitis cleared bilaterally ears look perfect  Lungs clear and equal bilaterally  Cardiovascular regular rhythm no murmur    ASSESSMENT & PLAN  Diagnoses and all orders for this visit:    1. Encounter for " support and coordination of transition of care (Primary)    2. Pneumonia due to infectious organism, unspecified laterality, unspecified part of lung    Other orders  -     pantoprazole (PROTONIX) 40 MG EC tablet; Take 1 tablet by mouth Daily.  Dispense: 30 tablet; Refill: 5      Pneumonia treated in hospital #2 gastroesophageal reflux head of the bed up Protonix not eating within 3 hours losing weight reflux diet            Patient was given instructions and counseling regarding his condition or for health maintenance advice. Please see specific information pulled into the AVS if appropriate.

## 2023-06-05 NOTE — TELEPHONE ENCOUNTER
The [facility] received a fax that requires your attention. The document has been indexed to the patient’s chart for your review.      Reason for sending: REFILL REQUEST     Documents Description: SWAPNIL REQUEST    Name of Sender: TAMMY    Date Indexed: 06.05.23    Notes (if needed): PLEASE ADVISE AND REFILL AS NECESSARY

## 2023-06-06 ENCOUNTER — TELEPHONE (OUTPATIENT)
Dept: CARDIOLOGY | Facility: CLINIC | Age: 78
End: 2023-06-06
Payer: COMMERCIAL

## 2023-06-06 NOTE — TELEPHONE ENCOUNTER
The [facility] received a fax that requires your attention. The document has been indexed to the patient’s chart for your review.      Reason for sending: NEW PRESCRIPTION REQUEST    Documents Description: EXT MED [id number]    Name of Sender: TAMMY    Date Indexed: 06-06-23

## 2023-06-29 PROBLEM — I50.31 ACUTE DIASTOLIC CHF (CONGESTIVE HEART FAILURE): Status: RESOLVED | Noted: 2023-04-04 | Resolved: 2023-06-29

## 2023-06-29 PROBLEM — R09.02 HYPOXIA: Status: RESOLVED | Noted: 2023-05-23 | Resolved: 2023-06-29

## 2023-06-29 PROBLEM — I50.32 CHRONIC HEART FAILURE WITH PRESERVED EJECTION FRACTION (HFPEF): Status: ACTIVE | Noted: 2023-03-20

## 2023-08-27 DIAGNOSIS — I48.19 PERSISTENT ATRIAL FIBRILLATION: ICD-10-CM

## 2023-08-28 RX ORDER — METOPROLOL SUCCINATE 50 MG/1
TABLET, EXTENDED RELEASE ORAL
Qty: 90 TABLET | Refills: 0 | Status: SHIPPED | OUTPATIENT
Start: 2023-08-28

## 2023-09-21 ENCOUNTER — OFFICE VISIT (OUTPATIENT)
Dept: FAMILY MEDICINE CLINIC | Facility: CLINIC | Age: 78
End: 2023-09-21
Payer: MEDICARE

## 2023-09-21 VITALS
HEART RATE: 75 BPM | BODY MASS INDEX: 31.17 KG/M2 | SYSTOLIC BLOOD PRESSURE: 108 MMHG | HEIGHT: 76 IN | DIASTOLIC BLOOD PRESSURE: 72 MMHG | OXYGEN SATURATION: 98 % | TEMPERATURE: 97.1 F | WEIGHT: 256 LBS | RESPIRATION RATE: 19 BRPM

## 2023-09-21 DIAGNOSIS — M70.22 OLECRANON BURSITIS OF LEFT ELBOW: Primary | ICD-10-CM

## 2023-09-21 PROCEDURE — 3078F DIAST BP <80 MM HG: CPT | Performed by: FAMILY MEDICINE

## 2023-09-21 PROCEDURE — 99213 OFFICE O/P EST LOW 20 MIN: CPT | Performed by: FAMILY MEDICINE

## 2023-09-21 PROCEDURE — 3074F SYST BP LT 130 MM HG: CPT | Performed by: FAMILY MEDICINE

## 2023-09-21 RX ORDER — BLOOD SUGAR DIAGNOSTIC
1 STRIP MISCELLANEOUS DAILY
COMMUNITY
Start: 2023-08-02

## 2023-09-21 NOTE — PROGRESS NOTES
Chief Complaint  Joint Swelling (Redness )    SUBJECTIVE  Edi Weston presents to [facility]    Patient 78 years old type 2 diabetes CHF preserved ejection fraction 12 on Eliquis noticed swelling on his elbow olecranon bursitis for several days    PAST MEDICAL HISTORY  Allergies   Allergen Reactions    Sulfa Antibiotics Unknown - Low Severity     Unsure of reaction        Past Surgical History:    ABDOMINAL SURGERY    lap band    COLONOSCOPY    GASTRIC BANDING       Social History     Tobacco Use    Smoking status: Former     Packs/day: 2.00     Years: 20.00     Pack years: 40.00     Types: Cigarettes     Start date:      Quit date:      Years since quittin.7    Smokeless tobacco: Never    Tobacco comments:     AGE STARTED 19 QUIT AGE 31 - SMOKED X 20 YEARS QUIT    Substance Use Topics    Alcohol use: Not Currently     Comment: CURRENT SOME DAY 2020,2017       Family History   Problem Relation Age of Onset    Nephrolithiasis Mother         RENAL CALCULUS    Other Mother         MOTHERS'S FAMILY HISTORY UNKOWN    Colon cancer Father 50        FAMILY HISTORY OF COLON CANCER    Other Father         FATHER'S FAMILY HISTORY UNKNOWN    Colon cancer Paternal Grandfather 60        FAMILY HISTORY OF COLON CANCER         Health Maintenance Due   Topic Date Due    BMI FOLLOWUP  Never done    ANNUAL WELLNESS VISIT  Never done        Last Completed Colonoscopy            Discontinued - COLORECTAL CANCER SCREENING  Discontinued        Frequency changed to Never automatically (Topic No Longer Applies)    2018  Outside Claim: RI COLONOSCOPY W/BIOPSY SINGLE/MULTIPLE,RI COLSC FLX W/RMVL OF TUMOR POLYP LESION SNARE TQ    2015  Outside Claim: RI COLONOSCOPY W/BIOPSY SINGLE/MULTIPLE                    REVIEW OF SYSTEMS    Musculoskeletal some swelling in the elbow discussed draining the fluid also discussed conservative management just the elbow pad and  "compression which would be just as good    OBJECTIVE  Vitals:    09/21/23 1419   BP: 108/72   Pulse: 75   Resp: 19   Temp: 97.1 °F (36.2 °C)   TempSrc: Temporal   SpO2: 98%   Weight: 116 kg (256 lb)   Height: 193 cm (76\")     Body mass index is 31.16 kg/m².    PHYSICAL EXAM    Enteral no distress  Musculoskeletal left elbow swelling olecranon bursa no erythema slight warmth    ASSESSMENT & PLAN  There are no diagnoses linked to this encounter.      Olecranon bursitis elbow pad recheck if redness or gets pain            Patient was given instructions and counseling regarding his condition or for health maintenance advice. Please see specific information pulled into the AVS if appropriate.   "

## 2023-10-04 ENCOUNTER — OFFICE VISIT (OUTPATIENT)
Dept: CARDIOLOGY | Facility: CLINIC | Age: 78
End: 2023-10-04
Payer: MEDICARE

## 2023-10-04 VITALS
HEIGHT: 76 IN | WEIGHT: 255 LBS | BODY MASS INDEX: 31.05 KG/M2 | DIASTOLIC BLOOD PRESSURE: 75 MMHG | SYSTOLIC BLOOD PRESSURE: 120 MMHG | HEART RATE: 81 BPM

## 2023-10-04 DIAGNOSIS — I10 HYPERTENSION, ESSENTIAL: ICD-10-CM

## 2023-10-04 DIAGNOSIS — I50.32 CHRONIC HEART FAILURE WITH PRESERVED EJECTION FRACTION (HFPEF): ICD-10-CM

## 2023-10-04 DIAGNOSIS — I48.19 PERSISTENT ATRIAL FIBRILLATION: Primary | ICD-10-CM

## 2023-10-04 NOTE — PROGRESS NOTES
Chief Complaint  Follow-up, Chronic heart failure with preserved ejection fraction, Atrial Fibrillation, Hypertension, and Hyperlipidemia    Subjective    Patient states he has been feeling better his heart rate has been improved he does notice it when he exercise get up in the 130s but his overall breathing capacity remained stable.  He has not had any problems increased weight gain edema or PND symptoms.  No chest discomfort  Past Medical History:   Diagnosis Date    Arthritis     Atrial fibrillation     Cellulitis of right lower limb     Chronic heart failure with preserved ejection fraction (HFpEF) 03/20/2023    Colon polyps     Decubitus ulcer of foot, stage 1 08/10/2018    Decubitus ulcer of foot, stage 3 02/07/2018    Foot pain, left 08/10/2018    Foot pain, right     Gout     Hammertoe 12/30/2019    High blood pressure     History of colonoscopy with polypectomy 08/25/2020 2018 TUBULAR ADENOMA, COLONOSCOPY SCHEDULED FOR 10/19/2020 WITH DR SULTANA    Ingrowing nail 11/02/2018    Medication management 02/18/2019    Nail dystrophy     PAT (paroxysmal atrial tachycardia) 08/06/2018    Pressure ulcer, stage 1     Tinea unguium     Type 2 diabetes mellitus with foot ulcer     Type 2 diabetes mellitus with polyneuropathy     Type 2 diabetes, controlled, with peripheral neuropathy 08/10/2018         Current Outpatient Medications:     apixaban (Eliquis) 5 MG tablet tablet, Take 1 tablet by mouth 2 (Two) Times a Day., Disp: 180 tablet, Rfl: 3    furosemide (Lasix) 40 MG tablet, Take 1 tablet by mouth Daily., Disp: 90 tablet, Rfl: 3    glyburide (DIAbeta) 2.5 MG tablet, Take 1 tablet by mouth Daily With Breakfast., Disp: 90 tablet, Rfl: 3    lisinopril (PRINIVIL,ZESTRIL) 20 MG tablet, Take 1 tablet by mouth Daily., Disp: 90 tablet, Rfl: 3    metFORMIN ER (GLUCOPHAGE-XR) 500 MG 24 hr tablet, Take 2 tablets by mouth Every Evening., Disp: 180 tablet, Rfl: 3    metoprolol succinate XL (TOPROL-XL) 50 MG 24 hr tablet, Take  "1 tablet by mouth once daily, Disp: 90 tablet, Rfl: 0    OneTouch Ultra test strip, 1 each by Other route Daily., Disp: , Rfl:     pantoprazole (PROTONIX) 40 MG EC tablet, Take 1 tablet by mouth Daily., Disp: 90 tablet, Rfl: 3    simvastatin (ZOCOR) 20 MG tablet, Take 1 tablet by mouth Every Evening., Disp: 90 tablet, Rfl: 3    There are no discontinued medications.  Allergies   Allergen Reactions    Sulfa Antibiotics Unknown - Low Severity     Unsure of reaction        Social History     Tobacco Use    Smoking status: Former     Packs/day: 2.00     Years: 20.00     Pack years: 40.00     Types: Cigarettes     Start date:      Quit date:      Years since quittin.7    Smokeless tobacco: Never    Tobacco comments:     AGE STARTED 19 QUIT AGE 31 - SMOKED X 20 YEARS QUIT    Vaping Use    Vaping Use: Never used   Substance Use Topics    Alcohol use: Not Currently     Comment: CURRENT SOME DAY 2020,2017    Drug use: Never       Family History   Problem Relation Age of Onset    Nephrolithiasis Mother         RENAL CALCULUS    Other Mother         MOTHERS'S FAMILY HISTORY UNKOWN    Colon cancer Father 50        FAMILY HISTORY OF COLON CANCER    Other Father         FATHER'S FAMILY HISTORY UNKNOWN    Colon cancer Paternal Grandfather 60        FAMILY HISTORY OF COLON CANCER         Objective     /75   Pulse 81   Ht 193 cm (76\")   Wt 116 kg (255 lb)   BMI 31.04 kg/m²       Physical Exam    General Appearance:   no acute distress  Alert and oriented x3  HENT:   lips not cyanotic  Atraumatic  Neck:  No jvd   supple  Respiratory:  no respiratory distress  normal breath sounds  no rales  Cardiovascular:  Irregularly irregular  no S3, no S4   no murmur  no rub  Extremities  No cyanosis  lower extremity edema: Trace  Skin:   warm, dry  No rashes      Result Review :     proBNP   Date Value Ref Range Status   2023 687.8 0.0 - 1,800.0 pg/mL Final     CMP          2023    05:11 " 5/24/2023    04:35 5/25/2023    03:10   CMP   Glucose 263  112  108    BUN 23  14  17    Creatinine 1.20  1.06  0.97    EGFR 62.7  72.7  80.9    Sodium 142  137  139    Potassium 4.1  3.8  3.9    Chloride 102  106  107    Calcium 9.2  8.3  8.7    Total Protein 6.9      Albumin 4.0      Globulin 2.9      Total Bilirubin 0.5      Alkaline Phosphatase 88      AST (SGOT) 23      ALT (SGPT) 15      Albumin/Globulin Ratio 1.4      BUN/Creatinine Ratio 19.2  13.2  17.5    Anion Gap 14.8  6.3  9.1      CBC w/diff          5/23/2023    05:11 5/24/2023    06:12 5/25/2023    03:10   CBC w/Diff   WBC 10.21  12.35  8.80    RBC 4.51  3.47  3.59    Hemoglobin 13.5  10.5  10.7    Hematocrit 40.5  31.5  32.7    MCV 89.8  90.8  91.1    MCH 29.9  30.3  29.8    MCHC 33.3  33.3  32.7    RDW 15.0  15.3  15.1    Platelets 241  170  174    Neutrophil Rel % 83.6  75.3  71.8    Immature Granulocyte Rel % 0.4  0.3  0.3    Lymphocyte Rel % 11.1  14.0  15.2    Monocyte Rel % 2.9  7.9  8.6    Eosinophil Rel % 1.6  2.2  3.8    Basophil Rel % 0.4  0.3  0.3       Lab Results   Component Value Date    TSH 2.080 04/13/2022      No results found for: FREET4   No results found for: DDIMERQUANT  Magnesium   Date Value Ref Range Status   05/25/2023 2.0 1.6 - 2.4 mg/dL Final      No results found for: DIGOXIN   Lab Results   Component Value Date    TROPONINT 19 (H) 05/23/2023           Lipid Panel          7/6/2023    15:29   Lipid Panel   Total Cholesterol 84    Triglycerides 104    HDL Cholesterol 35    VLDL Cholesterol 20    LDL Cholesterol  29    LDL/HDL Ratio 0.81      No results found for: POCTROP    Results for orders placed during the hospital encounter of 05/23/23    Adult Transthoracic Echo Complete W/ Cont if Necessary Per Protocol    Interpretation Summary    Left ventricular systolic function is normal. Calculated left ventricular EF = 64%    Left ventricular diastolic function was normal.    The left atrial cavity is moderately dilated.     The right atrial cavity is mildly  dilated.    There is mild calcification of the aortic valve.       ECG 12 Lead    Date/Time: 10/4/2023 4:37 PM  Performed by: Kar Ledezma MD  Authorized by: Kar Ledezma MD   Comparison: compared with previous ECG   Similar to previous ECG  Rhythm: atrial fibrillation  Conduction: left bundle branch block               Diagnoses and all orders for this visit:    1. Persistent atrial fibrillation (Primary)  Assessment & Plan:  Patient appears to be fairly stable from a symptom standpoint and his rate also appears to be controlled.  Previously I discussed the possibility of ablation procedure but given the fact that he is doing well symptomatically as well as his preferences feel the rate control appears to be doing well.  Continue with his Toprol 50 daily and Eliquis 5 twice daily      2. Chronic heart failure with preserved ejection fraction (HFpEF)  Assessment & Plan:  Stable from a volume standpoint clinically as well as by weight continue on Lasix 40 daily      3. Hypertension, essential  Assessment & Plan:  Blood pressure well controlled on Toprol 50 daily and lisinopril 20 daily just              Follow Up     Return in about 6 months (around 4/4/2024) for EKG with F/U.          Patient was given instructions and counseling regarding his condition or for health maintenance advice. Please see specific information pulled into the AVS if appropriate.

## 2023-10-04 NOTE — ASSESSMENT & PLAN NOTE
Patient appears to be fairly stable from a symptom standpoint and his rate also appears to be controlled.  Previously I discussed the possibility of ablation procedure but given the fact that he is doing well symptomatically as well as his preferences feel the rate control appears to be doing well.  Continue with his Toprol 50 daily and Eliquis 5 twice daily

## 2023-11-27 ENCOUNTER — TELEMEDICINE (OUTPATIENT)
Dept: FAMILY MEDICINE CLINIC | Facility: TELEHEALTH | Age: 78
End: 2023-11-27
Payer: COMMERCIAL

## 2023-11-27 DIAGNOSIS — R39.89 SUSPECTED UTI: Primary | ICD-10-CM

## 2023-11-27 PROCEDURE — 99213 OFFICE O/P EST LOW 20 MIN: CPT | Performed by: NURSE PRACTITIONER

## 2023-11-27 RX ORDER — CIPROFLOXACIN 500 MG/1
500 TABLET, FILM COATED ORAL 2 TIMES DAILY
Qty: 10 TABLET | Refills: 0 | Status: SHIPPED | OUTPATIENT
Start: 2023-11-27 | End: 2023-12-02

## 2023-11-27 NOTE — PATIENT INSTRUCTIONS
Drink plenty of water and avoid caffeine  If symptoms do not improve in 3-5 days follow up with your primary care provider or urgent care     Urinary Tract Infection, Adult  A urinary tract infection (UTI) is an infection of any part of the urinary tract. The urinary tract includes:  The kidneys.  The ureters.  The bladder.  The urethra.  These organs make, store, and get rid of pee (urine) in the body.  What are the causes?  This infection is caused by germs (bacteria) in your genital area. These germs grow and cause swelling (inflammation) of your urinary tract.  What increases the risk?  The following factors may make you more likely to develop this condition:  Using a small, thin tube (catheter) to drain pee.  Not being able to control when you pee or poop (incontinence).  Being female. If you are female, these things can increase the risk:  Using these methods to prevent pregnancy:  A medicine that kills sperm (spermicide).  A device that blocks sperm (diaphragm).  Having low levels of a female hormone (estrogen).  Being pregnant.  You are more likely to develop this condition if:  You have genes that add to your risk.  You are sexually active.  You take antibiotic medicines.  You have trouble peeing because of:  A prostate that is bigger than normal, if you are male.  A blockage in the part of your body that drains pee from the bladder.  A kidney stone.  A nerve condition that affects your bladder.  Not getting enough to drink.  Not peeing often enough.  You have other conditions, such as:  Diabetes.  A weak disease-fighting system (immune system).  Sickle cell disease.  Gout.  Injury of the spine.  What are the signs or symptoms?  Symptoms of this condition include:  Needing to pee right away.  Peeing small amounts often.  Pain or burning when peeing.  Blood in the pee.  Pee that smells bad or not like normal.  Trouble peeing.  Pee that is cloudy.  Fluid coming from the vagina, if you are female.  Pain in the  belly or lower back.  Other symptoms include:  Vomiting.  Not feeling hungry.  Feeling mixed up (confused). This may be the first symptom in older adults.  Being tired and grouchy (irritable).  A fever.  Watery poop (diarrhea).  How is this treated?  Taking antibiotic medicine.  Taking other medicines.  Drinking enough water.  In some cases, you may need to see a specialist.  Follow these instructions at home:    Medicines  Take over-the-counter and prescription medicines only as told by your doctor.  If you were prescribed an antibiotic medicine, take it as told by your doctor. Do not stop taking it even if you start to feel better.  General instructions  Make sure you:  Pee until your bladder is empty.  Do not hold pee for a long time.  Empty your bladder after sex.  Wipe from front to back after peeing or pooping if you are a female. Use each tissue one time when you wipe.  Drink enough fluid to keep your pee pale yellow.  Keep all follow-up visits.  Contact a doctor if:  You do not get better after 1-2 days.  Your symptoms go away and then come back.  Get help right away if:  You have very bad back pain.  You have very bad pain in your lower belly.  You have a fever.  You have chills.  You feeling like you will vomit or you vomit.  Summary  A urinary tract infection (UTI) is an infection of any part of the urinary tract.  This condition is caused by germs in your genital area.  There are many risk factors for a UTI.  Treatment includes antibiotic medicines.  Drink enough fluid to keep your pee pale yellow.  This information is not intended to replace advice given to you by your health care provider. Make sure you discuss any questions you have with your health care provider.  Document Revised: 07/30/2021 Document Reviewed: 07/30/2021  [employer] Patient Education © 2023 [email or web address]

## 2023-11-27 NOTE — PROGRESS NOTES
CHIEF COMPLAINT  No chief complaint on file.        HPI  Edi Weston is a 78 y.o. male  presents with complaint of symptoms of UTI. He has chills and urinary burning. No prostate tenderness. He has had this in the past and these are his usual symptoms. He takes Cipro for this. He has tolerated it and took it in May with no issues.     Review of Systems   Constitutional:  Positive for chills. Negative for diaphoresis, fatigue and fever.   Genitourinary:  Positive for dysuria and urgency. Negative for decreased urine volume, difficulty urinating, enuresis, flank pain, frequency, genital sores, hematuria, penile discharge, penile pain, penile swelling, scrotal swelling and testicular pain.   Musculoskeletal:  Negative for back pain.       Past Medical History:   Diagnosis Date    Arthritis     Atrial fibrillation     Cellulitis of right lower limb     Chronic heart failure with preserved ejection fraction (HFpEF) 03/20/2023    Colon polyps     Decubitus ulcer of foot, stage 1 08/10/2018    Decubitus ulcer of foot, stage 3 02/07/2018    Foot pain, left 08/10/2018    Foot pain, right     Gout     Hammertoe 12/30/2019    High blood pressure     History of colonoscopy with polypectomy 08/25/2020 2018 TUBULAR ADENOMA, COLONOSCOPY SCHEDULED FOR 10/19/2020 WITH DR SULTANA    Ingrowing nail 11/02/2018    Medication management 02/18/2019    Nail dystrophy     PAT (paroxysmal atrial tachycardia) 08/06/2018    Pressure ulcer, stage 1     Tinea unguium     Type 2 diabetes mellitus with foot ulcer     Type 2 diabetes mellitus with polyneuropathy     Type 2 diabetes, controlled, with peripheral neuropathy 08/10/2018       Family History   Problem Relation Age of Onset    Nephrolithiasis Mother         RENAL CALCULUS    Other Mother         MOTHERS'S FAMILY HISTORY UNKOWN    Colon cancer Father 50        FAMILY HISTORY OF COLON CANCER    Other Father         FATHER'S FAMILY HISTORY UNKNOWN    Colon cancer Paternal Grandfather 60         FAMILY HISTORY OF COLON CANCER        Social History     Socioeconomic History    Marital status:    Tobacco Use    Smoking status: Former     Packs/day: 2.00     Years: 20.00     Additional pack years: 0.00     Total pack years: 40.00     Types: Cigarettes     Start date:      Quit date:      Years since quittin.9    Smokeless tobacco: Never    Tobacco comments:     AGE STARTED 19 QUIT AGE 31 - SMOKED X 20 YEARS QUIT    Vaping Use    Vaping Use: Never used   Substance and Sexual Activity    Alcohol use: Not Currently     Comment: CURRENT SOME DAY 2020,2017    Drug use: Never    Sexual activity: Defer       Edi Weston  reports that he quit smoking about 36 years ago. His smoking use included cigarettes. He started smoking about 58 years ago. He has a 40.00 pack-year smoking history. He has never used smokeless tobacco..    There were no vitals taken for this visit.    PHYSICAL EXAM  Physical Exam   Constitutional: He is oriented to person, place, and time. He appears well-developed and well-nourished. He does not have a sickly appearance. He does not appear ill. No distress.   HENT:   Head: Normocephalic and atraumatic.   Eyes: EOM are normal.   Neck: Neck normal appearance.  Pulmonary/Chest: Effort normal.  No respiratory distress.  Neurological: He is alert and oriented to person, place, and time.   Skin: Skin is dry.   Psychiatric: He has a normal mood and affect.       Results for orders placed or performed in visit on 23   Hemoglobin A1c    Specimen: Arm, Right; Blood   Result Value Ref Range    Hemoglobin A1C 7.20 (H) 4.80 - 5.60 %   Microalbumin / Creatinine Urine Ratio - Urine, Clean Catch    Specimen: Urine, Clean Catch   Result Value Ref Range    Microalbumin/Creatinine Ratio      Creatinine, Urine 108.4 mg/dL    Microalbumin, Urine <1.2 mg/dL   Lipid Panel    Specimen: Arm, Right; Blood   Result Value Ref Range    Total Cholesterol 84 0 - 200 mg/dL     Triglycerides 104 0 - 150 mg/dL    HDL Cholesterol 35 (L) 40 - 60 mg/dL    LDL Cholesterol  29 0 - 100 mg/dL    VLDL Cholesterol 20 5 - 40 mg/dL    LDL/HDL Ratio 0.81        Diagnoses and all orders for this visit:    1. Suspected UTI (Primary)    Other orders  -     ciprofloxacin (Cipro) 500 MG tablet; Take 1 tablet by mouth 2 (Two) Times a Day for 5 days.  Dispense: 10 tablet; Refill: 0        The use of a video visit has been reviewed with the patient and verbal informed consent has been obtained. Myself and Edi Weston participated in this visit. The patient is located in [address] [place] [address] [address] [place] [id number]. I am located in [place], [place]. [employer] and [employer] were utilized.       Note Disclaimer: At [facility], we believe that sharing information builds trust and better   relationships. You are receiving this note because you recently visited [facility]. It is possible you   will see health information before a provider has talked with you about it. This kind of information can   be easy to misunderstand. To help you fully understand what it means for your health, we urge you to   discuss this note with your provider.    Laisha Ruiz, APRN  11/27/2023  18:41 EST

## 2024-01-02 ENCOUNTER — OFFICE VISIT (OUTPATIENT)
Dept: FAMILY MEDICINE CLINIC | Facility: CLINIC | Age: 79
End: 2024-01-02
Payer: MEDICARE

## 2024-01-02 VITALS
SYSTOLIC BLOOD PRESSURE: 133 MMHG | DIASTOLIC BLOOD PRESSURE: 78 MMHG | BODY MASS INDEX: 30.93 KG/M2 | TEMPERATURE: 97.7 F | OXYGEN SATURATION: 96 % | HEIGHT: 76 IN | HEART RATE: 92 BPM | WEIGHT: 254 LBS

## 2024-01-02 DIAGNOSIS — I10 HYPERTENSION, ESSENTIAL: Primary | ICD-10-CM

## 2024-01-02 DIAGNOSIS — E11.65 TYPE 2 DIABETES MELLITUS WITH HYPERGLYCEMIA, WITHOUT LONG-TERM CURRENT USE OF INSULIN: ICD-10-CM

## 2024-01-02 DIAGNOSIS — E11.42 TYPE 2 DIABETES, CONTROLLED, WITH PERIPHERAL NEUROPATHY: ICD-10-CM

## 2024-01-02 DIAGNOSIS — E78.2 MIXED HYPERLIPIDEMIA: ICD-10-CM

## 2024-01-02 DIAGNOSIS — I48.19 PERSISTENT ATRIAL FIBRILLATION: ICD-10-CM

## 2024-01-02 DIAGNOSIS — K21.00 GASTROESOPHAGEAL REFLUX DISEASE WITH ESOPHAGITIS WITHOUT HEMORRHAGE: ICD-10-CM

## 2024-01-02 LAB
ALBUMIN SERPL-MCNC: 4.4 G/DL (ref 3.5–5.2)
ALBUMIN UR-MCNC: <1.2 MG/DL
ALBUMIN/GLOB SERPL: 1.4 G/DL
ALP SERPL-CCNC: 75 U/L (ref 39–117)
ALT SERPL W P-5'-P-CCNC: 9 U/L (ref 1–41)
ANION GAP SERPL CALCULATED.3IONS-SCNC: 13.5 MMOL/L (ref 5–15)
AST SERPL-CCNC: <5 U/L (ref 1–40)
BILIRUB SERPL-MCNC: 0.8 MG/DL (ref 0–1.2)
BUN SERPL-MCNC: 21 MG/DL (ref 8–23)
BUN/CREAT SERPL: 16.3 (ref 7–25)
CALCIUM SPEC-SCNC: 9.6 MG/DL (ref 8.6–10.5)
CHLORIDE SERPL-SCNC: 102 MMOL/L (ref 98–107)
CHOLEST SERPL-MCNC: 93 MG/DL (ref 0–200)
CO2 SERPL-SCNC: 25.5 MMOL/L (ref 22–29)
CREAT SERPL-MCNC: 1.29 MG/DL (ref 0.76–1.27)
CREAT UR-MCNC: 23.5 MG/DL
EGFRCR SERPLBLD CKD-EPI 2021: 57.5 ML/MIN/1.73
GLOBULIN UR ELPH-MCNC: 3.1 GM/DL
GLUCOSE SERPL-MCNC: 93 MG/DL (ref 65–99)
HBA1C MFR BLD: 6.2 % (ref 4.8–5.6)
HDLC SERPL-MCNC: 39 MG/DL (ref 40–60)
LDLC SERPL CALC-MCNC: 42 MG/DL (ref 0–100)
LDLC/HDLC SERPL: 1.16 {RATIO}
MICROALBUMIN/CREAT UR: NORMAL MG/G{CREAT}
POTASSIUM SERPL-SCNC: 3.3 MMOL/L (ref 3.5–5.2)
PROT SERPL-MCNC: 7.5 G/DL (ref 6–8.5)
SODIUM SERPL-SCNC: 141 MMOL/L (ref 136–145)
TRIGL SERPL-MCNC: 44 MG/DL (ref 0–150)
VLDLC SERPL-MCNC: 12 MG/DL (ref 5–40)

## 2024-01-02 PROCEDURE — 83036 HEMOGLOBIN GLYCOSYLATED A1C: CPT | Performed by: FAMILY MEDICINE

## 2024-01-02 PROCEDURE — 80053 COMPREHEN METABOLIC PANEL: CPT | Performed by: FAMILY MEDICINE

## 2024-01-02 PROCEDURE — 82570 ASSAY OF URINE CREATININE: CPT | Performed by: FAMILY MEDICINE

## 2024-01-02 PROCEDURE — 3075F SYST BP GE 130 - 139MM HG: CPT | Performed by: FAMILY MEDICINE

## 2024-01-02 PROCEDURE — 80061 LIPID PANEL: CPT | Performed by: FAMILY MEDICINE

## 2024-01-02 PROCEDURE — 82043 UR ALBUMIN QUANTITATIVE: CPT | Performed by: FAMILY MEDICINE

## 2024-01-02 PROCEDURE — 99214 OFFICE O/P EST MOD 30 MIN: CPT | Performed by: FAMILY MEDICINE

## 2024-01-02 PROCEDURE — 3078F DIAST BP <80 MM HG: CPT | Performed by: FAMILY MEDICINE

## 2024-01-02 RX ORDER — GLYBURIDE 2.5 MG/1
2.5 TABLET ORAL
Qty: 90 TABLET | Refills: 3 | Status: SHIPPED | OUTPATIENT
Start: 2024-01-02

## 2024-01-02 RX ORDER — METFORMIN HYDROCHLORIDE 500 MG/1
1000 TABLET, EXTENDED RELEASE ORAL EVERY EVENING
Qty: 180 TABLET | Refills: 3 | Status: SHIPPED | OUTPATIENT
Start: 2024-01-02

## 2024-01-02 RX ORDER — BLOOD SUGAR DIAGNOSTIC
1 STRIP MISCELLANEOUS DAILY
Qty: 100 EACH | Refills: 3 | Status: SHIPPED | OUTPATIENT
Start: 2024-01-02

## 2024-01-02 RX ORDER — LISINOPRIL 20 MG/1
20 TABLET ORAL DAILY
Qty: 90 TABLET | Refills: 3 | Status: SHIPPED | OUTPATIENT
Start: 2024-01-02

## 2024-01-02 RX ORDER — SIMVASTATIN 20 MG
20 TABLET ORAL EVERY EVENING
Qty: 90 TABLET | Refills: 3 | Status: SHIPPED | OUTPATIENT
Start: 2024-01-02

## 2024-01-02 RX ORDER — PANTOPRAZOLE SODIUM 40 MG/1
40 TABLET, DELAYED RELEASE ORAL DAILY
Qty: 90 TABLET | Refills: 3 | Status: SHIPPED | OUTPATIENT
Start: 2024-01-02

## 2024-01-02 RX ORDER — METOPROLOL SUCCINATE 50 MG/1
50 TABLET, EXTENDED RELEASE ORAL DAILY
Qty: 90 TABLET | Refills: 3 | Status: SHIPPED | OUTPATIENT
Start: 2024-01-02

## 2024-01-02 RX ORDER — FUROSEMIDE 40 MG/1
40 TABLET ORAL DAILY
Qty: 90 TABLET | Refills: 3 | Status: SHIPPED | OUTPATIENT
Start: 2024-01-02

## 2024-01-02 NOTE — PROGRESS NOTES
Chief Complaint   Patient presents with    Establish Care     Former  patient         Subjective     Edi Weston  has a past medical history of Arthritis, Cellulitis of right lower limb, Chronic heart failure with preserved ejection fraction (HFpEF) (03/20/2023), Colon polyps, Decubitus ulcer of foot, stage 1 (08/10/2018), Decubitus ulcer of foot, stage 3 (02/07/2018), Foot pain, left (08/10/2018), Foot pain, right, Gout, Hammertoe (12/30/2019), History of colonoscopy with polypectomy (08/25/2020), Ingrowing nail (11/02/2018), Medication management (02/18/2019), Nail dystrophy, PAT (paroxysmal atrial tachycardia) (08/06/2018), Pressure ulcer, stage 1, Tinea unguium, and Type 2 diabetes, controlled, with peripheral neuropathy (08/10/2018).    Establish care-he had previously seen Dr. Jean Marie Joaquin who is since retired and needs to establish care with a new PCP.    Type 2 diabetes-he does check his blood sugars at home.  He states since his last visit and increasing his metformin his blood sugars are improved.  He states are typically 110-120.  He denies any blurred vision or excessive thirst.  He does have excessive urination due to his Lasix.    Hypertension-he does not check his blood pressure outside the office.  It is good here today at 133/78.    Hyperlipidemia-he takes his simvastatin on a nightly basis.    Atrial fibrillation-he denies any palpitations or tachycardia.  He remains on his Eliquis twice a day every day.        PHQ-2 Depression Screening  Little interest or pleasure in doing things?     Feeling down, depressed, or hopeless?     PHQ-2 Total Score     PHQ-9 Depression Screening  Little interest or pleasure in doing things?     Feeling down, depressed, or hopeless?     Trouble falling or staying asleep, or sleeping too much?     Feeling tired or having little energy?     Poor appetite or overeating?     Feeling bad about yourself - or that you are a failure or have let yourself or your  family down?     Trouble concentrating on things, such as reading the newspaper or watching television?     Moving or speaking so slowly that other people could have noticed? Or the opposite - being so fidgety or restless that you have been moving around a lot more than usual?     Thoughts that you would be better off dead, or of hurting yourself in some way?     PHQ-9 Total Score     If you checked off any problems, how difficult have these problems made it for you to do your work, take care of things at home, or get along with other people?       Allergies   Allergen Reactions    Sulfa Antibiotics Unknown - Low Severity     Unsure of reaction       Prior to Admission medications    Medication Sig Start Date End Date Taking? Authorizing Provider   apixaban (Eliquis) 5 MG tablet tablet Take 1 tablet by mouth 2 (Two) Times a Day. 6/6/23  Yes Jamila Ty APRN   furosemide (Lasix) 40 MG tablet Take 1 tablet by mouth Daily. 7/6/23  Yes Jean Marie Joaquin III, MD   glyburide (DIAbeta) 2.5 MG tablet Take 1 tablet by mouth Daily With Breakfast. 7/6/23  Yes Jean Marie Joaquin III, MD   lisinopril (PRINIVIL,ZESTRIL) 20 MG tablet Take 1 tablet by mouth Daily. 7/6/23  Yes Jean Marie Joaquin III, MD   metFORMIN ER (GLUCOPHAGE-XR) 500 MG 24 hr tablet Take 2 tablets by mouth Every Evening. 7/6/23  Yes Jean Marie Joaquin III, MD   metoprolol succinate XL (TOPROL-XL) 50 MG 24 hr tablet Take 1 tablet by mouth once daily 8/28/23  Yes Jamila Ty APRN   OneTouch Ultra test strip 1 each by Other route Daily. 8/2/23  Yes Cesar Allen MD   pantoprazole (PROTONIX) 40 MG EC tablet Take 1 tablet by mouth Daily. 7/6/23  Yes Jean Marie Joaquin III, MD   simvastatin (ZOCOR) 20 MG tablet Take 1 tablet by mouth Every Evening. 7/6/23  Yes Jean Marie Joaquin III, MD        Patient Active Problem List   Diagnosis    Type 2 diabetes, controlled, with peripheral neuropathy    History of colonoscopy with polypectomy    Maureen Davila     Arthritis    Persistent atrial fibrillation    LBBB (left bundle branch block)    Colon polyps    Hyperlipidemia    Hypertension, essential    Chronic heart failure with preserved ejection fraction (HFpEF)        Past Surgical History:   Procedure Laterality Date    ABDOMINAL SURGERY      lap band    COLONOSCOPY      GASTRIC BANDING         Social History     Socioeconomic History    Marital status:    Tobacco Use    Smoking status: Former     Packs/day: 2.00     Years: 20.00     Additional pack years: 0.00     Total pack years: 40.00     Types: Cigarettes     Start date:      Quit date:      Years since quittin.0    Smokeless tobacco: Never    Tobacco comments:     AGE STARTED 19 QUIT AGE 31 - SMOKED X 20 YEARS QUIT    Vaping Use    Vaping Use: Never used   Substance and Sexual Activity    Alcohol use: Not Currently     Comment: CURRENT SOME DAY 2020,2017    Drug use: Never    Sexual activity: Defer       Family History   Problem Relation Age of Onset    Nephrolithiasis Mother         RENAL CALCULUS    Other Mother         MOTHERS'S FAMILY HISTORY UNKOWN    Colon cancer Father 50        FAMILY HISTORY OF COLON CANCER    Other Father         FATHER'S FAMILY HISTORY UNKNOWN    Colon cancer Paternal Grandfather 60        FAMILY HISTORY OF COLON CANCER        Family history, surgical history, past medical history, Allergies and meds reviewed with patient today and updated in [facility].     ROS:  Review of Systems   Constitutional:  Negative for fatigue.   HENT:  Negative for congestion, nosebleeds, postnasal drip and rhinorrhea.    Eyes:  Negative for blurred vision and visual disturbance.   Respiratory:  Negative for cough, chest tightness, shortness of breath and wheezing.    Cardiovascular:  Negative for chest pain and palpitations.   Gastrointestinal:  Negative for blood in stool, constipation and diarrhea.   Endocrine: Negative for polydipsia.   Genitourinary:  Positive for  "frequency. Negative for hematuria.   Allergic/Immunologic: Negative for environmental allergies.   Neurological:  Negative for headache.   Psychiatric/Behavioral:  Negative for depressed mood. The patient is not nervous/anxious.        OBJECTIVE:  Vitals:    01/02/24 1008   BP: 133/78   BP Location: Left arm   Patient Position: Sitting   Pulse: 92   Temp: 97.7 °F (36.5 °C)   SpO2: 96%   Weight: 115 kg (254 lb)   Height: 193 cm (76\")     No results found.   Body mass index is 30.92 kg/m².  No LMP for male patient.    Physical Exam  Vitals and nursing note reviewed.   Constitutional:       General: He is not in acute distress.     Appearance: Normal appearance. He is normal weight.   HENT:      Head: Normocephalic.      Right Ear: Tympanic membrane, ear canal and external ear normal.      Left Ear: Tympanic membrane, ear canal and external ear normal.      Nose: Nose normal.      Mouth/Throat:      Mouth: Mucous membranes are moist.      Pharynx: Oropharynx is clear.   Eyes:      General: No scleral icterus.     Conjunctiva/sclera: Conjunctivae normal.      Pupils: Pupils are equal, round, and reactive to light.   Cardiovascular:      Rate and Rhythm: Normal rate and regular rhythm.      Pulses: Normal pulses.      Heart sounds: Normal heart sounds. No murmur heard.  Pulmonary:      Effort: Pulmonary effort is normal.      Breath sounds: Normal breath sounds. No wheezing, rhonchi or rales.   Musculoskeletal:      Cervical back: Neck supple. No rigidity or tenderness.   Lymphadenopathy:      Cervical: No cervical adenopathy.   Skin:     General: Skin is warm and dry.      Coloration: Skin is not jaundiced.      Findings: No rash.   Neurological:      General: No focal deficit present.      Mental Status: He is alert and oriented to person, place, and time.   Psychiatric:         Mood and Affect: Mood normal.         Thought Content: Thought content normal.         Judgment: Judgment normal.         Procedures    No " visits with results within 30 Day(s) from this visit.   Latest known visit with results is:   Office Visit on 07/06/2023   Component Date Value Ref Range Status    Hemoglobin A1C 07/06/2023 7.20 (H)  4.80 - 5.60 % Final    Microalbumin/Creatinine Ratio 07/06/2023    Final    Unable to calculate    Creatinine, Urine 07/06/2023 108.4  mg/dL Final    Microalbumin, Urine 07/06/2023 <1.2  mg/dL Final    Total Cholesterol 07/06/2023 84  0 - 200 mg/dL Final    Triglycerides 07/06/2023 104  0 - 150 mg/dL Final    HDL Cholesterol 07/06/2023 35 (L)  40 - 60 mg/dL Final    LDL Cholesterol  07/06/2023 29  0 - 100 mg/dL Final    VLDL Cholesterol 07/06/2023 20  5 - 40 mg/dL Final    LDL/HDL Ratio 07/06/2023 0.81   Final       ASSESSMENT/ PLAN:    Diagnoses and all orders for this visit:    1. Hypertension, essential (Primary)  Assessment & Plan:  His blood pressure is good here today.  Will continue his current meds and update his labs      2. Type 2 diabetes mellitus with hyperglycemia, without long-term current use of insulin  -     glyburide (DIAbeta) 2.5 MG tablet; Take 1 tablet by mouth Daily With Breakfast.  Dispense: 90 tablet; Refill: 3  -     metFORMIN ER (GLUCOPHAGE-XR) 500 MG 24 hr tablet; Take 2 tablets by mouth Every Evening.  Dispense: 180 tablet; Refill: 3  -     Comprehensive Metabolic Panel  -     Lipid Panel  -     Hemoglobin A1c  -     Microalbumin / Creatinine Urine Ratio - Urine, Clean Catch    3. Persistent atrial fibrillation  Assessment & Plan:  Clinically he sounds regular today.  Will continue his current meds.    Orders:  -     metoprolol succinate XL (TOPROL-XL) 50 MG 24 hr tablet; Take 1 tablet by mouth Daily.  Dispense: 90 tablet; Refill: 3    4. Gastroesophageal reflux disease with esophagitis without hemorrhage  -     pantoprazole (PROTONIX) 40 MG EC tablet; Take 1 tablet by mouth Daily.  Dispense: 90 tablet; Refill: 3    5. Mixed hyperlipidemia  Assessment & Plan:  Update his lipid  profile.    Orders:  -     simvastatin (ZOCOR) 20 MG tablet; Take 1 tablet by mouth Every Evening.  Dispense: 90 tablet; Refill: 3    6. Type 2 diabetes, controlled, with peripheral neuropathy  Assessment & Plan:  His blood sugars have been sounding good.  Will update his A1c.      Other orders  -     apixaban (Eliquis) 5 MG tablet tablet; Take 1 tablet by mouth 2 (Two) Times a Day.  Dispense: 180 tablet; Refill: 3  -     furosemide (Lasix) 40 MG tablet; Take 1 tablet by mouth Daily.  Dispense: 90 tablet; Refill: 3  -     lisinopril (PRINIVIL,ZESTRIL) 20 MG tablet; Take 1 tablet by mouth Daily.  Dispense: 90 tablet; Refill: 3  -     OneTouch Ultra test strip; 1 each by Other route Daily.  Dispense: 100 each; Refill: 3               Orders Placed Today:     New Medications Ordered This Visit   Medications    apixaban (Eliquis) 5 MG tablet tablet     Sig: Take 1 tablet by mouth 2 (Two) Times a Day.     Dispense:  180 tablet     Refill:  3    furosemide (Lasix) 40 MG tablet     Sig: Take 1 tablet by mouth Daily.     Dispense:  90 tablet     Refill:  3    glyburide (DIAbeta) 2.5 MG tablet     Sig: Take 1 tablet by mouth Daily With Breakfast.     Dispense:  90 tablet     Refill:  3    lisinopril (PRINIVIL,ZESTRIL) 20 MG tablet     Sig: Take 1 tablet by mouth Daily.     Dispense:  90 tablet     Refill:  3    metFORMIN ER (GLUCOPHAGE-XR) 500 MG 24 hr tablet     Sig: Take 2 tablets by mouth Every Evening.     Dispense:  180 tablet     Refill:  3     NEW INCREASED DOSAGE    metoprolol succinate XL (TOPROL-XL) 50 MG 24 hr tablet     Sig: Take 1 tablet by mouth Daily.     Dispense:  90 tablet     Refill:  3    OneTouch Ultra test strip     Si each by Other route Daily.     Dispense:  100 each     Refill:  3    pantoprazole (PROTONIX) 40 MG EC tablet     Sig: Take 1 tablet by mouth Daily.     Dispense:  90 tablet     Refill:  3    simvastatin (ZOCOR) 20 MG tablet     Sig: Take 1 tablet by mouth Every Evening.     Dispense:   90 tablet     Refill:  3        Management Plan:     An After Visit Summary was printed and given to the patient at discharge.    Follow-up: Return in about 4 months (around 5/2/2024) for Recheck.    Jesus Lux,  1/2/2024 10:47 EST  This note was electronically signed.

## 2024-01-03 DIAGNOSIS — N28.9 KIDNEY FUNCTION ABNORMAL: Primary | ICD-10-CM

## 2024-01-08 ENCOUNTER — CLINICAL SUPPORT (OUTPATIENT)
Dept: FAMILY MEDICINE CLINIC | Facility: CLINIC | Age: 79
End: 2024-01-08
Payer: MEDICARE

## 2024-01-08 DIAGNOSIS — N28.9 KIDNEY FUNCTION ABNORMAL: ICD-10-CM

## 2024-01-08 LAB
ANION GAP SERPL CALCULATED.3IONS-SCNC: 13 MMOL/L (ref 5–15)
BUN SERPL-MCNC: 20 MG/DL (ref 8–23)
BUN/CREAT SERPL: 14.8 (ref 7–25)
CALCIUM SPEC-SCNC: 9.2 MG/DL (ref 8.6–10.5)
CHLORIDE SERPL-SCNC: 104 MMOL/L (ref 98–107)
CO2 SERPL-SCNC: 26 MMOL/L (ref 22–29)
CREAT SERPL-MCNC: 1.35 MG/DL (ref 0.76–1.27)
EGFRCR SERPLBLD CKD-EPI 2021: 54.4 ML/MIN/1.73
GLUCOSE SERPL-MCNC: 89 MG/DL (ref 65–99)
POTASSIUM SERPL-SCNC: 3.7 MMOL/L (ref 3.5–5.2)
SODIUM SERPL-SCNC: 143 MMOL/L (ref 136–145)

## 2024-01-08 PROCEDURE — 36415 COLL VENOUS BLD VENIPUNCTURE: CPT | Performed by: FAMILY MEDICINE

## 2024-01-08 PROCEDURE — 80048 BASIC METABOLIC PNL TOTAL CA: CPT | Performed by: FAMILY MEDICINE

## 2024-02-15 ENCOUNTER — TELEPHONE (OUTPATIENT)
Dept: CARDIOLOGY | Facility: CLINIC | Age: 79
End: 2024-02-15
Payer: COMMERCIAL

## 2024-04-08 RX ORDER — BLOOD SUGAR DIAGNOSTIC
1 STRIP MISCELLANEOUS DAILY
Qty: 100 EACH | Refills: 3 | Status: SHIPPED | OUTPATIENT
Start: 2024-04-08 | End: 2024-04-09 | Stop reason: SDUPTHER

## 2024-04-08 NOTE — TELEPHONE ENCOUNTER
Caller: [email or web address]    Relationship: Self    Best call back number: [phone]    Requested Prescriptions:   Requested Prescriptions     Pending Prescriptions Disp Refills    OneTouch Ultra test strip 100 each 3     Si each by Other route Daily.        Pharmacy where request should be sent: [facility] [address] [place] [address] [address] [facility] [phone] [facility] [phone] FX     Last office visit with prescribing clinician: 2024   Last telemedicine visit with prescribing clinician: Visit date not found   Next office visit with prescribing clinician: 2024     Additional details provided by patient:     Does the patient have less than a 3 day supply:  [x] Yes  [] No    Would you like a call back once the refill request has been completed: [x] Yes [] No    If the office needs to give you a call back, can they leave a voicemail: [x] Yes [] No    Tobias Jimenez Rep   24 10:11 EDT

## 2024-04-09 ENCOUNTER — TELEPHONE (OUTPATIENT)
Dept: FAMILY MEDICINE CLINIC | Facility: CLINIC | Age: 79
End: 2024-04-09
Payer: MEDICARE

## 2024-04-09 RX ORDER — BLOOD SUGAR DIAGNOSTIC
1 STRIP MISCELLANEOUS DAILY
Qty: 100 EACH | Refills: 3 | Status: SHIPPED | OUTPATIENT
Start: 2024-04-09

## 2024-04-24 ENCOUNTER — OFFICE VISIT (OUTPATIENT)
Dept: CARDIOLOGY | Facility: CLINIC | Age: 79
End: 2024-04-24
Payer: MEDICARE

## 2024-04-24 VITALS
BODY MASS INDEX: 29.71 KG/M2 | DIASTOLIC BLOOD PRESSURE: 65 MMHG | HEART RATE: 71 BPM | HEIGHT: 76 IN | SYSTOLIC BLOOD PRESSURE: 119 MMHG | WEIGHT: 244 LBS

## 2024-04-24 DIAGNOSIS — I50.32 CHRONIC HEART FAILURE WITH PRESERVED EJECTION FRACTION (HFPEF): ICD-10-CM

## 2024-04-24 DIAGNOSIS — I10 HYPERTENSION, ESSENTIAL: ICD-10-CM

## 2024-04-24 DIAGNOSIS — I48.92 ATRIAL FLUTTER WITH CONTROLLED RESPONSE: Primary | ICD-10-CM

## 2024-04-24 NOTE — ASSESSMENT & PLAN NOTE
Patient remains rate controlled in persistent atrial flutter previous attempted cardioversion with reversion afterwards I discussed rate versus rhythm control strategy with consideration for possible ablation patient feels that he is not currently symptomatic and wants to hold off on consideration for any ablation procedure for now we will discuss on return visit in 6 months patient is on Eliquis 5 twice daily for CVA prevention

## 2024-04-24 NOTE — PROGRESS NOTES
Chief Complaint  Follow-up, Atrial Fibrillation, Chronic heart failure with preserved ejection fraction , Hypertension, and Hyperlipidemia    Subjective    Patient is symptomatically stable primarily with hip pain which is limiting his exertional ability he does not report any syncope or presyncopal episodes.  He has not had any symptomatic tachycardic issues denies any chest pain issues.  His weight is down about 11 pounds since last visit    Past Medical History:   Diagnosis Date    Arthritis     Cellulitis of right lower limb     Chronic heart failure with preserved ejection fraction (HFpEF) 03/20/2023    Colon polyps     Decubitus ulcer of foot, stage 1 08/10/2018    Decubitus ulcer of foot, stage 3 02/07/2018    Foot pain, left 08/10/2018    Foot pain, right     Gout     Hammertoe 12/30/2019    History of colonoscopy with polypectomy 08/25/2020 2018 TUBULAR ADENOMA, COLONOSCOPY SCHEDULED FOR 10/19/2020 WITH DR SULTANA    Ingrowing nail 11/02/2018    Medication management 02/18/2019    Nail dystrophy     PAT (paroxysmal atrial tachycardia) 08/06/2018    Pressure ulcer, stage 1     Tinea unguium     Type 2 diabetes, controlled, with peripheral neuropathy 08/10/2018         Current Outpatient Medications:     apixaban (Eliquis) 5 MG tablet tablet, Take 1 tablet by mouth 2 (Two) Times a Day., Disp: 180 tablet, Rfl: 3    furosemide (Lasix) 40 MG tablet, Take 1 tablet by mouth Daily., Disp: 90 tablet, Rfl: 3    glyburide (DIAbeta) 2.5 MG tablet, Take 1 tablet by mouth Daily With Breakfast., Disp: 90 tablet, Rfl: 3    lisinopril (PRINIVIL,ZESTRIL) 20 MG tablet, Take 1 tablet by mouth Daily., Disp: 90 tablet, Rfl: 3    metFORMIN ER (GLUCOPHAGE-XR) 500 MG 24 hr tablet, Take 2 tablets by mouth Every Evening., Disp: 180 tablet, Rfl: 3    metoprolol succinate XL (TOPROL-XL) 50 MG 24 hr tablet, Take 1 tablet by mouth Daily., Disp: 90 tablet, Rfl: 3    OneTouch Ultra test strip, 1 each by Other route Daily., Disp: 100 each,  "Rfl: 3    pantoprazole (PROTONIX) 40 MG EC tablet, Take 1 tablet by mouth Daily., Disp: 90 tablet, Rfl: 3    simvastatin (ZOCOR) 20 MG tablet, Take 1 tablet by mouth Every Evening., Disp: 90 tablet, Rfl: 3    There are no discontinued medications.  Allergies   Allergen Reactions    Sulfa Antibiotics Unknown - Low Severity     Unsure of reaction        Social History     Tobacco Use    Smoking status: Former     Current packs/day: 0.00     Average packs/day: 2.0 packs/day for 22.0 years (44.0 ttl pk-yrs)     Types: Cigarettes     Start date:      Quit date:      Years since quittin.3    Smokeless tobacco: Never    Tobacco comments:     AGE STARTED 19 QUIT AGE 31 - SMOKED X 20 YEARS QUIT    Vaping Use    Vaping status: Never Used   Substance Use Topics    Alcohol use: Not Currently     Comment: CURRENT SOME DAY 2020,2017    Drug use: Never       Family History   Problem Relation Age of Onset    Nephrolithiasis Mother         RENAL CALCULUS    Other Mother         MOTHERS'S FAMILY HISTORY UNKOWN    Colon cancer Father 50        FAMILY HISTORY OF COLON CANCER    Other Father         FATHER'S FAMILY HISTORY UNKNOWN    Colon cancer Paternal Grandfather 60        FAMILY HISTORY OF COLON CANCER         Objective     /65   Pulse 71   Ht 193 cm (76\")   Wt 111 kg (244 lb)   BMI 29.70 kg/m²       Physical Exam    General Appearance:   no acute distress  Alert and oriented x3  HENT:   lips not cyanotic  Atraumatic  Neck:  No jvd   supple  Respiratory:  no respiratory distress  normal breath sounds  no rales  Cardiovascular:  Regular rate and rhythm  no S3, no S4   no murmur  no rub  Extremities  No cyanosis  lower extremity edema: none    Skin:   warm, dry  No rashes      Result Review :     proBNP   Date Value Ref Range Status   2023 687.8 0.0 - 1,800.0 pg/mL Final     CMP          2023    03:10 2024    11:03 2024    11:42   CMP   Glucose 108  93  89    BUN 17  21  20  " "  Creatinine 0.97  1.29  1.35    EGFR 80.9  57.5  54.4    Sodium 139  141  143    Potassium 3.9  3.3  3.7    Chloride 107  102  104    Calcium 8.7  9.6  9.2    Total Protein  7.5     Albumin  4.4     Globulin  3.1     Total Bilirubin  0.8     Alkaline Phosphatase  75     AST (SGOT)  <5     ALT (SGPT)  9     Albumin/Globulin Ratio  1.4     BUN/Creatinine Ratio 17.5  16.3  14.8    Anion Gap 9.1  13.5  13.0      CBC w/diff          5/23/2023    05:11 5/24/2023    06:12 5/25/2023    03:10   CBC w/Diff   WBC 10.21  12.35  8.80    RBC 4.51  3.47  3.59    Hemoglobin 13.5  10.5  10.7    Hematocrit 40.5  31.5  32.7    MCV 89.8  90.8  91.1    MCH 29.9  30.3  29.8    MCHC 33.3  33.3  32.7    RDW 15.0  15.3  15.1    Platelets 241  170  174    Neutrophil Rel % 83.6  75.3  71.8    Immature Granulocyte Rel % 0.4  0.3  0.3    Lymphocyte Rel % 11.1  14.0  15.2    Monocyte Rel % 2.9  7.9  8.6    Eosinophil Rel % 1.6  2.2  3.8    Basophil Rel % 0.4  0.3  0.3       Lab Results   Component Value Date    TSH 2.080 04/13/2022      No results found for: \"FREET4\"   No results found for: \"DDIMERQUANT\"  Magnesium   Date Value Ref Range Status   05/25/2023 2.0 1.6 - 2.4 mg/dL Final      No results found for: \"DIGOXIN\"   Lab Results   Component Value Date    TROPONINT 19 (H) 05/23/2023           Lipid Panel          7/6/2023    15:29 1/2/2024    11:03   Lipid Panel   Total Cholesterol 84  93    Triglycerides 104  44    HDL Cholesterol 35  39    VLDL Cholesterol 20  12    LDL Cholesterol  29  42    LDL/HDL Ratio 0.81  1.16      No results found for: \"POCTROP\"    Results for orders placed during the hospital encounter of 05/23/23    Adult Transthoracic Echo Complete W/ Cont if Necessary Per Protocol    Interpretation Summary    Left ventricular systolic function is normal. Calculated left ventricular EF = 64%    Left ventricular diastolic function was normal.    The left atrial cavity is moderately dilated.    The right atrial cavity is mildly  " dilated.    There is mild calcification of the aortic valve.       ECG 12 Lead    Date/Time: 4/24/2024 2:28 PM  Performed by: Kar Ledezma MD    Authorized by: Kar Ledezma MD  Comparison: compared with previous ECG   Similar to previous ECG  Comments: Atypical atrial flutter  Borderline IVCD                 Diagnoses and all orders for this visit:    1. Atrial flutter with controlled response (Primary)  Assessment & Plan:  Patient remains rate controlled in persistent atrial flutter previous attempted cardioversion with reversion afterwards I discussed rate versus rhythm control strategy with consideration for possible ablation patient feels that he is not currently symptomatic and wants to hold off on consideration for any ablation procedure for now we will discuss on return visit in 6 months patient is on Eliquis 5 twice daily for CVA prevention    Orders:  -     ECG 12 Lead    2. Chronic heart failure with preserved ejection fraction (HFpEF)  Assessment & Plan:  Patient stable from a weight and volume standpoint exam today continue Lasix 40 daily dosing      3. Hypertension, essential  Assessment & Plan:  Blood pressure currently within goal range on Toprol 50 daily and lisinopril 20 daily dose               Follow Up     Return in about 6 months (around 10/24/2024).          Patient was given instructions and counseling regarding his condition or for health maintenance advice. Please see specific information pulled into the AVS if appropriate.

## 2024-05-06 ENCOUNTER — OFFICE VISIT (OUTPATIENT)
Dept: FAMILY MEDICINE CLINIC | Facility: CLINIC | Age: 79
End: 2024-05-06
Payer: MEDICARE

## 2024-05-06 ENCOUNTER — LAB (OUTPATIENT)
Dept: LAB | Facility: HOSPITAL | Age: 79
End: 2024-05-06
Payer: MEDICARE

## 2024-05-06 VITALS
SYSTOLIC BLOOD PRESSURE: 107 MMHG | WEIGHT: 242 LBS | HEIGHT: 76 IN | BODY MASS INDEX: 29.47 KG/M2 | TEMPERATURE: 97.6 F | HEART RATE: 74 BPM | DIASTOLIC BLOOD PRESSURE: 67 MMHG

## 2024-05-06 DIAGNOSIS — R04.0 LEFT-SIDED NOSEBLEED: ICD-10-CM

## 2024-05-06 DIAGNOSIS — E78.2 MIXED HYPERLIPIDEMIA: ICD-10-CM

## 2024-05-06 DIAGNOSIS — I48.92 ATRIAL FLUTTER WITH CONTROLLED RESPONSE: ICD-10-CM

## 2024-05-06 DIAGNOSIS — E11.42 TYPE 2 DIABETES, CONTROLLED, WITH PERIPHERAL NEUROPATHY: ICD-10-CM

## 2024-05-06 DIAGNOSIS — M54.31 SCIATICA OF RIGHT SIDE: ICD-10-CM

## 2024-05-06 DIAGNOSIS — I10 HYPERTENSION, ESSENTIAL: Primary | ICD-10-CM

## 2024-05-06 LAB — HBA1C MFR BLD: 5.9 % (ref 4.8–5.6)

## 2024-05-06 PROCEDURE — 83036 HEMOGLOBIN GLYCOSYLATED A1C: CPT | Performed by: FAMILY MEDICINE

## 2024-05-06 PROCEDURE — 80053 COMPREHEN METABOLIC PANEL: CPT | Performed by: FAMILY MEDICINE

## 2024-05-06 PROCEDURE — 3078F DIAST BP <80 MM HG: CPT | Performed by: FAMILY MEDICINE

## 2024-05-06 PROCEDURE — 3074F SYST BP LT 130 MM HG: CPT | Performed by: FAMILY MEDICINE

## 2024-05-06 PROCEDURE — 99214 OFFICE O/P EST MOD 30 MIN: CPT | Performed by: FAMILY MEDICINE

## 2024-05-06 PROCEDURE — 80061 LIPID PANEL: CPT | Performed by: FAMILY MEDICINE

## 2024-05-06 RX ORDER — BLOOD SUGAR DIAGNOSTIC
1 STRIP MISCELLANEOUS 2 TIMES DAILY
Qty: 200 EACH | Refills: 3 | Status: SHIPPED | OUTPATIENT
Start: 2024-05-06

## 2024-05-07 LAB
ALBUMIN SERPL-MCNC: 4.4 G/DL (ref 3.5–5.2)
ALBUMIN/GLOB SERPL: 1.6 G/DL
ALP SERPL-CCNC: 84 U/L (ref 39–117)
ALT SERPL W P-5'-P-CCNC: 9 U/L (ref 1–41)
ANION GAP SERPL CALCULATED.3IONS-SCNC: 11.9 MMOL/L (ref 5–15)
AST SERPL-CCNC: 19 U/L (ref 1–40)
BILIRUB SERPL-MCNC: 0.5 MG/DL (ref 0–1.2)
BUN SERPL-MCNC: 24 MG/DL (ref 8–23)
BUN/CREAT SERPL: 17.9 (ref 7–25)
CALCIUM SPEC-SCNC: 9.4 MG/DL (ref 8.6–10.5)
CHLORIDE SERPL-SCNC: 106 MMOL/L (ref 98–107)
CHOLEST SERPL-MCNC: 90 MG/DL (ref 0–200)
CO2 SERPL-SCNC: 26.1 MMOL/L (ref 22–29)
CREAT SERPL-MCNC: 1.34 MG/DL (ref 0.76–1.27)
EGFRCR SERPLBLD CKD-EPI 2021: 54.6 ML/MIN/1.73
GLOBULIN UR ELPH-MCNC: 2.7 GM/DL
GLUCOSE SERPL-MCNC: 116 MG/DL (ref 65–99)
HDLC SERPL-MCNC: 37 MG/DL (ref 40–60)
LDLC SERPL CALC-MCNC: 38 MG/DL (ref 0–100)
LDLC/HDLC SERPL: 1.08 {RATIO}
POTASSIUM SERPL-SCNC: 4.1 MMOL/L (ref 3.5–5.2)
PROT SERPL-MCNC: 7.1 G/DL (ref 6–8.5)
SODIUM SERPL-SCNC: 144 MMOL/L (ref 136–145)
TRIGL SERPL-MCNC: 65 MG/DL (ref 0–150)
VLDLC SERPL-MCNC: 15 MG/DL (ref 5–40)

## 2024-05-20 ENCOUNTER — TELEPHONE (OUTPATIENT)
Dept: CARDIOLOGY | Facility: CLINIC | Age: 79
End: 2024-05-20
Payer: MEDICARE

## 2024-05-20 DIAGNOSIS — I50.32 CHRONIC HEART FAILURE WITH PRESERVED EJECTION FRACTION (HFPEF): Primary | ICD-10-CM

## 2024-05-20 DIAGNOSIS — R06.02 SOB (SHORTNESS OF BREATH): ICD-10-CM

## 2024-05-20 NOTE — TELEPHONE ENCOUNTER
SW patient. Patient states he has noticed he getting more SOA with activity. Patient states he does have a little swelling. Paitent  states over last week he gained 6-7 pounds. Patient states this morning he is down 3 pounds.     Patient is compliant with Lasix 40 mg daily

## 2024-05-20 NOTE — TELEPHONE ENCOUNTER
Take lasix 80 mg daily for the next 3 days then go back to 40 mg daily, check BMP and BNP in 1 week

## 2024-05-28 ENCOUNTER — LAB (OUTPATIENT)
Dept: LAB | Facility: HOSPITAL | Age: 79
End: 2024-05-28
Payer: MEDICARE

## 2024-05-28 DIAGNOSIS — R06.02 SOB (SHORTNESS OF BREATH): ICD-10-CM

## 2024-05-28 DIAGNOSIS — I50.32 CHRONIC HEART FAILURE WITH PRESERVED EJECTION FRACTION (HFPEF): ICD-10-CM

## 2024-05-28 LAB
ANION GAP SERPL CALCULATED.3IONS-SCNC: 10 MMOL/L (ref 5–15)
BUN SERPL-MCNC: 23 MG/DL (ref 8–23)
BUN/CREAT SERPL: 18.4 (ref 7–25)
CALCIUM SPEC-SCNC: 9 MG/DL (ref 8.6–10.5)
CHLORIDE SERPL-SCNC: 103 MMOL/L (ref 98–107)
CO2 SERPL-SCNC: 28 MMOL/L (ref 22–29)
CREAT SERPL-MCNC: 1.25 MG/DL (ref 0.76–1.27)
EGFRCR SERPLBLD CKD-EPI 2021: 59.3 ML/MIN/1.73
GLUCOSE SERPL-MCNC: 164 MG/DL (ref 65–99)
NT-PROBNP SERPL-MCNC: 1569 PG/ML (ref 0–1800)
POTASSIUM SERPL-SCNC: 3.9 MMOL/L (ref 3.5–5.2)
SODIUM SERPL-SCNC: 141 MMOL/L (ref 136–145)

## 2024-05-28 PROCEDURE — 83880 ASSAY OF NATRIURETIC PEPTIDE: CPT

## 2024-05-28 PROCEDURE — 80048 BASIC METABOLIC PNL TOTAL CA: CPT

## 2024-05-28 PROCEDURE — 36415 COLL VENOUS BLD VENIPUNCTURE: CPT

## 2024-05-29 ENCOUNTER — TELEPHONE (OUTPATIENT)
Dept: CARDIOLOGY | Facility: CLINIC | Age: 79
End: 2024-05-29
Payer: MEDICARE

## 2024-05-29 NOTE — TELEPHONE ENCOUNTER
PER FLORENCIA CAMERON, APRN-  Renal function is improved, electrolytes are good, and BNP is in normal range, continue current meds     Sent via my chart.

## 2024-06-07 ENCOUNTER — APPOINTMENT (OUTPATIENT)
Dept: CT IMAGING | Facility: HOSPITAL | Age: 79
DRG: 177 | End: 2024-06-07
Payer: MEDICARE

## 2024-06-07 ENCOUNTER — APPOINTMENT (OUTPATIENT)
Dept: GENERAL RADIOLOGY | Facility: HOSPITAL | Age: 79
DRG: 177 | End: 2024-06-07
Payer: MEDICARE

## 2024-06-07 ENCOUNTER — HOSPITAL ENCOUNTER (INPATIENT)
Facility: HOSPITAL | Age: 79
LOS: 3 days | Discharge: HOME | DRG: 177 | End: 2024-06-10
Attending: EMERGENCY MEDICINE | Admitting: STUDENT IN AN ORGANIZED HEALTH CARE EDUCATION/TRAINING PROGRAM
Payer: MEDICARE

## 2024-06-07 DIAGNOSIS — R26.2 DIFFICULTY WALKING: ICD-10-CM

## 2024-06-07 DIAGNOSIS — Z78.9 DECREASED ACTIVITIES OF DAILY LIVING (ADL): ICD-10-CM

## 2024-06-07 DIAGNOSIS — J18.9 PNEUMONIA DUE TO INFECTIOUS ORGANISM, UNSPECIFIED LATERALITY, UNSPECIFIED PART OF LUNG: ICD-10-CM

## 2024-06-07 DIAGNOSIS — J96.01 ACUTE RESPIRATORY FAILURE WITH HYPOXIA: Primary | ICD-10-CM

## 2024-06-07 LAB
ACETONE BLD QL: NEGATIVE
ALBUMIN SERPL-MCNC: 4 G/DL (ref 3.5–5.2)
ALBUMIN/GLOB SERPL: 1.7 G/DL
ALP SERPL-CCNC: 95 U/L (ref 39–117)
ALT SERPL W P-5'-P-CCNC: 10 U/L (ref 1–41)
ANION GAP SERPL CALCULATED.3IONS-SCNC: 16.8 MMOL/L (ref 5–15)
APTT PPP: 27.6 SECONDS (ref 24.2–34.2)
AST SERPL-CCNC: 19 U/L (ref 1–40)
BASOPHILS # BLD AUTO: 0.05 10*3/MM3 (ref 0–0.2)
BASOPHILS NFR BLD AUTO: 0.4 % (ref 0–1.5)
BILIRUB SERPL-MCNC: 0.6 MG/DL (ref 0–1.2)
BILIRUB UR QL STRIP: NEGATIVE
BUN SERPL-MCNC: 22 MG/DL (ref 8–23)
BUN/CREAT SERPL: 17.7 (ref 7–25)
CALCIUM SPEC-SCNC: 9.2 MG/DL (ref 8.6–10.5)
CHLORIDE SERPL-SCNC: 104 MMOL/L (ref 98–107)
CLARITY UR: ABNORMAL
CO2 SERPL-SCNC: 20.2 MMOL/L (ref 22–29)
COLOR UR: YELLOW
CREAT SERPL-MCNC: 1.24 MG/DL (ref 0.76–1.27)
D-LACTATE SERPL-SCNC: 2 MMOL/L (ref 0.5–2)
D-LACTATE SERPL-SCNC: 3 MMOL/L (ref 0.5–2)
DEPRECATED RDW RBC AUTO: 48.9 FL (ref 37–54)
EGFRCR SERPLBLD CKD-EPI 2021: 59.9 ML/MIN/1.73
EOSINOPHIL # BLD AUTO: 0.09 10*3/MM3 (ref 0–0.4)
EOSINOPHIL NFR BLD AUTO: 0.7 % (ref 0.3–6.2)
ERYTHROCYTE [DISTWIDTH] IN BLOOD BY AUTOMATED COUNT: 17.1 % (ref 12.3–15.4)
GLOBULIN UR ELPH-MCNC: 2.4 GM/DL
GLUCOSE BLDC GLUCOMTR-MCNC: 113 MG/DL (ref 70–99)
GLUCOSE BLDC GLUCOMTR-MCNC: 145 MG/DL (ref 70–99)
GLUCOSE BLDC GLUCOMTR-MCNC: 160 MG/DL (ref 70–99)
GLUCOSE SERPL-MCNC: 205 MG/DL (ref 65–99)
GLUCOSE UR STRIP-MCNC: NEGATIVE MG/DL
HCT VFR BLD AUTO: 30 % (ref 37.5–51)
HGB BLD-MCNC: 9 G/DL (ref 13–17.7)
HGB UR QL STRIP.AUTO: NEGATIVE
HOLD SPECIMEN: NORMAL
HOLD SPECIMEN: NORMAL
IMM GRANULOCYTES # BLD AUTO: 0.05 10*3/MM3 (ref 0–0.05)
IMM GRANULOCYTES NFR BLD AUTO: 0.4 % (ref 0–0.5)
INR PPP: 1.33 (ref 0.86–1.15)
KETONES UR QL STRIP: ABNORMAL
L PNEUMO1 AG UR QL IA: NEGATIVE
LEUKOCYTE ESTERASE UR QL STRIP.AUTO: NEGATIVE
LYMPHOCYTES # BLD AUTO: 0.5 10*3/MM3 (ref 0.7–3.1)
LYMPHOCYTES NFR BLD AUTO: 3.7 % (ref 19.6–45.3)
MCH RBC QN AUTO: 23.7 PG (ref 26.6–33)
MCHC RBC AUTO-ENTMCNC: 30 G/DL (ref 31.5–35.7)
MCV RBC AUTO: 78.9 FL (ref 79–97)
MONOCYTES # BLD AUTO: 0.54 10*3/MM3 (ref 0.1–0.9)
MONOCYTES NFR BLD AUTO: 4 % (ref 5–12)
MRSA DNA SPEC QL NAA+PROBE: NORMAL
NEUTROPHILS NFR BLD AUTO: 12.4 10*3/MM3 (ref 1.7–7)
NEUTROPHILS NFR BLD AUTO: 90.8 % (ref 42.7–76)
NITRITE UR QL STRIP: NEGATIVE
NRBC BLD AUTO-RTO: 0 /100 WBC (ref 0–0.2)
NT-PROBNP SERPL-MCNC: 1590 PG/ML (ref 0–1800)
PH UR STRIP.AUTO: 5.5 [PH] (ref 5–8)
PLATELET # BLD AUTO: 262 10*3/MM3 (ref 140–450)
PMV BLD AUTO: 9.3 FL (ref 6–12)
POTASSIUM SERPL-SCNC: 4.1 MMOL/L (ref 3.5–5.2)
PROCALCITONIN SERPL-MCNC: 0.14 NG/ML (ref 0–0.25)
PROT SERPL-MCNC: 6.4 G/DL (ref 6–8.5)
PROT UR QL STRIP: NEGATIVE
PROTHROMBIN TIME: 16.7 SECONDS (ref 11.8–14.9)
RBC # BLD AUTO: 3.8 10*6/MM3 (ref 4.14–5.8)
S PNEUM AG SPEC QL LA: NEGATIVE
SODIUM SERPL-SCNC: 141 MMOL/L (ref 136–145)
SP GR UR STRIP: 1.03 (ref 1–1.03)
TROPONIN T SERPL HS-MCNC: 27 NG/L
UROBILINOGEN UR QL STRIP: ABNORMAL
WBC NRBC COR # BLD AUTO: 13.63 10*3/MM3 (ref 3.4–10.8)
WHOLE BLOOD HOLD COAG: NORMAL
WHOLE BLOOD HOLD SPECIMEN: NORMAL

## 2024-06-07 PROCEDURE — 85025 COMPLETE CBC W/AUTO DIFF WBC: CPT | Performed by: EMERGENCY MEDICINE

## 2024-06-07 PROCEDURE — 25510000001 IOPAMIDOL PER 1 ML: Performed by: STUDENT IN AN ORGANIZED HEALTH CARE EDUCATION/TRAINING PROGRAM

## 2024-06-07 PROCEDURE — 87899 AGENT NOS ASSAY W/OPTIC: CPT | Performed by: STUDENT IN AN ORGANIZED HEALTH CARE EDUCATION/TRAINING PROGRAM

## 2024-06-07 PROCEDURE — 93010 ELECTROCARDIOGRAM REPORT: CPT | Performed by: INTERNAL MEDICINE

## 2024-06-07 PROCEDURE — 82009 KETONE BODYS QUAL: CPT | Performed by: STUDENT IN AN ORGANIZED HEALTH CARE EDUCATION/TRAINING PROGRAM

## 2024-06-07 PROCEDURE — 36415 COLL VENOUS BLD VENIPUNCTURE: CPT | Performed by: EMERGENCY MEDICINE

## 2024-06-07 PROCEDURE — 81003 URINALYSIS AUTO W/O SCOPE: CPT | Performed by: STUDENT IN AN ORGANIZED HEALTH CARE EDUCATION/TRAINING PROGRAM

## 2024-06-07 PROCEDURE — 99285 EMERGENCY DEPT VISIT HI MDM: CPT

## 2024-06-07 PROCEDURE — 99291 CRITICAL CARE FIRST HOUR: CPT | Performed by: INTERNAL MEDICINE

## 2024-06-07 PROCEDURE — 84484 ASSAY OF TROPONIN QUANT: CPT | Performed by: EMERGENCY MEDICINE

## 2024-06-07 PROCEDURE — 25810000003 SODIUM CHLORIDE 0.9 % SOLUTION: Performed by: EMERGENCY MEDICINE

## 2024-06-07 PROCEDURE — 94799 UNLISTED PULMONARY SVC/PX: CPT

## 2024-06-07 PROCEDURE — 85730 THROMBOPLASTIN TIME PARTIAL: CPT | Performed by: EMERGENCY MEDICINE

## 2024-06-07 PROCEDURE — 71045 X-RAY EXAM CHEST 1 VIEW: CPT

## 2024-06-07 PROCEDURE — 87449 NOS EACH ORGANISM AG IA: CPT | Performed by: STUDENT IN AN ORGANIZED HEALTH CARE EDUCATION/TRAINING PROGRAM

## 2024-06-07 PROCEDURE — 99222 1ST HOSP IP/OBS MODERATE 55: CPT | Performed by: OTOLARYNGOLOGY

## 2024-06-07 PROCEDURE — 87040 BLOOD CULTURE FOR BACTERIA: CPT | Performed by: EMERGENCY MEDICINE

## 2024-06-07 PROCEDURE — 80053 COMPREHEN METABOLIC PANEL: CPT | Performed by: EMERGENCY MEDICINE

## 2024-06-07 PROCEDURE — 99223 1ST HOSP IP/OBS HIGH 75: CPT | Performed by: STUDENT IN AN ORGANIZED HEALTH CARE EDUCATION/TRAINING PROGRAM

## 2024-06-07 PROCEDURE — 93005 ELECTROCARDIOGRAM TRACING: CPT | Performed by: EMERGENCY MEDICINE

## 2024-06-07 PROCEDURE — 92610 EVALUATE SWALLOWING FUNCTION: CPT

## 2024-06-07 PROCEDURE — 63710000001 INSULIN REGULAR HUMAN PER 5 UNITS: Performed by: STUDENT IN AN ORGANIZED HEALTH CARE EDUCATION/TRAINING PROGRAM

## 2024-06-07 PROCEDURE — 83880 ASSAY OF NATRIURETIC PEPTIDE: CPT | Performed by: EMERGENCY MEDICINE

## 2024-06-07 PROCEDURE — 71260 CT THORAX DX C+: CPT

## 2024-06-07 PROCEDURE — 25010000002 VANCOMYCIN 5 G RECONSTITUTED SOLUTION: Performed by: EMERGENCY MEDICINE

## 2024-06-07 PROCEDURE — 84145 PROCALCITONIN (PCT): CPT | Performed by: STUDENT IN AN ORGANIZED HEALTH CARE EDUCATION/TRAINING PROGRAM

## 2024-06-07 PROCEDURE — 83605 ASSAY OF LACTIC ACID: CPT | Performed by: EMERGENCY MEDICINE

## 2024-06-07 PROCEDURE — 87641 MR-STAPH DNA AMP PROBE: CPT | Performed by: STUDENT IN AN ORGANIZED HEALTH CARE EDUCATION/TRAINING PROGRAM

## 2024-06-07 PROCEDURE — 99291 CRITICAL CARE FIRST HOUR: CPT

## 2024-06-07 PROCEDURE — 85610 PROTHROMBIN TIME: CPT | Performed by: EMERGENCY MEDICINE

## 2024-06-07 PROCEDURE — 25010000002 PIPERACILLIN SOD-TAZOBACTAM PER 1 G: Performed by: STUDENT IN AN ORGANIZED HEALTH CARE EDUCATION/TRAINING PROGRAM

## 2024-06-07 PROCEDURE — 25010000002 PIPERACILLIN SOD-TAZOBACTAM PER 1 G: Performed by: EMERGENCY MEDICINE

## 2024-06-07 PROCEDURE — 82948 REAGENT STRIP/BLOOD GLUCOSE: CPT

## 2024-06-07 RX ORDER — NICOTINE POLACRILEX 4 MG
15 LOZENGE BUCCAL
Status: DISCONTINUED | OUTPATIENT
Start: 2024-06-07 | End: 2024-06-10 | Stop reason: HOSPADM

## 2024-06-07 RX ORDER — IBUPROFEN 600 MG/1
1 TABLET ORAL
Status: DISCONTINUED | OUTPATIENT
Start: 2024-06-07 | End: 2024-06-10 | Stop reason: HOSPADM

## 2024-06-07 RX ORDER — SODIUM CHLORIDE 0.9 % (FLUSH) 0.9 %
10 SYRINGE (ML) INJECTION AS NEEDED
Status: DISCONTINUED | OUTPATIENT
Start: 2024-06-07 | End: 2024-06-10 | Stop reason: HOSPADM

## 2024-06-07 RX ORDER — FUROSEMIDE 40 MG/1
40 TABLET ORAL EVERY MORNING
Status: DISCONTINUED | OUTPATIENT
Start: 2024-06-07 | End: 2024-06-10 | Stop reason: HOSPADM

## 2024-06-07 RX ORDER — ARFORMOTEROL TARTRATE 15 UG/2ML
15 SOLUTION RESPIRATORY (INHALATION)
Status: DISCONTINUED | OUTPATIENT
Start: 2024-06-07 | End: 2024-06-10 | Stop reason: HOSPADM

## 2024-06-07 RX ORDER — BISACODYL 10 MG
10 SUPPOSITORY, RECTAL RECTAL DAILY PRN
Status: DISCONTINUED | OUTPATIENT
Start: 2024-06-07 | End: 2024-06-10 | Stop reason: HOSPADM

## 2024-06-07 RX ORDER — IPRATROPIUM BROMIDE AND ALBUTEROL SULFATE 2.5; .5 MG/3ML; MG/3ML
3 SOLUTION RESPIRATORY (INHALATION)
Status: DISCONTINUED | OUTPATIENT
Start: 2024-06-07 | End: 2024-06-09

## 2024-06-07 RX ORDER — AMOXICILLIN 250 MG
2 CAPSULE ORAL 2 TIMES DAILY PRN
Status: DISCONTINUED | OUTPATIENT
Start: 2024-06-07 | End: 2024-06-10 | Stop reason: HOSPADM

## 2024-06-07 RX ORDER — METOPROLOL SUCCINATE 50 MG/1
50 TABLET, EXTENDED RELEASE ORAL DAILY
Status: DISCONTINUED | OUTPATIENT
Start: 2024-06-07 | End: 2024-06-10 | Stop reason: HOSPADM

## 2024-06-07 RX ORDER — POLYETHYLENE GLYCOL 3350 17 G/17G
17 POWDER, FOR SOLUTION ORAL DAILY PRN
Status: DISCONTINUED | OUTPATIENT
Start: 2024-06-07 | End: 2024-06-10 | Stop reason: HOSPADM

## 2024-06-07 RX ORDER — BUDESONIDE 0.5 MG/2ML
0.5 INHALANT ORAL
Status: DISCONTINUED | OUTPATIENT
Start: 2024-06-07 | End: 2024-06-10 | Stop reason: HOSPADM

## 2024-06-07 RX ORDER — SODIUM CHLORIDE 0.9 % (FLUSH) 0.9 %
10 SYRINGE (ML) INJECTION EVERY 12 HOURS SCHEDULED
Status: DISCONTINUED | OUTPATIENT
Start: 2024-06-07 | End: 2024-06-10 | Stop reason: HOSPADM

## 2024-06-07 RX ORDER — DEXTROSE MONOHYDRATE 25 G/50ML
25 INJECTION, SOLUTION INTRAVENOUS
Status: DISCONTINUED | OUTPATIENT
Start: 2024-06-07 | End: 2024-06-10 | Stop reason: HOSPADM

## 2024-06-07 RX ORDER — ATORVASTATIN CALCIUM 10 MG/1
10 TABLET, FILM COATED ORAL NIGHTLY
Status: DISCONTINUED | OUTPATIENT
Start: 2024-06-07 | End: 2024-06-10 | Stop reason: HOSPADM

## 2024-06-07 RX ORDER — PANTOPRAZOLE SODIUM 40 MG/1
40 TABLET, DELAYED RELEASE ORAL
Status: DISCONTINUED | OUTPATIENT
Start: 2024-06-07 | End: 2024-06-10 | Stop reason: HOSPADM

## 2024-06-07 RX ORDER — BISACODYL 5 MG/1
5 TABLET, DELAYED RELEASE ORAL DAILY PRN
Status: DISCONTINUED | OUTPATIENT
Start: 2024-06-07 | End: 2024-06-10 | Stop reason: HOSPADM

## 2024-06-07 RX ORDER — SODIUM CHLORIDE 9 MG/ML
40 INJECTION, SOLUTION INTRAVENOUS AS NEEDED
Status: DISCONTINUED | OUTPATIENT
Start: 2024-06-07 | End: 2024-06-10 | Stop reason: HOSPADM

## 2024-06-07 RX ORDER — IPRATROPIUM BROMIDE AND ALBUTEROL SULFATE 2.5; .5 MG/3ML; MG/3ML
3 SOLUTION RESPIRATORY (INHALATION)
Status: DISCONTINUED | OUTPATIENT
Start: 2024-06-07 | End: 2024-06-07

## 2024-06-07 RX ADMIN — IOPAMIDOL 70 ML: 755 INJECTION, SOLUTION INTRAVENOUS at 16:20

## 2024-06-07 RX ADMIN — INSULIN HUMAN 3 UNITS: 100 INJECTION, SOLUTION PARENTERAL at 17:10

## 2024-06-07 RX ADMIN — VANCOMYCIN HYDROCHLORIDE 2250 MG: 5 INJECTION, POWDER, LYOPHILIZED, FOR SOLUTION INTRAVENOUS at 06:52

## 2024-06-07 RX ADMIN — FUROSEMIDE 40 MG: 40 TABLET ORAL at 13:36

## 2024-06-07 RX ADMIN — Medication 10 ML: at 21:27

## 2024-06-07 RX ADMIN — PIPERACILLIN SODIUM AND TAZOBACTAM SODIUM 3.38 G: 3; .375 INJECTION, POWDER, LYOPHILIZED, FOR SOLUTION INTRAVENOUS at 21:27

## 2024-06-07 RX ADMIN — PANTOPRAZOLE SODIUM 40 MG: 40 TABLET, DELAYED RELEASE ORAL at 13:36

## 2024-06-07 RX ADMIN — METOPROLOL SUCCINATE 50 MG: 50 TABLET, EXTENDED RELEASE ORAL at 13:35

## 2024-06-07 RX ADMIN — IPRATROPIUM BROMIDE AND ALBUTEROL SULFATE 3 ML: .5; 3 SOLUTION RESPIRATORY (INHALATION) at 13:00

## 2024-06-07 RX ADMIN — Medication 10 ML: at 13:37

## 2024-06-07 RX ADMIN — PIPERACILLIN AND TAZOBACTAM 3.38 G: 3; .375 INJECTION, POWDER, FOR SOLUTION INTRAVENOUS at 06:05

## 2024-06-07 RX ADMIN — ATORVASTATIN CALCIUM 10 MG: 10 TABLET, FILM COATED ORAL at 21:27

## 2024-06-07 RX ADMIN — SODIUM CHLORIDE 500 ML: 9 INJECTION, SOLUTION INTRAVENOUS at 07:35

## 2024-06-07 RX ADMIN — PIPERACILLIN SODIUM AND TAZOBACTAM SODIUM 3.38 G: 3; .375 INJECTION, POWDER, LYOPHILIZED, FOR SOLUTION INTRAVENOUS at 13:35

## 2024-06-07 NOTE — THERAPY EVALUATION
Acute Care - Speech Language Pathology   Swallow Initial Evaluation  [place]     Patient Name: Edi Weston  : 1945  MRN: [id number]  Today's Date: 2024               Admit Date: 2024    Visit Dx:     ICD-10-CM ICD-9-CM   1. Acute respiratory failure with hypoxia  J96.01 518.81   2. Pneumonia due to infectious organism, unspecified laterality, unspecified part of lung  J18.9 486     Patient Active Problem List   Diagnosis    Type 2 diabetes, controlled, with peripheral neuropathy    History of colonoscopy with polypectomy    Gout    Hammertoe    Arthritis    Atrial flutter with controlled response    LBBB (left bundle branch block)    Colon polyps    Hyperlipidemia    Hypertension, essential    Chronic heart failure with preserved ejection fraction (HFpEF)    Sciatica of right side    Left-sided nosebleed    Acute hypoxic respiratory failure     Past Medical History:   Diagnosis Date    Arthritis     Cellulitis of right lower limb     Chronic heart failure with preserved ejection fraction (HFpEF) 2023    Colon polyps     Decubitus ulcer of foot, stage 1 08/10/2018    Decubitus ulcer of foot, stage 3 2018    Foot pain, left 08/10/2018    Foot pain, right     Gout     Hammertoe 2019    History of colonoscopy with polypectomy 2020    2018 TUBULAR ADENOMA, COLONOSCOPY SCHEDULED FOR 10/19/2020 WITH DR KAYY Acevesing nail 2018    Medication management 2019    Nail dystrophy     PAT (paroxysmal atrial tachycardia) 2018    Pressure ulcer, stage 1     Tinea unguium     Type 2 diabetes, controlled, with peripheral neuropathy 08/10/2018     Past Surgical History:   Procedure Laterality Date    ABDOMINAL SURGERY      lap band    COLONOSCOPY      GASTRIC BANDING           Inpatient Speech Pathology Dysphagia Evaluation        PAIN SCALE: None noted    PRECAUTIONS/CONTRAINDICATIONS: None noted    SUSPECTED ABUSE/NEGLECT/EXPLOITATION: None  noted    SOCIAL/PSYCHOLOGICAL NEEDS/BARRIERS: None noted    PAST SOCIAL HISTORY: Lives at home with wife    PRIOR FUNCTION: Independent     PATIENT GOALS/EXPECTATIONS: Did not report    HISTORY: This patient is a 79-year-old male admitted with hypoxic respiratory failure.  CT of chest : Right-sided pneumonia is identified, as discussed. Consider close interval clinical and (if necessary) imaging follow-up to ensure a benign progression and to exclude an underlying malignant process.  Patient with history of lap band procedure.  Patient reports nocturnal reflux since that time occasionally however worse recently.  Denies any overt difficulty swallowing or coughing or choking episodes when eating or drinking.  Patient with recent vomiting episode and states that he feels that he aspirated while vomiting.    CURRENT DIET LEVEL: N.p.o.    OBJECTIVE:    TEST ADMINISTERED: Clinical dysphagia evaluation    COGNITION/SAFETY AWARENESS: Alert and oriented    BEHAVIORAL OBSERVATIONS: Pleasant and cooperative    ORAL MOTOR EXAM: Lingual and labial strength and range of motion within normal limits    VOICE QUALITY: Strong voicing    REFLEX EXAM: Strong cough    POSTURE: 90 degrees upright    FEEDING/SWALLOWING FUNCTION: Assessed with full range of consistencies with thin liquids from spoon cup and straw, purée consistency soft and hard solids    CLINICAL OBSERVATIONS: Oral stage is characterized by good bolus preparation and control.  Timely oral transit.  Pharyngeal phase appears timely with good laryngeal elevation per palpation.  No oral residue noted after the swallow.  No clinical signs or symptoms of aspiration noted.  Vocal quality remained clear to auscultation.  Denies globus sensation after completion of swallow.    DYSPHAGIA CRITERIA: N/A    FUNCTIONAL ASSESSMENT INSTRUMENT: Patient currently scored a level 7 of 7 on Functional Communication Measures for swallowing indicating a 0% limitation in  function.    ASSESSMENT/ PLAN OF CARE:  Pt presents with functional oropharyngeal swallow.  No clinical signs or symptoms of aspiration noted.     REHAB POTENTIAL:  Pt has good  rehab potential.  The following limitations may influence improvement/ length of tx medical status.    RECOMMENDATIONS:   1.   DIET: Regular diet-thin liquids    2.  POSITION: 90 degrees upright for all intake    3.  COMPENSATORY STRATEGIES: Reflux precautions    Pt/responsible party agrees with plan of care and has been informed of all alternatives, risks and benefits.    EDUCATION  The patient has been educated in the following areas:   Dysphagia (Swallowing Impairment).          Claire Park, SLP  6/7/2024

## 2024-06-07 NOTE — CONSULTS
ENT Hospital Consult Note     Date of Consult: 2024     Patient Name: Edi Weston  MRN: [id number]   : 1945     Referring Provider: * No referring provider recorded for this case *    Care Team: Patient Care Team:  Jesus Lux DO as PCP - General (Family Medicine)  Kar Ledezma MD as Consulting Physician (Cardiology)     Reason for Hospitalization: Epistaxis and aspiration    History of Present Illness: Was contacted for hospital consultation on Edi Weston a 79 y.o. male who presents to the hospital for aspiration of blood with hypoxia following epistaxis.  According the patient, he had been diagnosed with sleep apnea many years ago and during the sleep study after recording for 1 hour they interrupted the hand and insisted that he has a CPAP on him to continue the study since patient was severely apneic.  Apparently since the  he has been on CPAP but really has not been followed by any pulmonologist regularly.  It is extremely disturbing the patient had epistaxis and he slept through the epistaxis and cough to a point where he was aspirating blood with hypoxia.  Since the sleep study was done so long ago, he lost weight in between times where he had a lap band placed in  and felt like he had his sleep apnea resolved and stopped wearing CPAP.  Patient does not recall whether he had central sleep apnea in addition to obstructive sleep apnea.  He also does not recall any RDI or AHI score.  Currently, patient is on O2 per nasal cannula high flow at 50 L/min with 65% FiO2.  ENT is consulted at this time to the address epistaxis.  According to the patient it sounds like his epistaxis was mostly posterior in nature because possibly positional wise he had his head laying down.  At this time he has no active bleeding present.  Patient also has history of atrial fibrillation which he is currently treated with Eliquis but at this time it is being held.  Patient does not take  any aspirin or NSAIDs.    Subjective      Pertinent items are noted in HPI.     Past Medical History:   Past Medical History:   Diagnosis Date    Arthritis     Cellulitis of right lower limb     Chronic heart failure with preserved ejection fraction (HFpEF) 2023    Colon polyps     Decubitus ulcer of foot, stage 1 08/10/2018    Decubitus ulcer of foot, stage 3 2018    Foot pain, left 08/10/2018    Foot pain, right     Gout     Hammertoe 2019    History of colonoscopy with polypectomy 2020 TUBULAR ADENOMA, COLONOSCOPY SCHEDULED FOR 10/19/2020 WITH DR SULTANA    Ingrowing nail 2018    Medication management 2019    Nail dystrophy     PAT (paroxysmal atrial tachycardia) 2018    Pressure ulcer, stage 1     Tinea unguium     Type 2 diabetes, controlled, with peripheral neuropathy 08/10/2018       Past Surgical History:   Past Surgical History:   Procedure Laterality Date    ABDOMINAL SURGERY      lap band    COLONOSCOPY      GASTRIC BANDING         Family History:   Family History   Problem Relation Age of Onset    Nephrolithiasis Mother         RENAL CALCULUS    Other Mother         MOTHERS'S FAMILY HISTORY UNKOWN    Colon cancer Father 50        FAMILY HISTORY OF COLON CANCER    Other Father         FATHER'S FAMILY HISTORY UNKNOWN    Colon cancer Paternal Grandfather 60        FAMILY HISTORY OF COLON CANCER        Social History:   Social History     Socioeconomic History    Marital status:    Tobacco Use    Smoking status: Former     Current packs/day: 0.00     Average packs/day: 2.0 packs/day for 22.0 years (44.0 ttl pk-yrs)     Types: Cigarettes     Start date:      Quit date:      Years since quittin.4    Smokeless tobacco: Never    Tobacco comments:     AGE STARTED 19 QUIT AGE 31 - SMOKED X 20 YEARS QUIT    Vaping Use    Vaping status: Never Used   Substance and Sexual Activity    Alcohol use: Not Currently     Comment: CURRENT SOME DAY  11/16/2020,06/26/2017    Drug use: Never    Sexual activity: Defer       Medications:     Current Facility-Administered Medications:     [Held by provider] apixaban (ELIQUIS) tablet 5 mg, 5 mg, Oral, BID, Ariel Nava MD    arformoterol (BROVANA) nebulizer solution 15 mcg, 15 mcg, Nebulization, BID - RT, Ariel Nava MD    atorvastatin (LIPITOR) tablet 10 mg, 10 mg, Oral, Nightly, Ariel Nava MD    sennosides-docusate (PERICOLACE) 8.6-50 MG per tablet 2 tablet, 2 tablet, Oral, BID PRN **AND** polyethylene glycol (MIRALAX) packet 17 g, 17 g, Oral, Daily PRN **AND** bisacodyl (DULCOLAX) EC tablet 5 mg, 5 mg, Oral, Daily PRN **AND** bisacodyl (DULCOLAX) suppository 10 mg, 10 mg, Rectal, Daily PRN, Ariel Nava MD    budesonide (PULMICORT) nebulizer solution 0.5 mg, 0.5 mg, Nebulization, BID - RT, Ariel Nava MD    dextrose (D50W) (25 g/50 mL) IV injection 25 g, 25 g, Intravenous, Q15 Min PRN, Ariel Nava MD    dextrose (GLUTOSE) oral gel 15 g, 15 g, Oral, Q15 Min PRN, Ariel Nava MD    furosemide (LASIX) tablet 40 mg, 40 mg, Oral, QAM, Ariel Nava MD, 40 mg at 06/07/24 1336    glucagon (GLUCAGEN) injection 1 mg, 1 mg, Intramuscular, Q15 Min PRN, Ariel Nava MD    insulin regular (humuLIN R,novoLIN R) injection 2-9 Units, 2-9 Units, Subcutaneous, Q6H, Ariel Nava MD    ipratropium-albuterol (DUO-NEB) nebulizer solution 3 mL, 3 mL, Nebulization, 4x Daily - RT, Ariel Nava MD, 3 mL at 06/07/24 1300    metoprolol succinate XL (TOPROL-XL) 24 hr tablet 50 mg, 50 mg, Oral, Daily, Ariel Nava MD, 50 mg at 06/07/24 1335    pantoprazole (PROTONIX) EC tablet 40 mg, 40 mg, Oral, QAM AC, Ariel Nava MD, 40 mg at 06/07/24 1336    Pharmacy to dose vancomycin, , Does not apply, Continuous PRN, Ariel Nava MD    Pharmacy to Dose Zosyn, , Does not apply, Continuous PRN, Ariel Nava MD    piperacillin-tazobactam (ZOSYN) IVPB 3.375 g IVPB in 100 mL NS (VTB), 3.375 g,  Intravenous, Q8H, Ariel Nava MD, 3.375 g at 06/07/24 1335    sodium chloride 0.9 % flush 10 mL, 10 mL, Intravenous, PRN, Todd Nguyen MD    sodium chloride 0.9 % flush 10 mL, 10 mL, Intravenous, Q12H, Ariel Nava MD, 10 mL at 06/07/24 1337    sodium chloride 0.9 % flush 10 mL, 10 mL, Intravenous, PRN, Ariel Nava MD    sodium chloride 0.9 % infusion 40 mL, 40 mL, Intravenous, PRN, Ariel Nava MD    [START ON 6/8/2024] vancomycin 1750 mg/500 mL 0.9% NS IVPB ([facility]), 1,750 mg, Intravenous, Q24H, Ariel Nava MD    Allergies:   Allergies   Allergen Reactions    Sulfa Antibiotics Unknown - Low Severity     Unsure of reaction       Review of Systems:     Systemic Symptoms: No fever, no recent weight loss   Head Symptoms: No headache, no facial pain, no facial weakness   Eye Symptoms: No blurry vision, full range of motion   Ears: No loss of hearing   Nose:    Throat: No hoarseness   Cardiovascular Symptoms: No cold hand or feet   Pulmonary Symptoms: No dyspnea or stridor   GI Symptoms: No dysphagia, no choking   Endocrine Symptoms: No heat or cold intolerance   Neurological Symptoms: No vertigo, no taste or smell disturbances   Skin Symptoms: No abnormal lumps, rash or cuts        Objective     Physical Exam:  Vital Signs:   Temp:  [98.8 °F (37.1 °C)] 98.8 °F (37.1 °C)  Heart Rate:  [] 92  Resp:  [16] 16  BP: ()/(60-99) 107/95  113 kg (248 lb 7.3 oz)  Body mass index is 30.24 kg/m².     General Appearance:  Alert, cooperative, in no acute distress, no audible stridor   Head:  Normocephalic, without obvious abnormality, atraumatic   Eyes:          Conjunctivae and sclerae normal, corneas clear, PERRLA   Ears:  Ear canals clear, right TM normal, left TM normal   Oral Exam: Normal tongue, tonsils normal and oropharynx clear without any clots.   Nose: The midline with patent airways with no clots or bleeding.   Laryngeal: Refer to fiber optic laryngoscopy procedure note   "  Fiberoptic Exam: Deferred   Neck: No adenopathy, supple, trachea midline, no thyromegaly, no carotid bruit   Salivary Glands: No palpable masses   Lungs:   Clear to auscultation,respirations regular, victorina and unlabored      Heart:  Regular rhythm and normal rate, normal S1 and S2, no       murmur, no gallop, no rub, no click   Abdomen: Soft non tender, non distended, no guarding   Pulses: Pulses palpable and equal bilaterally   Skin: No bleeding, bruising or rash   Lymph nodes: No palpable adenopathy   Neurologic: Cranial nerves II-XII grossly intact, no spontaneous nystagmus     Results Review:   I reviewed the patient's new clinical results.     Results from last 7 days   Lab Units 06/07/24  0556   WBC 10*3/mm3 13.63*   HEMOGLOBIN g/dL 9.0*   HEMATOCRIT % 30.0*   PLATELETS 10*3/mm3 262     Results from last 7 days   Lab Units 06/07/24  0545   SODIUM mmol/L 141   POTASSIUM mmol/L 4.1   CHLORIDE mmol/L 104   CO2 mmol/L 20.2*   BUN mg/dL 22   CREATININE mg/dL 1.24   GLUCOSE mg/dL 205*   CALCIUM mg/dL 9.2     No results found for: \"ACANTHNAEG\", \"AFBCX\", \"BPERTUSSISCX\", \"BLOODCX\"  No results found for: \"BCIDPCR\", \"CXREFLEX\", \"CSFCX\", \"CULTURETIS\"  No results found for: \"CULTURES\", \"HSVCX\", \"URCX\"  No results found for: \"EYECULTURE\", \"GCCX\", \"HSVCULTURE\", \"LABHSV\"  No results found for: \"LEGIONELLA\", \"MRSACX\", \"MUMPSCX\", \"MYCOPLASCX\"  No results found for: \"NOCARDIACX\", \"STOOLCX\"  No results found for: \"THROATCX\", \"UNSTIMCULT\", \"URINECX\", \"CULTURE\", \"VZVCULTUR\"  No results found for: \"VIRALCULTU\", \"WOUNDCX\"    Imaging Results (Last 72 Hours)       Procedure Component Value Units Date/Time    XR Chest 1 View [[id number]] Collected: 06/07/24 0539     Updated: 06/07/24 0545    Narrative:      XR CHEST 1 VW-     Date of Exam: 6/7/2024 5:20 AM     Indication: SOA/SOB/shortness of air/shortness of breath.     Comparison: 5/23/2023.     Findings:  A single AP upright portable view of the chest is provided for " review.  Pulmonary consolidation is seen on the right, including the right lung  base but also seen within the upper-to-mid right lung zones. The  findings suggest pneumonia. Aspiration pneumonia is possible.  Minimal,  if any, airspace disease is seen in the left. Borderline cardiac  enlargement is possible. External artifacts obscure detail. No  pneumothorax. No pleural effusion. The thoracic aorta is atherosclerotic  and ectatic. There are degenerative changes of the imaged spine.       Impression:      Right-sided pneumonia is identified, as discussed. Consider close  interval clinical and (if necessary) imaging follow-up to ensure a  benign progression and to exclude an underlying malignant process.              Please note that portions of this note were completed with a voice  recognition program.              Electronically Signed By-Edi Kwan MD On:6/7/2024 5:43 AM               Assessment / Plan      Assessment:  1.  Obstructive sleep apnea and more possibly central sleep apnea very concerning  2.  Patient sleeping through aspiration of blood from epistaxis.  3.  Left acute epistaxis currently resolved  4.  Right aspiration pneumonia with hypoxia  5.  Atrial fibrillation on Eliquis  6.  Medication induced coagulopathy  7.  Insulin-dependent diabetes mellitus    Plan:   1.  Since patient is not actively bleeding and also unable to detect any source of bleeding, I would recommend continue holding Eliquis for additional 5 days minimum.  Especially since this sounds like more of a posterior epistaxis.  2.  I am more concerned about patient's sleep apnea status and whether he really may be having central sleep apnea since patient is not waking up from cough and to breathe.  Patient may require sleep study repeated since last sleep study was done in the 1990s.  I would recommend referral to sleep specialist.  3.  Treat aspiration pneumonia as you are doing.  4.  I would recommend considered changing nasal  cannula to face tent with humidification.  5.  I have instructed patient extensively on pain attention to his environment especially during his sleep by avoiding use of fan, using humidifier next to his bed on the nightstand and also definitely avoid manipulating his nose.  6.  If patient continues to have bleeding from the posterior aspect we may consider nasal endoscopy.  Thank you for asked me to see this patient in consultation and I will see patient as needed.    I discussed the patients findings and my recommendations with the patient.     Time: Total face-to-face/floor time. 45 minutes

## 2024-06-07 NOTE — PLAN OF CARE
Goal Outcome Evaluation:                      FUNCTIONAL ASSESSMENT INSTRUMENT: Patient currently scored a level 7 of 7 on Functional Communication Measures for swallowing indicating a 0% limitation in function.    ASSESSMENT/ PLAN OF CARE:  Pt presents with functional oropharyngeal swallow.  No clinical signs or symptoms of aspiration noted.     REHAB POTENTIAL:  Pt has good  rehab potential.  The following limitations may influence improvement/ length of tx medical status.    RECOMMENDATIONS:   1.   DIET: Regular diet-thin liquids    2.  POSITION: 90 degrees upright for all intake    3.  COMPENSATORY STRATEGIES: Reflux precautions    Pt/responsible party agrees with plan of care and has been informed of all alternatives, risks and benefits.    EDUCATION  The patient has been educated in the following areas:   Dysphagia (Swallowing Impairment).

## 2024-06-07 NOTE — H&P
[facility]   HOSPITALIST HISTORY AND PHYSICAL  Date: 2024   Patient Name: Edi Weston  : 1945  MRN: [id number]  Primary Care Physician:  Jesus Lux DO  Date of admission: 2024    Subjective   Subjective     Chief Complaint: Nosebleed, aspiration event with hypoxia    HPI:    Edi Weston is a 79 y.o. male with past medical history of A-fib on Eliquis, type 2 diabetes mellitus, hypertension and HFpEF who presented after having a nosebleed last night and had an aspiration event leading to hypoxia in 70s.  He stated he has been having nosebleed issues for past 6 months and has an upcoming appointment with ENT in few weeks.  He had nosebleed from left nose last night which woke him up following which she had aspiration event.  He had difficulty breathing there after and was found to be hypoxic and 70s thereafter prompted him to come to ER immediately.  He denied any fever, chills, rigors, chest pain, nausea or abdominal pain.     On presentation to ER, patient was found to be hypoxic with SpO2 79% in room air and was placed on 15 L with 65% FiO2 with improvement of SpO2 to 96-98%.  He was also tachycardic to 120s initially.  Elevated blood pressure of 176/99 on presentation, blood pressure low normal later.  Lab work showed leukocytosis of 13.63, hemoglobin 9 with platelets 262.  Chemistry showed bicarb of 20.2 with anion gap 16.8, glucose of 205.  LFT was nonsignificant.  Lactic acid of 3 with repeat lactic acid of 2.  Chest x-ray was obtained which showed right-sided pneumonia.  Patient received 1 dose of Zosyn and vancomycin in the ED. Hospitalist service was consulted for admission of the patient.  Patient is being admitted with working diagnosis of acute hypoxic respiratory failure in the setting of aspiration pneumonia.      Personal History     Past Medical History:  Past Medical History:   Diagnosis Date    Arthritis     Cellulitis of right lower limb     Chronic heart  failure with preserved ejection fraction (HFpEF) 2023    Colon polyps     Decubitus ulcer of foot, stage 1 08/10/2018    Decubitus ulcer of foot, stage 3 2018    Foot pain, left 08/10/2018    Foot pain, right     Gout     Hammertoe 2019    History of colonoscopy with polypectomy 2020 TUBULAR ADENOMA, COLONOSCOPY SCHEDULED FOR 10/19/2020 WITH DR SULTANA    Ingrowing nail 2018    Medication management 2019    Nail dystrophy     PAT (paroxysmal atrial tachycardia) 2018    Pressure ulcer, stage 1     Tinea unguium     Type 2 diabetes, controlled, with peripheral neuropathy 08/10/2018       Past Surgical History:  Past Surgical History:   Procedure Laterality Date    ABDOMINAL SURGERY      lap band    COLONOSCOPY      GASTRIC BANDING         Family History:       Social History:   Social History     Socioeconomic History    Marital status:    Tobacco Use    Smoking status: Former     Current packs/day: 0.00     Average packs/day: 2.0 packs/day for 22.0 years (44.0 ttl pk-yrs)     Types: Cigarettes     Start date:      Quit date:      Years since quittin.4    Smokeless tobacco: Never    Tobacco comments:     AGE STARTED 19 QUIT AGE 31 - SMOKED X 20 YEARS QUIT    Vaping Use    Vaping status: Never Used   Substance and Sexual Activity    Alcohol use: Not Currently     Comment: CURRENT SOME DAY 2020,2017    Drug use: Never    Sexual activity: Defer       Home Medications:  apixaban, furosemide, glucose blood, glyburide, lisinopril, metFORMIN ER, metoprolol succinate XL, pantoprazole, and simvastatin    Allergies:  Allergies   Allergen Reactions    Sulfa Antibiotics Unknown - Low Severity     Unsure of reaction       Review of Systems   All systems were reviewed and negative except for: Hypoxia, aspiration    Objective   Objective     Vitals:   Temp:  [98.8 °F (37.1 °C)] 98.8 °F (37.1 °C)  Heart Rate:  [115-125] 117  Resp:  [16] 16  BP:  ()/(60-99) 92/68  Flow (L/min):  [40-50] 50    Physical Exam    Constitutional: Awake, alert, no acute distress   Respiratory: Bilateral decreased air entry, diffuse rhonchi appreciated with some bibasilar crackles, no wheeze, nonlabored respirations; on HFNC.   Cardiovascular: RRR, no murmurs   Gastrointestinal: Positive bowel sounds, soft, nontender, nondistended   Extremities: Trace pedal edema. Psychiatric: Appropriate affect, cooperative   Neurologic: Oriented x 3, speech clear       Result Review    Result Review:  I have personally reviewed the results from the time of this admission to 6/7/2024 07:35 EDT and agree with these findings:  []  Laboratory  []  Microbiology  []  Radiology  []  EKG/Telemetry   []  Cardiology/Vascular   []  Pathology  []  Old records  []  Other:      Assessment & Plan   Assessment / Plan     Assessment/Plan:   Acute hypoxic respiratory failure likely in the setting of aspiration pneumonia  Aspiration pneumonia, right-sided pneumonia  Severe sepsis likely in the setting of pneumonia  Nosebleed, resolved  Leukocytosis  Lactic acidosis, resolved, likely in the setting of severe sepsis  Elevated troponin, likely due to type II MI in the setting of hypoxia  Anemia  History of A-fib on Eliquis  History of type II DM  History of hypertension  History of HFpEF    -Patient is being admitted to medicine service.  -Presented with aspiration event post nosebleed episode at home causing possible aspiration leading to hypoxia.  -Currently on 50 L/min humidified HFNC with 65% FiO2.  Continue to titrate as tolerated to maintain SpO2 greater than 90%.  -Started on IV Zosyn and IV vancomycin for aspiration pneumonia.  -Lactic acidosis, resolved.  -Procalcitonin 0.14.  Will continue to trend.  -Leukocytosis, likely in the setting of pneumonia.  Will continue to trend and monitor.  -Started on Brovana and Pulmicort.  Also on DuoNebs.  -Obtaining strep pneumo, Legionella and MRSA PCR.  -Blood  culture sent, pending result.  -Pulmonology consulted, appreciate further input.  -On insulin sliding scale for history of diabetes  -Holding home dose of antihypertensive medication in the setting of low normal blood pressure.  -Holding home dose of Eliquis.  -ENT consulted for ongoing nasal bleed; stated patient has been having it for last 6 months.  -Continue Lipitor 10 mg daily for hyperlipidemia.  -Patient is on Lasix 40 mg daily at home, continue.  -Continue rest of the current management.  -Speech consulted.  Patient n.p.o. currently until evaluated by speech.  -PT/OT.    DVT prophylaxis:  No DVT prophylaxis order currently exists.        CODE STATUS:    Code Status (Patient has no pulse and is not breathing): CPR (Attempt to Resuscitate)  Medical Interventions (Patient has pulse or is breathing): Full Support      Admission Status:  I believe this patient meets inpatient status.    Electronically signed by Ariel Nava MD, 06/07/24, 7:35 AM EDT.

## 2024-06-07 NOTE — ED PROVIDER NOTES
Time: 7:12 AM EDT  Date of encounter:  6/7/2024  Independent Historian/Clinical History and Information was obtained by:   Patient    History is limited by: N/A    Chief Complaint: shortness of breath      History of Present Illness:  Patient is a 79 y.o. year old male who presents to the emergency department for evaluation of Shortness of breath.  On arrival O2 sat in the 70s.  Patient was placed on nonrebreather.  Patient states he believes he aspirated.  He states he woke up and coughed up blood from and nosebleed.  He states he has had issues before with aspiration.  He denies any fever or chills.  No other complaints.    HPI    Patient Care Team  Primary Care Provider: Jesus Lux DO    Past Medical History:     Allergies   Allergen Reactions    Sulfa Antibiotics Unknown - Low Severity     Unsure of reaction     Past Medical History:   Diagnosis Date    Arthritis     Cellulitis of right lower limb     Chronic heart failure with preserved ejection fraction (HFpEF) 03/20/2023    Colon polyps     Decubitus ulcer of foot, stage 1 08/10/2018    Decubitus ulcer of foot, stage 3 02/07/2018    Foot pain, left 08/10/2018    Foot pain, right     Gout     Hammertoe 12/30/2019    History of colonoscopy with polypectomy 08/25/2020    2018 TUBULAR ADENOMA, COLONOSCOPY SCHEDULED FOR 10/19/2020 WITH DR SULTANA    Ingrdontaing nail 11/02/2018    Medication management 02/18/2019    Nail dystrophy     PAT (paroxysmal atrial tachycardia) 08/06/2018    Pressure ulcer, stage 1     Tinea unguium     Type 2 diabetes, controlled, with peripheral neuropathy 08/10/2018     Past Surgical History:   Procedure Laterality Date    ABDOMINAL SURGERY      lap band    COLONOSCOPY      GASTRIC BANDING  2007     Family History   Problem Relation Age of Onset    Nephrolithiasis Mother         RENAL CALCULUS    Other Mother         MOTHERS'S FAMILY HISTORY UNKOWN    Colon cancer Father 50        FAMILY HISTORY OF COLON CANCER    Other Father          FATHER'S FAMILY HISTORY UNKNOWN    Colon cancer Paternal Grandfather 60        FAMILY HISTORY OF COLON CANCER        Home Medications:  Prior to Admission medications    Medication Sig Start Date End Date Taking? Authorizing Provider   apixaban (Eliquis) 5 MG tablet tablet Take 1 tablet by mouth 2 (Two) Times a Day. 24   Jesus Lux DO   furosemide (Lasix) 40 MG tablet Take 1 tablet by mouth Daily. 24   Jesus Lux DO   glyburide (DIAbeta) 2.5 MG tablet Take 1 tablet by mouth Daily With Breakfast. 24   Jesus Lux DO   lisinopril (PRINIVIL,ZESTRIL) 20 MG tablet Take 1 tablet by mouth Daily. 24   Jesus Lux DO   metFORMIN ER (GLUCOPHAGE-XR) 500 MG 24 hr tablet Take 2 tablets by mouth Every Evening. 24   Jesus Lux DO   metoprolol succinate XL (TOPROL-XL) 50 MG 24 hr tablet Take 1 tablet by mouth Daily. 24   Jesus Lux DO   OneTouch Ultra test strip 1 each by Other route 2 (Two) Times a Day. 24   Jesus Lux DO   pantoprazole (PROTONIX) 40 MG EC tablet Take 1 tablet by mouth Daily. 24   Jesus Lux DO   simvastatin (ZOCOR) 20 MG tablet Take 1 tablet by mouth Every Evening. 24   Jesus Lux DO        Social History:   Social History     Tobacco Use    Smoking status: Former     Current packs/day: 0.00     Average packs/day: 2.0 packs/day for 22.0 years (44.0 ttl pk-yrs)     Types: Cigarettes     Start date:      Quit date:      Years since quittin.4    Smokeless tobacco: Never    Tobacco comments:     AGE STARTED 19 QUIT AGE 31 - SMOKED X 20 YEARS QUIT    Vaping Use    Vaping status: Never Used   Substance Use Topics    Alcohol use: Not Currently     Comment: CURRENT SOME DAY 2020,2017    Drug use: Never         Review of Systems:  Review of Systems   Constitutional:  Negative for chills and fever.   HENT:  Negative for  "congestion, ear pain and sore throat.    Eyes:  Negative for pain.   Respiratory:  Positive for cough and shortness of breath. Negative for chest tightness.    Cardiovascular:  Negative for chest pain.   Gastrointestinal:  Negative for abdominal pain, diarrhea, nausea and vomiting.   Genitourinary:  Negative for flank pain and hematuria.   Musculoskeletal:  Negative for joint swelling.   Skin:  Negative for pallor.   Neurological:  Negative for seizures and headaches.   All other systems reviewed and are negative.       Physical Exam:  BP 92/68   Pulse 117   Temp 98.8 °F (37.1 °C) (Oral)   Resp 16   Ht 193 cm (76\")   Wt 111 kg (245 lb 6 oz)   SpO2 97%   BMI 29.87 kg/m²     Physical Exam  Constitutional:       Appearance: Normal appearance.   HENT:      Head: Normocephalic and atraumatic.      Nose: Nose normal.      Mouth/Throat:      Mouth: Mucous membranes are moist.   Eyes:      Extraocular Movements: Extraocular movements intact.      Conjunctiva/sclera: Conjunctivae normal.      Pupils: Pupils are equal, round, and reactive to light.   Cardiovascular:      Rate and Rhythm: Regular rhythm. Tachycardia present.      Pulses: Normal pulses.      Heart sounds: Normal heart sounds.   Pulmonary:      Effort: Pulmonary effort is normal.      Breath sounds: Rhonchi present.   Abdominal:      General: There is no distension.      Palpations: Abdomen is soft.      Tenderness: There is no abdominal tenderness.   Musculoskeletal:         General: Normal range of motion.      Cervical back: Normal range of motion.   Skin:     General: Skin is warm and dry.      Capillary Refill: Capillary refill takes less than 2 seconds.   Neurological:      General: No focal deficit present.      Mental Status: He is alert and oriented to person, place, and time. Mental status is at baseline.   Psychiatric:         Mood and Affect: Mood normal.         Behavior: Behavior normal.                  Procedures:  Procedures      Medical " Decision Making:      Comorbidities that affect care:    Congestive Heart Failure, Diabetes    External Notes reviewed:    Previous Clinic Note: Patient seen by his PCP on 5/6/2024 for hypertension, hyperlipidemia, atrial flutter, diabetes, sciatica, left-sided nosebleeds.      The following orders were placed and all results were independently analyzed by me:  Orders Placed This Encounter   Procedures    Blood Culture - Blood,    Blood Culture - Blood,    XR Chest 1 View    [place] Draw    Comprehensive Metabolic Panel    BNP    Single High Sensitivity Troponin T    CBC Auto Differential    Lactic Acid, Plasma    Protime-INR    aPTT    STAT Lactic Acid, Reflex    NPO Diet NPO Type: Strict NPO    Undress & Gown    Continuous Pulse Oximetry    Vital Signs    Inpatient Hospitalist Consult    Oxygen Therapy- Nasal Cannula; Titrate 1-6 LPM Per SpO2; 90 - 95%    ECG 12 Lead ED Triage Standing Order; SOA    Insert Peripheral IV    CBC & Differential    Green Top (Gel)    Lavender Top    Gold Top - SST    Light Blue Top       Medications Given in the Emergency Department:  Medications   sodium chloride 0.9 % flush 10 mL (has no administration in time range)   vancomycin 2250 mg/500 mL 0.9% NS IVPB ([facility]) (2,250 mg Intravenous New Bag 6/7/24 0652)   sodium chloride 0.9 % bolus 500 mL (has no administration in time range)   piperacillin-tazobactam (ZOSYN) IVPB 3.375 g IVPB in 100 mL NS (VTB) (0 g Intravenous Stopped 6/7/24 0652)        ED Course:    ED Course as of 06/07/24 0718   Fri Jun 07, 2024   0717 ECG 12 Lead ED Triage Standing Order; SOA  Atrial fibrillation with rate of 116.  PVCs present.  No definite acute ST elevation.  EKG interpreted by me [LD]      ED Course User Index  [LD] Todd Nguyen MD       Labs:    Lab Results (last 24 hours)       Procedure Component Value Units Date/Time    Comprehensive Metabolic Panel [[id number]]  (Abnormal) Collected: 06/07/24 0545    Specimen: Blood Updated: 06/07/24 0630      Glucose 205 mg/dL      BUN 22 mg/dL      Creatinine 1.24 mg/dL      Sodium 141 mmol/L      Potassium 4.1 mmol/L      Comment: Slight hemolysis detected by analyzer. Result may be falsely elevated.        Chloride 104 mmol/L      CO2 20.2 mmol/L      Calcium 9.2 mg/dL      Total Protein 6.4 g/dL      Albumin 4.0 g/dL      ALT (SGPT) 10 U/L      AST (SGOT) 19 U/L      Alkaline Phosphatase 95 U/L      Total Bilirubin 0.6 mg/dL      Globulin 2.4 gm/dL      A/G Ratio 1.7 g/dL      BUN/Creatinine Ratio 17.7     Anion Gap 16.8 mmol/L      eGFR 59.9 mL/min/1.73     Narrative:      GFR Normal >60  Chronic Kidney Disease <60  Kidney Failure <15    The GFR formula is only valid for adults with stable renal function between ages 18 and 70.    BNP [[id number]]  (Normal) Collected: 06/07/24 0545    Specimen: Blood Updated: 06/07/24 0621     proBNP 1,590.0 pg/mL     Narrative:      This assay is used as an aid in the diagnosis of individuals suspected of having heart failure. It can be used as an aid in the diagnosis of acute decompensated heart failure (ADHF) in patients presenting with signs and symptoms of ADHF to the emergency department (ED). In addition, NT-proBNP of <300 pg/mL indicates ADHF is not likely.    Age Range Result Interpretation  NT-proBNP Concentration (pg/mL:      <50             Positive            >450                   Gray                 300-450                    Negative             <300    50-75           Positive            >900                  Gray                300-900                  Negative            <300      >75             Positive            >1800                  Gray                300-1800                  Negative            <300    Single High Sensitivity Troponin T [[id number]]  (Abnormal) Collected: 06/07/24 0545    Specimen: Blood Updated: 06/07/24 0630     HS Troponin T 27 ng/L     Narrative:      High Sensitive Troponin T Reference Range:  <14.0 ng/L- Negative Female for  AMI  <22.0 ng/L- Negative Male for AMI  >=14 - Abnormal Female indicating possible myocardial injury.  >=22 - Abnormal Male indicating possible myocardial injury.   Clinicians would have to utilize clinical acumen, EKG, Troponin, and serial changes to determine if it is an Acute Myocardial Infarction or myocardial injury due to an underlying chronic condition.         CBC & Differential [[id number]]  (Abnormal) Collected: 06/07/24 0556    Specimen: Blood from Arm, Left Updated: 06/07/24 0613    Narrative:      The following orders were created for panel order CBC & Differential.  Procedure                               Abnormality         Status                     ---------                               -----------         ------                     CBC Auto Differential[[id number]]        Abnormal            Final result                 Please view results for these tests on the individual orders.    CBC Auto Differential [[id number]]  (Abnormal) Collected: 06/07/24 0556    Specimen: Blood from Arm, Left Updated: 06/07/24 0613     WBC 13.63 10*3/mm3      RBC 3.80 10*6/mm3      Hemoglobin 9.0 g/dL      Hematocrit 30.0 %      MCV 78.9 fL      MCH 23.7 pg      MCHC 30.0 g/dL      RDW 17.1 %      RDW-SD 48.9 fl      MPV 9.3 fL      Platelets 262 10*3/mm3      Neutrophil % 90.8 %      Lymphocyte % 3.7 %      Monocyte % 4.0 %      Eosinophil % 0.7 %      Basophil % 0.4 %      Immature Grans % 0.4 %      Neutrophils, Absolute 12.40 10*3/mm3      Lymphocytes, Absolute 0.50 10*3/mm3      Monocytes, Absolute 0.54 10*3/mm3      Eosinophils, Absolute 0.09 10*3/mm3      Basophils, Absolute 0.05 10*3/mm3      Immature Grans, Absolute 0.05 10*3/mm3      nRBC 0.0 /100 WBC     Lactic Acid, Plasma [[id number]]  (Abnormal) Collected: 06/07/24 0556    Specimen: Blood from Arm, Left Updated: 06/07/24 0644     Lactate 3.0 mmol/L     Blood Culture - Blood, Arm, Left [[id number]] Collected: 06/07/24 0556    Specimen: Blood from Arm, Left  Updated: 06/07/24 0608    Blood Culture - Blood, Arm, Left [[id number]] Collected: 06/07/24 0556    Specimen: Blood from Arm, Left Updated: 06/07/24 0609    Protime-INR [[id number]]  (Abnormal) Collected: 06/07/24 0556    Specimen: Blood from Arm, Left Updated: 06/07/24 0623     Protime 16.7 Seconds      INR 1.33    Narrative:      Suggested Therapeutic Ranges For Oral Anticoagulant Therapy:  Level of Therapy                      INR Target Range  Standard Dose                            2.0-3.0  High Dose                                2.5-3.5  Patients not receiving anticoagulant  Therapy Normal Range                     0.86-1.15    aPTT [[id number]]  (Normal) Collected: 06/07/24 0556    Specimen: Blood from Arm, Left Updated: 06/07/24 0623     PTT 27.6 seconds              Imaging:    XR Chest 1 View    Result Date: 6/7/2024  XR CHEST 1 VW-  Date of Exam: 6/7/2024 5:20 AM  Indication: SOA/SOB/shortness of air/shortness of breath.  Comparison: 5/23/2023.  Findings: A single AP upright portable view of the chest is provided for review. Pulmonary consolidation is seen on the right, including the right lung base but also seen within the upper-to-mid right lung zones. The findings suggest pneumonia. Aspiration pneumonia is possible.  Minimal, if any, airspace disease is seen in the left. Borderline cardiac enlargement is possible. External artifacts obscure detail. No pneumothorax. No pleural effusion. The thoracic aorta is atherosclerotic and ectatic. There are degenerative changes of the imaged spine.      Right-sided pneumonia is identified, as discussed. Consider close interval clinical and (if necessary) imaging follow-up to ensure a benign progression and to exclude an underlying malignant process.     Please note that portions of this note were completed with a voice recognition program.     Electronically Signed By-Edi Kwan MD On:6/7/2024 5:43 AM         Differential Diagnosis and Discussion:    Dyspnea:  Differential diagnosis includes but is not limited to metabolic acidosis, neurological disorders, psychogenic, asthma, pneumothorax, upper airway obstruction, COPD, pneumonia, noncardiogenic pulmonary edema, interstitial lung disease, anemia, congestive heart failure, and pulmonary embolism    All labs were reviewed and interpreted by me.  All X-rays impressions were independently interpreted by me.  EKG was interpreted by me.    MDM  Number of Diagnoses or Management Options  Diagnosis management comments: On arrival patient is hypoxic.  He was placed on nonrebreather and later switched to high flow oxygen.  X-ray showed right-sided pneumonia.  Patient started on antibiotics.  Patient was discussed with hospitalist and will admit for further care.       Amount and/or Complexity of Data Reviewed  Clinical lab tests: reviewed  Tests in the radiology section of CPT®: reviewed  Review and summarize past medical records: yes  Independent visualization of images, tracings, or specimens: yes    Risk of Complications, Morbidity, and/or Mortality  Presenting problems: moderate  Management options: moderate         Critical Care Note: Total Critical Care time of 35 minutes. Total critical care time documented does not include time spent on separately billed procedures for services of nurses or physician assistants. I personally saw and examined the patient. I have reviewed all diagnostic interpretations and treatment plans as written. I was present for the key portions of any procedures performed and the inclusive time noted in any critical care statement. Critical care time includes patient management by me, time spent at the patients bedside,  time to review lab and imaging results, discussing patient care, documentation in the medical record, and time spent with family or caregiver.        Patient Care Considerations:    CONSULT: I considered consulting pulmonology, however patient stable for in patient  evaluation      Consultants/Shared Management Plan:    Hospitalist: I have discussed the case with Hospitalist who agrees to accept the patient for admission.    Social Determinants of Health:    Patient is independent, reliable, and has access to care.       Disposition and Care Coordination:    Admit:   Through independent evaluation of the patient's history, physical, and imperical data, the patient meets criteria for inpatient admission to the hospital.        Final diagnoses:   Acute respiratory failure with hypoxia   Pneumonia due to infectious organism, unspecified laterality, unspecified part of lung        ED Disposition       ED Disposition   Decision to Admit    Condition   --    Comment   --               This medical record created using voice recognition software.             Todd Nguyen MD  06/07/24 0705

## 2024-06-07 NOTE — PLAN OF CARE
Goal Outcome Evaluation:              Outcome Evaluation: New admission to PCU. Pt sating 97% on 2L NC. Pt treated per MAR for pneumonia.

## 2024-06-07 NOTE — CONSULTS
Pulmonary / Critical Care Consult Note      Patient Name: Edi Weston  : 1945  MRN: [id number]  Primary Care Physician:  Jesus Lux DO  Referring Physician: Ariel Nava MD  Date of admission: 2024    Subjective   Subjective     Reason for Consult/ Chief Complaint:   Shortness of breath, pneumonia    HPI:  Edi Weston is a 79 y.o. male with a history of atrial fibrillation on Eliquis, recent aspiration event, subacute epistaxis for the last couple months awaiting ENT evaluation, history of lap band placement in the past came in feeling poorly.  Reports nosebleed last night and states that he aspirated and choked.  He then stated he vomited all that blood up and it went to his lungs.  He came in with marked increased work of breathing with O2 saturations in the 70s.  He ultimately stabilized on Airvo.  Respiratory status remains rather tenuous.  Has no fevers or chills.  He has been started antibiotics for aspiration pneumonia.  He has not had any fluid light out of his Lap-Band in about 6 years.  He has lost about 140 pounds since Lap-Band placement.  Currently not having any nosebleeds but reports he feels like he is aspirating this blood into his lungs.        Personal History     Past Medical History:   Diagnosis Date    Arthritis     Cellulitis of right lower limb     Chronic heart failure with preserved ejection fraction (HFpEF) 2023    Colon polyps     Decubitus ulcer of foot, stage 1 08/10/2018    Decubitus ulcer of foot, stage 3 2018    Foot pain, left 08/10/2018    Foot pain, right     Gout     Hammertoe 2019    History of colonoscopy with polypectomy 2020    2018 TUBULAR ADENOMA, COLONOSCOPY SCHEDULED FOR 10/19/2020 WITH DR SULTANA    Ingrowing nail 2018    Medication management 2019    Nail dystrophy     PAT (paroxysmal atrial tachycardia) 2018    Pressure ulcer, stage 1     Tinea unguium     Type 2 diabetes, controlled, with  peripheral neuropathy 08/10/2018       Past Surgical History:   Procedure Laterality Date    ABDOMINAL SURGERY      lap band    COLONOSCOPY      GASTRIC BANDING  2007       Family History: family history includes Colon cancer (age of onset: 50) in his father; Colon cancer (age of onset: 60) in his paternal grandfather; Nephrolithiasis in his mother; Other in his father and mother. Otherwise pertinent FHx was reviewed and not pertinent to current issue.    Social History:  reports that he quit smoking about 37 years ago. His smoking use included cigarettes. He started smoking about 59 years ago. He has a 44 pack-year smoking history. He has never used smokeless tobacco. He reports that he does not currently use alcohol. He reports that he does not use drugs.    Home Medications:  apixaban, furosemide, glyburide, lisinopril, metFORMIN ER, metoprolol succinate XL, pantoprazole, and simvastatin    Allergies:  Allergies   Allergen Reactions    Sulfa Antibiotics Unknown - Low Severity     Unsure of reaction       Objective    Objective     Vitals:   Temp:  [98.8 °F (37.1 °C)] 98.8 °F (37.1 °C)  Heart Rate:  [100-125] 100  Resp:  [16] 16  BP: ()/(60-99) 107/72  Flow (L/min):  [40-50] 45    Physical Exam:  Vital Signs Reviewed   General:  WDWN, Alert, NAD.    HEENT:  PERRL, EOMI.  OP, nares clear  Chest:  good aeration, coarse crackles and rhonchi right chest, tympanic to percussion bilaterally, increased work of breathing on Airvo  CV: Sinus tachycardia, no MGR, pulses 2+, equal.  Abd:  Soft, NT, ND, + BS, no HSM  EXT:  no clubbing, no cyanosis, no edema  Neuro:  A&Ox3, CN grossly intact, no focal deficits.  Skin: No rashes or lesions noted      Result Review    Result Review:  I have personally reviewed the results from the time of this admission to 6/7/2024 12:17 EDT and agree with these findings:  [x]  Laboratory  [x]  Microbiology  [x]  Radiology  [x]  EKG/Telemetry   []  Cardiology/Vascular   []  Pathology  []   Old records  []  Other:  Most notable findings include:       Lab 06/07/24  0556 06/07/24  0545   WBC 13.63*  --    HEMOGLOBIN 9.0*  --    HEMATOCRIT 30.0*  --    PLATELETS 262  --    SODIUM  --  141   POTASSIUM  --  4.1   CHLORIDE  --  104   CO2  --  20.2*   BUN  --  22   CREATININE  --  1.24   GLUCOSE  --  205*   CALCIUM  --  9.2   TOTAL PROTEIN  --  6.4   ALBUMIN  --  4.0   GLOBULIN  --  2.4     X-ray with worsening right lower lobe airspace disease    CT from the end of May showing right lower lobe pneumonia, Lap-Band placement with esophageal dilation and fluid-filled esophagus    Assessment & Plan   Assessment / Plan     Active Hospital Problems:  Active Hospital Problems    Diagnosis     **Acute hypoxic respiratory failure      Impression:  Acute hypoxemic respiratory failure requiring Airvo  Aspiration pneumonia from unspecified organism  Aspiration of vomitus/epistaxis in airways  Persistent epistaxis, chronic  Atrial fibrillation on Eliquis leukocytosis  Type 2 diabetes with hyperglycemia  Status post lap band placement in the past    Plan:  Status rather tenuous and requiring Airvo.  Continue Airvo for now.  Wean O2 to keep SPO2 greater than 90%  Suspect the patient aspirated his epistaxis into his lung causing worsening respiratory status.  Given his esophageal dilation, I think he is at high risk of recurrent aspiration as he cannot swallow the blood from his epistaxis and it is being vomited and aspirated into his lungs  I will hold the patient's Eliquis for now.  Low threshold for ENT consult if we see epistaxis this hospitalization.  Start DuoNebs, bronchodilators bronchopulmonary hygiene protocol  Agree with vancomycin and Zosyn.  Can de-escalate based off cultures.  Stop vancomycin if MRSA PCR is negative  Once this patient has improved and is able to get out of the hospital, I have encouraged him to follow-up with his general surgeon who placed the Lap-Band, Dr. Be Willett, to have Lap-Band  loosened/deflated to see if this will help his recurrent aspiration events  Check noncontrast chest CT  Check procalcitonin    DVT prophylaxis:  Medical DVT prophylaxis orders are present.      Code Status and Medical Interventions:   Ordered at: 06/07/24 0735     Code Status (Patient has no pulse and is not breathing):    CPR (Attempt to Resuscitate)     Medical Interventions (Patient has pulse or is breathing):    Full Support          Labs, imaging, notes and medications personally reviewed.  Discussed with primary.  31 minutes of critical care time spent managing this patient, excluding procedures    Thank you for involving me in the care of this patient.    Electronically signed by Jono Ventura MD, 06/07/24, 12:21 PM EDT.     declines

## 2024-06-07 NOTE — ED NOTES
"Pt refusing to stop in CT during transport to the floor as he has \"to make an important business phone call at 1230\". Floor nurse and ED CT made aware.   "

## 2024-06-07 NOTE — CASE MANAGEMENT/SOCIAL WORK
Discharge Planning Assessment   [facility]     Patient Name: Edi Weston  MRN: [id number]  Today's Date: 6/7/2024    Admit Date: 6/7/2024    Plan: Pt lives at home with wife Riana. Pt independent at home. PCP: MIKHAIL Lux, Pharm: [facility]. Pt denies any fin. needs. Pt states he has a walker at home if needed. Pt plans to return home at discharge no needs. SW will continue to follow for needs.   Discharge Needs Assessment       Row Name 06/07/24 1428       Living Environment    People in Home spouse    Current Living Arrangements home    Potentially Unsafe Housing Conditions none    In the past 12 months has the electric, gas, oil, or water company threatened to shut off services in your home? No    Primary Care Provided by self    Provides Primary Care For no one    Family Caregiver if Needed spouse    Quality of Family Relationships helpful;involved;supportive    Able to Return to Prior Arrangements yes       Resource/Environmental Concerns    Resource/Environmental Concerns none    Transportation Concerns none       Transportation Needs    In the past 12 months, has lack of transportation kept you from medical appointments or from getting medications? no    In the past 12 months, has lack of transportation kept you from meetings, work, or from getting things needed for daily living? No       Food Insecurity    Within the past 12 months, you worried that your food would run out before you got the money to buy more. Never true    Within the past 12 months, the food you bought just didn't last and you didn't have money to get more. Never true       Transition Planning    Patient/Family Anticipates Transition to home with family    Patient/Family Anticipated Services at Transition none    Transportation Anticipated car, drives self;family or friend will provide       Discharge Needs Assessment    Equipment Currently Used at Home none    Concerns to be Addressed discharge planning    Anticipated Changes Related to  Illness none    Equipment Needed After Discharge none    Discharge Coordination/Progress Pt lives at home with wife Riana. Pt independent at home. PCP: MIKHAIL Lux, Pharm: [facility]. Pt denies any fin. needs. Pt states he has a walker at home if needed. Pt plans to return home at discharge no needs. SW will continue to follow for needs.                   Discharge Plan       Row Name 06/07/24 1431       Plan    Plan Pt lives at home with wife Riana. Pt independent at home. PCP: MIKHAIL Lux, Pharm: [facility]. Pt denies any fin. needs. Pt states he has a walker at home if needed. Pt plans to return home at discharge no needs. SW will continue to follow for needs.                  Continued Care and Services - Admitted Since 6/7/2024    No active coordination exists for this encounter.          Demographic Summary       Row Name 06/07/24 1426       General Information    Admission Type inpatient    Arrived From emergency department    Referral Source admission list    Reason for Consult discharge planning    Preferred Language English       Contact Information    Permission Granted to Share Info With lay caregiver    Contact Information Obtained for lay caregiver       Lay Caregiver Information    Name, Lay Caregiver Riana Weston    Phone, Lay Caregiver Riana Weston    Pager, Lay Caregiver [phone]                   Functional Status       Row Name 06/07/24 1427       Functional Status    Usual Activity Tolerance excellent    Current Activity Tolerance excellent       Physical Activity    On average, how many days per week do you engage in moderate to strenuous exercise (like a brisk walk)? 3 days    On average, how many minutes do you engage in exercise at this level? 30 min    Number of minutes of exercise per week 90       Assessment of Health Literacy    How often do you have someone help you read hospital materials? Never    How often do you have problems learning about your medical condition because of difficulty  understanding written information? Never    How often do you have a problem understanding what is told to you about your medical condition? Never    How confident are you filling out medical forms by yourself? Quite a bit    Health Literacy Good       Functional Status, IADL    Medications independent    Meal Preparation independent    Housekeeping independent    Laundry independent    Shopping independent       Mental Status    General Appearance WDL WDL       Mental Status Summary    Recent Changes in Mental Status/Cognitive Functioning no changes       Employment/    Employment Status retired                   Psychosocial    No documentation.                  Abuse/Neglect    No documentation.                  Legal       Row Name 06/07/24 1427       Financial Resource Strain    How hard is it for you to pay for the very basics like food, housing, medical care, and heating? Not hard       Financial/Legal    Source of Income pension/FCI;social security    Application for Public Assistance not applied       Legal    Criminal Activity/Legal Involvement none                   Substance Abuse    No documentation.                  Patient Forms    No documentation.                     Ciarra Angel

## 2024-06-07 NOTE — PROGRESS NOTES
Respiratory Therapist Broncho-Pulmonary Hygiene Progress Note      Patient Name:  Edi Weston  YOB: 1945    Edi Weston meets the qualification for Level 2 of the Bronco-Pulmonary Hygiene Protocol. This was based on my daily patient assessment and includes review of chest x-ray results, cough ability and quality, oxygenation, secretions or risk for secretion development and patient mobility.     Broncho-Pulmonary Hygiene Assessment:    Level of Movement: Actively changing positions without assistance  Alert/ oriented/ cooperative    Breath Sounds: Diminished and/or coarse rhonchi    Cough: Strong, effective    Chest X-Ray: Presence of atelectasis and/or consolidation    Sputum Productions: None or small amount of thin or watery secretions with effective cough    History and Physical: None    SpO2 to Oxygen Need: greater than 92% on room air or  less than 3L nasal canula    Current SpO2 is: 97 on 2lpm    Based on this information, I have completed the following interventions: Aerobika with bronchodialtor medication or TID      Electronically signed by Indira Van RRT, 06/07/24, 3:03 PM EDT.

## 2024-06-07 NOTE — PROGRESS NOTES
"[facility] Clinical Pharmacy Services: Vancomycin Pharmacokinetic Initial Consult Note    Edi Weston is a 79 y.o. male who is on pharmacy to dose vancomycin for Pneumonia.    Consult Information  Consulting Provider: Elijah  Planned Duration of Therapy: 7 days,   Was Patient Receiving Prior to Admission/Consult?: No  Loading Dose Ordered or Given: 2250 mg on  at 0652  PK/PD Target: -600 mg/L.hr   Relevant ID History: -  Other Antimicrobials: Piperacillin/Tazobactam    Imaging Reviewed?: Yes - CT pending    Microbiology Data  MRSA PCR performed: 24; Result: Pending  Culture/Source: pending    Vitals/Labs  Ht: 193 cm (76\"); Wt: 111 kg (245 lb 6 oz)  Temp (24hrs), Av.8 °F (37.1 °C), Min:98.8 °F (37.1 °C), Max:98.8 °F (37.1 °C)   Estimated Creatinine Clearance: 68.1 mL/min (by C-G formula based on SCr of 1.24 mg/dL).     Results from last 7 days   Lab Units 24  0556 24  0545   CREATININE mg/dL  --  1.24   WBC 10*3/mm3 13.63*  --      Assessment/Plan:    Vancomycin Dose:  1750 mg IV every 24 hours; which provides the following predicted parameters:  AUC24,ss: 541 mg/L.hr  PAUC*: 80 %  Ctrough,ss: 16.3 mg/L  Pconc*: 33 %  Tox.: 12 %  Vanc Random planned for ,  at AM labs  Patient has order for Basic Metabolic Panel    Pharmacy will follow patient's kidney function and will adjust doses and obtain levels as necessary. Thank you for involving pharmacy in this patient's care. Please contact pharmacy with any questions or concerns.                           Brigitte Villanueva [facility]  Clinical Pharmacist    "

## 2024-06-08 ENCOUNTER — APPOINTMENT (OUTPATIENT)
Dept: CARDIOLOGY | Facility: HOSPITAL | Age: 79
DRG: 177 | End: 2024-06-08
Payer: MEDICARE

## 2024-06-08 LAB
ANION GAP SERPL CALCULATED.3IONS-SCNC: 8.9 MMOL/L (ref 5–15)
BASOPHILS # BLD AUTO: 0.05 10*3/MM3 (ref 0–0.2)
BASOPHILS NFR BLD AUTO: 0.4 % (ref 0–1.5)
BUN SERPL-MCNC: 23 MG/DL (ref 8–23)
BUN/CREAT SERPL: 19.7 (ref 7–25)
CALCIUM SPEC-SCNC: 8.5 MG/DL (ref 8.6–10.5)
CHLORIDE SERPL-SCNC: 104 MMOL/L (ref 98–107)
CO2 SERPL-SCNC: 27.1 MMOL/L (ref 22–29)
CREAT SERPL-MCNC: 1.17 MG/DL (ref 0.76–1.27)
DEPRECATED RDW RBC AUTO: 49 FL (ref 37–54)
EGFRCR SERPLBLD CKD-EPI 2021: 64.2 ML/MIN/1.73
EOSINOPHIL # BLD AUTO: 0.21 10*3/MM3 (ref 0–0.4)
EOSINOPHIL NFR BLD AUTO: 1.7 % (ref 0.3–6.2)
ERYTHROCYTE [DISTWIDTH] IN BLOOD BY AUTOMATED COUNT: 17.3 % (ref 12.3–15.4)
GLUCOSE BLDC GLUCOMTR-MCNC: 126 MG/DL (ref 70–99)
GLUCOSE BLDC GLUCOMTR-MCNC: 158 MG/DL (ref 70–99)
GLUCOSE BLDC GLUCOMTR-MCNC: 166 MG/DL (ref 70–99)
GLUCOSE BLDC GLUCOMTR-MCNC: 169 MG/DL (ref 70–99)
GLUCOSE SERPL-MCNC: 116 MG/DL (ref 65–99)
HCT VFR BLD AUTO: 24.6 % (ref 37.5–51)
HGB BLD-MCNC: 7.5 G/DL (ref 13–17.7)
IMM GRANULOCYTES # BLD AUTO: 0.06 10*3/MM3 (ref 0–0.05)
IMM GRANULOCYTES NFR BLD AUTO: 0.5 % (ref 0–0.5)
IRON 24H UR-MRATE: 14 MCG/DL (ref 59–158)
IRON SATN MFR SERPL: 4 % (ref 20–50)
LYMPHOCYTES # BLD AUTO: 1.1 10*3/MM3 (ref 0.7–3.1)
LYMPHOCYTES NFR BLD AUTO: 9.1 % (ref 19.6–45.3)
MAGNESIUM SERPL-MCNC: 1.5 MG/DL (ref 1.6–2.4)
MCH RBC QN AUTO: 24 PG (ref 26.6–33)
MCHC RBC AUTO-ENTMCNC: 30.5 G/DL (ref 31.5–35.7)
MCV RBC AUTO: 78.8 FL (ref 79–97)
MONOCYTES # BLD AUTO: 0.8 10*3/MM3 (ref 0.1–0.9)
MONOCYTES NFR BLD AUTO: 6.6 % (ref 5–12)
NEUTROPHILS NFR BLD AUTO: 81.7 % (ref 42.7–76)
NEUTROPHILS NFR BLD AUTO: 9.87 10*3/MM3 (ref 1.7–7)
NRBC BLD AUTO-RTO: 0 /100 WBC (ref 0–0.2)
PHOSPHATE SERPL-MCNC: 2.9 MG/DL (ref 2.5–4.5)
PLATELET # BLD AUTO: 202 10*3/MM3 (ref 140–450)
PMV BLD AUTO: 9.3 FL (ref 6–12)
POTASSIUM SERPL-SCNC: 3.9 MMOL/L (ref 3.5–5.2)
PROCALCITONIN SERPL-MCNC: 8.72 NG/ML (ref 0–0.25)
RBC # BLD AUTO: 3.12 10*6/MM3 (ref 4.14–5.8)
SODIUM SERPL-SCNC: 140 MMOL/L (ref 136–145)
TIBC SERPL-MCNC: 355 MCG/DL (ref 298–536)
TRANSFERRIN SERPL-MCNC: 238 MG/DL (ref 200–360)
WBC NRBC COR # BLD AUTO: 12.09 10*3/MM3 (ref 3.4–10.8)

## 2024-06-08 PROCEDURE — 94799 UNLISTED PULMONARY SVC/PX: CPT

## 2024-06-08 PROCEDURE — 82948 REAGENT STRIP/BLOOD GLUCOSE: CPT

## 2024-06-08 PROCEDURE — 93306 TTE W/DOPPLER COMPLETE: CPT | Performed by: INTERNAL MEDICINE

## 2024-06-08 PROCEDURE — 99232 SBSQ HOSP IP/OBS MODERATE 35: CPT | Performed by: INTERNAL MEDICINE

## 2024-06-08 PROCEDURE — 63710000001 INSULIN REGULAR HUMAN PER 5 UNITS: Performed by: STUDENT IN AN ORGANIZED HEALTH CARE EDUCATION/TRAINING PROGRAM

## 2024-06-08 PROCEDURE — 25810000003 SODIUM CHLORIDE 0.9 % SOLUTION: Performed by: STUDENT IN AN ORGANIZED HEALTH CARE EDUCATION/TRAINING PROGRAM

## 2024-06-08 PROCEDURE — 97165 OT EVAL LOW COMPLEX 30 MIN: CPT

## 2024-06-08 PROCEDURE — 84145 PROCALCITONIN (PCT): CPT | Performed by: STUDENT IN AN ORGANIZED HEALTH CARE EDUCATION/TRAINING PROGRAM

## 2024-06-08 PROCEDURE — 84100 ASSAY OF PHOSPHORUS: CPT | Performed by: STUDENT IN AN ORGANIZED HEALTH CARE EDUCATION/TRAINING PROGRAM

## 2024-06-08 PROCEDURE — 83540 ASSAY OF IRON: CPT | Performed by: STUDENT IN AN ORGANIZED HEALTH CARE EDUCATION/TRAINING PROGRAM

## 2024-06-08 PROCEDURE — 83735 ASSAY OF MAGNESIUM: CPT | Performed by: STUDENT IN AN ORGANIZED HEALTH CARE EDUCATION/TRAINING PROGRAM

## 2024-06-08 PROCEDURE — 93306 TTE W/DOPPLER COMPLETE: CPT

## 2024-06-08 PROCEDURE — 80048 BASIC METABOLIC PNL TOTAL CA: CPT | Performed by: STUDENT IN AN ORGANIZED HEALTH CARE EDUCATION/TRAINING PROGRAM

## 2024-06-08 PROCEDURE — 84466 ASSAY OF TRANSFERRIN: CPT | Performed by: STUDENT IN AN ORGANIZED HEALTH CARE EDUCATION/TRAINING PROGRAM

## 2024-06-08 PROCEDURE — 85025 COMPLETE CBC W/AUTO DIFF WBC: CPT | Performed by: STUDENT IN AN ORGANIZED HEALTH CARE EDUCATION/TRAINING PROGRAM

## 2024-06-08 PROCEDURE — 25010000002 VANCOMYCIN 5 G RECONSTITUTED SOLUTION: Performed by: STUDENT IN AN ORGANIZED HEALTH CARE EDUCATION/TRAINING PROGRAM

## 2024-06-08 PROCEDURE — 25010000002 PIPERACILLIN SOD-TAZOBACTAM PER 1 G: Performed by: STUDENT IN AN ORGANIZED HEALTH CARE EDUCATION/TRAINING PROGRAM

## 2024-06-08 PROCEDURE — 99232 SBSQ HOSP IP/OBS MODERATE 35: CPT | Performed by: STUDENT IN AN ORGANIZED HEALTH CARE EDUCATION/TRAINING PROGRAM

## 2024-06-08 RX ADMIN — Medication 10 ML: at 09:35

## 2024-06-08 RX ADMIN — PIPERACILLIN SODIUM AND TAZOBACTAM SODIUM 3.38 G: 3; .375 INJECTION, POWDER, LYOPHILIZED, FOR SOLUTION INTRAVENOUS at 22:33

## 2024-06-08 RX ADMIN — INSULIN HUMAN 2 UNITS: 100 INJECTION, SOLUTION PARENTERAL at 20:51

## 2024-06-08 RX ADMIN — METOPROLOL SUCCINATE 50 MG: 50 TABLET, EXTENDED RELEASE ORAL at 09:35

## 2024-06-08 RX ADMIN — IPRATROPIUM BROMIDE AND ALBUTEROL SULFATE 3 ML: .5; 3 SOLUTION RESPIRATORY (INHALATION) at 12:18

## 2024-06-08 RX ADMIN — ATORVASTATIN CALCIUM 10 MG: 10 TABLET, FILM COATED ORAL at 20:51

## 2024-06-08 RX ADMIN — PANTOPRAZOLE SODIUM 40 MG: 40 TABLET, DELAYED RELEASE ORAL at 07:55

## 2024-06-08 RX ADMIN — PIPERACILLIN SODIUM AND TAZOBACTAM SODIUM 3.38 G: 3; .375 INJECTION, POWDER, LYOPHILIZED, FOR SOLUTION INTRAVENOUS at 05:29

## 2024-06-08 RX ADMIN — PIPERACILLIN SODIUM AND TAZOBACTAM SODIUM 3.38 G: 3; .375 INJECTION, POWDER, LYOPHILIZED, FOR SOLUTION INTRAVENOUS at 14:44

## 2024-06-08 RX ADMIN — FUROSEMIDE 40 MG: 40 TABLET ORAL at 07:55

## 2024-06-08 RX ADMIN — Medication 10 ML: at 20:51

## 2024-06-08 RX ADMIN — INSULIN HUMAN 2 UNITS: 100 INJECTION, SOLUTION PARENTERAL at 12:24

## 2024-06-08 RX ADMIN — INSULIN HUMAN 2 UNITS: 100 INJECTION, SOLUTION PARENTERAL at 16:57

## 2024-06-08 RX ADMIN — IPRATROPIUM BROMIDE AND ALBUTEROL SULFATE 3 ML: .5; 3 SOLUTION RESPIRATORY (INHALATION) at 07:02

## 2024-06-08 RX ADMIN — ARFORMOTEROL TARTRATE 15 MCG: 15 SOLUTION RESPIRATORY (INHALATION) at 07:02

## 2024-06-08 RX ADMIN — Medication 400 MG: at 11:42

## 2024-06-08 RX ADMIN — VANCOMYCIN HYDROCHLORIDE 1750 MG: 5 INJECTION, POWDER, LYOPHILIZED, FOR SOLUTION INTRAVENOUS at 07:56

## 2024-06-08 RX ADMIN — BUDESONIDE 0.5 MG: 0.5 SUSPENSION RESPIRATORY (INHALATION) at 07:02

## 2024-06-08 NOTE — PROGRESS NOTES
[facility]   Hospitalist Progress Note  Date: 2024  Patient Name: Edi Weston  : 1945  MRN: [id number]  Date of admission: 2024  Room/Bed: 213/1      Subjective   Subjective     Chief Complaint: Nosebleed, aspiration event with hypoxia     Summary:Edi Weston is a 79 y.o. male  with past medical history of A-fib on Eliquis, type 2 diabetes mellitus, hypertension and HFpEF who presented after having a nosebleed last night and had an aspiration event leading to hypoxia in 70s.  He stated he has been having nosebleed issues for past 6 months and has an upcoming appointment with ENT in few weeks.  He had nosebleed from left nose last night which woke him up following which she had aspiration event.  He had difficulty breathing there after and was found to be hypoxic and 70s thereafter prompted him to come to ER immediately. In ER, hypoxic with SpO2 79% in room air and was placed on 15 L with 65% FiO2 with improvement of SpO2 to 96-98%.  Lab work showed leukocytosis of 13.63, hemoglobin 9 (  Around 10.5-10.7) .  Lactic acid of 3 with repeat lactic acid of 2.  Chest x-ray was obtained which showed right-sided pneumonia.  Patient was started on IV antibiotics and admitted with working diagnosis of acute hypoxic respiratory failure in the setting of aspiration pneumonia.  Pulmonology and ENT consulted.  CT chest with contrast was ordered.    Interval Followup: No acute events overnight.  Patient is saturating well on room air today.  Denies any fever, chills, rigors, chest pain or difficulty breathing.  Nosebleed has resolved.  CT chest with contrast showed multifocal pneumonia/aspiration but did show distended tempora/failed patulous thoracic esophagus increasing prior in the setting of gastric band, highly suspicious for tight gastric band and places patient at high risk for recurrent aspiration.    Review of Systems    All systems reviewed and negative except for what is outlined  above.      Objective   Objective     Vitals:   Temp:  [97.3 °F (36.3 °C)-99.1 °F (37.3 °C)] 98.6 °F (37 °C)  Heart Rate:  [70-92] 84  Resp:  [16-20] 20  BP: (107-115)/(67-95) 115/69  Flow (L/min):  [1-45] 1    Physical Exam   General: Awake, alert, NAD  Cardiovascular: RRR, no murmurs   Pulmonary: CTA bilaterally; crackles appreciated on right base, no wheezes; no conversational dyspnea.  Gastrointestinal: S/ND/NT, +BS  Neuro: Alert, awake, oriented x 3; speech clear; no tremor    Result Review    Result Review:  I have personally reviewed these results:  [x]  Laboratory      Lab 06/08/24 0451 06/07/24  0912 06/07/24  0556   WBC 12.09*  --  13.63*   HEMOGLOBIN 7.5*  --  9.0*   HEMATOCRIT 24.6*  --  30.0*   PLATELETS 202  --  262   NEUTROS ABS 9.87*  --  12.40*   IMMATURE GRANS (ABS) 0.06*  --  0.05   LYMPHS ABS 1.10  --  0.50*   MONOS ABS 0.80  --  0.54   EOS ABS 0.21  --  0.09   MCV 78.8*  --  78.9*   PROCALCITONIN 8.72*  --  0.14   LACTATE  --  2.0 3.0*   PROTIME  --   --  16.7*   APTT  --   --  27.6         Lab 06/08/24  0451 06/07/24  0545   SODIUM 140 141   POTASSIUM 3.9 4.1   CHLORIDE 104 104   CO2 27.1 20.2*   ANION GAP 8.9 16.8*   BUN 23 22   CREATININE 1.17 1.24   EGFR 64.2 59.9*   GLUCOSE 116* 205*   CALCIUM 8.5* 9.2   MAGNESIUM 1.5*  --    PHOSPHORUS 2.9  --          Lab 06/07/24  0545   TOTAL PROTEIN 6.4   ALBUMIN 4.0   GLOBULIN 2.4   ALT (SGPT) 10   AST (SGOT) 19   BILIRUBIN 0.6   ALK PHOS 95         Lab 06/07/24  0556 06/07/24  0545   PROBNP  --  1,590.0   HSTROP T  --  27*   PROTIME 16.7*  --    INR 1.33*  --                  Brief Urine Lab Results  (Last result in the past 365 days)        Color   Clarity   Blood   Leuk Est   Nitrite   Protein   CREAT   Urine HCG        06/07/24 1411 Yellow   Turbid   Negative   Negative   Negative   Negative                 [x]  Microbiology   Microbiology Results (last 10 days)       Procedure Component Value - Date/Time    MRSA Screen, PCR (Inpatient) - Swab,  Nares [[id number]]  (Normal) Collected: 06/07/24 1513    Lab Status: Final result Specimen: Swab from Nares Updated: 06/07/24 1639     MRSA PCR No MRSA Detected    Narrative:      The negative predictive value of this diagnostic test is high and should only be used to consider de-escalating anti-MRSA therapy. A positive result may indicate colonization with MRSA and must be correlated clinically.    S. Pneumo Ag Urine or CSF - Urine, Urine, Clean Catch [[id number]]  (Normal) Collected: 06/07/24 1411    Lab Status: Final result Specimen: Urine, Clean Catch Updated: 06/07/24 1437     Strep Pneumo Ag Negative    Legionella Antigen, Urine - Urine, Urine, Clean Catch [[id number]]  (Normal) Collected: 06/07/24 1411    Lab Status: Final result Specimen: Urine, Clean Catch Updated: 06/07/24 1437     LEGIONELLA ANTIGEN, URINE Negative    Blood Culture - Blood, Arm, Left [[id number]]  (Normal) Collected: 06/07/24 0556    Lab Status: Preliminary result Specimen: Blood from Arm, Left Updated: 06/08/24 0615     Blood Culture No growth at 24 hours    Blood Culture - Blood, Arm, Left [[id number]]  (Normal) Collected: 06/07/24 0556    Lab Status: Preliminary result Specimen: Blood from Arm, Left Updated: 06/08/24 0615     Blood Culture No growth at 24 hours          [x]  Radiology  CT Chest With Contrast Diagnostic    Result Date: 6/7/2024  Impression: 1. Multifocal pneumonia/aspiration. 2. Distended debris-filled patulous thoracic esophagus increasing from prior in the setting of a gastric band. Findings are highly suspicious for tight gastric band and places patient at high risk for recurrent aspiration. Recommend GI/surgical consultation. 3. Aortic and severe coronary atherosclerotic disease. 4. Mild cardiomegaly. 5. Other chronic/ancillary findings as above. Electronically Signed: Eduard Leal MD  6/7/2024 5:23 PM EDT  Workstation ID: [id number]    XR Chest 1 View    Result Date: 6/7/2024  Right-sided pneumonia is identified, as  discussed. Consider close interval clinical and (if necessary) imaging follow-up to ensure a benign progression and to exclude an underlying malignant process.     Please note that portions of this note were completed with a voice recognition program.     Electronically Signed By-Edi Kwan MD On:6/7/2024 5:43 AM     []  EKG/Telemetry   []  Cardiology/Vascular   []  Pathology  []  Old records  []  Other:    Assessment & Plan   Assessment / Plan     Assessment:  Acute hypoxic respiratory failure likely in the setting of aspiration pneumonia  Aspiration pneumonia, right-sided pneumonia  Severe sepsis likely in the setting of pneumonia  Nosebleed, resolved  Diet gastric band with distended breastmilk patulous thoracic esophagus  Leukocytosis  Lactic acidosis, resolved, likely in the setting of severe sepsis  Elevated troponin, likely due to type II MI in the setting of hypoxia  Anemia  History of A-fib on Eliquis  History of type II DM  History of hypertension  History of HFpEF    Plan:  Patient currently being managed to medicine service.  Presented with aspiration event post nosebleed episode at home causing possible aspiration leading to hypoxia.  Was on Airvo on presentation.  Currently saturating well on room air.  Currently on IV Zosyn.  Continue.  Vancomycin discontinued.  CT chest with contrast showed multifocal pneumonia likely from aspiration and also showed tight gastric band with distended depressible patulous thoracic esophagus increasing from prior in the setting of gastric band.  Dr. Fagan from general surgery was consulted who recommended to contact [place] bariatric surgeon.  Dr. Be Willett at [facility] was contacted who performed patient's surgery about 17 years ago; recommended outpatient follow-up.  Also suggested IR guided aspiration if needed.  Lactic acidosis, resolved.  Procalcitonin 0.14.  Will continue to trend.  Leukocytosis, likely in the setting of pneumonia.  Will continue to  trend and monitor.  On Brovana and Pulmicort.  Also on DuoNebs.  MRSA PCR negative.  Legionella and strep pneumonia negative.  Culture showing no growth at 24 hours.  Pulmonology following the patient, appreciate further input.  On insulin sliding scale for history of diabetes  Holding home dose of antihypertensive medication in the setting of low normal blood pressure.  Holding home dose of Eliquis in the setting of nasal bleed, suggested to hold for 5 days.  Hemoglobin 7.5 today.  Could be in the setting of nasal bleed.  Nasal bleed has stopped.  Will obtain iron panel.  Continue to trend and monitor.  Transfuse as needed to maintain hemoglobin more than 7.  ENT was consulted, suggested no intervention currently given patient has no active nasal bleed.  Suggested nasal endoscopy if patient continues to have bleeding.  Continue Lipitor 10 mg daily for hyperlipidemia.  Patient is on Lasix 40 mg daily at home, continue.  Continue rest of the current management.  Likely discharge in next 24 hours.  Might consider IR guided aspiration if needed if patient is still here on Monday.  If not patient will have follow-up as outpatient with his bariatric surgeon at [place].  Plan discussed with patient and his wife at bedside.     Discussed with RN.    DVT prophylaxis:  Medical DVT prophylaxis orders are present.        CODE STATUS:   Code Status (Patient has no pulse and is not breathing): CPR (Attempt to Resuscitate)  Medical Interventions (Patient has pulse or is breathing): Full Support      Electronically signed by Ariel Nava MD, 06/08/24, 10:10 AM EDT.

## 2024-06-08 NOTE — THERAPY EVALUATION
Patient Name: Edi Weston  : 1945    MRN: [id number]                              Today's Date: 2024       Admit Date: 2024    Visit Dx:     ICD-10-CM ICD-9-CM   1. Acute respiratory failure with hypoxia  J96.01 518.81   2. Pneumonia due to infectious organism, unspecified laterality, unspecified part of lung  J18.9 486   3. Decreased activities of daily living (ADL)  Z78.9 V49.89     Patient Active Problem List   Diagnosis    Type 2 diabetes, controlled, with peripheral neuropathy    History of colonoscopy with polypectomy    Gout    Hammertoe    Arthritis    Atrial flutter with controlled response    LBBB (left bundle branch block)    Colon polyps    Hyperlipidemia    Hypertension, essential    Chronic heart failure with preserved ejection fraction (HFpEF)    Sciatica of right side    Left-sided nosebleed    Acute hypoxic respiratory failure     Past Medical History:   Diagnosis Date    Arthritis     Cellulitis of right lower limb     Chronic heart failure with preserved ejection fraction (HFpEF) 2023    Colon polyps     Decubitus ulcer of foot, stage 1 08/10/2018    Decubitus ulcer of foot, stage 3 2018    Foot pain, left 08/10/2018    Foot pain, right     Gout     Hammertoe 2019    History of colonoscopy with polypectomy 2020 TUBULAR ADENOMA, COLONOSCOPY SCHEDULED FOR 10/19/2020 WITH DR SULTANA    Ingrowing nail 2018    Medication management 2019    Nail dystrophy     PAT (paroxysmal atrial tachycardia) 2018    Pressure ulcer, stage 1     Tinea unguium     Type 2 diabetes, controlled, with peripheral neuropathy 08/10/2018     Past Surgical History:   Procedure Laterality Date    ABDOMINAL SURGERY      lap band    COLONOSCOPY      GASTRIC BANDING        General Information       Row Name 24 0922          OT Time and Intention    Document Type evaluation  -ES     Mode of Treatment individual therapy;occupational therapy  -ES       Row  Name 06/08/24 0922          General Information    Patient Profile Reviewed yes  -ES     Prior Level of Function independent:;ADL's;all household mobility;community mobility  Patient independent with ADLs at baseline. No device for functional mobility. Tub/shower, standing shower completion. [place] in stance. No home O2 use.  -ES     Existing Precautions/Restrictions oxygen therapy device and L/min  -ES     Barriers to Rehab none identified  -ES       Row Name 06/08/24 0922          Occupational Profile    Reason for Services/Referral (Occupational Profile) Patient is 79 yr old male admitted to [facility] on 6/7/2024 after hypoxic episode. OT evaluation and treatment ordered d/t recent decline in ADLs/transfer ability and discharge planning recommendations. No previous OT services for current condition.  -ES       Row Name 06/08/24 0922          Living Environment    People in Home spouse  -ES       Row Name 06/08/24 0922          Cognition    Orientation Status (Cognition) oriented x 4  -ES               User Key  (r) = Recorded By, (t) = Taken By, (c) = Cosigned By      Initials Name Provider Type    ES Abby Ernandez, OTR/L, CSRS Occupational Therapist                     Mobility/ADL's       Row Name 06/08/24 0925          Bed Mobility    Bed Mobility supine-sit;sit-supine  -ES     Supine-Sit [place] (Bed Mobility) independent  -ES     Sit-Supine [place] (Bed Mobility) independent  -ES       Row Name 06/08/24 0925          Transfers    Transfers sit-stand transfer;stand-sit transfer  -ES       Row Name 06/08/24 0925          Sit-Stand Transfer    Sit-Stand [place] (Transfers) supervision  -ES     Assistive Device (Sit-Stand Transfers) other (see comments)  no AD  -ES       Row Name 06/08/24 0925          Stand-Sit Transfer    Stand-Sit [place] (Transfers) supervision  -ES     Assistive Device (Stand-Sit Transfers) other (see comments)  no AD  -ES       Row Name 06/08/24 0925           Functional Mobility    Functional Mobility- Ind. Level supervision required  -ES     Functional Mobility- Device other (see comments)  no AD  -ES     Functional Mobility- Comment SPV for O2 line management with mobility  -ES       Row Name 06/08/24 0925          Activities of Daily Living    BADL Assessment/Intervention bathing;upper body dressing;lower body dressing;grooming;feeding;toileting  -ES       Row Name 06/08/24 0925          Bathing Assessment/Intervention    [place] Level (Bathing) bathing skills;independent  -ES       Row Name 06/08/24 0925          Upper Body Dressing Assessment/Training    [place] Level (Upper Body Dressing) upper body dressing skills;independent  -ES       Row Name 06/08/24 0925          Lower Body Dressing Assessment/Training    [place] Level (Lower Body Dressing) lower body dressing skills;independent  -ES       Row Name 06/08/24 0925          Grooming Assessment/Training    [place] Level (Grooming) grooming skills;independent  -ES       Row Name 06/08/24 0925          Self-Feeding Assessment/Training    [place] Level (Feeding) feeding skills;independent  -ES       Row Name 06/08/24 0925          Toileting Assessment/Training    [place] Level (Toileting) toileting skills;independent  -ES               User Key  (r) = Recorded By, (t) = Taken By, (c) = Cosigned By      Initials Name Provider Type    ES Abby Ernandez, OTR/L, CSRS Occupational Therapist                   Obj/Interventions       Row Name 06/08/24 0927          Vision Assessment/Intervention    Visual Impairment/Limitations corrective lenses full-time  -ES       Row Name 06/08/24 0927          Range of Motion Comprehensive    General Range of Motion bilateral upper extremity ROM WNL  -ES       Row Name 06/08/24 0927          Strength Comprehensive (MMT)    General Manual Muscle Testing (MMT) Assessment no strength deficits identified  -ES     Comment, General Manual Muscle Testing (MMT)  Assessment BUEs 4/5  -ES       Row Name 06/08/24 0927          Motor Skills    Motor Skills functional endurance  -ES     Functional Endurance good  -ES       Row Name 06/08/24 0927          Balance    Balance Assessment sitting dynamic balance;standing dynamic balance  -ES     Dynamic Sitting Balance independent  -ES     Position, Sitting Balance unsupported;sitting edge of bed  -ES     Dynamic Standing Balance supervision  -ES     Position/Device Used, Standing Balance unsupported;other (see comments)  no AD  -ES               User Key  (r) = Recorded By, (t) = Taken By, (c) = Cosigned By      Initials Name Provider Type    Abby Sweet, OTR/L, CSRS Occupational Therapist                   Goals/Plan    No documentation.                  Clinical Impression       Row Name 06/08/24 0927          Plan of Care Review    Plan of Care Reviewed With patient  -ES     Outcome Evaluation Patient presents at or near baseline functional status at time of evaluation with no identified functional deficits that impede patient independence with activities of daily living.  No indicated need for skilled occupational therapy intervention in the acute care setting.  Occupational therapy will sign off at this time.  -ES       Row Name 06/08/24 0927          Therapy Assessment/Plan (OT)    Criteria for Skilled Therapeutic Interventions Met (OT) no problems identified which require skilled intervention  -ES     Therapy Frequency (OT) evaluation only  -ES       Row Name 06/08/24 0927          Therapy Plan Review/Discharge Plan (OT)    Anticipated Discharge Disposition (OT) home  -ES               User Key  (r) = Recorded By, (t) = Taken By, (c) = Cosigned By      Initials Name Provider Type    Abby Sweet, OTR/L, CSRS Occupational Therapist                   Outcome Measures       Row Name 06/08/24 0927          How much help from another is currently needed...    Putting on and taking off regular lower body clothing? 4  -ES      Bathing (including washing, rinsing, and drying) 4  -ES     Toileting (which includes using toilet bed pan or urinal) 4  -ES     Putting on and taking off regular upper body clothing 4  -ES     Taking care of personal grooming (such as brushing teeth) 4  -ES     Eating meals 4  -ES     AM-PAC 6 Clicks Score (OT) 24  -ES       Row Name 06/08/24 0726          How much help from another person do you currently need...    Turning from your back to your side while in flat bed without using bedrails? 4  -KB     Moving from lying on back to sitting on the side of a flat bed without bedrails? 4  -KB     Moving to and from a bed to a chair (including a wheelchair)? 4  -KB     Standing up from a chair using your arms (e.g., wheelchair, bedside chair)? 4  -KB     Climbing 3-5 steps with a railing? 4  -KB     To walk in hospital room? 4  -KB     AM-PAC 6 Clicks Score (PT) 24  -KB     Highest Level of Mobility Goal 8 --> Walked 250 feet or more  -KB       Row Name 06/08/24 0927          Functional Assessment    Outcome Measure Options AM-PAC 6 Clicks Daily Activity (OT)  -ES               User Key  (r) = Recorded By, (t) = Taken By, (c) = Cosigned By      Initials Name Provider Type    Patricia Hidalgo, RN Registered Nurse    Abby Sweet, OTR/L, CSRS Occupational Therapist                      OT Recommendation and Plan  Therapy Frequency (OT): evaluation only  Plan of Care Review  Plan of Care Reviewed With: patient  Outcome Evaluation: Patient presents at or near baseline functional status at time of evaluation with no identified functional deficits that impede patient independence with activities of daily living.  No indicated need for skilled occupational therapy intervention in the acute care setting.  Occupational therapy will sign off at this time.     Time Calculation:   Evaluation Complexity (OT)  Review Occupational Profile/Medical/Therapy History Complexity: brief/low complexity  Assessment, Occupational  Performance/Identification of Deficit Complexity: 1-3 performance deficits  Clinical Decision Making Complexity (OT): problem focused assessment/low complexity  Overall Complexity of Evaluation (OT): low complexity     Time Calculation- OT       Row Name 06/08/24 0928             Time Calculation- OT    OT Received On 06/08/24  -ES         Untimed Charges    OT Eval/Re-eval Minutes 20  -ES         Total Minutes    Untimed Charges Total Minutes 20  -ES       Total Minutes 20  -ES                User Key  (r) = Recorded By, (t) = Taken By, (c) = Cosigned By      Initials Name Provider Type    ES Abby Ernandez, OTR/L, CSRS Occupational Therapist                  Therapy Charges for Today       Code Description Service Date Service Provider Modifiers Qty    [id number] HC OT EVAL LOW COMPLEXITY 2 6/8/2024 Abby Ernandez, OTR/L, CSRS GO 1                 GERARDO Houser/L, CSRS  6/8/2024

## 2024-06-08 NOTE — PLAN OF CARE
Problem: Gas Exchange Impaired  Goal: Optimal Gas Exchange  Outcome: Ongoing, Progressing  Intervention: Optimize Oxygenation and Ventilation  Recent Flowsheet Documentation  Taken 6/7/2024 2020 by Juliet Kenyon, RN  Head of Bed (HOB) Positioning: HOB lowered   Goal Outcome Evaluation:         Pt instructed to attempt to go without his 02 since he normally does not wear 02,  And wearing 02 can cause nasal drying. Instructed to continue to moisturize his nares to prevent bleeding. Voiced understanding.  Remains off elequis at time and reports no issues. Blood sugar has been wnl nad no need for insulin. Rested all hs and voiced no needs.

## 2024-06-08 NOTE — PROGRESS NOTES
Reason for consultation pneumonia    HPI  Patient improved  Currently on room air  CT scan shows significantly dilated esophagus        Vitals:    06/08/24 0710 06/08/24 0810 06/08/24 1115 06/08/24 1218   BP:  115/69 108/75    BP Location:  Left arm Left arm    Patient Position:  Lying Sitting    Pulse: 70 84 76 71   Resp: 20 20 18 16   Temp:  98.6 °F (37 °C) 98.2 °F (36.8 °C)    TempSrc:  Oral Oral    SpO2: 98% 97% 100% 98%   Weight:       Height:             Physical Exam:  Vital Signs Reviewed  General WDWN, Alert, NAD.    Chest:  good aeration, clear to auscultation bilaterally, tympanic to percussion bilaterally, no work of breathing noted  CV: RRR, no MGR, .  EXT:  no clubbing, no cyanosis, no edema, no joint tenderness  Neuro:  A&Ox3, CN grossly intact, no focal deficits.  Skin: No rashes or lesions noted              Result Review  Result Review:  I have personally reviewed the results from the time of this admission to 6/7/2024 12:17 EDT and agree with these findings:  [x]  Laboratory  [x]  Microbiology  [x]  Radiology  [x]  EKG/Telemetry   []  Cardiology/Vascular   []  Pathology  []  Old records  []  Other:  Most notable findings include:            Lab 06/07/24  0556 06/07/24  0545   WBC 13.63*  --    HEMOGLOBIN 9.0*  --    HEMATOCRIT 30.0*  --    PLATELETS 262  --    SODIUM  --  141   POTASSIUM  --  4.1   CHLORIDE  --  104   CO2  --  20.2*   BUN  --  22   CREATININE  --  1.24   GLUCOSE  --  205*   CALCIUM  --  9.2   TOTAL PROTEIN  --  6.4   ALBUMIN  --  4.0   GLOBULIN  --  2.4      X-ray with worsening right lower lobe airspace disease     CT from the end of May showing right lower lobe pneumonia, Lap-Band placement with esophageal dilation and fluid-filled esophagus           Assessment & Plan  Assessment / Plan      Active Hospital Problems:       Active Hospital Problems     Diagnosis      **Acute hypoxic respiratory failure        Impression:  Acute hypoxemic respiratory failure requiring  Airvo  Aspiration pneumonia from unspecified organism  Aspiration of vomitus/epistaxis in airways  Persistent epistaxis, chronic  Atrial fibrillation on Eliquis leukocytosis  Type 2 diabetes with hyperglycemia  Status post lap band placement in the past    Plan  CT reviewed  Patient with significantly dilated esophagus  At this time would recommend patient be sent to have this addressed will need fluid taken out of the cuff  This can be done as an outpatient as patient appears to be comfortable at this time  Will need treatment is aspiration pneumonia with a 5 to 7-day course of antibiotics  Augmentin would be appropriate at the time of discharge  Continue to follow patient's hemoglobin  Eliquis on hold due to epistaxis    Will continue to follow    Continue bronchodilator therapies    Electronically signed by Bharat Spain DO, 06/08/24, 3:35 PM EDT.

## 2024-06-08 NOTE — PLAN OF CARE
Goal Outcome Evaluation:              Outcome Evaluation: Pt recieved antibiotics per MAR. Eliquis on hold. Now on RA satuation of 98%

## 2024-06-09 PROBLEM — I25.10 CORONARY ARTERY CALCIFICATION SEEN ON CT SCAN: Status: ACTIVE | Noted: 2024-06-09

## 2024-06-09 LAB
ANION GAP SERPL CALCULATED.3IONS-SCNC: 10.8 MMOL/L (ref 5–15)
BASOPHILS # BLD AUTO: 0.04 10*3/MM3 (ref 0–0.2)
BASOPHILS NFR BLD AUTO: 0.4 % (ref 0–1.5)
BH CV ECHO MEAS - AO MAX PG: 5.8 MMHG
BH CV ECHO MEAS - AO MEAN PG: 4 MMHG
BH CV ECHO MEAS - AO ROOT DIAM: 4.4 CM
BH CV ECHO MEAS - AO V2 MAX: 120 CM/SEC
BH CV ECHO MEAS - AO V2 VTI: 26.2 CM
BH CV ECHO MEAS - AVA(I,D): 2.9 CM2
BH CV ECHO MEAS - EDV(CUBED): 135.8 ML
BH CV ECHO MEAS - EDV(MOD-SP2): 120 ML
BH CV ECHO MEAS - EDV(MOD-SP4): 76.9 ML
BH CV ECHO MEAS - EF(MOD-BP): 48.3 %
BH CV ECHO MEAS - EF(MOD-SP2): 52.5 %
BH CV ECHO MEAS - EF(MOD-SP4): 40.4 %
BH CV ECHO MEAS - ESV(CUBED): 40 ML
BH CV ECHO MEAS - ESV(MOD-SP2): 57 ML
BH CV ECHO MEAS - ESV(MOD-SP4): 45.8 ML
BH CV ECHO MEAS - FS: 33.5 %
BH CV ECHO MEAS - IVS/LVPW: 0.84 CM
BH CV ECHO MEAS - IVSD: 1.03 CM
BH CV ECHO MEAS - LA DIMENSION: 4.6 CM
BH CV ECHO MEAS - LAT PEAK E' VEL: 10.1 CM/SEC
BH CV ECHO MEAS - LV DIASTOLIC VOL/BSA (35-75): 31.6 CM2
BH CV ECHO MEAS - LV MASS(C)D: 223.4 GRAMS
BH CV ECHO MEAS - LV MAX PG: 6.2 MMHG
BH CV ECHO MEAS - LV MEAN PG: 3 MMHG
BH CV ECHO MEAS - LV SYSTOLIC VOL/BSA (12-30): 18.8 CM2
BH CV ECHO MEAS - LV V1 MAX: 124 CM/SEC
BH CV ECHO MEAS - LV V1 VTI: 23.9 CM
BH CV ECHO MEAS - LVIDD: 5.1 CM
BH CV ECHO MEAS - LVIDS: 3.4 CM
BH CV ECHO MEAS - LVOT AREA: 3.1 CM2
BH CV ECHO MEAS - LVOT DIAM: 2 CM
BH CV ECHO MEAS - LVPWD: 1.22 CM
BH CV ECHO MEAS - MED PEAK E' VEL: 5.1 CM/SEC
BH CV ECHO MEAS - MV DEC TIME: 0.16 SEC
BH CV ECHO MEAS - MV MEAN PG: 2 MMHG
BH CV ECHO MEAS - MV V2 VTI: 26.3 CM
BH CV ECHO MEAS - MVA(VTI): 2.9 CM2
BH CV ECHO MEAS - RVDD: 2.8 CM
BH CV ECHO MEAS - SV(LVOT): 75.1 ML
BH CV ECHO MEAS - SV(MOD-SP2): 63 ML
BH CV ECHO MEAS - SV(MOD-SP4): 31.1 ML
BH CV ECHO MEAS - SVI(LVOT): 30.8 ML/M2
BH CV ECHO MEAS - SVI(MOD-SP2): 25.9 ML/M2
BH CV ECHO MEAS - SVI(MOD-SP4): 12.8 ML/M2
BH CV ECHO MEAS - TAPSE (>1.6): 2.7 CM
BH CV ECHO MEAS - TR MAX PG: 61.8 MMHG
BH CV ECHO MEAS - TR MAX VEL: 393 CM/SEC
BUN SERPL-MCNC: 19 MG/DL (ref 8–23)
BUN/CREAT SERPL: 16.8 (ref 7–25)
CALCIUM SPEC-SCNC: 8.5 MG/DL (ref 8.6–10.5)
CHLORIDE SERPL-SCNC: 103 MMOL/L (ref 98–107)
CO2 SERPL-SCNC: 25.2 MMOL/L (ref 22–29)
CREAT SERPL-MCNC: 1.13 MG/DL (ref 0.76–1.27)
DEPRECATED RDW RBC AUTO: 49.1 FL (ref 37–54)
EGFRCR SERPLBLD CKD-EPI 2021: 66.9 ML/MIN/1.73
EOSINOPHIL # BLD AUTO: 0.38 10*3/MM3 (ref 0–0.4)
EOSINOPHIL NFR BLD AUTO: 4 % (ref 0.3–6.2)
ERYTHROCYTE [DISTWIDTH] IN BLOOD BY AUTOMATED COUNT: 17.2 % (ref 12.3–15.4)
FERRITIN SERPL-MCNC: 58.24 NG/ML (ref 30–400)
GLUCOSE BLDC GLUCOMTR-MCNC: 144 MG/DL (ref 70–99)
GLUCOSE BLDC GLUCOMTR-MCNC: 153 MG/DL (ref 70–99)
GLUCOSE BLDC GLUCOMTR-MCNC: 185 MG/DL (ref 70–99)
GLUCOSE BLDC GLUCOMTR-MCNC: 185 MG/DL (ref 70–99)
GLUCOSE SERPL-MCNC: 125 MG/DL (ref 65–99)
HCT VFR BLD AUTO: 26 % (ref 37.5–51)
HGB BLD-MCNC: 7.7 G/DL (ref 13–17.7)
IMM GRANULOCYTES # BLD AUTO: 0.03 10*3/MM3 (ref 0–0.05)
IMM GRANULOCYTES NFR BLD AUTO: 0.3 % (ref 0–0.5)
LEFT ATRIUM VOLUME INDEX: 51.6 ML/M2
LV EF 2D ECHO EST: 55 %
LYMPHOCYTES # BLD AUTO: 1 10*3/MM3 (ref 0.7–3.1)
LYMPHOCYTES NFR BLD AUTO: 10.6 % (ref 19.6–45.3)
MAGNESIUM SERPL-MCNC: 1.7 MG/DL (ref 1.6–2.4)
MCH RBC QN AUTO: 23.4 PG (ref 26.6–33)
MCHC RBC AUTO-ENTMCNC: 29.6 G/DL (ref 31.5–35.7)
MCV RBC AUTO: 79 FL (ref 79–97)
MONOCYTES # BLD AUTO: 0.93 10*3/MM3 (ref 0.1–0.9)
MONOCYTES NFR BLD AUTO: 9.9 % (ref 5–12)
NEUTROPHILS NFR BLD AUTO: 7.04 10*3/MM3 (ref 1.7–7)
NEUTROPHILS NFR BLD AUTO: 74.8 % (ref 42.7–76)
NRBC BLD AUTO-RTO: 0 /100 WBC (ref 0–0.2)
PHOSPHATE SERPL-MCNC: 3.2 MG/DL (ref 2.5–4.5)
PLATELET # BLD AUTO: 200 10*3/MM3 (ref 140–450)
PMV BLD AUTO: 9.4 FL (ref 6–12)
POTASSIUM SERPL-SCNC: 3.6 MMOL/L (ref 3.5–5.2)
QT INTERVAL: 344 MS
QTC INTERVAL: 479 MS
RBC # BLD AUTO: 3.29 10*6/MM3 (ref 4.14–5.8)
SODIUM SERPL-SCNC: 139 MMOL/L (ref 136–145)
WBC NRBC COR # BLD AUTO: 9.42 10*3/MM3 (ref 3.4–10.8)

## 2024-06-09 PROCEDURE — 85025 COMPLETE CBC W/AUTO DIFF WBC: CPT | Performed by: STUDENT IN AN ORGANIZED HEALTH CARE EDUCATION/TRAINING PROGRAM

## 2024-06-09 PROCEDURE — 63710000001 INSULIN REGULAR HUMAN PER 5 UNITS: Performed by: STUDENT IN AN ORGANIZED HEALTH CARE EDUCATION/TRAINING PROGRAM

## 2024-06-09 PROCEDURE — 99232 SBSQ HOSP IP/OBS MODERATE 35: CPT | Performed by: STUDENT IN AN ORGANIZED HEALTH CARE EDUCATION/TRAINING PROGRAM

## 2024-06-09 PROCEDURE — 82948 REAGENT STRIP/BLOOD GLUCOSE: CPT

## 2024-06-09 PROCEDURE — 83735 ASSAY OF MAGNESIUM: CPT | Performed by: STUDENT IN AN ORGANIZED HEALTH CARE EDUCATION/TRAINING PROGRAM

## 2024-06-09 PROCEDURE — 25810000003 SODIUM CHLORIDE 0.9 % SOLUTION: Performed by: STUDENT IN AN ORGANIZED HEALTH CARE EDUCATION/TRAINING PROGRAM

## 2024-06-09 PROCEDURE — 25010000002 NA FERRIC GLUC CPLX PER 12.5 MG: Performed by: STUDENT IN AN ORGANIZED HEALTH CARE EDUCATION/TRAINING PROGRAM

## 2024-06-09 PROCEDURE — 94760 N-INVAS EAR/PLS OXIMETRY 1: CPT

## 2024-06-09 PROCEDURE — 94799 UNLISTED PULMONARY SVC/PX: CPT

## 2024-06-09 PROCEDURE — 97161 PT EVAL LOW COMPLEX 20 MIN: CPT

## 2024-06-09 PROCEDURE — 80048 BASIC METABOLIC PNL TOTAL CA: CPT | Performed by: STUDENT IN AN ORGANIZED HEALTH CARE EDUCATION/TRAINING PROGRAM

## 2024-06-09 PROCEDURE — 82728 ASSAY OF FERRITIN: CPT | Performed by: STUDENT IN AN ORGANIZED HEALTH CARE EDUCATION/TRAINING PROGRAM

## 2024-06-09 PROCEDURE — 99232 SBSQ HOSP IP/OBS MODERATE 35: CPT | Performed by: INTERNAL MEDICINE

## 2024-06-09 PROCEDURE — 82948 REAGENT STRIP/BLOOD GLUCOSE: CPT | Performed by: STUDENT IN AN ORGANIZED HEALTH CARE EDUCATION/TRAINING PROGRAM

## 2024-06-09 PROCEDURE — 84100 ASSAY OF PHOSPHORUS: CPT | Performed by: STUDENT IN AN ORGANIZED HEALTH CARE EDUCATION/TRAINING PROGRAM

## 2024-06-09 PROCEDURE — 25010000002 PIPERACILLIN SOD-TAZOBACTAM PER 1 G: Performed by: STUDENT IN AN ORGANIZED HEALTH CARE EDUCATION/TRAINING PROGRAM

## 2024-06-09 PROCEDURE — 94664 DEMO&/EVAL PT USE INHALER: CPT

## 2024-06-09 PROCEDURE — 36415 COLL VENOUS BLD VENIPUNCTURE: CPT | Performed by: STUDENT IN AN ORGANIZED HEALTH CARE EDUCATION/TRAINING PROGRAM

## 2024-06-09 RX ORDER — IPRATROPIUM BROMIDE AND ALBUTEROL SULFATE 2.5; .5 MG/3ML; MG/3ML
3 SOLUTION RESPIRATORY (INHALATION) EVERY 6 HOURS PRN
Status: DISCONTINUED | OUTPATIENT
Start: 2024-06-09 | End: 2024-06-10 | Stop reason: HOSPADM

## 2024-06-09 RX ADMIN — Medication 10 ML: at 08:02

## 2024-06-09 RX ADMIN — INSULIN HUMAN 2 UNITS: 100 INJECTION, SOLUTION PARENTERAL at 17:21

## 2024-06-09 RX ADMIN — PIPERACILLIN SODIUM AND TAZOBACTAM SODIUM 3.38 G: 3; .375 INJECTION, POWDER, LYOPHILIZED, FOR SOLUTION INTRAVENOUS at 21:48

## 2024-06-09 RX ADMIN — SODIUM CHLORIDE 250 MG: 9 INJECTION, SOLUTION INTRAVENOUS at 11:25

## 2024-06-09 RX ADMIN — METOPROLOL SUCCINATE 50 MG: 50 TABLET, EXTENDED RELEASE ORAL at 08:02

## 2024-06-09 RX ADMIN — INSULIN HUMAN 2 UNITS: 100 INJECTION, SOLUTION PARENTERAL at 12:30

## 2024-06-09 RX ADMIN — PANTOPRAZOLE SODIUM 40 MG: 40 TABLET, DELAYED RELEASE ORAL at 08:02

## 2024-06-09 RX ADMIN — PIPERACILLIN SODIUM AND TAZOBACTAM SODIUM 3.38 G: 3; .375 INJECTION, POWDER, LYOPHILIZED, FOR SOLUTION INTRAVENOUS at 13:40

## 2024-06-09 RX ADMIN — FUROSEMIDE 40 MG: 40 TABLET ORAL at 06:12

## 2024-06-09 RX ADMIN — PIPERACILLIN SODIUM AND TAZOBACTAM SODIUM 3.38 G: 3; .375 INJECTION, POWDER, LYOPHILIZED, FOR SOLUTION INTRAVENOUS at 05:15

## 2024-06-09 RX ADMIN — Medication 10 ML: at 20:42

## 2024-06-09 RX ADMIN — ATORVASTATIN CALCIUM 10 MG: 10 TABLET, FILM COATED ORAL at 20:42

## 2024-06-09 NOTE — PLAN OF CARE
Goal Outcome Evaluation:           Progress: no change  Outcome Evaluation: No acute changes. pt denies pain or discomfort. ambulated ad joan around unit. familay at bedside. pt to be NPO after midnight.

## 2024-06-09 NOTE — PLAN OF CARE
Goal Outcome Evaluation:  Plan of Care Reviewed With: patient           Outcome Evaluation: Patient is able to complete all transfers and bed mobilities indepedently in the room. Pt is able to ambulate ad joan in the room. Pt does not need inpatient PT services. Recommend DC home.      Anticipated Discharge Disposition (PT): home

## 2024-06-09 NOTE — PROGRESS NOTES
[facility]   Hospitalist Progress Note  Date: 2024  Patient Name: Edi Weston  : 1945  MRN: [id number]  Date of admission: 2024  Room/Bed: 213/1      Subjective   Subjective     Chief Complaint: Nosebleed, aspiration event with hypoxia     Summary:Edi Weston is a 79 y.o. male  with past medical history of A-fib on Eliquis, type 2 diabetes mellitus, hypertension and HFpEF who presented after having a nosebleed last night and had an aspiration event leading to hypoxia in 70s.  He stated he has been having nosebleed issues for past 6 months and has an upcoming appointment with ENT in few weeks.  He had nosebleed from left nose last night which woke him up following which she had aspiration event.  He had difficulty breathing there after and was found to be hypoxic and 70s thereafter prompted him to come to ER immediately. In ER, hypoxic with SpO2 79% in room air and was placed on 15 L with 65% FiO2 with improvement of SpO2 to 96-98%.  Lab work showed leukocytosis of 13.63, hemoglobin 9 (  Around 10.5-10.7) .  Lactic acid of 3 with repeat lactic acid of 2.  Chest x-ray was obtained which showed right-sided pneumonia.  Patient was started on IV antibiotics and admitted with working diagnosis of acute hypoxic respiratory failure in the setting of aspiration pneumonia.  Pulmonology and ENT consulted.  CT chest with contrast showed multifocal pneumonia/aspiration but did show distended tempora/failed patulous thoracic esophagus increasing prior in the setting of gastric band, highly suspicious for tight gastric band and places patient at high risk for recurrent aspiration. Dr. Fagan from general surgery was consulted who recommended to contact [place] bariatric surgeon.  Dr. eB Willett at [facility] was contacted who performed patient's surgery about 17 years ago; recommended outpatient follow-up.  Plan for IR guided aspiration on Monday.    Interval Followup: No acute events overnight.   Patient is saturating well on room air today.  Denies any fever, chills, rigors, chest pain or difficulty breathing.  Nosebleed has resolved.    Review of Systems    All systems reviewed and negative except for what is outlined above.      Objective   Objective     Vitals:   Temp:  [98.1 °F (36.7 °C)-99.7 °F (37.6 °C)] 99 °F (37.2 °C)  Heart Rate:  [71-81] 81  Resp:  [16-18] 16  BP: ()/(59-81) 126/81  Flow (L/min):  [1] 1    Physical Exam   General: Awake, alert, NAD  Cardiovascular: RRR, no murmurs   Pulmonary: CTA bilaterally; no wheezes; no conversational dyspnea.  Gastrointestinal: S/ND/NT, +BS  Neuro: Alert, awake, oriented x 3; speech clear; no tremor  Extremities: Trace pedal edema.    Result Review    Result Review:  I have personally reviewed these results:  [x]  Laboratory      Lab 06/09/24 0442 06/08/24 0451 06/07/24  0912 06/07/24  0556   WBC 9.42 12.09*  --  13.63*   HEMOGLOBIN 7.7* 7.5*  --  9.0*   HEMATOCRIT 26.0* 24.6*  --  30.0*   PLATELETS 200 202  --  262   NEUTROS ABS 7.04* 9.87*  --  12.40*   IMMATURE GRANS (ABS) 0.03 0.06*  --  0.05   LYMPHS ABS 1.00 1.10  --  0.50*   MONOS ABS 0.93* 0.80  --  0.54   EOS ABS 0.38 0.21  --  0.09   MCV 79.0 78.8*  --  78.9*   PROCALCITONIN  --  8.72*  --  0.14   LACTATE  --   --  2.0 3.0*   PROTIME  --   --   --  16.7*   APTT  --   --   --  27.6         Lab 06/09/24 0442 06/08/24 0451 06/07/24  0545   SODIUM 139 140 141   POTASSIUM 3.6 3.9 4.1   CHLORIDE 103 104 104   CO2 25.2 27.1 20.2*   ANION GAP 10.8 8.9 16.8*   BUN 19 23 22   CREATININE 1.13 1.17 1.24   EGFR 66.9 64.2 59.9*   GLUCOSE 125* 116* 205*   CALCIUM 8.5* 8.5* 9.2   MAGNESIUM 1.7 1.5*  --    PHOSPHORUS 3.2 2.9  --          Lab 06/07/24  0545   TOTAL PROTEIN 6.4   ALBUMIN 4.0   GLOBULIN 2.4   ALT (SGPT) 10   AST (SGOT) 19   BILIRUBIN 0.6   ALK PHOS 95         Lab 06/07/24  0556 06/07/24  0545   PROBNP  --  1,590.0   HSTROP T  --  27*   PROTIME 16.7*  --    INR 1.33*  --              Lab  06/08/24  0451   IRON 14*   IRON SATURATION (TSAT) 4*   TIBC 355   TRANSFERRIN 238         Brief Urine Lab Results  (Last result in the past 365 days)        Color   Clarity   Blood   Leuk Est   Nitrite   Protein   CREAT   Urine HCG        06/07/24 1411 Yellow   Turbid   Negative   Negative   Negative   Negative                 [x]  Microbiology   Microbiology Results (last 10 days)       Procedure Component Value - Date/Time    MRSA Screen, PCR (Inpatient) - Swab, Nares [[id number]]  (Normal) Collected: 06/07/24 1513    Lab Status: Final result Specimen: Swab from Nares Updated: 06/07/24 1639     MRSA PCR No MRSA Detected    Narrative:      The negative predictive value of this diagnostic test is high and should only be used to consider de-escalating anti-MRSA therapy. A positive result may indicate colonization with MRSA and must be correlated clinically.    S. Pneumo Ag Urine or CSF - Urine, Urine, Clean Catch [[id number]]  (Normal) Collected: 06/07/24 1411    Lab Status: Final result Specimen: Urine, Clean Catch Updated: 06/07/24 1437     Strep Pneumo Ag Negative    Legionella Antigen, Urine - Urine, Urine, Clean Catch [[id number]]  (Normal) Collected: 06/07/24 1411    Lab Status: Final result Specimen: Urine, Clean Catch Updated: 06/07/24 1437     LEGIONELLA ANTIGEN, URINE Negative    Blood Culture - Blood, Arm, Left [[id number]]  (Normal) Collected: 06/07/24 0556    Lab Status: Preliminary result Specimen: Blood from Arm, Left Updated: 06/09/24 0615     Blood Culture No growth at 2 days    Blood Culture - Blood, Arm, Left [[id number]]  (Normal) Collected: 06/07/24 0556    Lab Status: Preliminary result Specimen: Blood from Arm, Left Updated: 06/09/24 0615     Blood Culture No growth at 2 days          [x]  Radiology  CT Chest With Contrast Diagnostic    Result Date: 6/7/2024  Impression: 1. Multifocal pneumonia/aspiration. 2. Distended debris-filled patulous thoracic esophagus increasing from prior in the setting  of a gastric band. Findings are highly suspicious for tight gastric band and places patient at high risk for recurrent aspiration. Recommend GI/surgical consultation. 3. Aortic and severe coronary atherosclerotic disease. 4. Mild cardiomegaly. 5. Other chronic/ancillary findings as above. Electronically Signed: Eduard Leal MD  6/7/2024 5:23 PM EDT  Workstation ID: [id number]    XR Chest 1 View    Result Date: 6/7/2024  Right-sided pneumonia is identified, as discussed. Consider close interval clinical and (if necessary) imaging follow-up to ensure a benign progression and to exclude an underlying malignant process.     Please note that portions of this note were completed with a voice recognition program.     Electronically Signed By-Edi Kwan MD On:6/7/2024 5:43 AM     []  EKG/Telemetry   []  Cardiology/Vascular   []  Pathology  []  Old records  []  Other:    Assessment & Plan   Assessment / Plan     Assessment:  Acute hypoxic respiratory failure likely in the setting of aspiration pneumonia  Aspiration pneumonia, right-sided pneumonia  Severe sepsis likely in the setting of pneumonia  Nosebleed, resolved  Diet gastric band with distended breastmilk patulous thoracic esophagus  Leukocytosis  Lactic acidosis, resolved, likely in the setting of severe sepsis  Elevated troponin, likely due to type II MI in the setting of hypoxia  Anemia  History of A-fib on Eliquis  History of type II DM  History of hypertension  History of HFpEF    Plan:  Patient currently being managed to medicine service.  Presented with aspiration event post nosebleed episode at home causing possible aspiration leading to hypoxia.  Was on Airvo on presentation.  Currently saturating well on room air.  Currently on IV Zosyn.  Continue.  Vancomycin discontinued.  Plan to transition to Augmentin at the time of discharge for course of 7 days.  CT chest with contrast showed multifocal pneumonia likely from aspiration and also showed tight  gastric band with distended depressible patulous thoracic esophagus increasing from prior in the setting of gastric band.  Dr. Fagna from general surgery was consulted who recommended to contact [place] bariatric surgeon.  Dr. Be Willett at [facility] was contacted who performed patient's surgery about 17 years ago; recommended outpatient follow-up.  Also suggested IR guided aspiration if needed.  Plan for IR guided aspiration tomorrow on 6/9.  Will place patient n.p.o. after midnight for procedure.  Lactic acidosis, resolved.  Procalcitonin 0.14.  Will continue to trend.  Leukocytosis, likely in the setting of pneumonia.  Will continue to trend and monitor.  On Brovana and Pulmicort.  Also on DuoNebs.  MRSA PCR negative.  Legionella and strep pneumonia negative.  Culture showing no growth at 2 days.  Pulmonology following the patient, appreciate further input.  On insulin sliding scale for history of diabetes  Holding home dose of antihypertensive medication in the setting of low normal blood pressure.  Holding home dose of Eliquis in the setting of nasal bleed, suggested to hold for 5 days.  Hemoglobin 7.7 today.  Could be in the setting of nasal bleed iron deficiency anemia.  Nasal bleed has stopped.  Continue to trend and monitor.  Transfuse as needed to maintain hemoglobin more than 7.  Receiving IV iron today.  ENT was consulted, suggested no intervention currently given patient has no active nasal bleed.  Suggested nasal endoscopy if patient continues to have bleeding.  Continue Lipitor 10 mg daily for hyperlipidemia.  Patient is on Lasix 40 mg daily at home, continue.  Continue rest of the current management.  Currently on liquid diet until patient undergoes negative aspiration.  Plan discussed with patient and his wife at bedside.  Likely discharge in next 24 hours after IR guided aspiration.     Discussed with RN.    DVT prophylaxis:  Medical DVT prophylaxis orders are present.        CODE STATUS:    Code Status (Patient has no pulse and is not breathing): CPR (Attempt to Resuscitate)  Medical Interventions (Patient has pulse or is breathing): Full Support      Electronically signed by Ariel Nava MD, 06/09/24, 10:10 AM EDT.

## 2024-06-09 NOTE — PROGRESS NOTES
Reason for consultation pneumonia    HPI  Currently on room air  Feeling well  No further bleeding      Vitals:    06/09/24 0017 06/09/24 0314 06/09/24 0750 06/09/24 1130   BP:  118/76 126/81 108/60   BP Location:  Left arm Left arm Left arm   Patient Position:  Lying Lying Lying   Pulse: 72 77 81 70   Resp:  16 16 18   Temp:  98.8 °F (37.1 °C) 99 °F (37.2 °C) 98.1 °F (36.7 °C)   TempSrc:  Oral Oral Oral   SpO2:  94% 95% 96%   Weight:       Height:             Physical Exam:  Vital Signs Reviewed  General WDWN, Alert, NAD.    Chest:  good aeration, clear to auscultation bilaterally, tympanic to percussion bilaterally, no work of breathing noted  CV: RRR, no MGR, .  EXT:  no clubbing, no cyanosis, no edema, no joint tenderness  Neuro:  A&Ox3, CN grossly intact, no focal deficits.  Skin: No rashes or lesions noted           Result Review  Result Review:  I have personally reviewed the results from the time of this admission to 6/7/2024 12:17 EDT and agree with these findings:  [x]  Laboratory  [x]  Microbiology  [x]  Radiology  [x]  EKG/Telemetry   []  Cardiology/Vascular   []  Pathology  []  Old records  []  Other:  Most notable findings include:            Lab 06/07/24  0556 06/07/24  0545   WBC 13.63*  --    HEMOGLOBIN 9.0*  --    HEMATOCRIT 30.0*  --    PLATELETS 262  --    SODIUM  --  141   POTASSIUM  --  4.1   CHLORIDE  --  104   CO2  --  20.2*   BUN  --  22   CREATININE  --  1.24   GLUCOSE  --  205*   CALCIUM  --  9.2   TOTAL PROTEIN  --  6.4   ALBUMIN  --  4.0   GLOBULIN  --  2.4      X-ray with worsening right lower lobe airspace disease     CT from the end of May showing right lower lobe pneumonia, Lap-Band placement with esophageal dilation and fluid-filled esophagus           Assessment & Plan  Assessment / Plan      Active Hospital Problems:       Active Hospital Problems     Diagnosis      **Acute hypoxic respiratory failure        Impression:  Acute hypoxemic respiratory failure requiring  Airvo  Aspiration pneumonia from unspecified organism  Aspiration of vomitus/epistaxis in airways  Persistent epistaxis, chronic  Atrial fibrillation on Eliquis leukocytosis  Type 2 diabetes with hyperglycemia  Status post lap band placement in the past    Plan  Recommend IR for aspiration of gastric band fluid as patient does have significant esophageal dilatation and evidence of aspiration  Continue antibiotics  Continue LABA inhaled corticosteroid  Out of bed in chair  CT reviewed  Eliquis on hold due to upcoming procedure        Electronically signed by Bharat Spain DO, 06/09/24, 4:07 PM EDT.

## 2024-06-09 NOTE — THERAPY EVALUATION
Acute Care - Physical Therapy Initial Evaluation   [facility]     Patient Name: Edi Weston  : 1945  MRN: [id number]  Today's Date: 2024      Visit Dx:     ICD-10-CM ICD-9-CM   1. Acute respiratory failure with hypoxia  J96.01 518.81   2. Pneumonia due to infectious organism, unspecified laterality, unspecified part of lung  J18.9 486   3. Decreased activities of daily living (ADL)  Z78.9 V49.89   4. Difficulty walking  R26.2 719.7     Patient Active Problem List   Diagnosis    Type 2 diabetes, controlled, with peripheral neuropathy    History of colonoscopy with polypectomy    Gout    Hammertoe    Arthritis    Atrial flutter with controlled response    LBBB (left bundle branch block)    Colon polyps    Hyperlipidemia    Hypertension, essential    Chronic heart failure with preserved ejection fraction (HFpEF)    Sciatica of right side    Left-sided nosebleed    Acute hypoxic respiratory failure    Coronary artery calcification seen on CT scan     Past Medical History:   Diagnosis Date    Arthritis     Cellulitis of right lower limb     Chronic heart failure with preserved ejection fraction (HFpEF) 2023    Colon polyps     Decubitus ulcer of foot, stage 1 08/10/2018    Decubitus ulcer of foot, stage 3 2018    Foot pain, left 08/10/2018    Foot pain, right     Gout     Hammertoe 2019    History of colonoscopy with polypectomy 2020    2018 TUBULAR ADENOMA, COLONOSCOPY SCHEDULED FOR 10/19/2020 WITH DR SULTANA    Ingrowing nail 2018    Medication management 2019    Nail dystrophy     PAT (paroxysmal atrial tachycardia) 2018    Pressure ulcer, stage 1     Tinea unguium     Type 2 diabetes, controlled, with peripheral neuropathy 08/10/2018     Past Surgical History:   Procedure Laterality Date    ABDOMINAL SURGERY      lap band    COLONOSCOPY      GASTRIC BANDING       PT Assessment (Last 12 Hours)       PT Evaluation and Treatment       Row Name 24 1200           Physical Therapy Time and Intention    Subjective Information no complaints  -DP     Document Type evaluation  -DP     Mode of Treatment individual therapy;physical therapy  -DP     Patient Effort good  -DP       Row Name 06/09/24 1200          General Information    Patient Profile Reviewed yes  -DP     Patient Observations alert;cooperative;agree to therapy  -DP     Prior Level of Function independent:;gait;transfer;bed mobility;ADL's  -DP     Equipment Currently Used at Home none  -DP     Existing Precautions/Restrictions no known precautions/restrictions  -DP     Barriers to Rehab none identified  -DP       Row Name 06/09/24 1200          Living Environment    Current Living Arrangements home  -DP       Row Name 06/09/24 1200          Cognition    Orientation Status (Cognition) oriented x 3  -DP       Row Name 06/09/24 1200          Range of Motion Comprehensive    General Range of Motion bilateral lower extremity ROM WFL  -DP       Row Name 06/09/24 1200          Strength (Manual Muscle Testing)    Strength (Manual Muscle Testing) bilateral lower extremities;strength is WFL  -DP       Row Name 06/09/24 1200          Bed Mobility    Bed Mobility supine-sit-supine  -DP     Supine-Sit-Supine [place] (Bed Mobility) independent  -DP       Row Name 06/09/24 1200          Transfers    Transfers sit-stand transfer  -DP       Row Name 06/09/24 1200          Sit-Stand Transfer    Sit-Stand [place] (Transfers) independent  -DP       Row Name 06/09/24 1200          Gait/Stairs (Locomotion)    Gait/Stairs Locomotion gait/ambulation independence  -DP     [place] Level (Gait) modified independence  -DP     Comment, (Gait/Stairs) Patient able to ambulate ad joan. in his room  -DP       Row Name 06/09/24 1200          Balance    Balance Assessment standing dynamic balance  -DP     Dynamic Standing Balance supervision  -DP       Row Name 06/09/24 1200          Plan of Care Review    Plan of Care Reviewed With  patient  -DP     Outcome Evaluation Patient is able to complete all transfers and bed mobilities indepedently in the room. Pt is able to ambulate ad joan in the room. Pt does not need inpatient PT services. Recommend DC home.  -DP       Row Name 06/09/24 1200          Therapy Assessment/Plan (PT)    Criteria for Skilled Interventions Met (PT) no problems identified which require skilled intervention  -DP     Therapy Frequency (PT) evaluation only  -DP       Row Name 06/09/24 1200          PT Evaluation Complexity    History, PT Evaluation Complexity no personal factors and/or comorbidities  -DP     Examination of Body Systems (PT Eval Complexity) total of 4 or more elements  -DP     Clinical Presentation (PT Evaluation Complexity) stable  -DP     Clinical Decision Making (PT Evaluation Complexity) low complexity  -DP     Overall Complexity (PT Evaluation Complexity) low complexity  -DP       Row Name 06/09/24 1200          Physical Therapy Goals    Problem Specific Goal Selection (PT) problem specific goal 1, PT  -DP       Row Name 06/09/24 1200          Problem Specific Goal 1 (PT)    Problem Specific Goal 1 (PT) Complete PT evaluation.  -DP     Time Frame (Problem Specific Goal 1, PT) 1 day  -DP     Progress/Outcome (Problem Specific Goal 1, PT) goal met  -DP               User Key  (r) = Recorded By, (t) = Taken By, (c) = Cosigned By      Initials Name Provider Type    Saima Carrasco, PT Physical Therapist                      PT Recommendation and Plan  Anticipated Discharge Disposition (PT): home  Therapy Frequency (PT): evaluation only  Plan of Care Reviewed With: patient  Outcome Evaluation: Patient is able to complete all transfers and bed mobilities indepedently in the room. Pt is able to ambulate ad joan in the room. Pt does not need inpatient PT services. Recommend DC home.   Outcome Measures       Row Name 06/09/24 1200             How much help from another person do you currently need...    Turning  from your back to your side while in flat bed without using bedrails? 4  -DP      Moving from lying on back to sitting on the side of a flat bed without bedrails? 4  -DP      Moving to and from a bed to a chair (including a wheelchair)? 4  -DP      Standing up from a chair using your arms (e.g., wheelchair, bedside chair)? 4  -DP      Climbing 3-5 steps with a railing? 4  -DP      To walk in hospital room? 4  -DP      AM-PAC 6 Clicks Score (PT) 24  -DP      Highest Level of Mobility Goal 8 --> Walked 250 feet or more  -DP         Functional Assessment    Outcome Measure Options AM-PAC 6 Clicks Basic Mobility (PT)  -DP                User Key  (r) = Recorded By, (t) = Taken By, (c) = Cosigned By      Initials Name Provider Type    Saima Carrasco, PT Physical Therapist                     Time Calculation:    PT Charges       Row Name 06/09/24 1203             Time Calculation    PT Received On 06/09/24  -DP         Untimed Charges    PT Eval/Re-eval Minutes 25  -DP         Total Minutes    Untimed Charges Total Minutes 25  -DP       Total Minutes 25  -DP                User Key  (r) = Recorded By, (t) = Taken By, (c) = Cosigned By      Initials Name Provider Type    Saima Carrasco, PT Physical Therapist                      PT G-Codes  Outcome Measure Options: AM-PAC 6 Clicks Basic Mobility (PT)  AM-PAC 6 Clicks Score (PT): 24  AM-PAC 6 Clicks Score (OT): 24    Saima Miranda, MACKENZIE  6/9/2024

## 2024-06-10 ENCOUNTER — READMISSION MANAGEMENT (OUTPATIENT)
Dept: CALL CENTER | Facility: HOSPITAL | Age: 79
End: 2024-06-10
Payer: MEDICARE

## 2024-06-10 VITALS
DIASTOLIC BLOOD PRESSURE: 94 MMHG | OXYGEN SATURATION: 95 % | WEIGHT: 248.46 LBS | RESPIRATION RATE: 20 BRPM | HEIGHT: 76 IN | TEMPERATURE: 98.1 F | BODY MASS INDEX: 30.26 KG/M2 | SYSTOLIC BLOOD PRESSURE: 127 MMHG | HEART RATE: 79 BPM

## 2024-06-10 LAB
ANION GAP SERPL CALCULATED.3IONS-SCNC: 11.6 MMOL/L (ref 5–15)
BASOPHILS # BLD AUTO: 0.07 10*3/MM3 (ref 0–0.2)
BASOPHILS NFR BLD AUTO: 1 % (ref 0–1.5)
BUN SERPL-MCNC: 14 MG/DL (ref 8–23)
BUN/CREAT SERPL: 11.8 (ref 7–25)
CALCIUM SPEC-SCNC: 9.1 MG/DL (ref 8.6–10.5)
CHLORIDE SERPL-SCNC: 103 MMOL/L (ref 98–107)
CO2 SERPL-SCNC: 24.4 MMOL/L (ref 22–29)
CREAT SERPL-MCNC: 1.19 MG/DL (ref 0.76–1.27)
DEPRECATED RDW RBC AUTO: 49.2 FL (ref 37–54)
EGFRCR SERPLBLD CKD-EPI 2021: 62.9 ML/MIN/1.73
EOSINOPHIL # BLD AUTO: 0.42 10*3/MM3 (ref 0–0.4)
EOSINOPHIL NFR BLD AUTO: 5.8 % (ref 0.3–6.2)
ERYTHROCYTE [DISTWIDTH] IN BLOOD BY AUTOMATED COUNT: 17.1 % (ref 12.3–15.4)
GLUCOSE BLDC GLUCOMTR-MCNC: 138 MG/DL (ref 70–99)
GLUCOSE BLDC GLUCOMTR-MCNC: 149 MG/DL (ref 70–99)
GLUCOSE SERPL-MCNC: 145 MG/DL (ref 65–99)
HCT VFR BLD AUTO: 28.6 % (ref 37.5–51)
HGB BLD-MCNC: 8.4 G/DL (ref 13–17.7)
IMM GRANULOCYTES # BLD AUTO: 0.02 10*3/MM3 (ref 0–0.05)
IMM GRANULOCYTES NFR BLD AUTO: 0.3 % (ref 0–0.5)
LYMPHOCYTES # BLD AUTO: 1.04 10*3/MM3 (ref 0.7–3.1)
LYMPHOCYTES NFR BLD AUTO: 14.3 % (ref 19.6–45.3)
MAGNESIUM SERPL-MCNC: 1.8 MG/DL (ref 1.6–2.4)
MCH RBC QN AUTO: 23.5 PG (ref 26.6–33)
MCHC RBC AUTO-ENTMCNC: 29.4 G/DL (ref 31.5–35.7)
MCV RBC AUTO: 79.9 FL (ref 79–97)
MONOCYTES # BLD AUTO: 0.7 10*3/MM3 (ref 0.1–0.9)
MONOCYTES NFR BLD AUTO: 9.6 % (ref 5–12)
NEUTROPHILS NFR BLD AUTO: 5.04 10*3/MM3 (ref 1.7–7)
NEUTROPHILS NFR BLD AUTO: 69 % (ref 42.7–76)
NRBC BLD AUTO-RTO: 0 /100 WBC (ref 0–0.2)
PHOSPHATE SERPL-MCNC: 3.3 MG/DL (ref 2.5–4.5)
PLATELET # BLD AUTO: 227 10*3/MM3 (ref 140–450)
PMV BLD AUTO: 9.3 FL (ref 6–12)
POTASSIUM SERPL-SCNC: 3.8 MMOL/L (ref 3.5–5.2)
RBC # BLD AUTO: 3.58 10*6/MM3 (ref 4.14–5.8)
SODIUM SERPL-SCNC: 139 MMOL/L (ref 136–145)
WBC NRBC COR # BLD AUTO: 7.29 10*3/MM3 (ref 3.4–10.8)

## 2024-06-10 PROCEDURE — 25010000002 PIPERACILLIN SOD-TAZOBACTAM PER 1 G: Performed by: STUDENT IN AN ORGANIZED HEALTH CARE EDUCATION/TRAINING PROGRAM

## 2024-06-10 PROCEDURE — 84100 ASSAY OF PHOSPHORUS: CPT | Performed by: STUDENT IN AN ORGANIZED HEALTH CARE EDUCATION/TRAINING PROGRAM

## 2024-06-10 PROCEDURE — 85025 COMPLETE CBC W/AUTO DIFF WBC: CPT | Performed by: STUDENT IN AN ORGANIZED HEALTH CARE EDUCATION/TRAINING PROGRAM

## 2024-06-10 PROCEDURE — 80048 BASIC METABOLIC PNL TOTAL CA: CPT | Performed by: STUDENT IN AN ORGANIZED HEALTH CARE EDUCATION/TRAINING PROGRAM

## 2024-06-10 PROCEDURE — 99232 SBSQ HOSP IP/OBS MODERATE 35: CPT | Performed by: INTERNAL MEDICINE

## 2024-06-10 PROCEDURE — 82948 REAGENT STRIP/BLOOD GLUCOSE: CPT | Performed by: STUDENT IN AN ORGANIZED HEALTH CARE EDUCATION/TRAINING PROGRAM

## 2024-06-10 PROCEDURE — 94799 UNLISTED PULMONARY SVC/PX: CPT

## 2024-06-10 PROCEDURE — 83735 ASSAY OF MAGNESIUM: CPT | Performed by: STUDENT IN AN ORGANIZED HEALTH CARE EDUCATION/TRAINING PROGRAM

## 2024-06-10 PROCEDURE — 99239 HOSP IP/OBS DSCHRG MGMT >30: CPT | Performed by: STUDENT IN AN ORGANIZED HEALTH CARE EDUCATION/TRAINING PROGRAM

## 2024-06-10 PROCEDURE — 36415 COLL VENOUS BLD VENIPUNCTURE: CPT | Performed by: STUDENT IN AN ORGANIZED HEALTH CARE EDUCATION/TRAINING PROGRAM

## 2024-06-10 RX ORDER — FERROUS SULFATE 325(65) MG
325 TABLET ORAL
Qty: 30 TABLET | Refills: 0 | Status: SHIPPED | OUTPATIENT
Start: 2024-06-10 | End: 2024-07-10

## 2024-06-10 RX ORDER — AMOXICILLIN AND CLAVULANATE POTASSIUM 875; 125 MG/1; MG/1
1 TABLET, FILM COATED ORAL 2 TIMES DAILY
Qty: 8 TABLET | Refills: 0 | Status: SHIPPED | OUTPATIENT
Start: 2024-06-10 | End: 2024-06-14

## 2024-06-10 RX ORDER — AMOXICILLIN AND CLAVULANATE POTASSIUM 875; 125 MG/1; MG/1
1 TABLET, FILM COATED ORAL 2 TIMES DAILY
Qty: 8 TABLET | Refills: 0 | Status: SHIPPED | OUTPATIENT
Start: 2024-06-10 | End: 2024-06-10

## 2024-06-10 RX ORDER — FERROUS SULFATE 325(65) MG
325 TABLET ORAL
Qty: 30 TABLET | Refills: 0 | Status: SHIPPED | OUTPATIENT
Start: 2024-06-10 | End: 2024-06-10

## 2024-06-10 RX ADMIN — METOPROLOL SUCCINATE 50 MG: 50 TABLET, EXTENDED RELEASE ORAL at 10:17

## 2024-06-10 RX ADMIN — PIPERACILLIN SODIUM AND TAZOBACTAM SODIUM 3.38 G: 3; .375 INJECTION, POWDER, LYOPHILIZED, FOR SOLUTION INTRAVENOUS at 05:27

## 2024-06-10 RX ADMIN — Medication 10 ML: at 10:17

## 2024-06-10 NOTE — DISCHARGE SUMMARY
[facility]         HOSPITALIST  DISCHARGE SUMMARY    Patient Name: Edi Weston  : 1945  MRN: [id number]    Date of Admission: 2024  Date of Discharge:  6/10/2024  Primary Care Physician: Jesus Lux, DO    Consults       Date and Time Order Name Status Description    2024 12:12 PM Inpatient General Surgery Consult      2024 12:52 PM Inpatient ENT Consult      2024  8:24 AM Inpatient Pulmonology Consult Completed     2024  7:03 AM Inpatient Hospitalist Consult              Active and Resolved Hospital Problems:  Active Hospital Problems    Diagnosis POA    **Acute hypoxic respiratory failure [J96.01] Yes      Resolved Hospital Problems   No resolved problems to display.       Hospital Course     Hospital Course:  Edi Weston is a 79 y.o. male with past medical history of A-fib on Eliquis, type 2 diabetes mellitus, hypertension and HFpEF who presented after having a nosebleed last night and had an aspiration event leading to hypoxia in 70s.  He stated he has been having nosebleed issues for past 6 months and has an upcoming appointment with ENT in few weeks.  He had nosebleed from left nose last night which woke him up following which she had aspiration event.  He had difficulty breathing there after and was found to be hypoxic and 70s thereafter prompted him to come to ER immediately. In ER, hypoxic with SpO2 79% in room air and was placed on 15 L with 65% FiO2 with improvement of SpO2 to 96-98%.  Lab work showed leukocytosis of 13.63, hemoglobin 9 (Around 10.5-10.7) .  Lactic acid of 3 with repeat lactic acid of 2.  Chest x-ray was obtained which showed right-sided pneumonia.  Patient was started on IV antibiotics and admitted with working diagnosis of acute hypoxic respiratory failure in the setting of aspiration pneumonia.  Pulmonology and ENT consulted.  CT chest with contrast showed multifocal pneumonia/aspiration but did show distended  tempora/failed patulous thoracic esophagus increasing prior in the setting of gastric band, highly suspicious for tight gastric band and places patient at high risk for recurrent aspiration. Dr. Fagan from general surgery was consulted who recommended to contact [place] bariatric surgeon.  Dr. Be Willett at [facility] was contacted who performed patient's surgery about 17 years ago; recommended outpatient follow-up.  Initially plan was for IR guided aspiration here at [facility] but the procedure is not done by our interventional radiologist.  Patient has scheduled an appointment at [facility] at his bariatric surgeon office for today.  He is saturating well on room air.  No acute events overnight.  Thus, patient is being discharged home today.  He stable at the time of discharge.  He will follow-up with PCP in 3-7 days, needs CBC and chemistries trended during follow-up.  Follow-up with bariatric surgeon at [place] today and possible aspiration of debris causing dilatation of esophageal dilatation, bariatric surgeon to address tight gastric band as outpatient.  Follow-up with ENT as scheduled in few weeks for evaluation of nasal bleed.  Fall precautions.  Advised to restart Eliquis after 3 days.  Stop if patient's nasal bleed reoccurs until evaluated by his primary physician or ENT.  Plan was discussed with the patient which he agreed on.  Advised to be compliant with medications.      DISCHARGE Follow Up Recommendations for labs and diagnostics: As mentioned above.      Day of Discharge     Vital Signs:  Temp:  [98.1 °F (36.7 °C)-98.8 °F (37.1 °C)] 98.1 °F (36.7 °C)  Heart Rate:  [69-89] 79  Resp:  [18-20] 20  BP: (104-127)/(60-94) 127/94  Flow (L/min):  [1] 1  Physical Exam:   General: Awake, alert, NAD  Cardiovascular: RRR, no murmurs   Pulmonary: CTA bilaterally; no wheezes; no conversational dyspnea.  Gastrointestinal: S/ND/NT, +BS  Neuro: Alert, awake, oriented x 3; speech  clear; no tremor  Extremities: No pedal edema.    Discharge Details        Discharge Medications        New Medications        Instructions Start Date   amoxicillin-clavulanate 875-125 MG per tablet  Commonly known as: AUGMENTIN   1 tablet, Oral, 2 Times Daily      ferrous sulfate 325 (65 FE) MG tablet   325 mg, Oral, Daily With Breakfast             Changes to Medications        Instructions Start Date   apixaban 5 MG tablet tablet  Commonly known as: Eliquis  What changed: additional instructions   5 mg, Oral, 2 Times Daily, Hold for 3 more days and then restart it. Stop if patient has Nosebleed again and contact primary provider regarding it      furosemide 40 MG tablet  Commonly known as: Lasix  What changed: when to take this   40 mg, Oral, Daily      glyburide 2.5 MG tablet  Commonly known as: DIAbeta  What changed: when to take this   2.5 mg, Oral, Daily With Breakfast      lisinopril 20 MG tablet  Commonly known as: PRINIVIL,ZESTRIL  What changed: when to take this   20 mg, Oral, Daily      metoprolol succinate XL 50 MG 24 hr tablet  Commonly known as: TOPROL-XL  What changed: when to take this   50 mg, Oral, Daily      pantoprazole 40 MG EC tablet  Commonly known as: PROTONIX  What changed: when to take this   40 mg, Oral, Daily             Continue These Medications        Instructions Start Date   metFORMIN  MG 24 hr tablet  Commonly known as: GLUCOPHAGE-XR   1,000 mg, Oral, Every Evening      simvastatin 20 MG tablet  Commonly known as: ZOCOR   20 mg, Oral, Every Evening               Allergies   Allergen Reactions    Sulfa Antibiotics Unknown - Low Severity     Unsure of reaction       Discharge Disposition:  Home or Self Care    Diet:  Hospital:  Diet Order   Procedures    Diet: Cardiac, Diabetic; Healthy Heart (2-3 Na+); Consistent Carbohydrate; Fluid Consistency: Thin (IDDSI 0)       Discharge Activity:       CODE STATUS:  Code Status and Medical Interventions:   Ordered at: 06/07/24 0781      Code Status (Patient has no pulse and is not breathing):    CPR (Attempt to Resuscitate)     Medical Interventions (Patient has pulse or is breathing):    Full Support       Future Appointments   Date Time Provider Department Center   9/6/2024  1:45 PM Jesus Lux DO [facility]   10/10/2024  1:00 PM Shikha Kramer APRN [facility]   11/6/2024  2:00 PM Kar Ledezma MD [facility] CD [facility]       Additional Instructions for the Follow-ups that You Need to Schedule       Discharge Follow-up with PCP   As directed       Currently Documented PCP:    Jesus Lux DO    PCP Phone Number:    [phone]     Follow Up Details: In 3-5 days; needs CBC and chemistries trended during follow up.        Discharge Follow-up with Specified Provider: Bariatric Surgeon in [place]; Today   As directed      To: Bariatric Surgeon in [place]   Follow Up: Today        Discharge Follow-up with Specified Provider: ENT; 2 Weeks   As directed      To: ENT   Follow Up: 2 Weeks   Follow Up Details: as scheduled for nose bleed                Pertinent  and/or Most Recent Results     PROCEDURES:       LAB RESULTS:      Lab 06/10/24  0708 06/09/24  0442 06/08/24  0451 06/07/24  0912 06/07/24  0556   WBC 7.29 9.42 12.09*  --  13.63*   HEMOGLOBIN 8.4* 7.7* 7.5*  --  9.0*   HEMATOCRIT 28.6* 26.0* 24.6*  --  30.0*   PLATELETS 227 200 202  --  262   NEUTROS ABS 5.04 7.04* 9.87*  --  12.40*   IMMATURE GRANS (ABS) 0.02 0.03 0.06*  --  0.05   LYMPHS ABS 1.04 1.00 1.10  --  0.50*   MONOS ABS 0.70 0.93* 0.80  --  0.54   EOS ABS 0.42* 0.38 0.21  --  0.09   MCV 79.9 79.0 78.8*  --  78.9*   PROCALCITONIN  --   --  8.72*  --  0.14   LACTATE  --   --   --  2.0 3.0*   PROTIME  --   --   --   --  16.7*   APTT  --   --   --   --  27.6         Lab 06/10/24  0708 06/09/24  0442 06/08/24  0451 06/07/24  0545   SODIUM 139 139 140 141   POTASSIUM 3.8 3.6 3.9 4.1   CHLORIDE 103 103 104 104   CO2 24.4 25.2 27.1 20.2*   ANION GAP 11.6  10.8 8.9 16.8*   BUN 14 19 23 22   CREATININE 1.19 1.13 1.17 1.24   EGFR 62.9 66.9 64.2 59.9*   GLUCOSE 145* 125* 116* 205*   CALCIUM 9.1 8.5* 8.5* 9.2   MAGNESIUM 1.8 1.7 1.5*  --    PHOSPHORUS 3.3 3.2 2.9  --          Lab 06/07/24  0545   TOTAL PROTEIN 6.4   ALBUMIN 4.0   GLOBULIN 2.4   ALT (SGPT) 10   AST (SGOT) 19   BILIRUBIN 0.6   ALK PHOS 95         Lab 06/07/24  0556 06/07/24  0545   PROBNP  --  1,590.0   HSTROP T  --  27*   PROTIME 16.7*  --    INR 1.33*  --              Lab 06/09/24  0442 06/08/24  0451   IRON  --  14*   IRON SATURATION (TSAT)  --  4*   TIBC  --  355   TRANSFERRIN  --  238   FERRITIN 58.24  --          Brief Urine Lab Results  (Last result in the past 365 days)        Color   Clarity   Blood   Leuk Est   Nitrite   Protein   CREAT   Urine HCG        06/07/24 1411 Yellow   Turbid   Negative   Negative   Negative   Negative                 Microbiology Results (last 10 days)       Procedure Component Value - Date/Time    MRSA Screen, PCR (Inpatient) - Swab, Nares [[id number]]  (Normal) Collected: 06/07/24 1513    Lab Status: Final result Specimen: Swab from Nares Updated: 06/07/24 1639     MRSA PCR No MRSA Detected    Narrative:      The negative predictive value of this diagnostic test is high and should only be used to consider de-escalating anti-MRSA therapy. A positive result may indicate colonization with MRSA and must be correlated clinically.    S. Pneumo Ag Urine or CSF - Urine, Urine, Clean Catch [[id number]]  (Normal) Collected: 06/07/24 1411    Lab Status: Final result Specimen: Urine, Clean Catch Updated: 06/07/24 1437     Strep Pneumo Ag Negative    Legionella Antigen, Urine - Urine, Urine, Clean Catch [[id number]]  (Normal) Collected: 06/07/24 1411    Lab Status: Final result Specimen: Urine, Clean Catch Updated: 06/07/24 1437     LEGIONELLA ANTIGEN, URINE Negative    Blood Culture - Blood, Arm, Left [[id number]]  (Normal) Collected: 06/07/24 0556    Lab Status: Preliminary result  Specimen: Blood from Arm, Left Updated: 06/10/24 0615     Blood Culture No growth at 3 days    Blood Culture - Blood, Arm, Left [[id number]]  (Normal) Collected: 06/07/24 0556    Lab Status: Preliminary result Specimen: Blood from Arm, Left Updated: 06/10/24 0615     Blood Culture No growth at 3 days            CT Chest With Contrast Diagnostic    Result Date: 6/7/2024  Impression: 1. Multifocal pneumonia/aspiration. 2. Distended debris-filled patulous thoracic esophagus increasing from prior in the setting of a gastric band. Findings are highly suspicious for tight gastric band and places patient at high risk for recurrent aspiration. Recommend GI/surgical consultation. 3. Aortic and severe coronary atherosclerotic disease. 4. Mild cardiomegaly. 5. Other chronic/ancillary findings as above. Electronically Signed: Eduard Leal MD  6/7/2024 5:23 PM EDT  Workstation ID: [id number]    XR Chest 1 View    Result Date: 6/7/2024  Right-sided pneumonia is identified, as discussed. Consider close interval clinical and (if necessary) imaging follow-up to ensure a benign progression and to exclude an underlying malignant process.     Please note that portions of this note were completed with a voice recognition program.     Electronically Signed By-Edi Kwan MD On:6/7/2024 5:43 AM                Results for orders placed during the hospital encounter of 06/07/24    Adult Transthoracic Echo Complete W/ Cont if Necessary Per Protocol    Interpretation Summary    Left ventricular systolic function is normal. Estimated left ventricular EF = 55%    The left atrial cavity is moderately dilated.    There is mild calcification of the aortic valve.    Mild dilation of the aortic root is present.      Labs Pending at Discharge:  Pending Labs       Order Current Status    Blood Culture - Blood, Arm, Left Preliminary result    Blood Culture - Blood, Arm, Left Preliminary result              Time spent on Discharge including face to  face service:  35 minutes    Electronically signed by Ariel Nava MD, 06/10/24, 9:01 AM EDT.

## 2024-06-10 NOTE — PROGRESS NOTES
Reason for consultation pneumonia    On room air doing well  No more epistaxis  IR here unable to let fluid out of gastric band.  Can be done as outpatient up in [place]  No fevers or chills  Dyspnea and cough improved      Vitals:    06/09/24 1525 06/09/24 1932 06/09/24 2052 06/10/24 0030   BP: 104/66 111/71  113/79   BP Location: Left arm Left arm  Left arm   Patient Position: Lying Lying  Lying   Pulse: 70 82 69 89   Resp: 20 20  20   Temp: 98.8 °F (37.1 °C) 98.1 °F (36.7 °C)  98.2 °F (36.8 °C)   TempSrc: Oral Oral  Oral   SpO2: 98% 99% 98% 94%   Weight:       Height:             Physical Exam:  Vital Signs Reviewed  General WDWN, Alert, NAD.    Chest:  good aeration, clear to auscultation bilaterally, tympanic to percussion bilaterally, no work of breathing noted  CV: RRR, no MGR, .  EXT:  no clubbing, no cyanosis, no edema, no joint tenderness  Neuro:  A&Ox3, CN grossly intact, no focal deficits.  Skin: No rashes or lesions noted           Result Review  Result Review:  I have personally reviewed the results from the time of this admission to 6/7/2024 12:17 EDT and agree with these findings:  [x]  Laboratory  [x]  Microbiology  [x]  Radiology  [x]  EKG/Telemetry   []  Cardiology/Vascular   []  Pathology  []  Old records  []  Other:  Most notable findings include:            Lab 06/07/24  0556 06/07/24  0545   WBC 13.63*  --    HEMOGLOBIN 9.0*  --    HEMATOCRIT 30.0*  --    PLATELETS 262  --    SODIUM  --  141   POTASSIUM  --  4.1   CHLORIDE  --  104   CO2  --  20.2*   BUN  --  22   CREATININE  --  1.24   GLUCOSE  --  205*   CALCIUM  --  9.2   TOTAL PROTEIN  --  6.4   ALBUMIN  --  4.0   GLOBULIN  --  2.4      X-ray with worsening right lower lobe airspace disease     CT from the end of May showing right lower lobe pneumonia, Lap-Band placement with esophageal dilation and fluid-filled esophagus           Assessment & Plan  Assessment / Plan        Active Hospital Problems:  Active Hospital Problems     Diagnosis  POA    **Acute hypoxic respiratory failure [J96.01]  Yes      Resolved Hospital Problems   No resolved problems to display.       Impression:  Acute hypoxemic respiratory failure requiring Airvo  Aspiration pneumonia from unspecified organism  Aspiration of vomitus/epistaxis in airways  Persistent epistaxis, chronic  Atrial fibrillation on Eliquis  Chronic compensated diastolic congestive heart failure  Leukocytosis  Type 2 diabetes with hyperglycemia  Status post lap band placement in the past    Plan  IR here is unable to aspirate gastric band fluid to loosen it.  Patient is stable and I recommend he discharges and outpatient follow-up with his bariatric surgeon up at [facility] to get this loosened up as this will decrease his aspiration risk  Appreciate ENT assistance.  Eliquis remains on hold per them.  Can follow-up with ENT as outpatient  Discharge on Augmentin to complete 7 days of antibiotics  Continue nebulizers and bronchopulmonary hygiene  Repeat noncontrast chest CT in 3 months as an outpatient  Echocardiogram with diastolic dysfunction    Stable to discharge from my perspective      Labs, imaging, microbiology, notes and medications personally reviewed  Discussed with primary    Electronically signed by Jono Ventura MD, 06/10/24, 2:22 PM EDT.

## 2024-06-10 NOTE — PLAN OF CARE
Goal Outcome Evaluation:           Progress: no change  Outcome Evaluation: Pt medically cleared by MD for discharge.

## 2024-06-10 NOTE — OUTREACH NOTE
Prep Survey      Flowsheet Row Responses   [facility] facility patient discharged from? [facility]   Is LACE score < 7 ? No   Eligibility [facility]   Hospital Pneumonia   Date of Admission 06/07/24   Date of Discharge 06/10/24   Discharge Disposition Home or Self Care   Discharge diagnosis Pneumonia   Does the patient have one of the following disease processes/diagnoses(primary or secondary)? Pneumonia   Prep survey completed? Yes            Tyra FROST - Registered Nurse

## 2024-06-11 ENCOUNTER — TRANSITIONAL CARE MANAGEMENT TELEPHONE ENCOUNTER (OUTPATIENT)
Dept: CALL CENTER | Facility: HOSPITAL | Age: 79
End: 2024-06-11
Payer: MEDICARE

## 2024-06-11 NOTE — OUTREACH NOTE
Call Center TCM Note      Flowsheet Row Responses   [facility] patient discharged from? [facility]   Does the patient have one of the following disease processes/diagnoses(primary or secondary)? Pneumonia   TCM attempt successful? Yes   Call start time 1048   Call end time 1050   Discharge diagnosis Pneumonia   Person spoke with today (if not patient) and relationship pt   Meds reviewed with patient/caregiver? Yes   Is the patient having any side effects they believe may be caused by any medication additions or changes? No   Does the patient have all medications ordered at discharge? Yes   Is the patient taking all medications as directed (includes completed medication regime)? Yes   Does the patient have an appointment with their PCP within 7-14 days of discharge? Yes  [6/18/2024 10:45 AM]   Psychosocial issues? No   Did the patient receive a copy of their discharge instructions? Yes   Nursing interventions Reviewed instructions with patient   What is the patient's perception of their health status since discharge? Improving   Nursing Interventions Nurse provided patient education   Is the patient/caregiver able to teach back the hierarchy of who to call/visit for symptoms/problems? PCP, Specialist, Home health nurse, Urgent Care, ED, 911 Yes   Is the patient/caregiver able to teach back signs and symptoms of worsening condition: Fever/chills, Shortness of breath, Chest pain   Is the patient/caregiver able to teach back importance of completing antibiotic course of treatment? Yes   TCM call completed? Yes   Wrap up additional comments Pt denies SOB, or complications r/t PNA. Reviewed AVS/meds with pt. Pt verified PCP fu appt.   Call end time 1050   Would this patient benefit from a Referral to Amb Social Work? No   Is the patient interested in additional calls from an ambulatory ? No            Jemma Antunez RN    6/11/2024, 10:51 EDT

## 2024-06-12 LAB
BACTERIA SPEC AEROBE CULT: NORMAL
BACTERIA SPEC AEROBE CULT: NORMAL

## 2024-06-13 ENCOUNTER — TELEPHONE (OUTPATIENT)
Dept: CARDIOLOGY | Facility: CLINIC | Age: 79
End: 2024-06-13
Payer: MEDICARE

## 2024-06-13 ENCOUNTER — NURSE TRIAGE (OUTPATIENT)
Dept: CALL CENTER | Facility: HOSPITAL | Age: 79
End: 2024-06-13
Payer: MEDICARE

## 2024-06-13 NOTE — TELEPHONE ENCOUNTER
"Caller with mild right side chest pain, non radiating lasting 30 minutes (resolved while on triage call). Recently discharged from hospital with Acute hypoxic respiratory failure  on 06/11/2024.  O2 sat 99%, denies soa. Caller does not have capability of checking BP.  Denies any irregular or skipped heart beats.    Caller transferred thru to Dr. Ledezma's office (cardiologist) after above information given to speak with Deena MULLEN, for further evaluation and treatment.      Reason for Disposition   [1] Chest pain lasts > 5 minutes AND [2] occurred in past 3 days (72 hours) (Exception: Feels exactly the same as previously diagnosed heartburn and has accompanying sour taste in mouth.)    Additional Information   Negative: SEVERE difficulty breathing (e.g., struggling for each breath, speaks in single words)   Negative: Difficult to awaken or acting confused (e.g., disoriented, slurred speech)   Negative: Shock suspected (e.g., cold/pale/clammy skin, too weak to stand, low BP, rapid pulse)   Negative: Passed out (i.e., lost consciousness, collapsed and was not responding)   Negative: [1] Chest pain lasts > 5 minutes AND [2] age > 44   Negative: [1] Chest pain lasts > 5 minutes AND [2] age > 30 AND [3] one or more cardiac risk factors (e.g., diabetes, high blood pressure, high cholesterol, smoker, or strong family history of heart disease)   Negative: [1] Chest pain lasts > 5 minutes AND [2] history of heart disease (i.e., angina, heart attack, heart failure, bypass surgery, takes nitroglycerin)   Negative: [1] Chest pain lasts > 5 minutes AND [2] described as crushing, pressure-like, or heavy   Negative: Heart beating < 50 beats per minute OR > 140 beats per minute   Negative: Visible sweat on face or sweat dripping down face   Negative: Sounds like a life-threatening emergency to the triager   Negative: Followed a chest injury   Negative: SEVERE chest pain   Negative: [1] Chest pain (or \"angina\") comes and goes AND [2] is " "happening more often (increasing in frequency) or getting worse (increasing in severity)  (Exception: Chest pains that last only a few seconds.)   Negative: Pain also in shoulder(s) or arm(s) or jaw  (Exception: Pain is clearly made worse by movement.)   Negative: Difficulty breathing   Negative: Dizziness or lightheadedness   Negative: Coughing up blood   Negative: Cocaine use within last 3 days   Negative: Major surgery in past month   Negative: Hip or leg fracture (broken bone) in past month (or had cast on leg or ankle in past month)   Negative: Illness requiring prolonged bedrest in past month (e.g., immobilization, long hospital stay)   Negative: Long-distance travel in past month (e.g., car, bus, train, plane; with trip lasting 6 or more hours)   Negative: History of prior \"blood clot\" in leg or lungs (i.e., deep vein thrombosis, pulmonary embolism)   Negative: History of inherited increased risk of blood clots (e.g., Factor 5 Leiden, Anti-thrombin 3, Protein C or Protein S deficiency, Prothrombin mutation)   Negative: Cancer treatment in past six months (or has cancer now)   Negative: Taking a deep breath makes pain worse   Negative: Patient sounds very sick or weak to the triager   Negative: [1] Chest pain lasts > 5 minutes AND [2] occurred > 3 days ago (72 hours) AND [3] NO chest pain or cardiac symptoms now    Protocols used: Chest Pain-ADULT-    "

## 2024-06-13 NOTE — TELEPHONE ENCOUNTER
"SW patient. Patient c/o he has like \"cramp\" on R side of chest. Patient denies any other symptoms. Patient denies it being painful. Patient just got out of hospital due to pneumonia. Patient states it has only happened today. Patient states it is better at time of call. Patient denies it happening with exertion. Did advise patient if it happened again or changed to go to ER.   "

## 2024-06-19 ENCOUNTER — OFFICE VISIT (OUTPATIENT)
Dept: FAMILY MEDICINE CLINIC | Facility: CLINIC | Age: 79
End: 2024-06-19
Payer: MEDICARE

## 2024-06-19 VITALS
DIASTOLIC BLOOD PRESSURE: 75 MMHG | OXYGEN SATURATION: 100 % | HEIGHT: 76 IN | SYSTOLIC BLOOD PRESSURE: 124 MMHG | WEIGHT: 250.8 LBS | BODY MASS INDEX: 30.54 KG/M2 | TEMPERATURE: 97.7 F | HEART RATE: 75 BPM

## 2024-06-19 DIAGNOSIS — R04.0 LEFT-SIDED NOSEBLEED: ICD-10-CM

## 2024-06-19 DIAGNOSIS — D50.8 OTHER IRON DEFICIENCY ANEMIA: Primary | ICD-10-CM

## 2024-06-19 LAB
BASOPHILS # BLD AUTO: 0.06 10*3/MM3 (ref 0–0.2)
BASOPHILS NFR BLD AUTO: 0.8 % (ref 0–1.5)
DEPRECATED RDW RBC AUTO: 51.5 FL (ref 37–54)
EOSINOPHIL # BLD AUTO: 0.24 10*3/MM3 (ref 0–0.4)
EOSINOPHIL NFR BLD AUTO: 3.1 % (ref 0.3–6.2)
ERYTHROCYTE [DISTWIDTH] IN BLOOD BY AUTOMATED COUNT: 17.9 % (ref 12.3–15.4)
FERRITIN SERPL-MCNC: 78.6 NG/ML (ref 30–400)
FOLATE SERPL-MCNC: >20 NG/ML (ref 4.78–24.2)
HCT VFR BLD AUTO: 29.8 % (ref 37.5–51)
HGB BLD-MCNC: 8.6 G/DL (ref 13–17.7)
IMM GRANULOCYTES # BLD AUTO: 0.04 10*3/MM3 (ref 0–0.05)
IMM GRANULOCYTES NFR BLD AUTO: 0.5 % (ref 0–0.5)
IRON 24H UR-MRATE: 281 MCG/DL (ref 59–158)
LYMPHOCYTES # BLD AUTO: 0.99 10*3/MM3 (ref 0.7–3.1)
LYMPHOCYTES NFR BLD AUTO: 12.7 % (ref 19.6–45.3)
MCH RBC QN AUTO: 23.4 PG (ref 26.6–33)
MCHC RBC AUTO-ENTMCNC: 28.9 G/DL (ref 31.5–35.7)
MCV RBC AUTO: 81.2 FL (ref 79–97)
MONOCYTES # BLD AUTO: 0.53 10*3/MM3 (ref 0.1–0.9)
MONOCYTES NFR BLD AUTO: 6.8 % (ref 5–12)
NEUTROPHILS NFR BLD AUTO: 5.94 10*3/MM3 (ref 1.7–7)
NEUTROPHILS NFR BLD AUTO: 76.1 % (ref 42.7–76)
NRBC BLD AUTO-RTO: 0 /100 WBC (ref 0–0.2)
PLATELET # BLD AUTO: 227 10*3/MM3 (ref 140–450)
PMV BLD AUTO: 9.3 FL (ref 6–12)
RBC # BLD AUTO: 3.67 10*6/MM3 (ref 4.14–5.8)
RETICS # AUTO: 0.13 10*6/MM3 (ref 0.02–0.13)
RETICS/RBC NFR AUTO: 3.58 % (ref 0.7–1.9)
VIT B12 BLD-MCNC: 376 PG/ML (ref 211–946)
WBC NRBC COR # BLD AUTO: 7.8 10*3/MM3 (ref 3.4–10.8)

## 2024-06-19 PROCEDURE — 1125F AMNT PAIN NOTED PAIN PRSNT: CPT | Performed by: NURSE PRACTITIONER

## 2024-06-19 PROCEDURE — 83540 ASSAY OF IRON: CPT | Performed by: NURSE PRACTITIONER

## 2024-06-19 PROCEDURE — 82728 ASSAY OF FERRITIN: CPT | Performed by: NURSE PRACTITIONER

## 2024-06-19 PROCEDURE — 1111F DSCHRG MED/CURRENT MED MERGE: CPT | Performed by: NURSE PRACTITIONER

## 2024-06-19 PROCEDURE — 85045 AUTOMATED RETICULOCYTE COUNT: CPT | Performed by: NURSE PRACTITIONER

## 2024-06-19 PROCEDURE — 36415 COLL VENOUS BLD VENIPUNCTURE: CPT | Performed by: NURSE PRACTITIONER

## 2024-06-19 PROCEDURE — 3078F DIAST BP <80 MM HG: CPT | Performed by: NURSE PRACTITIONER

## 2024-06-19 PROCEDURE — 85025 COMPLETE CBC W/AUTO DIFF WBC: CPT | Performed by: NURSE PRACTITIONER

## 2024-06-19 PROCEDURE — 3074F SYST BP LT 130 MM HG: CPT | Performed by: NURSE PRACTITIONER

## 2024-06-19 PROCEDURE — 99213 OFFICE O/P EST LOW 20 MIN: CPT | Performed by: NURSE PRACTITIONER

## 2024-06-19 PROCEDURE — 82746 ASSAY OF FOLIC ACID SERUM: CPT | Performed by: NURSE PRACTITIONER

## 2024-06-19 PROCEDURE — 82607 VITAMIN B-12: CPT | Performed by: NURSE PRACTITIONER

## 2024-06-19 NOTE — PROGRESS NOTES
Transitional Care Follow Up Visit  Subjective     Edi Weston is a 79 y.o. male who presents for a transitional care management visit.    Within 48 business hours after discharge our office contacted him via telephone to coordinate his care and needs.      I reviewed and discussed the details of that call along with the discharge summary, hospital problems, inpatient lab results, inpatient diagnostic studies, and consultation reports with Edi.     Current outpatient and discharge medications have been reconciled for the patient.  Reviewed by: DIONNA Barboza          6/10/2024     5:34 PM   Date of TCM Phone Call   Hospital Pneumonia   Date of Admission 6/7/2024   Date of Discharge 6/10/2024   Discharge Disposition Home or Self Care     Risk for Readmission (LACE) Score: 10 (6/10/2024  6:00 AM)      History of Present Illness   Course During Hospital Stay: Edi Weston is a 79 y.o. male with past medical history of A-fib on Eliquis, type 2 diabetes mellitus, hypertension and HFpEF who presented after having a nosebleed last night and had an aspiration event leading to hypoxia in 70s.  He stated he has been having nosebleed issues for past 6 months and has an upcoming appointment with ENT in few weeks.  He had nosebleed from left nose last night which woke him up following which she had aspiration event.  He had difficulty breathing there after and was found to be hypoxic and 70s thereafter prompted him to come to ER immediately. In ER, hypoxic with SpO2 79% in room air and was placed on 15 L with 65% FiO2 with improvement of SpO2 to 96-98%.  Lab work showed leukocytosis of 13.63, hemoglobin 9 (Around 10.5-10.7) .  Lactic acid of 3 with repeat lactic acid of 2.  Chest x-ray was obtained which showed right-sided pneumonia.  Patient was started on IV antibiotics and admitted with working diagnosis of acute hypoxic respiratory failure in the setting of aspiration pneumonia.  Pulmonology and ENT  consulted.  CT chest with contrast showed multifocal pneumonia/aspiration but did show distended tempora/failed patulous thoracic esophagus increasing prior in the setting of gastric band, highly suspicious for tight gastric band and places patient at high risk for recurrent aspiration. Dr. Fagan from general surgery was consulted who recommended to contact [facility] bariatric surgeon.  Dr. Be Willett at [facility] was contacted who performed patient's surgery about 17 years ago; recommended outpatient follow-up.  Initially plan was for IR guided aspiration here at [facility] but the procedure is not done by our interventional radiologist.  Patient has scheduled an appointment at [facility] at his bariatric surgeon office for today.  He is saturating well on room air.  No acute events overnight.  Thus, patient is being discharged home today.  He stable at the time of discharge.  He will follow-up with PCP in 3-7 days, needs CBC and chemistries trended during follow-up.  Follow-up with bariatric surgeon at [facility] today and possible aspiration of debris causing dilatation of esophageal dilatation, bariatric surgeon to address tight gastric band as outpatient.  Follow-up with ENT as scheduled in few weeks for evaluation of nasal bleed.  Fall precautions.  Advised to restart Eliquis after 3 days.  Stop if patient's nasal bleed reoccurs until evaluated by his primary physician or ENT.  Plan was discussed with the patient which he agreed on.  Advised to be compliant with medications.        Seeing ENT on Monday.  Ended up in the hospital from aspirating from a nosebleed.  States he is feeling much better but concerned his nose may bleed again at any time.      The following portions of the patient's history were reviewed and updated as appropriate: allergies, current medications, past family history, past medical history, past social history, past surgical history, and problem list.    Review  "of Systems    Objective   /75   Pulse 75   Temp 97.7 °F (36.5 °C)   Ht 193 cm (75.98\")   Wt 114 kg (250 lb 12.8 oz)   SpO2 100%   BMI 30.54 kg/m²     Physical Exam  Vitals reviewed.   Constitutional:       Appearance: Normal appearance.   Cardiovascular:      Rate and Rhythm: Normal rate and regular rhythm.      Pulses: Normal pulses.      Heart sounds: Normal heart sounds.   Pulmonary:      Effort: Pulmonary effort is normal.      Breath sounds: Normal breath sounds.   Skin:     General: Skin is warm and dry.   Neurological:      Mental Status: He is alert and oriented to person, place, and time.   Psychiatric:         Mood and Affect: Mood normal.         Behavior: Behavior normal.         Thought Content: Thought content normal.         Judgment: Judgment normal.         Assessment & Plan   Problems Addressed this Visit       Left-sided nosebleed     Other Visit Diagnoses       Other iron deficiency anemia    -  Primary    Relevant Orders    Iron    Vitamin B12 and Folate    Ferritin    CBC and Differential    Reticulocytes          Diagnoses         Codes Comments    Other iron deficiency anemia    -  Primary ICD-10-CM: D50.8  ICD-9-CM: 280.8     Left-sided nosebleed     ICD-10-CM: R04.0  ICD-9-CM: 784.7 Gave him sample of [place] to use for nasal dryness                       "

## 2024-06-21 ENCOUNTER — READMISSION MANAGEMENT (OUTPATIENT)
Dept: CALL CENTER | Facility: HOSPITAL | Age: 79
End: 2024-06-21
Payer: MEDICARE

## 2024-06-21 NOTE — OUTREACH NOTE
COPD/PN Week 2 Survey      Flowsheet Row Responses   [facility] patient discharged from? [facility]   Does the patient have one of the following disease processes/diagnoses(primary or secondary)? Pneumonia   Week 2 attempt successful? Yes   Call start time 1546   Call end time 1549   Discharge diagnosis Pneumonia   Person spoke with today (if not patient) and relationship Patient   Meds reviewed with patient/caregiver? Yes   Is the patient having any side effects they believe may be caused by any medication additions or changes? No   Does the patient have all medications ordered at discharge? Yes   Is the patient taking all medications as directed (includes completed medication regime)? Yes   Medication comments Patient states he completed all of his Augmentin   Does the patient have a primary care provider?  Yes   Does the patient have an appointment with their PCP or specialist within 7 days of discharge? Yes   Comments regarding PCP PCP--Dr. Jesus Lux--saw on 6/19/24   Has the patient kept scheduled appointments due by today? Yes   Has home health visited the patient within 72 hours of discharge? N/A   Pulse Ox monitoring None   Psychosocial issues? No   Comments Patient states he is doing well. He had f/u with PCP and blood work done. No fevers, Chest pain or shortness of breath.   Did the patient receive a copy of their discharge instructions? Yes   Nursing interventions Reviewed instructions with patient   What is the patient's perception of their health status since discharge? Improving   Nursing Interventions Nurse provided patient education   If the patient is a current smoker, are they able to teach back resources for cessation? Not a smoker   Is the patient/caregiver able to teach back the hierarchy of who to call/visit for symptoms/problems? PCP, Specialist, Home health nurse, Urgent Care, ED, 911 Yes   Is the patient/caregiver able to teach back signs and symptoms of worsening condition:  Fever/chills, Shortness of breath, Chest pain   Is the patient/caregiver able to teach back importance of completing antibiotic course of treatment? Yes   Week 2 call completed? Yes   Graduated Yes   Is the patient interested in additional calls from an ambulatory ? No   Would this patient benefit from a Referral to [facility] Social Work? No   Wrap up additional comments Patient denies any needs or concerns. Doing well.   Call end time 1807            Patricia MONTES - Registered Nurse

## 2024-07-08 ENCOUNTER — TELEPHONE (OUTPATIENT)
Dept: FAMILY MEDICINE CLINIC | Facility: CLINIC | Age: 79
End: 2024-07-08
Payer: MEDICARE

## 2024-07-08 ENCOUNTER — TELEPHONE (OUTPATIENT)
Dept: CARDIOLOGY | Facility: CLINIC | Age: 79
End: 2024-07-08
Payer: MEDICARE

## 2024-07-08 DIAGNOSIS — I50.32 CHRONIC HEART FAILURE WITH PRESERVED EJECTION FRACTION (HFPEF): Primary | ICD-10-CM

## 2024-07-08 RX ORDER — SPIRONOLACTONE 25 MG/1
25 TABLET ORAL DAILY
Qty: 7 TABLET | Refills: 0 | Status: SHIPPED | OUTPATIENT
Start: 2024-07-08

## 2024-07-08 NOTE — TELEPHONE ENCOUNTER
Received call from patient. Patient C/O bilateral feet edema and now going up L leg. Patient states Last week Monday-Wednesday he doubled his lasix and it did not help. Patient has been taking regular dose. Patient denies any SOA.     Paitent Taking Lasix 40 mg daily

## 2024-07-08 NOTE — TELEPHONE ENCOUNTER
Patient called in he is retaining water. Both feet and the left leg is swollen.  Last Monday Tues Wed he took 40mg at 8am and 40mg at 2pm    It did not help  so he is back to just doing the 40mg daily.

## 2024-07-15 ENCOUNTER — LAB (OUTPATIENT)
Dept: LAB | Facility: HOSPITAL | Age: 79
End: 2024-07-15
Payer: MEDICARE

## 2024-07-15 DIAGNOSIS — I50.32 CHRONIC HEART FAILURE WITH PRESERVED EJECTION FRACTION (HFPEF): ICD-10-CM

## 2024-07-15 LAB
ANION GAP SERPL CALCULATED.3IONS-SCNC: 10.6 MMOL/L (ref 5–15)
BUN SERPL-MCNC: 27 MG/DL (ref 8–23)
BUN/CREAT SERPL: 18.8 (ref 7–25)
CALCIUM SPEC-SCNC: 9.1 MG/DL (ref 8.6–10.5)
CHLORIDE SERPL-SCNC: 107 MMOL/L (ref 98–107)
CO2 SERPL-SCNC: 24.4 MMOL/L (ref 22–29)
CREAT SERPL-MCNC: 1.44 MG/DL (ref 0.76–1.27)
EGFRCR SERPLBLD CKD-EPI 2021: 50 ML/MIN/1.73
GLUCOSE SERPL-MCNC: 117 MG/DL (ref 65–99)
NT-PROBNP SERPL-MCNC: 1249 PG/ML (ref 0–1800)
POTASSIUM SERPL-SCNC: 4.4 MMOL/L (ref 3.5–5.2)
SODIUM SERPL-SCNC: 142 MMOL/L (ref 136–145)

## 2024-07-15 PROCEDURE — 83880 ASSAY OF NATRIURETIC PEPTIDE: CPT

## 2024-07-15 PROCEDURE — 36415 COLL VENOUS BLD VENIPUNCTURE: CPT

## 2024-07-15 PROCEDURE — 80048 BASIC METABOLIC PNL TOTAL CA: CPT

## 2024-07-17 ENCOUNTER — TELEPHONE (OUTPATIENT)
Dept: CARDIOLOGY | Facility: CLINIC | Age: 79
End: 2024-07-17
Payer: MEDICARE

## 2024-07-17 NOTE — TELEPHONE ENCOUNTER
----- Message from Jamila Ty sent at 7/16/2024  7:15 PM EDT -----  Renal function is declined, most likely due to diuretic being added. BNP is in normal range. Find out if symptoms have improved

## 2024-07-17 NOTE — TELEPHONE ENCOUNTER
SW patient. Patient states swelling is a lot better. Patient states he has lost 6 pounds. Advised to notify office if symptoms returned.

## 2024-08-02 ENCOUNTER — TELEPHONE (OUTPATIENT)
Dept: CARDIOLOGY | Facility: CLINIC | Age: 79
End: 2024-08-02
Payer: MEDICARE

## 2024-08-02 DIAGNOSIS — I50.32 CHRONIC HEART FAILURE WITH PRESERVED EJECTION FRACTION (HFPEF): ICD-10-CM

## 2024-08-02 DIAGNOSIS — R06.02 SOB (SHORTNESS OF BREATH): Primary | ICD-10-CM

## 2024-08-02 NOTE — TELEPHONE ENCOUNTER
Received VM from patient.     SW patient. Patient states that he is up 10 pounds in 5 days and having BLE edema. Patient denies any SOA.   Patient is taking:  Lasix 40 mg daily    Advised I would return call with recommendations.

## 2024-08-09 ENCOUNTER — LAB (OUTPATIENT)
Dept: LAB | Facility: HOSPITAL | Age: 79
End: 2024-08-09
Payer: MEDICARE

## 2024-08-09 DIAGNOSIS — I50.32 CHRONIC HEART FAILURE WITH PRESERVED EJECTION FRACTION (HFPEF): ICD-10-CM

## 2024-08-09 DIAGNOSIS — R06.02 SOB (SHORTNESS OF BREATH): ICD-10-CM

## 2024-08-09 LAB
ANION GAP SERPL CALCULATED.3IONS-SCNC: 10.8 MMOL/L (ref 5–15)
BUN SERPL-MCNC: 37 MG/DL (ref 8–23)
BUN/CREAT SERPL: 21.3 (ref 7–25)
CALCIUM SPEC-SCNC: 9.7 MG/DL (ref 8.6–10.5)
CHLORIDE SERPL-SCNC: 106 MMOL/L (ref 98–107)
CO2 SERPL-SCNC: 25.2 MMOL/L (ref 22–29)
CREAT SERPL-MCNC: 1.74 MG/DL (ref 0.76–1.27)
EGFRCR SERPLBLD CKD-EPI 2021: 39.9 ML/MIN/1.73
GLUCOSE SERPL-MCNC: 106 MG/DL (ref 65–99)
NT-PROBNP SERPL-MCNC: 1393 PG/ML (ref 0–1800)
POTASSIUM SERPL-SCNC: 4.6 MMOL/L (ref 3.5–5.2)
SODIUM SERPL-SCNC: 142 MMOL/L (ref 136–145)

## 2024-08-09 PROCEDURE — 36415 COLL VENOUS BLD VENIPUNCTURE: CPT

## 2024-08-09 PROCEDURE — 83880 ASSAY OF NATRIURETIC PEPTIDE: CPT

## 2024-08-09 PROCEDURE — 80048 BASIC METABOLIC PNL TOTAL CA: CPT

## 2024-08-12 ENCOUNTER — TELEPHONE (OUTPATIENT)
Dept: CARDIOLOGY | Facility: CLINIC | Age: 79
End: 2024-08-12
Payer: MEDICARE

## 2024-08-12 NOTE — TELEPHONE ENCOUNTER
----- Message from Jamila Ty sent at 8/12/2024 10:58 AM EDT -----  BNP is in normal range  Electrolytes are in normal range  Renal function is declined, find out if he had symptom improvement with spironolactone

## 2024-08-12 NOTE — TELEPHONE ENCOUNTER
PRINCESS patient. Patent states he is doing much better after doubling lasix and reducing sodium/salt. Patient states weight is down and swelling gone    Patient is not Spironolactone. Patient only did 7 day trial 7/8.

## 2024-09-06 ENCOUNTER — OFFICE VISIT (OUTPATIENT)
Dept: FAMILY MEDICINE CLINIC | Facility: CLINIC | Age: 79
End: 2024-09-06
Payer: MEDICARE

## 2024-09-06 VITALS
OXYGEN SATURATION: 100 % | SYSTOLIC BLOOD PRESSURE: 98 MMHG | TEMPERATURE: 96.8 F | DIASTOLIC BLOOD PRESSURE: 63 MMHG | WEIGHT: 241 LBS | HEART RATE: 56 BPM | HEIGHT: 76 IN | BODY MASS INDEX: 29.35 KG/M2

## 2024-09-06 DIAGNOSIS — E11.42 TYPE 2 DIABETES, CONTROLLED, WITH PERIPHERAL NEUROPATHY: ICD-10-CM

## 2024-09-06 DIAGNOSIS — I10 HYPERTENSION, ESSENTIAL: Primary | ICD-10-CM

## 2024-09-06 DIAGNOSIS — I48.92 ATRIAL FLUTTER WITH CONTROLLED RESPONSE: ICD-10-CM

## 2024-09-06 DIAGNOSIS — E78.2 MIXED HYPERLIPIDEMIA: ICD-10-CM

## 2024-09-06 LAB
ALBUMIN SERPL-MCNC: 4.2 G/DL (ref 3.5–5.2)
ALBUMIN/GLOB SERPL: 1.3 G/DL
ALP SERPL-CCNC: 116 U/L (ref 39–117)
ALT SERPL W P-5'-P-CCNC: 22 U/L (ref 1–41)
ANION GAP SERPL CALCULATED.3IONS-SCNC: 11 MMOL/L (ref 5–15)
AST SERPL-CCNC: 24 U/L (ref 1–40)
BILIRUB SERPL-MCNC: 0.3 MG/DL (ref 0–1.2)
BUN SERPL-MCNC: 57 MG/DL (ref 8–23)
BUN/CREAT SERPL: 35.2 (ref 7–25)
CALCIUM SPEC-SCNC: 9.5 MG/DL (ref 8.6–10.5)
CHLORIDE SERPL-SCNC: 104 MMOL/L (ref 98–107)
CHOLEST SERPL-MCNC: 86 MG/DL (ref 0–200)
CO2 SERPL-SCNC: 21 MMOL/L (ref 22–29)
CREAT SERPL-MCNC: 1.62 MG/DL (ref 0.76–1.27)
EGFRCR SERPLBLD CKD-EPI 2021: 43.4 ML/MIN/1.73
GLOBULIN UR ELPH-MCNC: 3.2 GM/DL
GLUCOSE SERPL-MCNC: 163 MG/DL (ref 65–99)
HBA1C MFR BLD: 6.3 % (ref 4.8–5.6)
HDLC SERPL-MCNC: 32 MG/DL (ref 40–60)
LDLC SERPL CALC-MCNC: 33 MG/DL (ref 0–100)
LDLC/HDLC SERPL: 0.98 {RATIO}
POTASSIUM SERPL-SCNC: 4.9 MMOL/L (ref 3.5–5.2)
PROT SERPL-MCNC: 7.4 G/DL (ref 6–8.5)
SODIUM SERPL-SCNC: 136 MMOL/L (ref 136–145)
TRIGL SERPL-MCNC: 114 MG/DL (ref 0–150)
VLDLC SERPL-MCNC: 21 MG/DL (ref 5–40)

## 2024-09-06 PROCEDURE — 3078F DIAST BP <80 MM HG: CPT | Performed by: FAMILY MEDICINE

## 2024-09-06 PROCEDURE — 36415 COLL VENOUS BLD VENIPUNCTURE: CPT | Performed by: FAMILY MEDICINE

## 2024-09-06 PROCEDURE — 80053 COMPREHEN METABOLIC PANEL: CPT | Performed by: FAMILY MEDICINE

## 2024-09-06 PROCEDURE — 1125F AMNT PAIN NOTED PAIN PRSNT: CPT | Performed by: FAMILY MEDICINE

## 2024-09-06 PROCEDURE — 80061 LIPID PANEL: CPT | Performed by: FAMILY MEDICINE

## 2024-09-06 PROCEDURE — 3074F SYST BP LT 130 MM HG: CPT | Performed by: FAMILY MEDICINE

## 2024-09-06 PROCEDURE — 83036 HEMOGLOBIN GLYCOSYLATED A1C: CPT | Performed by: FAMILY MEDICINE

## 2024-09-06 PROCEDURE — 99214 OFFICE O/P EST MOD 30 MIN: CPT | Performed by: FAMILY MEDICINE

## 2024-09-06 NOTE — PROGRESS NOTES
Chief Complaint   Patient presents with    Follow-up    Hypertension        Subjective     Edi Weston  has a past medical history of Arthritis, Cellulitis of right lower limb, Chronic heart failure with preserved ejection fraction (HFpEF) (03/20/2023), Colon polyps, Decubitus ulcer of foot, stage 1 (08/10/2018), Decubitus ulcer of foot, stage 3 (02/07/2018), Foot pain, left (08/10/2018), Foot pain, right, Gout, Hammertoe (12/30/2019), History of colonoscopy with polypectomy (08/25/2020), Ingrowing nail (11/02/2018), Medication management (02/18/2019), Nail dystrophy, PAT (paroxysmal atrial tachycardia) (08/06/2018), Pressure ulcer, stage 1, Tinea unguium, and Type 2 diabetes, controlled, with peripheral neuropathy (08/10/2018).    Diabetes  He presents for his follow-up diabetic visit. He has type 2 diabetes mellitus. No MedicAlert identification noted. The initial diagnosis of diabetes was made 30 years ago. Hypoglycemia symptoms include tremors. Pertinent negatives for hypoglycemia include no nervousness/anxiousness or speech difficulty. Associated symptoms include foot paresthesias. Pertinent negatives for diabetes include no blurred vision, no chest pain, no fatigue, no polydipsia and no polyuria. Hypoglycemia complications include nocturnal hypoglycemia. Pertinent negatives for hypoglycemia complications include no blackouts, no hospitalization, no required assistance and no required glucagon injection. Symptoms are improving. Risk factors for coronary artery disease include diabetes mellitus, dyslipidemia, hypertension and male sex. Current diabetic treatment includes oral agent (dual therapy). He is compliant with treatment all of the time. He is following a low salt diet. Meal planning includes avoidance of concentrated sweets. He participates in exercise daily. He monitors blood glucose at home 1-2 x per day. His breakfast blood glucose range is generally 110-130 mg/dl. His highest blood glucose is  110-130 mg/dl. He does not see a podiatrist. Eye exam is current.   Hypertension  This is a chronic problem. The current episode started more than 1 year ago. The problem has been stable since onset. The problem is controlled. Pertinent negatives include no blurred vision, chest pain, palpitations or shortness of breath. Risk factors for coronary artery disease include diabetes mellitus, dyslipidemia and male gender. Current antihypertension treatment includes beta blockers and ACE inhibitors. The current treatment provides significant improvement. There are no compliance problems.  Hypertensive end-organ damage includes heart failure.   Hyperlipidemia  This is a chronic problem. The current episode started more than 1 year ago. The problem is controlled. Recent lipid tests were reviewed and are normal. Exacerbating diseases include diabetes. There are no known factors aggravating his hyperlipidemia. Pertinent negatives include no chest pain or shortness of breath. Current antihyperlipidemic treatment includes statins. The current treatment provides significant improvement of lipids. There are no compliance problems.  Risk factors for coronary artery disease include diabetes mellitus, dyslipidemia, hypertension and male sex.       PHQ-2 Depression Screening  Little interest or pleasure in doing things?     Feeling down, depressed, or hopeless?     PHQ-2 Total Score     PHQ-9 Depression Screening  Little interest or pleasure in doing things?     Feeling down, depressed, or hopeless?     Trouble falling or staying asleep, or sleeping too much?     Feeling tired or having little energy?     Poor appetite or overeating?     Feeling bad about yourself - or that you are a failure or have let yourself or your family down?     Trouble concentrating on things, such as reading the newspaper or watching television?     Moving or speaking so slowly that other people could have noticed? Or the opposite - being so fidgety or  restless that you have been moving around a lot more than usual?     Thoughts that you would be better off dead, or of hurting yourself in some way?     PHQ-9 Total Score     If you checked off any problems, how difficult have these problems made it for you to do your work, take care of things at home, or get along with other people?       Allergies   Allergen Reactions    Sulfa Antibiotics Unknown - Low Severity     Unsure of reaction       Prior to Admission medications    Medication Sig Start Date End Date Taking? Authorizing Provider   apixaban (Eliquis) 5 MG tablet tablet Take 1 tablet by mouth 2 (Two) Times a Day. Hold for 3 more days and then restart it. Stop if patient has Nosebleed again and contact primary provider regarding it 6/10/24  Yes Ariel Nava MD   furosemide (Lasix) 40 MG tablet Take 1 tablet by mouth Daily.  Patient taking differently: Take 1 tablet by mouth Every Morning. 1/2/24  Yes Jesus Lux, DO   glyburide (DIAbeta) 2.5 MG tablet Take 1 tablet by mouth Daily With Breakfast.  Patient taking differently: Take 1 tablet by mouth Every Night. 1/2/24  Yes Jesus Lux DO   lisinopril (PRINIVIL,ZESTRIL) 20 MG tablet Take 1 tablet by mouth Daily.  Patient taking differently: Take 1 tablet by mouth Every Morning. 1/2/24  Yes Jesus Lux DO   metFORMIN ER (GLUCOPHAGE-XR) 500 MG 24 hr tablet Take 2 tablets by mouth Every Evening. 1/2/24  Yes Jesus Lux DO   metoprolol succinate XL (TOPROL-XL) 50 MG 24 hr tablet Take 1 tablet by mouth Daily.  Patient taking differently: Take 1 tablet by mouth Every Morning. 1/2/24  Yes Jesus Lux DO   pantoprazole (PROTONIX) 40 MG EC tablet Take 1 tablet by mouth Daily.  Patient taking differently: Take 1 tablet by mouth Every Morning. 1/2/24  Yes Jesus Lux DO   simvastatin (ZOCOR) 20 MG tablet Take 1 tablet by mouth Every Evening. 1/2/24  Yes Jesus Lux DO    spironolactone (ALDACTONE) 25 MG tablet Take 1 tablet by mouth Daily. 24  Yes Jamila Ty APRN        Patient Active Problem List   Diagnosis    Type 2 diabetes, controlled, with peripheral neuropathy    History of colonoscopy with polypectomy    Gout    Hammertoe    Arthritis    Atrial flutter with controlled response    LBBB (left bundle branch block)    Colon polyps    Hyperlipidemia    Hypertension, essential    Chronic heart failure with preserved ejection fraction (HFpEF)    Sciatica of right side    Left-sided nosebleed    Acute hypoxic respiratory failure    Coronary artery calcification seen on CT scan        Past Surgical History:   Procedure Laterality Date    ABDOMINAL SURGERY      lap band    COLONOSCOPY      GASTRIC BANDING         Social History     Socioeconomic History    Marital status:    Tobacco Use    Smoking status: Former     Current packs/day: 0.00     Average packs/day: 2.0 packs/day for 22.0 years (44.0 ttl pk-yrs)     Types: Cigarettes     Start date:      Quit date:      Years since quittin.7    Smokeless tobacco: Never    Tobacco comments:     AGE STARTED 19 QUIT AGE 31 - SMOKED X 20 YEARS QUIT    Vaping Use    Vaping status: Never Used   Substance and Sexual Activity    Alcohol use: Not Currently     Comment: CURRENT SOME DAY 2020,2017    Drug use: Never    Sexual activity: Defer       Family History   Problem Relation Age of Onset    Nephrolithiasis Mother         RENAL CALCULUS    Other Mother         MOTHERS'S FAMILY HISTORY UNKOWN    Colon cancer Father 50        FAMILY HISTORY OF COLON CANCER    Other Father         FATHER'S FAMILY HISTORY UNKNOWN    Colon cancer Paternal Grandfather 60        FAMILY HISTORY OF COLON CANCER        Family history, surgical history, past medical history, Allergies and meds reviewed with patient today and updated in [facility] EMR.     ROS:  Review of Systems   Constitutional:  Negative for fatigue.  "  HENT:  Negative for congestion, postnasal drip and rhinorrhea.    Eyes:  Negative for blurred vision and visual disturbance.   Respiratory:  Negative for cough, chest tightness, shortness of breath and wheezing.    Cardiovascular:  Negative for chest pain, palpitations and leg swelling.   Endocrine: Negative for polydipsia and polyuria.   Allergic/Immunologic: Negative for environmental allergies.   Neurological:  Positive for tremors. Negative for speech difficulty and headache.   Psychiatric/Behavioral:  Negative for depressed mood. The patient is not nervous/anxious.        OBJECTIVE:  Vitals:    09/06/24 1337   BP: 98/63   BP Location: Left arm   Patient Position: Sitting   Pulse: 56   Temp: 96.8 °F (36 °C)   SpO2: 100%   Weight: 109 kg (241 lb)   Height: 193 cm (75.98\")     No results found.   Body mass index is 29.35 kg/m².  No LMP for male patient.    The ASCVD Risk score (Shelia DK, et al., 2019) failed to calculate for the following reasons:    The valid total cholesterol range is 130 to 320 mg/dL     Physical Exam  Vitals and nursing note reviewed.   Constitutional:       General: He is not in acute distress.     Appearance: Normal appearance. He is normal weight.   HENT:      Head: Normocephalic.      Right Ear: Tympanic membrane, ear canal and external ear normal.      Left Ear: Tympanic membrane, ear canal and external ear normal.      Nose: Nose normal.      Mouth/Throat:      Mouth: Mucous membranes are moist.      Pharynx: Oropharynx is clear.   Eyes:      General: No scleral icterus.     Conjunctiva/sclera: Conjunctivae normal.      Pupils: Pupils are equal, round, and reactive to light.   Cardiovascular:      Rate and Rhythm: Normal rate and regular rhythm.      Pulses: Normal pulses.      Heart sounds: Normal heart sounds. No murmur heard.  Pulmonary:      Effort: Pulmonary effort is normal.      Breath sounds: Normal breath sounds. No wheezing, rhonchi or rales.   Musculoskeletal:      Cervical " back: Neck supple. No rigidity or tenderness.   Lymphadenopathy:      Cervical: No cervical adenopathy.   Skin:     General: Skin is warm and dry.      Coloration: Skin is not jaundiced.      Findings: No rash.   Neurological:      General: No focal deficit present.      Mental Status: He is alert and oriented to person, place, and time.   Psychiatric:         Mood and Affect: Mood normal.         Thought Content: Thought content normal.         Judgment: Judgment normal.         Procedures    Lab on 08/09/2024   Component Date Value Ref Range Status    Glucose 08/09/2024 106 (H)  65 - 99 mg/dL Final    BUN 08/09/2024 37 (H)  8 - 23 mg/dL Final    Creatinine 08/09/2024 1.74 (H)  0.76 - 1.27 mg/dL Final    Sodium 08/09/2024 142  136 - 145 mmol/L Final    Potassium 08/09/2024 4.6  3.5 - 5.2 mmol/L Final    Chloride 08/09/2024 106  98 - 107 mmol/L Final    CO2 08/09/2024 25.2  22.0 - 29.0 mmol/L Final    Calcium 08/09/2024 9.7  8.6 - 10.5 mg/dL Final    BUN/Creatinine Ratio 08/09/2024 21.3  7.0 - 25.0 Final    Anion Gap 08/09/2024 10.8  5.0 - 15.0 mmol/L Final    eGFR 08/09/2024 39.9 (L)  >60.0 mL/min/1.73 Final    proBNP 08/09/2024 1,393.0  0.0 - 1,800.0 pg/mL Final       ASSESSMENT/ PLAN:    Diagnoses and all orders for this visit:    1. Hypertension, essential (Primary)  Assessment & Plan:  His blood pressure is okay here today it is a little on the low side.  Will continue his meds and update his labs      2. Mixed hyperlipidemia  Assessment & Plan:   Update his lipid profile with his labs.      3. Atrial flutter with controlled response  Assessment & Plan:  Clinically he is regular here today.  He is rate controlled and on Eliquis.      4. Type 2 diabetes, controlled, with peripheral neuropathy  Assessment & Plan:  Will update his A1c with his labs here today.    Orders:  -     Comprehensive Metabolic Panel  -     Lipid Panel  -     Hemoglobin A1c               Orders Placed Today:     No orders of the defined  types were placed in this encounter.       Management Plan:     An After Visit Summary was printed and given to the patient at discharge.    Follow-up: Return in about 5 months (around 2/6/2025) for Recheck.    Jesus Lux, DO 9/6/2024 14:04 EDT  This note was electronically signed.  Answers submitted by the patient for this visit:  Office Visit on 9/6/2024  1:45 PM with Jesus Lux  Primary Reason for Visit (Submitted on 9/3/2024)  What is the primary reason for your visit?: Diabetes

## 2024-09-06 NOTE — ASSESSMENT & PLAN NOTE
His blood pressure is okay here today it is a little on the low side.  Will continue his meds and update his labs

## 2024-09-09 DIAGNOSIS — N28.9 KIDNEY FUNCTION ABNORMAL: Primary | ICD-10-CM

## 2024-09-17 ENCOUNTER — LAB (OUTPATIENT)
Dept: LAB | Facility: HOSPITAL | Age: 79
End: 2024-09-17
Payer: MEDICARE

## 2024-09-17 DIAGNOSIS — N28.9 KIDNEY FUNCTION ABNORMAL: ICD-10-CM

## 2024-09-17 LAB
ANION GAP SERPL CALCULATED.3IONS-SCNC: 11 MMOL/L (ref 5–15)
BUN SERPL-MCNC: 47 MG/DL (ref 8–23)
BUN/CREAT SERPL: 28 (ref 7–25)
CALCIUM SPEC-SCNC: 9.6 MG/DL (ref 8.6–10.5)
CHLORIDE SERPL-SCNC: 108 MMOL/L (ref 98–107)
CO2 SERPL-SCNC: 23 MMOL/L (ref 22–29)
CREAT SERPL-MCNC: 1.68 MG/DL (ref 0.76–1.27)
EGFRCR SERPLBLD CKD-EPI 2021: 41.6 ML/MIN/1.73
GLUCOSE SERPL-MCNC: 69 MG/DL (ref 65–99)
POTASSIUM SERPL-SCNC: 5.4 MMOL/L (ref 3.5–5.2)
SODIUM SERPL-SCNC: 142 MMOL/L (ref 136–145)

## 2024-09-17 PROCEDURE — 36415 COLL VENOUS BLD VENIPUNCTURE: CPT

## 2024-09-17 PROCEDURE — 80048 BASIC METABOLIC PNL TOTAL CA: CPT

## 2024-09-18 DIAGNOSIS — N28.9 KIDNEY FUNCTION ABNORMAL: Primary | ICD-10-CM

## 2024-09-27 ENCOUNTER — LAB (OUTPATIENT)
Dept: LAB | Facility: HOSPITAL | Age: 79
End: 2024-09-27
Payer: MEDICARE

## 2024-09-27 DIAGNOSIS — N28.9 KIDNEY FUNCTION ABNORMAL: ICD-10-CM

## 2024-09-27 LAB
ANION GAP SERPL CALCULATED.3IONS-SCNC: 7.7 MMOL/L (ref 5–15)
BUN SERPL-MCNC: 41 MG/DL (ref 8–23)
BUN/CREAT SERPL: 25.8 (ref 7–25)
CALCIUM SPEC-SCNC: 9.7 MG/DL (ref 8.6–10.5)
CHLORIDE SERPL-SCNC: 107 MMOL/L (ref 98–107)
CO2 SERPL-SCNC: 25.3 MMOL/L (ref 22–29)
CREAT SERPL-MCNC: 1.59 MG/DL (ref 0.76–1.27)
EGFRCR SERPLBLD CKD-EPI 2021: 44.4 ML/MIN/1.73
GLUCOSE SERPL-MCNC: 131 MG/DL (ref 65–99)
POTASSIUM SERPL-SCNC: 5 MMOL/L (ref 3.5–5.2)
SODIUM SERPL-SCNC: 140 MMOL/L (ref 136–145)

## 2024-09-27 PROCEDURE — 36415 COLL VENOUS BLD VENIPUNCTURE: CPT

## 2024-09-27 PROCEDURE — 80048 BASIC METABOLIC PNL TOTAL CA: CPT

## 2024-10-10 ENCOUNTER — OFFICE VISIT (OUTPATIENT)
Dept: SURGERY | Facility: CLINIC | Age: 79
End: 2024-10-10
Payer: MEDICARE

## 2024-10-10 ENCOUNTER — PREP FOR SURGERY (OUTPATIENT)
Dept: OTHER | Facility: HOSPITAL | Age: 79
End: 2024-10-10
Payer: MEDICARE

## 2024-10-10 VITALS
HEART RATE: 51 BPM | DIASTOLIC BLOOD PRESSURE: 58 MMHG | HEIGHT: 76 IN | WEIGHT: 246 LBS | BODY MASS INDEX: 29.96 KG/M2 | SYSTOLIC BLOOD PRESSURE: 94 MMHG

## 2024-10-10 DIAGNOSIS — Z86.0100 HISTORY OF COLONIC POLYPS: ICD-10-CM

## 2024-10-10 DIAGNOSIS — Z80.0 FAMILY HISTORY OF COLON CANCER IN FATHER: ICD-10-CM

## 2024-10-10 DIAGNOSIS — Z12.11 SCREENING FOR MALIGNANT NEOPLASM OF COLON: Primary | ICD-10-CM

## 2024-10-10 RX ORDER — SODIUM CHLORIDE 0.9 % (FLUSH) 0.9 %
3 SYRINGE (ML) INJECTION EVERY 12 HOURS SCHEDULED
OUTPATIENT
Start: 2024-10-10

## 2024-10-10 RX ORDER — SODIUM CHLORIDE 9 MG/ML
40 INJECTION, SOLUTION INTRAVENOUS AS NEEDED
OUTPATIENT
Start: 2024-12-18 | End: 2024-12-18

## 2024-10-10 RX ORDER — SODIUM CHLORIDE 0.9 % (FLUSH) 0.9 %
10 SYRINGE (ML) INJECTION AS NEEDED
OUTPATIENT
Start: 2024-10-10

## 2024-10-10 RX ORDER — POLYETHYLENE GLYCOL 3350 17 G/17G
POWDER, FOR SOLUTION ORAL
Qty: 238 PACKET | Refills: 0 | Status: SHIPPED | OUTPATIENT
Start: 2024-10-10

## 2024-10-10 NOTE — PROGRESS NOTES
Chief Complaint: Colonoscopy    Subjective      Colonoscopy consultation       History of Present Illness  Edi Weston is a 79 y.o. male presents to [facility] for colonoscopy consultation.    Patient presents today on referral from Dr. Jed Sultana.    Patient presents today without complaints for screening colonoscopy.  He denies any abdominal pain, change in bowel habit, or rectal bleeding.  Reports a family history of colon cancer with his father and paternal grandfather.  Admits to history of colonic polyps.    Admits to AUGUSTO.  Does not use a CPAP.    Denies taking any GLP-1 receptors.    Patient does report he takes Eliquis daily for A-fib he is under the care of Dr. Ledezma.  I will get cardiac clearance prior to the procedure.      10/20: Colonoscopy (Phil): internal hemorrhoids.     6/2018 - Colonoscopy (Ellen): Proximal Ascending - Tubulovillous adenoma; Ascending - tubular adenoma; Hepatic flexure - tubular adenoma; grade I internal hemorrhoids.     9/2015 - Colonoscopy (Ellen): Hepatic flexure - tubular adenoma; Cecum - tubular adenoma; Proximal Ascending - tubular adenoma.    9/2010 - Colonoscopy (Ellen): Transverse - Hyperplastic.     12/2005 - Colonoscopy (Ellen): Sigmoid - tubular adenoma & hyperplastic.    10/1999 - Colonoscopy (Ellen): Normal colon.     Objective     Past Medical History:   Diagnosis Date    Arthritis     Cellulitis of right lower limb     CHF (congestive heart failure)     Chronic heart failure with preserved ejection fraction (HFpEF) 03/20/2023    Colon polyps     Coronary artery disease     Decubitus ulcer of foot, stage 1 08/10/2018    Decubitus ulcer of foot, stage 3 02/07/2018    Foot pain, left 08/10/2018    Foot pain, right     Gout     Hammertoe 12/30/2019    History of colonoscopy with polypectomy 08/25/2020    2018 TUBULAR ADENOMA, COLONOSCOPY SCHEDULED FOR 10/19/2020 WITH DR SULTANA    Hypertension     Ingrowing nail 11/02/2018     Medication management 02/18/2019    Nail dystrophy     PAT (paroxysmal atrial tachycardia) 08/06/2018    Pressure ulcer, stage 1     Tinea unguium     Type 2 diabetes, controlled, with peripheral neuropathy 08/10/2018       Past Surgical History:   Procedure Laterality Date    ABDOMINAL SURGERY      lap band    COLONOSCOPY      GASTRIC BANDING  2007       Outpatient Medications Marked as Taking for the 10/10/24 encounter (Office Visit) with Williams April, APRABDIFATAH   Medication Sig Dispense Refill    apixaban (Eliquis) 5 MG tablet tablet Take 1 tablet by mouth 2 (Two) Times a Day. Hold for 3 more days and then restart it. Stop if patient has Nosebleed again and contact primary provider regarding it 180 tablet 3    furosemide (Lasix) 40 MG tablet Take 1 tablet by mouth Daily. (Patient taking differently: Take 1 tablet by mouth Every Morning.) 90 tablet 3    glyburide (DIAbeta) 2.5 MG tablet Take 1 tablet by mouth Daily With Breakfast. (Patient taking differently: Take 1 tablet by mouth Every Night.) 90 tablet 3    lisinopril (PRINIVIL,ZESTRIL) 20 MG tablet Take 1 tablet by mouth Daily. (Patient taking differently: Take 1 tablet by mouth Every Morning.) 90 tablet 3    metFORMIN ER (GLUCOPHAGE-XR) 500 MG 24 hr tablet Take 2 tablets by mouth Every Evening. 180 tablet 3    metoprolol succinate XL (TOPROL-XL) 50 MG 24 hr tablet Take 1 tablet by mouth Daily. (Patient taking differently: Take 1 tablet by mouth Every Morning.) 90 tablet 3    pantoprazole (PROTONIX) 40 MG EC tablet Take 1 tablet by mouth Daily. (Patient taking differently: Take 1 tablet by mouth Every Morning.) 90 tablet 3    simvastatin (ZOCOR) 20 MG tablet Take 1 tablet by mouth Every Evening. 90 tablet 3       Allergies   Allergen Reactions    Sulfa Antibiotics Unknown - Low Severity     Unsure of reaction        Family History   Problem Relation Age of Onset    Nephrolithiasis Mother         RENAL CALCULUS    Kidney disease Mother     Colon cancer Father 50      "   FAMILY HISTORY OF COLON CANCER    Cancer Father     Colon cancer Paternal Grandfather 60        FAMILY HISTORY OF COLON CANCER        Social History     Socioeconomic History    Marital status:    Tobacco Use    Smoking status: Former     Current packs/day: 0.00     Average packs/day: 2.0 packs/day for 22.0 years (44.0 ttl pk-yrs)     Types: Cigarettes     Start date: 1965     Quit date:      Years since quittin.8    Smokeless tobacco: Never    Tobacco comments:     AGE STARTED 19 QUIT AGE 31 - SMOKED X 20 YEARS QUIT    Vaping Use    Vaping status: Never Used   Substance and Sexual Activity    Alcohol use: Not Currently     Comment: CURRENT SOME DAY 2020,2017    Drug use: Never    Sexual activity: Yes     Partners: Female     Birth control/protection: Vasectomy       Review of Systems   Constitutional:  Negative for chills and fever.   Gastrointestinal:  Negative for abdominal distention, abdominal pain, anal bleeding, blood in stool, constipation, diarrhea and rectal pain.        Vital Signs:   BP 94/58 (BP Location: Right arm, Patient Position: Sitting, Cuff Size: Adult)   Pulse 51   Ht 193 cm (75.98\")   Wt 112 kg (246 lb)   BMI 29.96 kg/m²      Physical Exam  Vitals and nursing note reviewed.   Constitutional:       General: He is not in acute distress.     Appearance: Normal appearance. He is not ill-appearing.   HENT:      Head: Normocephalic and atraumatic.   Cardiovascular:      Rate and Rhythm: Normal rate.   Pulmonary:      Effort: Pulmonary effort is normal.      Breath sounds: No stridor.   Abdominal:      Palpations: Abdomen is soft.      Tenderness: There is no guarding.   Musculoskeletal:         General: No deformity. Normal range of motion.   Skin:     General: Skin is warm and dry.      Coloration: Skin is not jaundiced.   Neurological:      General: No focal deficit present.      Mental Status: He is alert and oriented to person, place, and time. "   Psychiatric:         Mood and Affect: Mood normal.         Thought Content: Thought content normal.          Result Review :          []  Laboratory  []  Radiology  []  Pathology  []  Microbiology  []  EKG/Telemetry   []  Cardiology/Vascular   []  Old records  I spent 15 minutes caring for Edi on this date of service. This time includes time spent by me in the following activities: reviewing tests, obtaining and/or reviewing a separately obtained history, performing a medically appropriate examination and/or evaluation, ordering medications, tests, or procedures, and documenting information in the medical record        Assessment and Plan    Diagnoses and all orders for this visit:    1. Screening for malignant neoplasm of colon (Primary)    2. History of colonic polyps    3. Family history of colon cancer in father    Other orders  -     polyethylene glycol (MIRALAX) 17 g packet; Take as directed.  Instructions given in office.  Dispense: 238 g bottle  Dispense: 238 packet; Refill: 0    Clearance from Brisa Cuevas starting 11/16/2024    Follow Up   Return for Schedule colonoscopy with Dr. Amador on 12/18/2024 at [facility].    Hospital arrival time: 0800.    Possible risks/complications, benefits, and alternatives to surgical or invasive procedures have been explained to patient and/or legal guardian.    Patient has been evaluated and can tolerate anesthesia and/or sedation. Risks, benefits, and alternatives to anesthesia and sedation have been explained to the patient and/or legal guardian. Patient verbalizes understanding and is willing to proceed with the above plan.     Patient was given instructions and counseling regarding his condition or for health maintenance advice. Please see specific information pulled into the AVS if appropriate.      Thank you for allowing me to participate in the care of this patient. Please call with questions or concerns.

## 2024-10-23 ENCOUNTER — TELEPHONE (OUTPATIENT)
Dept: FAMILY MEDICINE CLINIC | Facility: CLINIC | Age: 79
End: 2024-10-23

## 2024-10-23 NOTE — TELEPHONE ENCOUNTER
Caller: Edi Weston    Relationship: Self    Best call back number: [phone]    Requested Prescriptions:         TEST STRIPS       PATIENT NOT SURE OF NAME            Pharmacy where request should be sent: [facility] [address] [place] [address] [place] [address] [phone] [facility] [phone] FX     Last office visit with prescribing clinician: 9/6/2024   Last telemedicine visit with prescribing clinician: Visit date not found   Next office visit with prescribing clinician: 2/7/2025     Additional details provided by patient: OUT OF TEST STRIPS     Does the patient have less than a 3 day supply:  [x] Yes  [] No    Would you like a call back once the refill request has been completed: [x] Yes [] No    If the office needs to give you a call back, can they leave a voicemail: [x] Yes [] No    Tobias Alfred Rep   10/23/24 09:22 EDT

## 2024-11-01 ENCOUNTER — TELEPHONE (OUTPATIENT)
Dept: FAMILY MEDICINE CLINIC | Facility: CLINIC | Age: 79
End: 2024-11-01
Payer: MEDICARE

## 2024-11-01 DIAGNOSIS — E11.42 TYPE 2 DIABETES, CONTROLLED, WITH PERIPHERAL NEUROPATHY: Primary | ICD-10-CM

## 2024-11-02 DIAGNOSIS — K21.00 GASTROESOPHAGEAL REFLUX DISEASE WITH ESOPHAGITIS WITHOUT HEMORRHAGE: ICD-10-CM

## 2024-11-04 RX ORDER — PANTOPRAZOLE SODIUM 40 MG/1
40 TABLET, DELAYED RELEASE ORAL EVERY MORNING
Qty: 90 TABLET | Refills: 3 | Status: SHIPPED | OUTPATIENT
Start: 2024-11-04

## 2024-11-06 ENCOUNTER — OFFICE VISIT (OUTPATIENT)
Dept: CARDIOLOGY | Facility: CLINIC | Age: 79
End: 2024-11-06
Payer: MEDICARE

## 2024-11-06 VITALS
HEART RATE: 66 BPM | DIASTOLIC BLOOD PRESSURE: 77 MMHG | BODY MASS INDEX: 31.05 KG/M2 | SYSTOLIC BLOOD PRESSURE: 120 MMHG | WEIGHT: 255 LBS | HEIGHT: 76 IN

## 2024-11-06 DIAGNOSIS — I48.92 ATRIAL FLUTTER WITH CONTROLLED RESPONSE: ICD-10-CM

## 2024-11-06 DIAGNOSIS — I50.32 CHRONIC HEART FAILURE WITH PRESERVED EJECTION FRACTION (HFPEF): Primary | ICD-10-CM

## 2024-11-06 DIAGNOSIS — E11.42 TYPE 2 DIABETES, CONTROLLED, WITH PERIPHERAL NEUROPATHY: ICD-10-CM

## 2024-11-06 DIAGNOSIS — I25.10 CORONARY ARTERY CALCIFICATION SEEN ON CT SCAN: ICD-10-CM

## 2024-11-06 DIAGNOSIS — I10 HYPERTENSION, ESSENTIAL: ICD-10-CM

## 2024-11-06 RX ORDER — METOLAZONE 2.5 MG/1
2.5 TABLET ORAL DAILY
Qty: 5 TABLET | Refills: 3 | Status: SHIPPED | OUTPATIENT
Start: 2024-11-06

## 2024-11-06 RX ORDER — POTASSIUM CHLORIDE 750 MG/1
10 TABLET, EXTENDED RELEASE ORAL DAILY
Qty: 10 TABLET | Refills: 3 | Status: SHIPPED | OUTPATIENT
Start: 2024-11-06 | End: 2024-11-06

## 2024-11-06 RX ORDER — FUROSEMIDE 40 MG/1
40 TABLET ORAL DAILY
Start: 2024-11-06

## 2024-11-06 RX ORDER — POTASSIUM CHLORIDE 750 MG/1
10 TABLET, EXTENDED RELEASE ORAL DAILY
Qty: 10 TABLET | Refills: 3 | Status: SHIPPED | OUTPATIENT
Start: 2024-11-06

## 2024-11-06 RX ORDER — METOLAZONE 2.5 MG/1
2.5 TABLET ORAL DAILY
Qty: 5 TABLET | Refills: 3 | Status: SHIPPED | OUTPATIENT
Start: 2024-11-06 | End: 2024-11-06

## 2024-11-06 NOTE — ASSESSMENT & PLAN NOTE
Patient with some increased lower extremity edema and weight gain problems recommended a trial of Zaroxolyn 2.5 along with 10 mg potassium for 3 to 5 days to try to help maintain better fluid balance.  Patient is compliant usually with his low-sodium diet which is also helping discussed with him consideration for possible addition of SGLT inhibitors he was can discuss further with his PCP.

## 2024-11-06 NOTE — ASSESSMENT & PLAN NOTE
Patient with controlled heart rate was not interested previously in ablation procedure we will continue with his Eliquis 5 twice daily for CVA prevention

## 2024-11-11 RX ORDER — METOLAZONE 2.5 MG/1
2.5 TABLET ORAL DAILY
Qty: 20 TABLET | OUTPATIENT
Start: 2024-11-11

## 2024-11-14 ENCOUNTER — TELEPHONE (OUTPATIENT)
Dept: SURGERY | Facility: CLINIC | Age: 79
End: 2024-11-14
Payer: MEDICARE

## 2024-11-14 NOTE — TELEPHONE ENCOUNTER
Procedure: Colonoscopy and/or EGD     Med Directive: Eliquis     PMH: PAF, CHF, HTN      Last Seen: 11/6/24

## 2024-11-14 NOTE — TELEPHONE ENCOUNTER
[facility] [place]  [facility]  [address] [place] [address]  Fax [phone]  Phone [phone]     Patient: Edi Weston  YOB: 1945      This patient is waiting to have a Colonoscopy which will be perform at [facility] on 12/18/24 by Dr. Amador.     Our records indicate this patient is currently taking eliquis . This procedure requires the patient to suspend their Eliquis 2 days prior to surgery.     Please respond to this request noting your recommendations. You may contact our office at [phone] Option 1 then 4 with any questions. I appreciate your prompt response in this matter.     Please return this form to our office as soon as possible to [phone].    ____ I approve my patient to stop taking their Anticoagulant Therapy medication _____ days prior to the scheduled procedure.    ____ I do NOT approve my patient to stop taking their Anticoagulant Therapy medication at this time.    ____ I approve my patient from a cardiac standpoint.    ____ I do NOT approve my patient from a cardiac standpoint at this time.    Approving physician name (please print):     _____________________________________________    Approving physician signature:     ________________________________            Date:________________      Sincerely,    DIONNA Marinelli

## 2024-12-12 NOTE — PRE-PROCEDURE INSTRUCTIONS
Spoke with pt:  Arrival 0830, Entrance C,  over age 20.  Has bowel prep & instructions.   HOLD Eliquis 2-3 days prior.  HOLD DM morning of.  HOLD Lasix & MetoLazone morning of.  No candy, gum or mints morning of.  Non smoker  Meds with sips of water 2 hours prior to arrival.

## 2024-12-17 ENCOUNTER — ANESTHESIA EVENT (OUTPATIENT)
Dept: GASTROENTEROLOGY | Facility: HOSPITAL | Age: 79
End: 2024-12-17
Payer: MEDICARE

## 2024-12-17 NOTE — ANESTHESIA PREPROCEDURE EVALUATION
Anesthesia Evaluation     Patient summary reviewed and Nursing notes reviewed   NPO Solid Status: > 8 hours  NPO Liquid Status: > 4 hours           Airway   Mallampati: III  TM distance: <3 FB  Neck ROM: full  Possible difficult intubation  Dental - normal exam     Pulmonary - negative pulmonary ROS and normal exam    breath sounds clear to auscultation  Cardiovascular - normal exam  Exercise tolerance: good (4-7 METS)    ECG reviewed  PT is on anticoagulation therapy  Patient on routine beta blocker  Rhythm: regular  Rate: normal    (+) hypertension well controlled 2 medications or greater, CAD, dysrhythmias Atrial Fib, CHF , hyperlipidemia    ROS comment: Takes metoprolol - last dose this am 12/18    Neuro/Psych  (+) numbness  GI/Hepatic/Renal/Endo    (+) obesity, diabetes mellitus type 2 well controlled    Musculoskeletal     Abdominal    Substance History      OB/GYN          Other   arthritis,     ROS/Med Hx Other: Screening, hx polyps    EKG 6/7/24  HEART RATE= 116  bpm  RR Interval= 515  ms  CT Interval=   ms  P Horizontal Axis=   deg  P Front Axis=   deg  QRSD Interval= 109  ms  QT Interval= 344  ms  QTcB= 479  ms  QRS Axis= -62  deg  T Wave Axis= 87  deg  - ABNORMAL ECG -  Atrial fibrillation  Baseline artifact in multiple leads   Left anterior fascicular block  Nonspecific intraventricular conduction delay  Nonspecific repol abnormality, lateral leads  When compared with ECG of 23-May-2023 5:15:36,  Nonspecific significant change    MALU 6/8/24     Left ventricular systolic function is normal. Estimated left ventricular EF = 55%  ·  The left atrial cavity is moderately dilated.  ·  There is mild calcification of the aortic valve.  ·  Mild dilation of the aortic root is present.    Cards clearance per Dr. Ledezma with acceptable risks on 11/14/24     Last dose Eliquis- 12/15         Phys Exam Other: Left shoulder pain                   Anesthesia Plan    ASA 3     general   total IV anesthesia  (Total IV  Anesthesia    Patient understands anesthesia not responsible for dental damage.      Discussed risks with pt including aspiration, allergic reactions, apnea, advanced airway placement. Pt verbalized understanding. All questions answered.     )  intravenous induction     Anesthetic plan, risks, benefits, and alternatives have been provided, discussed and informed consent has been obtained with: patient.  Pre-procedure education provided  Plan discussed with CRNA.        CODE STATUS:

## 2024-12-18 ENCOUNTER — HOSPITAL ENCOUNTER (OUTPATIENT)
Facility: HOSPITAL | Age: 79
Setting detail: HOSPITAL OUTPATIENT SURGERY
Discharge: HOME | End: 2024-12-18
Attending: SURGERY | Admitting: SURGERY
Payer: MEDICARE

## 2024-12-18 ENCOUNTER — ANESTHESIA (OUTPATIENT)
Dept: GASTROENTEROLOGY | Facility: HOSPITAL | Age: 79
End: 2024-12-18
Payer: MEDICARE

## 2024-12-18 ENCOUNTER — PREP FOR SURGERY (OUTPATIENT)
Dept: OTHER | Facility: HOSPITAL | Age: 79
End: 2024-12-18
Payer: MEDICARE

## 2024-12-18 VITALS
DIASTOLIC BLOOD PRESSURE: 70 MMHG | HEART RATE: 53 BPM | RESPIRATION RATE: 14 BRPM | WEIGHT: 251.77 LBS | BODY MASS INDEX: 30.66 KG/M2 | TEMPERATURE: 96.7 F | OXYGEN SATURATION: 100 % | SYSTOLIC BLOOD PRESSURE: 102 MMHG

## 2024-12-18 DIAGNOSIS — Z12.11 SCREENING FOR MALIGNANT NEOPLASM OF COLON: ICD-10-CM

## 2024-12-18 DIAGNOSIS — Z86.0100 HISTORY OF COLONIC POLYPS: ICD-10-CM

## 2024-12-18 DIAGNOSIS — Z80.0 FAMILY HISTORY OF COLON CANCER IN FATHER: ICD-10-CM

## 2024-12-18 DIAGNOSIS — Z86.0100 HISTORY OF COLONIC POLYPS: Primary | ICD-10-CM

## 2024-12-18 LAB — GLUCOSE BLDC GLUCOMTR-MCNC: 105 MG/DL (ref 70–99)

## 2024-12-18 PROCEDURE — 25010000002 PROPOFOL 10 MG/ML EMULSION: Performed by: NURSE ANESTHETIST, CERTIFIED REGISTERED

## 2024-12-18 PROCEDURE — 82948 REAGENT STRIP/BLOOD GLUCOSE: CPT | Performed by: NURSE ANESTHETIST, CERTIFIED REGISTERED

## 2024-12-18 PROCEDURE — 25010000002 LIDOCAINE PF 2% 2 % SOLUTION: Performed by: NURSE ANESTHETIST, CERTIFIED REGISTERED

## 2024-12-18 RX ORDER — PHENYLEPHRINE HCL IN 0.9% NACL 1 MG/10 ML
SYRINGE (ML) INTRAVENOUS AS NEEDED
Status: DISCONTINUED | OUTPATIENT
Start: 2024-12-18 | End: 2024-12-18 | Stop reason: SURG

## 2024-12-18 RX ORDER — SODIUM CHLORIDE 0.9 % (FLUSH) 0.9 %
3 SYRINGE (ML) INJECTION EVERY 12 HOURS SCHEDULED
Status: DISCONTINUED | OUTPATIENT
Start: 2024-12-18 | End: 2024-12-18 | Stop reason: HOSPADM

## 2024-12-18 RX ORDER — EPHEDRINE SULFATE 50 MG/ML
INJECTION INTRAVENOUS AS NEEDED
Status: DISCONTINUED | OUTPATIENT
Start: 2024-12-18 | End: 2024-12-18 | Stop reason: SURG

## 2024-12-18 RX ORDER — PROPOFOL 10 MG/ML
VIAL (ML) INTRAVENOUS AS NEEDED
Status: DISCONTINUED | OUTPATIENT
Start: 2024-12-18 | End: 2024-12-18 | Stop reason: SURG

## 2024-12-18 RX ORDER — SODIUM CHLORIDE 0.9 % (FLUSH) 0.9 %
10 SYRINGE (ML) INJECTION AS NEEDED
Status: DISCONTINUED | OUTPATIENT
Start: 2024-12-18 | End: 2024-12-18 | Stop reason: HOSPADM

## 2024-12-18 RX ORDER — SODIUM CHLORIDE 9 MG/ML
40 INJECTION, SOLUTION INTRAVENOUS AS NEEDED
Status: DISCONTINUED | OUTPATIENT
Start: 2024-12-18 | End: 2024-12-18 | Stop reason: HOSPADM

## 2024-12-18 RX ORDER — LIDOCAINE HYDROCHLORIDE 20 MG/ML
INJECTION, SOLUTION EPIDURAL; INFILTRATION; INTRACAUDAL; PERINEURAL AS NEEDED
Status: DISCONTINUED | OUTPATIENT
Start: 2024-12-18 | End: 2024-12-18 | Stop reason: SURG

## 2024-12-18 RX ADMIN — PROPOFOL 150 MCG/KG/MIN: 10 INJECTION, EMULSION INTRAVENOUS at 10:28

## 2024-12-18 RX ADMIN — LIDOCAINE HYDROCHLORIDE 40 MG: 20 INJECTION, SOLUTION INTRAVENOUS at 10:28

## 2024-12-18 RX ADMIN — Medication 100 MCG: at 10:31

## 2024-12-18 RX ADMIN — PROPOFOL 30 MG: 10 INJECTION, EMULSION INTRAVENOUS at 10:28

## 2024-12-18 RX ADMIN — EPHEDRINE SULFATE 5 MG: 50 INJECTION INTRAVENOUS at 10:31

## 2024-12-18 NOTE — ANESTHESIA POSTPROCEDURE EVALUATION
Patient: Edi Weston    Procedure Summary       Date: 12/18/24 Room / Location: [facility] / [facility]    Anesthesia Start: 1025 Anesthesia Stop: 1054    Procedure: COLONOSCOPY Diagnosis:       Screening for malignant neoplasm of colon      History of colonic polyps      Family history of colon cancer in father      (Screening for malignant neoplasm of colon [Z12.11])      (History of colonic polyps [Z86.0100])      (Family history of colon cancer in father [Z80.0])    Surgeons: Live Amador MD Provider: Cathie Fisher CRNA    Anesthesia Type: general ASA Status: 3            Anesthesia Type: general    Vitals  Vitals Value Taken Time   /70 12/18/24 1111   Temp 35.9 °C (96.7 °F) 12/18/24 1110   Pulse 68 12/18/24 1112   Resp 14 12/18/24 1110   SpO2 92 % 12/18/24 1112   Vitals shown include unfiled device data.        Post Anesthesia Care and Evaluation    Patient location during evaluation: bedside  Patient participation: complete - patient participated  Level of consciousness: awake  Pain management: adequate    Airway patency: patent  Anesthetic complications: No anesthetic complications  PONV Status: controlled  Cardiovascular status: acceptable and stable  Respiratory status: acceptable

## 2024-12-18 NOTE — H&P
Chief Complaint: Colonoscopy    Patient is here to have a colonoscopy.  Following is a copy of the HPI from the patient's office visit at the surgery clinic.      History of Present Illness  Edi Weston is a 79 y.o. male presents to [facility] for colonoscopy consultation.    Patient presents today on referral from Dr. Jed Villarreal.    Patient presents today without complaints for screening colonoscopy.  He denies any abdominal pain, change in bowel habit, or rectal bleeding.  Reports a family history of colon cancer with his father and paternal grandfather.  Admits to history of colonic polyps.    Admits to AUGUSTO.  Does not use a CPAP.    Denies taking any GLP-1 receptors.    Patient does report he takes Eliquis daily for A-fib he is under the care of Dr. Ledezma.  I will get cardiac clearance prior to the procedure.      10/20: Colonoscopy (Phil): internal hemorrhoids.     6/2018 - Colonoscopy (Ellen): Proximal Ascending - Tubulovillous adenoma; Ascending - tubular adenoma; Hepatic flexure - tubular adenoma; grade I internal hemorrhoids.     9/2015 - Colonoscopy (Ellen): Hepatic flexure - tubular adenoma; Cecum - tubular adenoma; Proximal Ascending - tubular adenoma.    9/2010 - Colonoscopy (Ellen): Transverse - Hyperplastic.     12/2005 - Colonoscopy (Ellen): Sigmoid - tubular adenoma & hyperplastic.    10/1999 - Colonoscopy (Ellen): Normal colon.     Objective     Past Medical History:   Diagnosis Date    Arthritis     Cellulitis of right lower limb     CHF (congestive heart failure)     Chronic heart failure with preserved ejection fraction (HFpEF) 03/20/2023    Colon polyps     Coronary artery disease     Decubitus ulcer of foot, stage 1 08/10/2018    Decubitus ulcer of foot, stage 3 02/07/2018    Foot pain, left 08/10/2018    Foot pain, right     Gout     Hammertoe 12/30/2019    History of colonoscopy with polypectomy 08/25/2020    2018 TUBULAR ADENOMA, COLONOSCOPY SCHEDULED FOR  10/19/2020 WITH DR SULTANA    Hypertension     Ingrowing nail 11/02/2018    Medication management 02/18/2019    Nail dystrophy     PAT (paroxysmal atrial tachycardia) 08/06/2018    Pressure ulcer, stage 1     Tinea unguium     Type 2 diabetes, controlled, with peripheral neuropathy 08/10/2018       Past Surgical History:   Procedure Laterality Date    ABDOMINAL SURGERY      lap band    COLONOSCOPY      GASTRIC BANDING  2007       Outpatient Medications Marked as Taking for the 10/10/24 encounter (Office Visit) with Williams April, DIONNA   Medication Sig Dispense Refill    apixaban (Eliquis) 5 MG tablet tablet Take 1 tablet by mouth 2 (Two) Times a Day. Hold for 3 more days and then restart it. Stop if patient has Nosebleed again and contact primary provider regarding it 180 tablet 3    furosemide (Lasix) 40 MG tablet Take 1 tablet by mouth Daily. (Patient taking differently: Take 1 tablet by mouth Every Morning.) 90 tablet 3    glyburide (DIAbeta) 2.5 MG tablet Take 1 tablet by mouth Daily With Breakfast. (Patient taking differently: Take 1 tablet by mouth Every Night.) 90 tablet 3    lisinopril (PRINIVIL,ZESTRIL) 20 MG tablet Take 1 tablet by mouth Daily. (Patient taking differently: Take 1 tablet by mouth Every Morning.) 90 tablet 3    metFORMIN ER (GLUCOPHAGE-XR) 500 MG 24 hr tablet Take 2 tablets by mouth Every Evening. 180 tablet 3    metoprolol succinate XL (TOPROL-XL) 50 MG 24 hr tablet Take 1 tablet by mouth Daily. (Patient taking differently: Take 1 tablet by mouth Every Morning.) 90 tablet 3    pantoprazole (PROTONIX) 40 MG EC tablet Take 1 tablet by mouth Daily. (Patient taking differently: Take 1 tablet by mouth Every Morning.) 90 tablet 3    simvastatin (ZOCOR) 20 MG tablet Take 1 tablet by mouth Every Evening. 90 tablet 3       Allergies   Allergen Reactions    Sulfa Antibiotics Unknown - Low Severity     Unsure of reaction        Family History   Problem Relation Age of Onset    Nephrolithiasis Mother          RENAL CALCULUS    Kidney disease Mother     Colon cancer Father 50        FAMILY HISTORY OF COLON CANCER    Cancer Father     Colon cancer Paternal Grandfather 60        FAMILY HISTORY OF COLON CANCER        Social History     Socioeconomic History    Marital status:    Tobacco Use    Smoking status: Former     Current packs/day: 0.00     Average packs/day: 2.0 packs/day for 22.0 years (44.0 ttl pk-yrs)     Types: Cigarettes     Start date: 1965     Quit date:      Years since quittin.8    Smokeless tobacco: Never    Tobacco comments:     AGE STARTED 19 QUIT AGE 31 - SMOKED X 20 YEARS QUIT    Vaping Use    Vaping status: Never Used   Substance and Sexual Activity    Alcohol use: Not Currently     Comment: CURRENT SOME DAY 2020,2017    Drug use: Never    Sexual activity: Yes     Partners: Female     Birth control/protection: Vasectomy     Physical Exam  Vitals - Available in the EMR.   Respiratory:  breathing not labored, respiratory effort appears normal  Cardiovascular:  heart regular rate  Skin and subcutaneous tissue:  warm and dry  Musculoskeletal: moving all extremities symmetrically and purposefully  Neurologic:  no obvious motor or sensory deficits, speech clear  Psychiatric:  judgment and insight intact, mood normal      Assessment   Screening colonoscopy  Personal history of colon polyps  Family history of colon cancer    Plan  Colonoscopy    Risks and benefits discussed    Live Amador M.D.  24    Electronically signed by Live Amador MD, 24, 7:04 AM EST.

## 2024-12-26 ENCOUNTER — OFFICE VISIT (OUTPATIENT)
Dept: FAMILY MEDICINE CLINIC | Facility: CLINIC | Age: 79
End: 2024-12-26
Payer: MEDICARE

## 2024-12-26 VITALS
HEIGHT: 76 IN | HEART RATE: 53 BPM | BODY MASS INDEX: 31.05 KG/M2 | SYSTOLIC BLOOD PRESSURE: 97 MMHG | OXYGEN SATURATION: 98 % | DIASTOLIC BLOOD PRESSURE: 63 MMHG | TEMPERATURE: 97.8 F | WEIGHT: 255 LBS

## 2024-12-26 DIAGNOSIS — Z00.00 MEDICARE ANNUAL WELLNESS VISIT, SUBSEQUENT: ICD-10-CM

## 2024-12-26 DIAGNOSIS — E78.2 MIXED HYPERLIPIDEMIA: ICD-10-CM

## 2024-12-26 DIAGNOSIS — E11.42 TYPE 2 DIABETES, CONTROLLED, WITH PERIPHERAL NEUROPATHY: ICD-10-CM

## 2024-12-26 DIAGNOSIS — Z12.5 SCREENING FOR PROSTATE CANCER: ICD-10-CM

## 2024-12-26 DIAGNOSIS — I48.92 ATRIAL FLUTTER WITH CONTROLLED RESPONSE: ICD-10-CM

## 2024-12-26 DIAGNOSIS — I10 HYPERTENSION, ESSENTIAL: Primary | ICD-10-CM

## 2024-12-26 DIAGNOSIS — N18.32 STAGE 3B CHRONIC KIDNEY DISEASE: ICD-10-CM

## 2024-12-26 PROCEDURE — G0439 PPPS, SUBSEQ VISIT: HCPCS | Performed by: FAMILY MEDICINE

## 2024-12-26 PROCEDURE — 3078F DIAST BP <80 MM HG: CPT | Performed by: FAMILY MEDICINE

## 2024-12-26 PROCEDURE — 99214 OFFICE O/P EST MOD 30 MIN: CPT | Performed by: FAMILY MEDICINE

## 2024-12-26 PROCEDURE — 3074F SYST BP LT 130 MM HG: CPT | Performed by: FAMILY MEDICINE

## 2024-12-26 PROCEDURE — 1126F AMNT PAIN NOTED NONE PRSNT: CPT | Performed by: FAMILY MEDICINE

## 2024-12-26 PROCEDURE — 1170F FXNL STATUS ASSESSED: CPT | Performed by: FAMILY MEDICINE

## 2024-12-26 NOTE — ASSESSMENT & PLAN NOTE
Clinically he is regular today.  He is rate controlled with his metoprolol and on Eliquis routinely twice daily

## 2024-12-26 NOTE — PROGRESS NOTES
Subjective   The ABCs of the Annual Wellness Visit  Medicare Wellness Visit      Edi Weston is a 79 y.o. patient who presents for a Medicare Wellness Visit.    The following portions of the patient's history were reviewed and   updated as appropriate: allergies, current medications, past family history, past medical history, past social history, past surgical history, and problem list.    Compared to one year ago, the patient's physical   health is better.  Compared to one year ago, the patient's mental   health is the same.    Recent Hospitalizations:  This patient has had a [facility] admission record on file within the last 365 days.  Current Medical Providers:  Patient Care Team:  Jesus Lux DO as PCP - General (Family Medicine)  Kar Ledezma MD as Consulting Physician (Cardiology)  Shikha Kramer, APRABDIFATAH as Nurse Practitioner (Nurse Practitioner)    Outpatient Medications Prior to Visit   Medication Sig Dispense Refill    apixaban (Eliquis) 5 MG tablet tablet Take 1 tablet by mouth 2 (Two) Times a Day. Hold for 3 more days and then restart it. Stop if patient has Nosebleed again and contact primary provider regarding it      furosemide (Lasix) 40 MG tablet Take 1 tablet by mouth Daily.      glucose blood test strip Check blood sugar once daily fasting and record. DX- E11.9 100 each 3    glyburide (DIAbeta) 2.5 MG tablet Take 1 tablet by mouth Daily With Breakfast. (Patient taking differently: Take 1 tablet by mouth Every Night.) 90 tablet 3    lisinopril (PRINIVIL,ZESTRIL) 20 MG tablet Take 1 tablet by mouth Daily. (Patient taking differently: Take 1 tablet by mouth Every Morning.) 90 tablet 3    metFORMIN ER (GLUCOPHAGE-XR) 500 MG 24 hr tablet Take 2 tablets by mouth Every Evening. 180 tablet 3    metOLazone (ZAROXOLYN) 2.5 MG tablet Take 1 tablet by mouth Daily. 5 tablet 3    metoprolol succinate XL (TOPROL-XL) 50 MG 24 hr tablet Take 1 tablet by mouth Daily. (Patient taking  "differently: Take 1 tablet by mouth Every Morning.) 90 tablet 3    pantoprazole (PROTONIX) 40 MG EC tablet Take 1 tablet by mouth Every Morning. 90 tablet 3    polyethylene glycol (MIRALAX) 17 g packet Take as directed.  Instructions given in office.  Dispense: 238 g bottle 238 packet 0    potassium chloride 10 MEQ CR tablet Take 1 tablet by mouth Daily. 10 tablet 3    simvastatin (ZOCOR) 20 MG tablet Take 1 tablet by mouth Every Evening. 90 tablet 3     No facility-administered medications prior to visit.     No opioid medication identified on active medication list. I have reviewed chart for other potential  high risk medication/s and harmful drug interactions in the elderly.      Aspirin is not on active medication list.  Aspirin use is contraindicated for this patient due to: current use of Eliquis.  .    Patient Active Problem List   Diagnosis    Type 2 diabetes, controlled, with peripheral neuropathy    History of colonoscopy with polypectomy    Gout    Hammertoe    Arthritis    Atrial flutter with controlled response    LBBB (left bundle branch block)    Colon polyps    Hyperlipidemia    Hypertension, essential    Chronic heart failure with preserved ejection fraction (HFpEF)    Sciatica of right side    Left-sided nosebleed    Acute hypoxic respiratory failure    Coronary artery calcification seen on CT scan    Medicare annual wellness visit, subsequent    Stage 3b chronic kidney disease    Screening for prostate cancer     Advance Care Planning Advance Directive is on file.  ACP discussion was held with the patient during this visit. Patient has an advance directive in EMR which is still valid.             Objective   Vitals:    12/26/24 1554   BP: 97/63   BP Location: Left arm   Patient Position: Sitting   Pulse: 53   Temp: 97.8 °F (36.6 °C)   SpO2: 98%   Weight: 116 kg (255 lb)   Height: 193 cm (75.98\")   PainSc: 0-No pain       Estimated body mass index is 31.06 kg/m² as calculated from the following:    " "Height as of this encounter: 193 cm (75.98\").    Weight as of this encounter: 116 kg (255 lb).                Does the patient have evidence of cognitive impairment? No                                                                                                Health  Risk Assessment    Smoking Status:  Social History     Tobacco Use   Smoking Status Former    Current packs/day: 0.00    Average packs/day: 2.0 packs/day for 22.0 years (44.0 ttl pk-yrs)    Types: Cigarettes    Start date: 1965    Quit date:     Years since quittin.0   Smokeless Tobacco Never   Tobacco Comments    AGE STARTED 19 QUIT AGE 31 - SMOKED X 20 YEARS QUIT      Alcohol Consumption:  Social History     Substance and Sexual Activity   Alcohol Use Not Currently    Comment: CURRENT SOME DAY 2020,2017       Fall Risk Screen  HANKADI Fall Risk Assessment was completed, and patient is at LOW risk for falls.Assessment completed on:2024    Depression Screening   Little interest or pleasure in doing things? Not at all   Feeling down, depressed, or hopeless? Not at all   PHQ-2 Total Score 0      Health Habits and Functional and Cognitive Screenin/26/2024     3:56 PM   Functional & Cognitive Status   Do you have difficulty preparing food and eating? No   Do you have difficulty bathing yourself, getting dressed or grooming yourself? No   Do you have difficulty using the toilet? No   Do you have difficulty moving around from place to place? No   Do you have trouble with steps or getting out of a bed or a chair? No   Current Diet Diabetic Diet   Dental Exam Up to date   Eye Exam Up to date   Exercise (times per week) 6 times per week   Current Exercises Include Walking   Do you need help using the phone?  No   Are you deaf or do you have serious difficulty hearing?  No   Do you need help to go to places out of walking distance? No   Do you need help shopping? No   Do you need help preparing meals?  No   Do you " need help with housework?  No   Do you need help with laundry? No   Do you need help taking your medications? No   Do you need help managing money? No   Do you ever drive or ride in a car without wearing a seat belt? No   Have you felt unusual stress, anger or loneliness in the last month? No   Who do you live with? Spouse   If you need help, do you have trouble finding someone available to you? No   Have you been bothered in the last four weeks by sexual problems? No   Do you have difficulty concentrating, remembering or making decisions? No           Age-appropriate Screening Schedule:  Refer to the list below for future screening recommendations based on patient's age, sex and/or medical conditions. Orders for these recommended tests are listed in the plan section. The patient has been provided with a written plan.    Health Maintenance List  Health Maintenance   Topic Date Due    TDAP/TD VACCINES (1 - Tdap) Never done    HEMOGLOBIN A1C  03/06/2025    RSV Vaccine - Adults (1 - 1-dose 75+ series) 05/06/2025 (Originally 2/2/2022)    BMI FOLLOWUP  05/06/2025    DIABETIC EYE EXAM  05/15/2025    LIPID PANEL  09/06/2025    ANNUAL WELLNESS VISIT  12/26/2025    HEPATITIS C SCREENING  Completed    COVID-19 Vaccine  Completed    INFLUENZA VACCINE  Completed    Pneumococcal Vaccine 65+  Completed    ZOSTER VACCINE  Completed    URINE MICROALBUMIN  Discontinued    COLORECTAL CANCER SCREENING  Discontinued                                                                                                                                                CMS Preventative Services Quick Reference  Risk Factors Identified During Encounter  Immunizations Discussed/Encouraged: Tdap    The above risks/problems have been discussed with the patient.  Pertinent information has been shared with the patient in the After Visit Summary.  An After Visit Summary and PPPS were made available to the patient.    Follow Up:   Next Medicare Wellness  "visit to be scheduled in 1 year.          Additional E&M Note during same encounter follows:  Patient has multiple medical problems which are significant and separately identifiable that require additional work above and beyond the Medicare Wellness Visit.      Chief Complaint  Medicare Wellness-subsequent    Edi Weston is a 79 y.o. male who presents to [facility]     Type 2 diabetes-overall he is doing well.  He states his blood sugars are little bit more sporadic than they previously have been.  He states he probably is getting more carbs in his diet than he had been previously so.  He denies any blurred vision excessive thirst or excessive urination.    Hypertension-he does not notice his blood pressure at home.  He states traditionally it has been good.    Hyperlipidemia-he takes his simvastatin on a nightly basis.    Atrial flutter-he denies any palpitations or tachycardia.  He does take his Eliquis twice a day every day.    Review of Systems   Constitutional:  Negative for fatigue.   Eyes:  Negative for visual disturbance.   Respiratory:  Negative for cough, chest tightness, shortness of breath and wheezing.    Cardiovascular:  Negative for chest pain, palpitations and leg swelling.   Endocrine: Negative for polydipsia and polyuria.   Neurological:  Negative for headaches.       Objective   Vital Signs:   Vitals:    12/26/24 1554   BP: 97/63   BP Location: Left arm   Patient Position: Sitting   Pulse: 53   Temp: 97.8 °F (36.6 °C)   SpO2: 98%   Weight: 116 kg (255 lb)   Height: 193 cm (75.98\")   PainSc: 0-No pain       Wt Readings from Last 3 Encounters:   12/26/24 116 kg (255 lb)   12/18/24 114 kg (251 lb 12.3 oz)   11/06/24 116 kg (255 lb)     BP Readings from Last 3 Encounters:   12/26/24 97/63   12/18/24 102/70   11/06/24 120/77       Physical Exam  Vitals and nursing note reviewed.   Constitutional:       General: He is not in acute distress.     Appearance: Normal " appearance. He is normal weight.   HENT:      Head: Normocephalic.      Right Ear: Tympanic membrane, ear canal and external ear normal.      Left Ear: Tympanic membrane, ear canal and external ear normal.      Nose: Nose normal.      Mouth/Throat:      Mouth: Mucous membranes are moist.      Pharynx: Oropharynx is clear.   Eyes:      General: No scleral icterus.     Conjunctiva/sclera: Conjunctivae normal.      Pupils: Pupils are equal, round, and reactive to light.   Cardiovascular:      Rate and Rhythm: Normal rate and regular rhythm.      Pulses: Normal pulses.      Heart sounds: Normal heart sounds. No murmur heard.  Pulmonary:      Effort: Pulmonary effort is normal.      Breath sounds: Normal breath sounds. No wheezing, rhonchi or rales.   Musculoskeletal:      Cervical back: Neck supple. No rigidity or tenderness.      Right lower leg: No edema.      Left lower leg: No edema.   Lymphadenopathy:      Cervical: No cervical adenopathy.   Skin:     General: Skin is warm and dry.      Coloration: Skin is not jaundiced.      Findings: No rash.   Neurological:      General: No focal deficit present.      Mental Status: He is alert and oriented to person, place, and time.   Psychiatric:         Mood and Affect: Mood normal.         Thought Content: Thought content normal.         Judgment: Judgment normal.         The following data was reviewed by Jesus Lux DO on 12/26/2024  CMP   CMP          9/6/2024    14:19 9/17/2024    06:38 9/27/2024    06:44   CMP   Glucose 163  69  131    BUN 57  47  41    Creatinine 1.62  1.68  1.59    EGFR 43.4  41.6  44.4    Sodium 136  142  140    Potassium 4.9  5.4  5.0    Chloride 104  108  107    Calcium 9.5  9.6  9.7    Total Protein 7.4      Albumin 4.2      Globulin 3.2      Total Bilirubin 0.3      Alkaline Phosphatase 116      AST (SGOT) 24      ALT (SGPT) 22      Albumin/Globulin Ratio 1.3      BUN/Creatinine Ratio 35.2  28.0  25.8    Anion Gap 11.0  11.0  7.7       LIPID   Lipid Panel          1/2/2024    11:03 5/6/2024    11:46 9/6/2024    14:19   Lipid Panel   Total Cholesterol 93  90  86    Triglycerides 44  65  114    HDL Cholesterol 39  37  32    VLDL Cholesterol 12  15  21    LDL Cholesterol  42  38  33    LDL/HDL Ratio 1.16  1.08  0.98      A1C   Most Recent A1C          9/6/2024    14:19   HGBA1C Most Recent   Hemoglobin A1C 6.30          Assessment & Plan   Diagnoses and all orders for this visit:    1. Hypertension, essential (Primary)  Assessment & Plan:  His blood pressure is little on the low side here today but otherwise he is asymptomatic.  Will not make any changes    Orders:  -     Comprehensive Metabolic Panel  -     Lipid Panel    2. Mixed hyperlipidemia  Assessment & Plan:   Has most recent lipid profile was okay.  Will wait and repeat in her next labs    Orders:  -     Comprehensive Metabolic Panel  -     Lipid Panel    3. Atrial flutter with controlled response  Assessment & Plan:  Clinically he is regular today.  He is rate controlled with his metoprolol and on Eliquis routinely twice daily      4. Type 2 diabetes, controlled, with peripheral neuropathy  Assessment & Plan:  His blood sugars have been a little bit more variable as of late.  This is probably more dietary related.  We discussed carbohydrates and working on moderating those a little bit more.    Orders:  -     Comprehensive Metabolic Panel  -     Lipid Panel  -     Hemoglobin A1c    5. Medicare annual wellness visit, subsequent  Assessment & Plan:  Overall he is doing well.  His only care gap is that he needs a booster on his Tdap.      6. Stage 3b chronic kidney disease  Assessment & Plan:      Orders:  -     CBC (No Diff)    7. Screening for prostate cancer  -     PSA Screen    Other orders  -     Tdap (ADACEL) 5-2-15.5 LF-MCG/0.5 injection; Inject 0.5 mL into the appropriate muscle as directed by prescriber 1 (One) Time for 1 dose.  Dispense: 0.5 mL; Refill: 0                  FOLLOW  UP  Return in about 3 months (around 3/26/2025) for Recheck.  Patient was given instructions and counseling regarding his condition or for health maintenance advice. Please see specific information pulled into the AVS if appropriate.     Jesus Lux, DO  12/26/24  16:42 EST

## 2024-12-26 NOTE — ASSESSMENT & PLAN NOTE
His blood sugars have been a little bit more variable as of late.  This is probably more dietary related.  We discussed carbohydrates and working on moderating those a little bit more.

## 2024-12-26 NOTE — ASSESSMENT & PLAN NOTE
His blood pressure is little on the low side here today but otherwise he is asymptomatic.  Will not make any changes

## 2025-01-09 ENCOUNTER — TELEPHONE (OUTPATIENT)
Dept: CARDIOLOGY | Facility: CLINIC | Age: 80
End: 2025-01-09
Payer: MEDICARE

## 2025-01-09 RX ORDER — METOLAZONE 2.5 MG/1
2.5 TABLET ORAL DAILY PRN
Qty: 30 TABLET | Refills: 1 | Status: SHIPPED | OUTPATIENT
Start: 2025-01-09

## 2025-01-09 NOTE — TELEPHONE ENCOUNTER
[facility] received a fax that requires your attention. The document has been indexed to the patient’s chart for your review.      Reason for sending: EXTERNAL MEDICAL RECORD NOTIFICATION     Documents Description: METOLAZ[employer] [facility]-1.9.25    Name of Sender: [employer] [facility]     Date Indexed: 1.9.25

## 2025-02-02 ENCOUNTER — TELEMEDICINE (OUTPATIENT)
Dept: FAMILY MEDICINE CLINIC | Facility: TELEHEALTH | Age: 80
End: 2025-02-02
Payer: MEDICARE

## 2025-02-02 ENCOUNTER — HOSPITAL ENCOUNTER (INPATIENT)
Facility: HOSPITAL | Age: 80
LOS: 1 days | Discharge: HOME | DRG: 683 | End: 2025-02-05
Attending: EMERGENCY MEDICINE | Admitting: HOSPITALIST
Payer: MEDICARE

## 2025-02-02 ENCOUNTER — APPOINTMENT (OUTPATIENT)
Dept: GENERAL RADIOLOGY | Facility: HOSPITAL | Age: 80
DRG: 683 | End: 2025-02-02
Payer: MEDICARE

## 2025-02-02 ENCOUNTER — APPOINTMENT (OUTPATIENT)
Dept: ULTRASOUND IMAGING | Facility: HOSPITAL | Age: 80
DRG: 683 | End: 2025-02-02
Payer: MEDICARE

## 2025-02-02 VITALS — WEIGHT: 255 LBS | BODY MASS INDEX: 31.06 KG/M2 | OXYGEN SATURATION: 98 %

## 2025-02-02 DIAGNOSIS — R63.5 WEIGHT GAIN: ICD-10-CM

## 2025-02-02 DIAGNOSIS — M25.562 ACUTE PAIN OF LEFT KNEE: ICD-10-CM

## 2025-02-02 DIAGNOSIS — M79.89 SWELLING OF LEFT FOOT: Primary | ICD-10-CM

## 2025-02-02 DIAGNOSIS — I50.32 CHRONIC HEART FAILURE WITH PRESERVED EJECTION FRACTION (HFPEF): ICD-10-CM

## 2025-02-02 DIAGNOSIS — I48.19 PERSISTENT ATRIAL FIBRILLATION: ICD-10-CM

## 2025-02-02 DIAGNOSIS — Z86.79 HISTORY OF CHF (CONGESTIVE HEART FAILURE): ICD-10-CM

## 2025-02-02 DIAGNOSIS — M10.379 GOUT DUE TO RENAL IMPAIRMENT INVOLVING TOE, UNSPECIFIED CHRONICITY, UNSPECIFIED LATERALITY: ICD-10-CM

## 2025-02-02 DIAGNOSIS — N17.9 AKI (ACUTE KIDNEY INJURY): Primary | ICD-10-CM

## 2025-02-02 LAB
ALBUMIN SERPL-MCNC: 4 G/DL (ref 3.5–5.2)
ALBUMIN/GLOB SERPL: 1.2 G/DL
ALP SERPL-CCNC: 127 U/L (ref 39–117)
ALT SERPL W P-5'-P-CCNC: 14 U/L (ref 1–41)
ANION GAP SERPL CALCULATED.3IONS-SCNC: 13.2 MMOL/L (ref 5–15)
AST SERPL-CCNC: 20 U/L (ref 1–40)
BASOPHILS # BLD AUTO: 0.05 10*3/MM3 (ref 0–0.2)
BASOPHILS NFR BLD AUTO: 0.5 % (ref 0–1.5)
BILIRUB SERPL-MCNC: 0.3 MG/DL (ref 0–1.2)
BILIRUB UR QL STRIP: NEGATIVE
BUN SERPL-MCNC: 94 MG/DL (ref 8–23)
BUN/CREAT SERPL: 35.5 (ref 7–25)
CALCIUM SPEC-SCNC: 8.9 MG/DL (ref 8.6–10.5)
CHLORIDE SERPL-SCNC: 97 MMOL/L (ref 98–107)
CLARITY UR: CLEAR
CO2 SERPL-SCNC: 23.8 MMOL/L (ref 22–29)
COLOR UR: YELLOW
CREAT SERPL-MCNC: 2.65 MG/DL (ref 0.76–1.27)
DEPRECATED RDW RBC AUTO: 47.8 FL (ref 37–54)
EGFRCR SERPLBLD CKD-EPI 2021: 23.9 ML/MIN/1.73
EOSINOPHIL # BLD AUTO: 0.34 10*3/MM3 (ref 0–0.4)
EOSINOPHIL NFR BLD AUTO: 3.7 % (ref 0.3–6.2)
ERYTHROCYTE [DISTWIDTH] IN BLOOD BY AUTOMATED COUNT: 14.8 % (ref 12.3–15.4)
GEN 5 1HR TROPONIN T REFLEX: 34 NG/L
GLOBULIN UR ELPH-MCNC: 3.4 GM/DL
GLUCOSE BLDC GLUCOMTR-MCNC: 127 MG/DL (ref 70–99)
GLUCOSE BLDC GLUCOMTR-MCNC: 181 MG/DL (ref 70–99)
GLUCOSE SERPL-MCNC: 177 MG/DL (ref 65–99)
GLUCOSE UR STRIP-MCNC: NEGATIVE MG/DL
HBA1C MFR BLD: 7.3 % (ref 4.8–5.6)
HCT VFR BLD AUTO: 41.1 % (ref 37.5–51)
HGB BLD-MCNC: 13.1 G/DL (ref 13–17.7)
HGB UR QL STRIP.AUTO: NEGATIVE
IMM GRANULOCYTES # BLD AUTO: 0.09 10*3/MM3 (ref 0–0.05)
IMM GRANULOCYTES NFR BLD AUTO: 1 % (ref 0–0.5)
KETONES UR QL STRIP: NEGATIVE
LEUKOCYTE ESTERASE UR QL STRIP.AUTO: NEGATIVE
LYMPHOCYTES # BLD AUTO: 1.49 10*3/MM3 (ref 0.7–3.1)
LYMPHOCYTES NFR BLD AUTO: 16.3 % (ref 19.6–45.3)
MAGNESIUM SERPL-MCNC: 2.2 MG/DL (ref 1.6–2.4)
MCH RBC QN AUTO: 28.1 PG (ref 26.6–33)
MCHC RBC AUTO-ENTMCNC: 31.9 G/DL (ref 31.5–35.7)
MCV RBC AUTO: 88.2 FL (ref 79–97)
MONOCYTES # BLD AUTO: 1.05 10*3/MM3 (ref 0.1–0.9)
MONOCYTES NFR BLD AUTO: 11.5 % (ref 5–12)
NEUTROPHILS NFR BLD AUTO: 6.14 10*3/MM3 (ref 1.7–7)
NEUTROPHILS NFR BLD AUTO: 67 % (ref 42.7–76)
NITRITE UR QL STRIP: NEGATIVE
NRBC BLD AUTO-RTO: 0 /100 WBC (ref 0–0.2)
NT-PROBNP SERPL-MCNC: 763.2 PG/ML (ref 0–1800)
PH UR STRIP.AUTO: <=5 [PH] (ref 5–8)
PHOSPHATE SERPL-MCNC: 4.2 MG/DL (ref 2.5–4.5)
PLATELET # BLD AUTO: 197 10*3/MM3 (ref 140–450)
PMV BLD AUTO: 9.5 FL (ref 6–12)
POTASSIUM SERPL-SCNC: 4.6 MMOL/L (ref 3.5–5.2)
PROT SERPL-MCNC: 7.4 G/DL (ref 6–8.5)
PROT UR QL STRIP: NEGATIVE
QT INTERVAL: 498 MS
QTC INTERVAL: 509 MS
RBC # BLD AUTO: 4.66 10*6/MM3 (ref 4.14–5.8)
SODIUM SERPL-SCNC: 134 MMOL/L (ref 136–145)
SP GR UR STRIP: 1.01 (ref 1–1.03)
TROPONIN T % DELTA: -11
TROPONIN T NUMERIC DELTA: -4 NG/L
TROPONIN T SERPL HS-MCNC: 38 NG/L
UROBILINOGEN UR QL STRIP: NORMAL
WBC NRBC COR # BLD AUTO: 9.16 10*3/MM3 (ref 3.4–10.8)
WHOLE BLOOD HOLD COAG: NORMAL

## 2025-02-02 PROCEDURE — 84100 ASSAY OF PHOSPHORUS: CPT | Performed by: HOSPITALIST

## 2025-02-02 PROCEDURE — 99285 EMERGENCY DEPT VISIT HI MDM: CPT

## 2025-02-02 PROCEDURE — G0378 HOSPITAL OBSERVATION PER HR: HCPCS

## 2025-02-02 PROCEDURE — 36415 COLL VENOUS BLD VENIPUNCTURE: CPT

## 2025-02-02 PROCEDURE — 83880 ASSAY OF NATRIURETIC PEPTIDE: CPT | Performed by: EMERGENCY MEDICINE

## 2025-02-02 PROCEDURE — 73562 X-RAY EXAM OF KNEE 3: CPT

## 2025-02-02 PROCEDURE — 99213 OFFICE O/P EST LOW 20 MIN: CPT | Performed by: NURSE PRACTITIONER

## 2025-02-02 PROCEDURE — 81003 URINALYSIS AUTO W/O SCOPE: CPT | Performed by: EMERGENCY MEDICINE

## 2025-02-02 PROCEDURE — 80053 COMPREHEN METABOLIC PANEL: CPT | Performed by: EMERGENCY MEDICINE

## 2025-02-02 PROCEDURE — 63710000001 INSULIN LISPRO (HUMAN) PER 5 UNITS: Performed by: HOSPITALIST

## 2025-02-02 PROCEDURE — 99223 1ST HOSP IP/OBS HIGH 75: CPT | Performed by: HOSPITALIST

## 2025-02-02 PROCEDURE — 84484 ASSAY OF TROPONIN QUANT: CPT | Performed by: EMERGENCY MEDICINE

## 2025-02-02 PROCEDURE — 93005 ELECTROCARDIOGRAM TRACING: CPT | Performed by: EMERGENCY MEDICINE

## 2025-02-02 PROCEDURE — 85025 COMPLETE CBC W/AUTO DIFF WBC: CPT | Performed by: EMERGENCY MEDICINE

## 2025-02-02 PROCEDURE — 1160F RVW MEDS BY RX/DR IN RCRD: CPT | Performed by: NURSE PRACTITIONER

## 2025-02-02 PROCEDURE — 71045 X-RAY EXAM CHEST 1 VIEW: CPT

## 2025-02-02 PROCEDURE — 1159F MED LIST DOCD IN RCRD: CPT | Performed by: NURSE PRACTITIONER

## 2025-02-02 PROCEDURE — 76775 US EXAM ABDO BACK WALL LIM: CPT

## 2025-02-02 PROCEDURE — 25810000003 LACTATED RINGERS PER 1000 ML: Performed by: INTERNAL MEDICINE

## 2025-02-02 PROCEDURE — 83735 ASSAY OF MAGNESIUM: CPT | Performed by: HOSPITALIST

## 2025-02-02 PROCEDURE — 83036 HEMOGLOBIN GLYCOSYLATED A1C: CPT | Performed by: HOSPITALIST

## 2025-02-02 PROCEDURE — 82948 REAGENT STRIP/BLOOD GLUCOSE: CPT

## 2025-02-02 RX ORDER — ACETAMINOPHEN 500 MG
1000 TABLET ORAL EVERY MORNING
COMMUNITY

## 2025-02-02 RX ORDER — SODIUM CHLORIDE 9 MG/ML
40 INJECTION, SOLUTION INTRAVENOUS AS NEEDED
Status: DISCONTINUED | OUTPATIENT
Start: 2025-02-02 | End: 2025-02-05 | Stop reason: HOSPADM

## 2025-02-02 RX ORDER — BISACODYL 5 MG/1
5 TABLET, DELAYED RELEASE ORAL DAILY PRN
Status: DISCONTINUED | OUTPATIENT
Start: 2025-02-02 | End: 2025-02-05 | Stop reason: HOSPADM

## 2025-02-02 RX ORDER — PANTOPRAZOLE SODIUM 40 MG/1
40 TABLET, DELAYED RELEASE ORAL EVERY MORNING
Status: DISCONTINUED | OUTPATIENT
Start: 2025-02-03 | End: 2025-02-05 | Stop reason: HOSPADM

## 2025-02-02 RX ORDER — ACETAMINOPHEN 650 MG/1
650 SUPPOSITORY RECTAL EVERY 4 HOURS PRN
Status: DISCONTINUED | OUTPATIENT
Start: 2025-02-02 | End: 2025-02-05 | Stop reason: HOSPADM

## 2025-02-02 RX ORDER — SODIUM CHLORIDE, SODIUM LACTATE, POTASSIUM CHLORIDE, CALCIUM CHLORIDE 600; 310; 30; 20 MG/100ML; MG/100ML; MG/100ML; MG/100ML
100 INJECTION, SOLUTION INTRAVENOUS CONTINUOUS
Status: ACTIVE | OUTPATIENT
Start: 2025-02-02 | End: 2025-02-03

## 2025-02-02 RX ORDER — DEXTROSE MONOHYDRATE 25 G/50ML
25 INJECTION, SOLUTION INTRAVENOUS
Status: DISCONTINUED | OUTPATIENT
Start: 2025-02-02 | End: 2025-02-05 | Stop reason: HOSPADM

## 2025-02-02 RX ORDER — SODIUM CHLORIDE 0.9 % (FLUSH) 0.9 %
10 SYRINGE (ML) INJECTION EVERY 12 HOURS SCHEDULED
Status: DISCONTINUED | OUTPATIENT
Start: 2025-02-02 | End: 2025-02-05 | Stop reason: HOSPADM

## 2025-02-02 RX ORDER — ACETAMINOPHEN 160 MG/5ML
650 SOLUTION ORAL EVERY 4 HOURS PRN
Status: DISCONTINUED | OUTPATIENT
Start: 2025-02-02 | End: 2025-02-05 | Stop reason: HOSPADM

## 2025-02-02 RX ORDER — ACETAMINOPHEN 325 MG/1
650 TABLET ORAL EVERY 4 HOURS PRN
Status: DISCONTINUED | OUTPATIENT
Start: 2025-02-02 | End: 2025-02-03 | Stop reason: SDUPTHER

## 2025-02-02 RX ORDER — ONDANSETRON 2 MG/ML
4 INJECTION INTRAMUSCULAR; INTRAVENOUS EVERY 6 HOURS PRN
Status: DISCONTINUED | OUTPATIENT
Start: 2025-02-02 | End: 2025-02-03 | Stop reason: SDUPTHER

## 2025-02-02 RX ORDER — NICOTINE POLACRILEX 4 MG
15 LOZENGE BUCCAL
Status: DISCONTINUED | OUTPATIENT
Start: 2025-02-02 | End: 2025-02-05 | Stop reason: HOSPADM

## 2025-02-02 RX ORDER — SODIUM CHLORIDE 0.9 % (FLUSH) 0.9 %
10 SYRINGE (ML) INJECTION AS NEEDED
Status: DISCONTINUED | OUTPATIENT
Start: 2025-02-02 | End: 2025-02-05 | Stop reason: HOSPADM

## 2025-02-02 RX ORDER — METOPROLOL SUCCINATE 50 MG/1
50 TABLET, EXTENDED RELEASE ORAL EVERY MORNING
Status: DISCONTINUED | OUTPATIENT
Start: 2025-02-03 | End: 2025-02-05 | Stop reason: HOSPADM

## 2025-02-02 RX ORDER — BISACODYL 10 MG
10 SUPPOSITORY, RECTAL RECTAL DAILY PRN
Status: DISCONTINUED | OUTPATIENT
Start: 2025-02-02 | End: 2025-02-05 | Stop reason: HOSPADM

## 2025-02-02 RX ORDER — INSULIN LISPRO 100 [IU]/ML
2-9 INJECTION, SOLUTION INTRAVENOUS; SUBCUTANEOUS
Status: DISCONTINUED | OUTPATIENT
Start: 2025-02-02 | End: 2025-02-05 | Stop reason: HOSPADM

## 2025-02-02 RX ORDER — POLYETHYLENE GLYCOL 3350 17 G/17G
17 POWDER, FOR SOLUTION ORAL DAILY PRN
Status: DISCONTINUED | OUTPATIENT
Start: 2025-02-02 | End: 2025-02-05 | Stop reason: HOSPADM

## 2025-02-02 RX ORDER — IBUPROFEN 600 MG/1
1 TABLET ORAL
Status: DISCONTINUED | OUTPATIENT
Start: 2025-02-02 | End: 2025-02-05 | Stop reason: HOSPADM

## 2025-02-02 RX ORDER — ONDANSETRON 4 MG/1
4 TABLET, ORALLY DISINTEGRATING ORAL EVERY 6 HOURS PRN
Status: DISCONTINUED | OUTPATIENT
Start: 2025-02-02 | End: 2025-02-05 | Stop reason: HOSPADM

## 2025-02-02 RX ORDER — AMOXICILLIN 250 MG
2 CAPSULE ORAL 2 TIMES DAILY
Status: DISCONTINUED | OUTPATIENT
Start: 2025-02-02 | End: 2025-02-05 | Stop reason: HOSPADM

## 2025-02-02 RX ORDER — ATORVASTATIN CALCIUM 10 MG/1
10 TABLET, FILM COATED ORAL NIGHTLY
Status: DISCONTINUED | OUTPATIENT
Start: 2025-02-02 | End: 2025-02-05 | Stop reason: HOSPADM

## 2025-02-02 RX ORDER — TRAMADOL HYDROCHLORIDE 50 MG/1
50 TABLET ORAL EVERY 6 HOURS PRN
Status: DISCONTINUED | OUTPATIENT
Start: 2025-02-02 | End: 2025-02-05 | Stop reason: HOSPADM

## 2025-02-02 RX ADMIN — SODIUM CHLORIDE, POTASSIUM CHLORIDE, SODIUM LACTATE AND CALCIUM CHLORIDE 100 ML/HR: 600; 310; 30; 20 INJECTION, SOLUTION INTRAVENOUS at 17:18

## 2025-02-02 RX ADMIN — INSULIN LISPRO 2 UNITS: 100 INJECTION, SOLUTION INTRAVENOUS; SUBCUTANEOUS at 17:18

## 2025-02-02 RX ADMIN — TRAMADOL HYDROCHLORIDE 50 MG: 50 TABLET, COATED ORAL at 20:02

## 2025-02-02 RX ADMIN — ATORVASTATIN CALCIUM 10 MG: 10 TABLET, FILM COATED ORAL at 20:02

## 2025-02-02 NOTE — PROGRESS NOTES
"You have chosen to receive care through a telehealth visit.  Do you consent to use a video/audio connection for your medical care today? Yes     CHIEF COMPLAINT  No chief complaint on file.        HPI  Edi Weston is a 80 y.o. male  presents with complaint of possible gout. Reports he has had a lap band in the past- the lap band was loosened due to having CHF. After he had lap band he didn't eat red meat and has been increasing it back in his diet. That's why he thought he may have gout. He is also concerned that this is \"water\" Reports reports he is diabetic and his sugar has been running alittle over 200. Reports 2 days he is having stabbing pain in his left knee. Reports his left foot is swollen for over a week and now it begins to throb when he lays down. Reports the foot doesn't hurt to touch.  Reports he has taken Tylenol, hydrocodone and IBU for his symptoms and it has not relieved the pain. Reports he has gained 5 lbs over the last 2 days. He does take Lasix daily and has taken metolazone this am. Denies any fever or chills. No nausea or vomiting. Denies CP or SOA.     Review of Systems   Constitutional:  Negative for chills, fatigue and fever.   HENT:  Negative for congestion, ear discharge, ear pain, sinus pressure, sinus pain and sore throat.    Respiratory:  Negative for cough, chest tightness, shortness of breath and wheezing.    Cardiovascular:  Negative for chest pain.   Gastrointestinal:  Negative for abdominal pain, diarrhea, nausea and vomiting.   Musculoskeletal:  Negative for back pain and myalgias.        Swelling in left foot and left knee pain   Neurological:  Negative for dizziness and headaches.   Psychiatric/Behavioral: Negative.         Past Medical History:   Diagnosis Date    Arthritis     Atrial fibrillation     Cellulitis of right lower limb     CHF (congestive heart failure)     Chronic heart failure with preserved ejection fraction (HFpEF) 03/20/2023    Colon polyps     Decubitus " ulcer of foot, stage 1 08/10/2018    Decubitus ulcer of foot, stage 3 2018    Foot pain, left 08/10/2018    Foot pain, right     Gout     Hammertoe 2019    History of colonoscopy with polypectomy 2020 TUBULAR ADENOMA, COLONOSCOPY SCHEDULED FOR 10/19/2020 WITH DR SULTANA    Ingrowing nail 2018    Medication management 2019    Nail dystrophy     PAT (paroxysmal atrial tachycardia) 2018    Pressure ulcer, stage 1     Tinea unguium     Type 2 diabetes, controlled, with peripheral neuropathy 08/10/2018       Family History   Problem Relation Age of Onset    Nephrolithiasis Mother         RENAL CALCULUS    Kidney disease Mother     Colon cancer Father 50        FAMILY HISTORY OF COLON CANCER    Cancer Father     Colon cancer Paternal Grandfather 60        FAMILY HISTORY OF COLON CANCER        Social History     Socioeconomic History    Marital status:    Tobacco Use    Smoking status: Former     Current packs/day: 0.00     Average packs/day: 2.0 packs/day for 22.0 years (44.0 ttl pk-yrs)     Types: Cigarettes     Start date: 1965     Quit date:      Years since quittin.1    Smokeless tobacco: Never    Tobacco comments:     AGE STARTED 19 QUIT AGE 31 - SMOKED X 20 YEARS QUIT    Vaping Use    Vaping status: Never Used   Substance and Sexual Activity    Alcohol use: Not Currently     Comment: CURRENT SOME DAY 2020,2017    Drug use: Never    Sexual activity: Yes     Partners: Female     Birth control/protection: Vasectomy       Edi Weston  reports that he quit smoking about 38 years ago. His smoking use included cigarettes. He started smoking about 60 years ago. He has a 44 pack-year smoking history. He has never used smokeless tobacco. I have educated him on the risk of diseases from using tobacco products such as cancer, COPD, and heart disease.         I spent  1  minutes counseling the patient.              Wt 116 kg (255 lb)   SpO2 98%    BMI 31.06 kg/m²     PHYSICAL EXAM  Physical Exam   Constitutional: He is oriented to person, place, and time. He appears well-developed and well-nourished. No distress.   HENT:   Head: Normocephalic and atraumatic.   Right Ear: Hearing normal.   Left Ear: Hearing normal.   Nose: No congestion.   Mouth/Throat: Mouth/Lips are normal.  Eyes: Conjunctivae and lids are normal.   Pulmonary/Chest: Effort normal.  No respiratory distress.  Feet:   The right foot shows no signs of pedal edema and diabetic foot ulcer.   The left foot shows signs of pedal edema.    Overall foot comments: Left foot with 3+ edema   Right foot no edema noted.   Neurological: He is alert and oriented to person, place, and time.   Psychiatric: He has a normal mood and affect. His speech is normal and behavior is normal.   Speaks in full sentences. No respiratory distress noted.         Diagnoses and all orders for this visit:    1. Swelling of left foot (Primary)    2. Acute pain of left knee    3. History of CHF (congestive heart failure)    4. Weight gain    Advised patient to go to ER for in person evaluation.  Patient has a history of CHF.  Having left foot swelling and throbbing.  Patient understands he needs a higher level care with vitals, lung auscultation, labs possible radiology, EKG and poss IV med to rule out CHF exac, gout, DVT or other diagnosis.  Patient understands the risk of not going to the ER.  Reports he will go to [facility] for an evaluation for symptoms.          DIONNA Ramos  02/02/2025  07:07 EST    Mode of Visit: Video  Location of patient: -HOME-  Location of provider: +HOME+  You have chosen to receive care through a telehealth visit.  The patient has signed the video visit consent form.  The visit included audio and video interaction. No technical issues occurred during this visit.      The use of a video visit has been reviewed with the patient and verbal informed consent has been obtained. Myself and  Edi Weston participated in this visit. The patient is located in [address] [place] [address] [place] [address].   I am located in [place], [place]. [employer] and [employer] were utilized. I spent 10 minutes in the patient's chart for this visit.         Note Disclaimer: At [facility], we believe that sharing information builds trust and better   relationships. You are receiving this note because you recently visited [facility]. It is possible you   will see health information before a provider has talked with you about it. This kind of information can   be easy to misunderstand. To help you fully understand what it means for your health, we urge you to   discuss this note with your provider.

## 2025-02-02 NOTE — ED PROVIDER NOTES
Time: 9:33 AM EST  Date of encounter:  2/2/2025  Independent Historian/Clinical History and Information was obtained by:   Patient    History is limited by: N/A    Chief Complaint: Swelling      History of Present Illness:  Patient is a 80 y.o. year old male who presents to the emergency department for evaluation of swelling to bilateral lower extremities.  The patient also reports that he has had approximately 6 pound weight gain.  Patient denies fever and chills.  Patient has no chest pain or shortness of breath.  Patient denies nausea, vomiting, and diarrhea.  The patient has had scant cough.      Patient Care Team  Primary Care Provider: Jesus Lux DO    Past Medical History:     Allergies   Allergen Reactions    Sulfa Antibiotics Unknown - Low Severity     Unsure of reaction     Past Medical History:   Diagnosis Date    Arthritis     Atrial fibrillation     Cellulitis of right lower limb     CHF (congestive heart failure)     Chronic heart failure with preserved ejection fraction (HFpEF) 03/20/2023    Colon polyps     Decubitus ulcer of foot, stage 1 08/10/2018    Decubitus ulcer of foot, stage 3 02/07/2018    Foot pain, left 08/10/2018    Foot pain, right     Gout     Hammertoe 12/30/2019    History of colonoscopy with polypectomy 08/25/2020 2018 TUBULAR ADENOMA, COLONOSCOPY SCHEDULED FOR 10/19/2020 WITH DR SULTANA    Ingrowing nail 11/02/2018    Medication management 02/18/2019    Nail dystrophy     PAT (paroxysmal atrial tachycardia) 08/06/2018    Pressure ulcer, stage 1     Tinea unguium     Type 2 diabetes, controlled, with peripheral neuropathy 08/10/2018     Past Surgical History:   Procedure Laterality Date    ABDOMINAL SURGERY      lap band    COLONOSCOPY      COLONOSCOPY N/A 12/18/2024    Procedure: COLONOSCOPY;  Surgeon: Live Amador MD;  Location: [facility] ENDOSCOPY;  Service: General;  Laterality: N/A;  NORMAL COLON    GASTRIC BANDING  2007     Family History   Problem Relation Age  of Onset    Nephrolithiasis Mother         RENAL CALCULUS    Kidney disease Mother     Colon cancer Father 50        FAMILY HISTORY OF COLON CANCER    Cancer Father     Colon cancer Paternal Grandfather 60        FAMILY HISTORY OF COLON CANCER        Home Medications:  Prior to Admission medications    Medication Sig Start Date End Date Taking? Authorizing Provider   apixaban (Eliquis) 5 MG tablet tablet Take 1 tablet by mouth 2 (Two) Times a Day. Hold for 3 more days and then restart it. Stop if patient has Nosebleed again and contact primary provider regarding it 11/6/24  Yes Kar Ledezma MD   furosemide (Lasix) 40 MG tablet Take 1 tablet by mouth Daily. 11/6/24  Yes Kar Ledezma MD   glyburide (DIAbeta) 2.5 MG tablet Take 1 tablet by mouth Daily With Breakfast.  Patient taking differently: Take 1 tablet by mouth Every Night. 1/2/24  Yes Jesus Lux DO   lisinopril (PRINIVIL,ZESTRIL) 20 MG tablet Take 1 tablet by mouth Daily.  Patient taking differently: Take 1 tablet by mouth Every Morning. 1/2/24  Yes Jesus Lux DO   metFORMIN ER (GLUCOPHAGE-XR) 500 MG 24 hr tablet Take 2 tablets by mouth Every Evening. 1/2/24  Yes Jesus Lux DO   metOLazone (ZAROXOLYN) 2.5 MG tablet Take 1 tablet by mouth Daily As Needed (swelling). 1/9/25  Yes Jamila Ty APRN   metoprolol succinate XL (TOPROL-XL) 50 MG 24 hr tablet Take 1 tablet by mouth Daily.  Patient taking differently: Take 1 tablet by mouth Every Morning. 1/2/24  Yes Jesus Lux DO   pantoprazole (PROTONIX) 40 MG EC tablet Take 1 tablet by mouth Every Morning. 11/4/24  Yes Jesus Lux DO   potassium chloride 10 MEQ CR tablet Take 1 tablet by mouth Daily. 11/6/24  Yes Kar Ledezma MD   simvastatin (ZOCOR) 20 MG tablet Take 1 tablet by mouth Every Evening. 1/2/24  Yes Jesus Lux DO   glucose blood test strip Check blood sugar once daily fasting and record. DX- E11.9 11/1/24    "Jesus Lux,    polyethylene glycol (MIRALAX) 17 g packet Take as directed.  Instructions given in office.  Dispense: 238 g bottle 10/10/24 2/2/25  Williams        Social History:   Social History     Tobacco Use    Smoking status: Former     Current packs/day: 0.00     Average packs/day: 2.0 packs/day for 22.0 years (44.0 ttl pk-yrs)     Types: Cigarettes     Start date: 1965     Quit date:      Years since quittin.1    Smokeless tobacco: Never    Tobacco comments:     AGE STARTED 19 QUIT AGE 31 - SMOKED X 20 YEARS QUIT    Vaping Use    Vaping status: Never Used   Substance Use Topics    Alcohol use: Not Currently     Comment: CURRENT SOME DAY 2020,2017    Drug use: Never         Review of Systems:  Review of Systems   Constitutional:  Negative for chills and fever.   HENT:  Negative for congestion, rhinorrhea and sore throat.    Eyes:  Negative for pain and visual disturbance.   Respiratory:  Negative for apnea, cough, chest tightness and shortness of breath.    Cardiovascular:  Negative for chest pain and palpitations.   Gastrointestinal:  Negative for abdominal pain, diarrhea, nausea and vomiting.   Genitourinary:  Negative for difficulty urinating and dysuria.   Musculoskeletal:  Negative for joint swelling and myalgias.   Skin:  Negative for color change.   Neurological:  Negative for seizures and headaches.   Psychiatric/Behavioral: Negative.     All other systems reviewed and are negative.       Physical Exam:  /73 (BP Location: Right arm, Patient Position: Lying)   Pulse 62   Temp 97.9 °F (36.6 °C) (Oral)   Resp 11   Ht 195.6 cm (77\")   Wt 117 kg (258 lb 2.5 oz)   SpO2 97%   BMI 30.61 kg/m²     Physical Exam  Vitals and nursing note reviewed.   Constitutional:       General: He is not in acute distress.     Appearance: Normal appearance. He is not toxic-appearing.   HENT:      Head: Normocephalic and atraumatic.      Jaw: There is normal jaw " occlusion.   Eyes:      General: Lids are normal.      Extraocular Movements: Extraocular movements intact.      Conjunctiva/sclera: Conjunctivae normal.      Pupils: Pupils are equal, round, and reactive to light.   Cardiovascular:      Rate and Rhythm: Normal rate and regular rhythm.      Pulses: Normal pulses.      Heart sounds: Normal heart sounds.   Pulmonary:      Effort: Pulmonary effort is normal. No respiratory distress.      Breath sounds: Normal breath sounds. No wheezing or rhonchi.   Abdominal:      General: Abdomen is flat.      Palpations: Abdomen is soft.      Tenderness: There is no abdominal tenderness. There is no guarding or rebound.   Musculoskeletal:         General: Normal range of motion.      Cervical back: Normal range of motion and neck supple.      Right lower leg: Edema present.      Left lower leg: Edema present.   Skin:     General: Skin is warm and dry.   Neurological:      Mental Status: He is alert and oriented to person, place, and time. Mental status is at baseline.   Psychiatric:         Mood and Affect: Mood normal.                    Medical Decision Making:      Comorbidities that affect care:    Congestive Heart Failure    External Notes reviewed:    Previous Clinic Note: Patient was seen by telehealth for possible gout.      The following orders were placed and all results were independently analyzed by me:  Orders Placed This Encounter   Procedures    XR Chest 1 View    XR Knee 3 View Left    Comprehensive Metabolic Panel    Urinalysis With Microscopic If Indicated (No Culture) - Urine, Clean Catch    High Sensitivity Troponin T    BNP    CBC Auto Differential    High Sensitivity Troponin T 1Hr    Code Status and Medical Interventions: CPR (Attempt to Resuscitate); Full Support    Inpatient Nephrology Consult    Inpatient Hospitalist Consult    ECG 12 Lead Chest Pain    Initiate Observation Status    CBC & Differential    Extra Tubes    Light Blue Top       Medications  Given in the Emergency Department:  Medications - No data to display     ED Course:         Labs:    Lab Results (last 24 hours)       Procedure Component Value Units Date/Time    CBC & Differential [[id number]]  (Abnormal) Collected: 02/02/25 0940    Specimen: Blood Updated: 02/02/25 1004    Narrative:      The following orders were created for panel order CBC & Differential.  Procedure                               Abnormality         Status                     ---------                               -----------         ------                     CBC Auto Differential[[id number]]        Abnormal            Final result                 Please view results for these tests on the individual orders.    Comprehensive Metabolic Panel [[id number]]  (Abnormal) Collected: 02/02/25 0940    Specimen: Blood Updated: 02/02/25 1019     Glucose 177 mg/dL      BUN 94 mg/dL      Creatinine 2.65 mg/dL      Sodium 134 mmol/L      Potassium 4.6 mmol/L      Comment: Slight hemolysis detected by analyzer. Result may be falsely elevated.        Chloride 97 mmol/L      CO2 23.8 mmol/L      Calcium 8.9 mg/dL      Total Protein 7.4 g/dL      Albumin 4.0 g/dL      ALT (SGPT) 14 U/L      AST (SGOT) 20 U/L      Alkaline Phosphatase 127 U/L      Total Bilirubin 0.3 mg/dL      Globulin 3.4 gm/dL      A/G Ratio 1.2 g/dL      BUN/Creatinine Ratio 35.5     Anion Gap 13.2 mmol/L      eGFR 23.9 mL/min/1.73     Narrative:      GFR Categories in Chronic Kidney Disease (CKD)      GFR Category          GFR (mL/min/1.73)    Interpretation  G1                     90 or greater         Normal or high (1)  G2                      60-89                Mild decrease (1)  G3a                   45-59                Mild to moderate decrease  G3b                   30-44                Moderate to severe decrease  G4                    15-29                Severe decrease  G5                    14 or less           Kidney failure          (1)In the absence of  evidence of kidney disease, neither GFR category G1 or G2 fulfill the criteria for CKD.    eGFR calculation 2021 CKD-EPI creatinine equation, which does not include race as a factor    High Sensitivity Troponin T [[id number]]  (Abnormal) Collected: 02/02/25 0940    Specimen: Blood Updated: 02/02/25 1019     HS Troponin T 38 ng/L     Narrative:      High Sensitive Troponin T Reference Range:  <14.0 ng/L- Negative Female for AMI  <22.0 ng/L- Negative Male for AMI  >=14 - Abnormal Female indicating possible myocardial injury.  >=22 - Abnormal Male indicating possible myocardial injury.   Clinicians would have to utilize clinical acumen, EKG, Troponin, and serial changes to determine if it is an Acute Myocardial Infarction or myocardial injury due to an underlying chronic condition.         BNP [[id number]]  (Normal) Collected: 02/02/25 0940    Specimen: Blood Updated: 02/02/25 1017     proBNP 763.2 pg/mL     Narrative:      This assay is used as an aid in the diagnosis of individuals suspected of having heart failure. It can be used as an aid in the diagnosis of acute decompensated heart failure (ADHF) in patients presenting with signs and symptoms of ADHF to the emergency department (ED). In addition, NT-proBNP of <300 pg/mL indicates ADHF is not likely.    Age Range Result Interpretation  NT-proBNP Concentration (pg/mL:      <50             Positive            >450                   Gray                 300-450                    Negative             <300    50-75           Positive            >900                  Gray                300-900                  Negative            <300      >75             Positive            >1800                  Gray                300-1800                  Negative            <300    CBC Auto Differential [[id number]]  (Abnormal) Collected: 02/02/25 0940    Specimen: Blood Updated: 02/02/25 1004     WBC 9.16 10*3/mm3      RBC 4.66 10*6/mm3      Hemoglobin 13.1 g/dL      Hematocrit  41.1 %      MCV 88.2 fL      MCH 28.1 pg      MCHC 31.9 g/dL      RDW 14.8 %      RDW-SD 47.8 fl      MPV 9.5 fL      Platelets 197 10*3/mm3      Neutrophil % 67.0 %      Lymphocyte % 16.3 %      Monocyte % 11.5 %      Eosinophil % 3.7 %      Basophil % 0.5 %      Immature Grans % 1.0 %      Neutrophils, Absolute 6.14 10*3/mm3      Lymphocytes, Absolute 1.49 10*3/mm3      Monocytes, Absolute 1.05 10*3/mm3      Eosinophils, Absolute 0.34 10*3/mm3      Basophils, Absolute 0.05 10*3/mm3      Immature Grans, Absolute 0.09 10*3/mm3      nRBC 0.0 /100 WBC     Urinalysis With Microscopic If Indicated (No Culture) - Urine, Clean Catch [[id number]]  (Normal) Collected: 02/02/25 1045    Specimen: Urine, Clean Catch Updated: 02/02/25 1057     Color, UA Yellow     Appearance, UA Clear     pH, UA <=5.0     Specific Gravity, UA 1.010     Glucose, UA Negative     Ketones, UA Negative     Bilirubin, UA Negative     Blood, UA Negative     Protein, UA Negative     Leuk Esterase, UA Negative     Nitrite, UA Negative     Urobilinogen, UA 0.2 E.U./dL    Narrative:      Urine microscopic not indicated.    High Sensitivity Troponin T 1Hr [[id number]]  (Abnormal) Collected: 02/02/25 1045    Specimen: Blood from Arm, Left Updated: 02/02/25 1112     HS Troponin T 34 ng/L      Troponin T Numeric Delta -4 ng/L      Troponin T % Delta -11    Narrative:      High Sensitive Troponin T Reference Range:  <14.0 ng/L- Negative Female for AMI  <22.0 ng/L- Negative Male for AMI  >=14 - Abnormal Female indicating possible myocardial injury.  >=22 - Abnormal Male indicating possible myocardial injury.   Clinicians would have to utilize clinical acumen, EKG, Troponin, and serial changes to determine if it is an Acute Myocardial Infarction or myocardial injury due to an underlying chronic condition.                  Imaging:    XR Chest 1 View    Result Date: 2/2/2025  XR CHEST 1 VW Date of Exam: 2/2/2025 10:00 AM EST Indication: Cough, persistent Cough  cough Comparison: Chest radiograph 6/7/2024, CT chest 6/7/2024 Findings: Similar mediastinal contour which could reflect a distended patulous esophagus in the setting of known gastric band. Similar mild cardiomegaly. No infectious appearing consolidation, edema, large effusion or pneumothorax. Degenerative related osseous change.     Impression: No active pulmonary process. Electronically Signed: Eduard Leal MD  2/2/2025 10:26 AM EST  Workstation ID: [id number]    XR Knee 3 View Left    Result Date: 2/2/2025  XR KNEE 3 VW LEFT Date of Exam: 2/2/2025 10:00 AM EST Indication: Knee pain pain Comparison: None available. Findings/    Impression: Multiple views of the left knee were obtained. No acute fracture or dislocation is identified. There are moderate osteoarthritic changes of the knee. Several calcifications about the knee measuring up to approximately 1 cm. Intracapsular osseous bodies cannot be excluded. Probable small knee effusion. Soft tissues demonstrate no acute abnormality. Electronically Signed: Mohsen Contreras MD  2/2/2025 10:21 AM EST  Workstation ID: [id number]       Differential Diagnosis and Discussion:    Edema: Based on the patient's history, signs, and symptoms, the differential diagnosis includes but is not limited to heart failure, kidney disease, liver disease, venous insufficiency, lymphedema, pregnancy, medications, allergic reactions.    PROCEDURES:    Labs were collected in the emergency department and all labs were reviewed and interpreted by me.  X-ray were performed in the emergency department and all X-ray impressions were independently interpreted by me.  An EKG was performed and the EKG was interpreted by me.    ECG 12 Lead Chest Pain   Preliminary Result   HEART RATE=63  bpm   RR Kpgwhhrp=197  ms   AR Interval=  ms   P Horizontal Axis=  deg   P Front Axis=  deg   QRSD Jtbbwtqq=791  ms   QT Uyhdxvey=939  ms   EPeP=525  ms   QRS Axis=-53  deg   T Wave Axis=47  deg   - ABNORMAL ECG -    Atrial flutter   Nonspecific IVCD with LAD   Date and Time of Study:2025-02-02 09:41:35          Procedures    MDM     Amount and/or Complexity of Data Reviewed  Decide to obtain previous medical records or to obtain history from someone other than the patient: yes         The patient´s CBC that was reviewed and interpreted by me shows no abnormalities of critical concern. Of note, there is no anemia requiring a blood transfusion and the platelet count is acceptable.  CMP shows a worsening BUN and creatinine and GFR.              Patient Care Considerations:    PERC: I used the PERC score to risk stratify the patient for PE and a CT of the chest was considered but ultimately not indicated in today's visit.      Consultants/Shared Management Plan:    Case was discussed with Dr. Raphael who agrees with admission.  Case was discussed with Dr. Mora on-call for nephrology who agrees to consult and recommends admission.    Social Determinants of Health:    Patient is independent, reliable, and has access to care.       Disposition and Care Coordination:    Admit:   Through independent evaluation of the patient's history, physical, and imperical data, the patient meets criteria for inpatient admission to the hospital.        Final diagnoses:   BON (acute kidney injury)        ED Disposition       ED Disposition   Decision to Admit    Condition   --    Comment   Level of Care: Telemetry [5]   Diagnosis: BON (acute kidney injury) [534680]   Admitting Physician: ESME RAPHAEL [[id number]]   Attending Physician: ESME RAPHAEL [[id number]]                 This medical record created using voice recognition software.             Nicole Nuno MD  02/02/25 [id number]

## 2025-02-02 NOTE — H&P
[facility]  [facility]   Patient Name: Edi Weston  : 1945  MRN: [id number]  Primary Care Physician:  Jesus Lux DO  Date of admission: 2025    Subjective foot pain  Subjective   Chief Complaint: Foot pain    HPI: Patient is a 80-year-old male with a past medical history of atrial fibrillation on Eliquis, type 2 diabetes, hypertension, preserved function congestive heart failure, who presents with a possible gout flare.  Patient had Lap-Band loosened procedure.  After that he has been eating red meat and has been slowly increasing in his diet which is why he thought he may be having a gout flare.  Additionally, his left foot is swollen for over a week and has been throbbing.  The foot does not hurt when he touches it.  He has tried ibuprofen and hydrocodone and Tylenol with no relief.  He has gained 5 pounds in the last 2 days.  He takes Lasix and metolazone in the mornings.    On labs, temperature is 97.9, pulse is 52, respiratory rate is 11, blood pressure is 105/73, and he saturating 100% on room air.    Chest x-ray is clear.  Knee x-ray shows no fracture.  Moderate osteoarthritic changes.  Several calcifications in the knee measuring about 1 cm.  Intracapsular osseous bodies cannot be excluded.  Probable small knee effusion.  Small tissues demonstrate no abnormality.      On labs, patient's sodium is 134, chloride is 97, creatinine is 2.65, CO2 was 23.8.  Calcium is 8.9.  White blood cell count is 9.61.  Urine analysis is normal.  Baseline creatinine between 1.1-1.4    Personal History     Past Medical History:  Past Medical History:   Diagnosis Date    Arthritis     Atrial fibrillation     Cellulitis of right lower limb     CHF (congestive heart failure)     Chronic heart failure with preserved ejection fraction (HFpEF) 2023    Colon polyps     Decubitus ulcer of foot, stage 1 08/10/2018    Decubitus ulcer of foot, stage 3 2018     Foot pain, left 08/10/2018    Foot pain, right     Gout     Hammertoe 2019    History of colonoscopy with polypectomy 2020 TUBULAR ADENOMA, COLONOSCOPY SCHEDULED FOR 10/19/2020 WITH DR SULTANA    Ingrowing nail 2018    Medication management 2019    Nail dystrophy     PAT (paroxysmal atrial tachycardia) 2018    Pressure ulcer, stage 1     Tinea unguium     Type 2 diabetes, controlled, with peripheral neuropathy 08/10/2018       Past Surgical History:  Past Surgical History:   Procedure Laterality Date    ABDOMINAL SURGERY      lap band    COLONOSCOPY      COLONOSCOPY N/A 2024    Procedure: COLONOSCOPY;  Surgeon: Live Amador MD;  Location: [facility] ENDOSCOPY;  Service: General;  Laterality: N/A;  NORMAL COLON    GASTRIC BANDING         Family History:   Family History   Problem Relation Age of Onset    Nephrolithiasis Mother         RENAL CALCULUS    Kidney disease Mother     Colon cancer Father 50        FAMILY HISTORY OF COLON CANCER    Cancer Father     Colon cancer Paternal Grandfather 60        FAMILY HISTORY OF COLON CANCER        Social History:   Social History     Socioeconomic History    Marital status:    Tobacco Use    Smoking status: Former     Current packs/day: 0.00     Average packs/day: 2.0 packs/day for 22.0 years (44.0 ttl pk-yrs)     Types: Cigarettes     Start date: 1965     Quit date:      Years since quittin.1    Smokeless tobacco: Never    Tobacco comments:     AGE STARTED 19 QUIT AGE 31 - SMOKED X 20 YEARS QUIT    Vaping Use    Vaping status: Never Used   Substance and Sexual Activity    Alcohol use: Not Currently     Comment: CURRENT SOME DAY 2020,2017    Drug use: Never    Sexual activity: Yes     Partners: Female     Birth control/protection: Vasectomy       Home Medications:  apixaban, furosemide, glucose blood, glyburide, lisinopril, metFORMIN ER, metOLazone, metoprolol succinate XL, pantoprazole,  potassium chloride, and simvastatin    Allergies:  Allergies   Allergen Reactions    Sulfa Antibiotics Unknown - Low Severity     Unsure of reaction       Review of Systems   All systems were reviewed and negative except for: Swelling of the left lower leg    Objective   Objective     Vitals:   Temp:  [97.9 °F (36.6 °C)-98.2 °F (36.8 °C)] 97.9 °F (36.6 °C)  Heart Rate:  [52-74] 52  Resp:  [11-18] 11  BP: (105-120)/(72-73) 105/73    Physical Exam    Constitutional: Awake, alert, no acute distress   Eyes: Pupils equal, sclerae anicteric, no conjunctival injection   HENT: NCAT, mucous membranes moist   Neck: Supple, no thyromegaly, no lymphadenopathy, trachea midline   Respiratory: Clear to auscultation bilaterally, nonlabored respirations    Cardiovascular: RRR, no murmurs, rubs, or gallops, palpable pedal pulses bilaterally   Gastrointestinal: Positive bowel sounds, soft, nontender, nondistended   Musculoskeletal: No bilateral ankle edema, no clubbing or cyanosis to extremities   Psychiatric: Appropriate affect, cooperative   Neurologic: Oriented x 3, strength symmetric in all extremities, Cranial Nerves grossly intact to confrontation, speech clear   Skin: No rashes     Result Review    Result Review:  I have personally reviewed the results from the time of this admission to 2/2/2025 12:36 EST and agree with these findings:  [x]  Laboratory  [x]  Microbiology  [x]  Radiology  [x]  EKG/Telemetry   [x]  Cardiology/Vascular   [x]  Pathology  [x]  Old records  []  Other:      Assessment & Plan   Assessment / Plan   #1 BON-nephrology consulted  #2 left foot/leg swelling-check uric acid, Dopplers,  #3 diabetes-cover with insulin sliding scale hold oral hypoglycemic  #4 atrial fibrillation continue beta-blocker and Eliquis  #5 preserved function congestive heart failure  GDMT: Patient on Lasix.  Held for now I will defer to nephrology for diuresis.  #6 CVA continue statin  #7 hypertension hold lisinopril because of BON  #8  GERD continue PPI  #9 prior gastric lap surgery and adjustment    VTE Prophylaxis:  Mechanical VTE prophylaxis orders are signed & held.          CODE STATUS:    Level Of Support Discussed With: Patient  Code Status (Patient has no pulse and is not breathing): CPR (Attempt to Resuscitate)  Medical Interventions (Patient has pulse or is breathing): Full Support      Admission Status:  I believe this patient meets observationstatus.    Electronically signed by David Mrianda DO, 02/02/25, 12:21 PM EST.

## 2025-02-02 NOTE — CONSULTS
"  Referring Provider: Ruben  Reason for Consultation: renal issues    Patient Care Team:  Jesus Lux DO as PCP - General (Family Medicine)  Kar Ledezma MD as Consulting Physician (Cardiology)  Williams, April, APRN as Nurse Practitioner (Nurse Practitioner)    Chief complaint     Subjective .     History of present illness:  Chart reviewed  Called from ER about BON on CKD.  He had presented with 5 pound weight gain and concern over gout.  HE took his daily lasix this am plus a metolazone.  Took 4 Advil yday.  Denies any H/O stones  No FH of ESRD  Takes lasix for \"CHF\"    Review of Systems    General :  no fevers/chills  HEENT: No headache, no nosebleed, no sore throat, no blurry vision  Chest : no chest pain, no palpitations, no chest wall pain  Respiratory: No cough, no SOA, no GORDILLO, no hemoptysis, no orthopnea  GI:  No N/V/D, no abdominal pain, no BRBPR, no melena  Skin : no rashes, no pruritus  Musculoskeletal : no flank pain  Neuro : No weakness, no numbness, no dizziness, no double vision  Endocrine: no heat/cold intolerance, no weight loss  Genitourinary: no dysuria or hematuria, no frequency  Psychiatric: no insomnia, no depression, no anxiety  Heme: no easy bruising or bleeding  Vascular: no cyanosis or extremity pain        Past Medical History:   Diagnosis Date    Arthritis     Atrial fibrillation     Cellulitis of right lower limb     CHF (congestive heart failure)     Chronic heart failure with preserved ejection fraction (HFpEF) 03/20/2023    Colon polyps     Decubitus ulcer of foot, stage 1 08/10/2018    Decubitus ulcer of foot, stage 3 02/07/2018    Foot pain, left 08/10/2018    Foot pain, right     Gout     Hammertoe 12/30/2019    History of colonoscopy with polypectomy 08/25/2020    2018 TUBULAR ADENOMA, COLONOSCOPY SCHEDULED FOR 10/19/2020 WITH DR KAYY Sheldon nail 11/02/2018    Medication management 02/18/2019    Nail dystrophy     PAT (paroxysmal atrial tachycardia) 08/06/2018 "    Pressure ulcer, stage 1     Tinea unguium     Type 2 diabetes, controlled, with peripheral neuropathy 08/10/2018     Past Surgical History:   Procedure Laterality Date    ABDOMINAL SURGERY      lap band    COLONOSCOPY      COLONOSCOPY N/A 2024    Procedure: COLONOSCOPY;  Surgeon: Live Amador MD;  Location: [facility] ENDOSCOPY;  Service: General;  Laterality: N/A;  NORMAL COLON    GASTRIC BANDING       Family History   Problem Relation Age of Onset    Nephrolithiasis Mother         RENAL CALCULUS    Kidney disease Mother     Colon cancer Father 50        FAMILY HISTORY OF COLON CANCER    Cancer Father     Colon cancer Paternal Grandfather 60        FAMILY HISTORY OF COLON CANCER      Social History     Tobacco Use    Smoking status: Former     Current packs/day: 0.00     Average packs/day: 2.0 packs/day for 22.0 years (44.0 ttl pk-yrs)     Types: Cigarettes     Start date: 1965     Quit date:      Years since quittin.1    Smokeless tobacco: Never    Tobacco comments:     AGE STARTED 19 QUIT AGE 31 - SMOKED X 20 YEARS QUIT    Vaping Use    Vaping status: Never Used   Substance Use Topics    Alcohol use: Not Currently     Comment: CURRENT SOME DAY 2020,2017    Drug use: Never     Medications Prior to Admission   Medication Sig Dispense Refill Last Dose/Taking    apixaban (Eliquis) 5 MG tablet tablet Take 1 tablet by mouth 2 (Two) Times a Day. Hold for 3 more days and then restart it. Stop if patient has Nosebleed again and contact primary provider regarding it   2025 Morning    furosemide (Lasix) 40 MG tablet Take 1 tablet by mouth Daily.   2025 Morning    glyburide (DIAbeta) 2.5 MG tablet Take 1 tablet by mouth Daily With Breakfast. (Patient taking differently: Take 1 tablet by mouth Every Night.) 90 tablet 3 2025    lisinopril (PRINIVIL,ZESTRIL) 20 MG tablet Take 1 tablet by mouth Daily. (Patient taking differently: Take 1 tablet by mouth Every Morning.) 90  tablet 3 2/2/2025 Morning    metFORMIN ER (GLUCOPHAGE-XR) 500 MG 24 hr tablet Take 2 tablets by mouth Every Evening. 180 tablet 3 2/1/2025 Evening    metOLazone (ZAROXOLYN) 2.5 MG tablet Take 1 tablet by mouth Daily As Needed (swelling). 30 tablet 1 2/2/2025 Morning    metoprolol succinate XL (TOPROL-XL) 50 MG 24 hr tablet Take 1 tablet by mouth Daily. (Patient taking differently: Take 1 tablet by mouth Every Morning.) 90 tablet 3 2/2/2025 Morning    pantoprazole (PROTONIX) 40 MG EC tablet Take 1 tablet by mouth Every Morning. 90 tablet 3 2/2/2025 Morning    potassium chloride 10 MEQ CR tablet Take 1 tablet by mouth Daily. 10 tablet 3 2/1/2025 Evening    simvastatin (ZOCOR) 20 MG tablet Take 1 tablet by mouth Every Evening. 90 tablet 3 2/1/2025 Evening    glucose blood test strip Check blood sugar once daily fasting and record. DX- E11.9 100 each 3      atorvastatin, 10 mg, Oral, Nightly  insulin lispro, 2-9 Units, Subcutaneous, 4x Daily AC & at Bedtime  [START ON 2/3/2025] metoprolol succinate XL, 50 mg, Oral, QAM  [START ON 2/3/2025] pantoprazole, 40 mg, Oral, QAM  senna-docusate sodium, 2 tablet, Oral, BID  sodium chloride, 10 mL, Intravenous, Q12H         Allergies:   Sulfa antibiotics    Objective     Vital Signs   Temp:  [97.3 °F (36.3 °C)-98.2 °F (36.8 °C)] 97.3 °F (36.3 °C)  Heart Rate:  [52-74] 52  Resp:  [11-18] 16  BP: (105-120)/(72-73) 110/73    No intake or output data in the 24 hours ending 02/02/25 1645    Physical Exam:      General Appearance:    Alert,  in no acute distress   Head:    Normocephalic, without obvious abnormality, atraumatic   Eyes:          No drainage, no icterus, no pallor   Throat:   No oral lesions, no thrush, oral mucosa moist   Neck:   No adenopathy,  no thyromegaly,  no JVD   Musc:     No CVA tenderness to palpation, no joint effusions   Lungs:     Clear to auscultation bilaterally,no wheezes or rhonchi ,  unlabored    Heart:    Servando, normal S1 and S2, no   murmur, no gallop,  "no rub   Chest Wall:    No chest wall tenderness to palpation   Abdomen:     Normal bowel sounds, no masses,  soft non-tender, non-distended, no guarding, no rebound    Rectal:     Deferred   Extremities:   Trace Lower extremity edema L ankle >R,no cyanosis   Pulses:   Radial Pulses palpable   Skin:   Dry, No bruising or rashes   Lymph nodes:   No palpable adenopathy in neck    Neurologic:   Cranial nerves 2 - 12 grossly intact, no gross motor deficits          Results Review:       Results from last 7 days   Lab Units 02/02/25  0940   SODIUM mmol/L 134*   POTASSIUM mmol/L 4.6   CHLORIDE mmol/L 97*   CO2 mmol/L 23.8   BUN mg/dL 94*   CREATININE mg/dL 2.65*   GLUCOSE mg/dL 177*   CALCIUM mg/dL 8.9     Results from last 7 days   Lab Units 02/02/25  0940   WBC 10*3/mm3 9.16   HEMOGLOBIN g/dL 13.1   HEMATOCRIT % 41.1   PLATELETS 10*3/mm3 197     Magnesium   Date Value Ref Range Status   02/02/2025 2.2 1.6 - 2.4 mg/dL Final     Phosphorus   Date Value Ref Range Status   02/02/2025 4.2 2.5 - 4.5 mg/dL Final     No results found for: \"BNP\"  Lab Results   Component Value Date    ALBUMIN 4.0 02/02/2025      Brief Urine Lab Results  (Last result in the past 365 days)        Color   Clarity   Blood   Leuk Est   Nitrite   Protein   CREAT   Urine HCG        02/02/25 1045 Yellow   Clear   Negative   Negative   Negative   Negative                    XR Chest 1 View    Result Date: 2/2/2025  XR CHEST 1 VW Date of Exam: 2/2/2025 10:00 AM EST Indication: Cough, persistent Cough cough Comparison: Chest radiograph 6/7/2024, CT chest 6/7/2024 Findings: Similar mediastinal contour which could reflect a distended patulous esophagus in the setting of known gastric band. Similar mild cardiomegaly. No infectious appearing consolidation, edema, large effusion or pneumothorax. Degenerative related osseous change.     Impression: Impression: No active pulmonary process. Electronically Signed: Eduard Leal MD  2/2/2025 10:26 AM EST  " Workstation ID: [id number]    XR Knee 3 View Left    Result Date: 2/2/2025  XR KNEE 3 VW LEFT Date of Exam: 2/2/2025 10:00 AM EST Indication: Knee pain pain Comparison: None available. Findings/    Impression: Impression: Multiple views of the left knee were obtained. No acute fracture or dislocation is identified. There are moderate osteoarthritic changes of the knee. Several calcifications about the knee measuring up to approximately 1 cm. Intracapsular osseous bodies cannot be excluded. Probable small knee effusion. Soft tissues demonstrate no acute abnormality. Electronically Signed: Mohsen Contreras MD  2/2/2025 10:21 AM EST  Workstation ID: [id number]         Assessment & Plan       BON (acute kidney injury)    BON on CKD 3 - baseline 1.4 to 1.7 with H/o DM/HTN.  Suspect BON from extra diuretics and 4 Advil while on ACEi. Hold lasix and gently hydrate. UA bland, will check U/S    2. HTN- controlled    3. CHFpEF- compensated.  Looks to be taking too much diuretic ( no need for metolazone)    4. DM2-  great candiate for Farxiga in place of metformin    5. ? Gout- check U.A level, may need Allopurinol.  May just be OA      Plan:    Hold ACEi  Hydrate x 1 Liter  Check U/S       Thank you for consult.   Please call [phone] for any questions.

## 2025-02-02 NOTE — PLAN OF CARE
Goal Outcome Evaluation:  Plan of Care Reviewed With: patient        Progress: improving  Outcome Evaluation: VSS. IV fluid started per order.

## 2025-02-02 NOTE — CASE MANAGEMENT/SOCIAL WORK
Discharge Planning Assessment   [place]     Patient Name: Edi Weston  MRN: [id number]  Today's Date: 2/2/2025    Admit Date: 2/2/2025        Discharge Needs Assessment       Row Name 02/02/25 1240       Living Environment    People in Home spouse (P)     Name(s) of People in Home Wife Riana (P)     Current Living Arrangements home (P)     Potentially Unsafe Housing Conditions none (P)     In the past 12 months has the electric, gas, oil, or water company threatened to shut off services in your home? No (P)     Primary Care Provided by self (P)     Family Caregiver if Needed none (P)     Able to Return to Prior Arrangements yes (P)        Transportation Needs    In the past 12 months, has lack of transportation kept you from medical appointments or from getting medications? no (P)     In the past 12 months, has lack of transportation kept you from meetings, work, or from getting things needed for daily living? No (P)        Food Insecurity    Within the past 12 months, you worried that your food would run out before you got the money to buy more. Never true (P)     Within the past 12 months, the food you bought just didn't last and you didn't have money to get more. Never true (P)        Transition Planning    Patient/Family Anticipates Transition to home (P)     Patient/Family Anticipated Services at Transition none (P)        Discharge Needs Assessment    Readmission Within the Last 30 Days no previous admission in last 30 days (P)     Equipment Currently Used at Home none (P)     Concerns to be Addressed no discharge needs identified (P)     Do you want help finding or keeping work or a job? I do not need or want help (P)     Do you want help with school or training? For example, starting or completing job training or getting a high school diploma, GED or equivalent No (P)                    Discharge Plan    No documentation.                 Continued Care and Services - Admitted Since 2/2/2025    No active  coordination exists for this encounter.          Demographic Summary       Row Name 02/02/25 1238       General Information    Admission Type observation (P)     Arrived From home (P)     Referral Source high risk screening (P)     Reason for Consult discharge planning (P)     Preferred Language English (P)                    Functional Status       Row Name 02/02/25 1239       Functional Status    Usual Activity Tolerance good (P)     Current Activity Tolerance good (P)        Physical Activity    On average, how many days per week do you engage in moderate to strenuous exercise (like a brisk walk)? 7 days (P)     On average, how many minutes do you engage in exercise at this level? 40 min (P)     Number of minutes of exercise per week 280 (P)        Functional Status, IADL    Medications independent (P)     Meal Preparation independent (P)     Housekeeping independent (P)     Laundry independent (P)     Shopping independent (P)     If for any reason you need help with day-to-day activities such as bathing, preparing meals, shopping, managing finances, etc., do you get the help you need? I don't need any help (P)        Mental Status    General Appearance WDL WDL (P)        Mental Status Summary    Recent Changes in Mental Status/Cognitive Functioning no changes (P)        Employment/    Employment Status retired (P)                    Psychosocial       Row Name 02/02/25 1240       Values/Beliefs    Spiritual, Cultural Beliefs, Caodaism Practices, Values that Affect Care no (P)        Behavior WDL    Behavior WDL WDL (P)        Emotion Mood WDL    Emotion/Mood/Affect WDL WDL (P)        Mental Health    Little interest or pleasure in doing things Not at all (P)     Feeling down, depressed, or hopeless Not at all (P)        Speech WDL    Speech WDL WDL (P)        Perceptual State WDL    Perceptual State WDL WDL (P)        Thought Process WDL    Thought Process WDL WDL (P)        Intellectual Performance WDL     Intellectual Performance WDL WDL (P)        Stress    Do you feel stress - tense, restless, nervous, or anxious, or unable to sleep at night because your mind is troubled all the time - these days? Not at all (P)                    Abuse/Neglect       Row Name 02/02/25 1240       Personal Safety    Feels Unsafe at Home or Work/School no (P)     Feels Threatened by Someone no (P)     Does Anyone Try to Keep You From Having Contact with Others or Doing Things Outside Your Home? no (P)     Physical Signs of Abuse Present no (P)                    Legal       Row Name 02/02/25 1240       Financial Resource Strain    How hard is it for you to pay for the very basics like food, housing, medical care, and heating? Not hard (P)        Financial/Legal    Source of Income pension/jail (P)     Financial/Environmental Concerns none (P)                    Substance Abuse       Row Name 02/02/25 1240       Substance Use    Substance Use Status never used (P)                    Patient Forms    No documentation.                 SW student completed discharge needs assessment with PT. Pt reported that [facility] is his pharmacy and that his PCP is Dr Lux. No needs at this time. PT did report that his wife Riana has home health.     Duran Puente, Social Work Student

## 2025-02-02 NOTE — Clinical Note
Level of Care: Telemetry [5]   Diagnosis: BON (acute kidney injury) [981448]   Admitting Physician: ESME RAPHAEL [[id number]]   Attending Physician: ESME RAPHAEL [[id number]]   Certification: I Certify That Inpatient Hospital Services Are Medically Necessary For Greater Than 2 Midnights

## 2025-02-03 LAB
ANION GAP SERPL CALCULATED.3IONS-SCNC: 13.3 MMOL/L (ref 5–15)
APPEARANCE FLD: ABNORMAL
BASOPHILS # BLD AUTO: 0.05 10*3/MM3 (ref 0–0.2)
BASOPHILS NFR BLD AUTO: 0.5 % (ref 0–1.5)
BUN SERPL-MCNC: 82 MG/DL (ref 8–23)
BUN/CREAT SERPL: 39 (ref 7–25)
CALCIUM SPEC-SCNC: 9.1 MG/DL (ref 8.6–10.5)
CHLORIDE SERPL-SCNC: 98 MMOL/L (ref 98–107)
CO2 SERPL-SCNC: 23.7 MMOL/L (ref 22–29)
COLOR FLD: ABNORMAL
CREAT SERPL-MCNC: 2.1 MG/DL (ref 0.76–1.27)
CRYSTALS FLD MICRO: NORMAL
DEPRECATED RDW RBC AUTO: 47.7 FL (ref 37–54)
EGFRCR SERPLBLD CKD-EPI 2021: 31.6 ML/MIN/1.73
EOSINOPHIL # BLD AUTO: 0.3 10*3/MM3 (ref 0–0.4)
EOSINOPHIL NFR BLD AUTO: 3.3 % (ref 0.3–6.2)
ERYTHROCYTE [DISTWIDTH] IN BLOOD BY AUTOMATED COUNT: 15 % (ref 12.3–15.4)
GLUCOSE BLDC GLUCOMTR-MCNC: 150 MG/DL (ref 70–99)
GLUCOSE BLDC GLUCOMTR-MCNC: 222 MG/DL (ref 70–99)
GLUCOSE BLDC GLUCOMTR-MCNC: 253 MG/DL (ref 70–99)
GLUCOSE BLDC GLUCOMTR-MCNC: 351 MG/DL (ref 70–99)
GLUCOSE SERPL-MCNC: 149 MG/DL (ref 65–99)
HCT VFR BLD AUTO: 39.8 % (ref 37.5–51)
HGB BLD-MCNC: 12.5 G/DL (ref 13–17.7)
IMM GRANULOCYTES # BLD AUTO: 0.04 10*3/MM3 (ref 0–0.05)
IMM GRANULOCYTES NFR BLD AUTO: 0.4 % (ref 0–0.5)
LYMPHOCYTES # BLD AUTO: 1.58 10*3/MM3 (ref 0.7–3.1)
LYMPHOCYTES NFR BLD AUTO: 17.3 % (ref 19.6–45.3)
LYMPHOCYTES NFR FLD MANUAL: 2 %
MACROPHAGE FLUID %: 1 %
MAGNESIUM SERPL-MCNC: 2 MG/DL (ref 1.6–2.4)
MCH RBC QN AUTO: 27.8 PG (ref 26.6–33)
MCHC RBC AUTO-ENTMCNC: 31.4 G/DL (ref 31.5–35.7)
MCV RBC AUTO: 88.4 FL (ref 79–97)
MONOCYTES # BLD AUTO: 0.97 10*3/MM3 (ref 0.1–0.9)
MONOCYTES NFR BLD AUTO: 10.6 % (ref 5–12)
MONOCYTES NFR FLD: 45 %
NEUTROPHILS NFR BLD AUTO: 6.17 10*3/MM3 (ref 1.7–7)
NEUTROPHILS NFR BLD AUTO: 67.9 % (ref 42.7–76)
NEUTROPHILS NFR FLD MANUAL: 52 %
NRBC BLD AUTO-RTO: 0 /100 WBC (ref 0–0.2)
NUC CELL # FLD: 595 /MM3
PHOSPHATE SERPL-MCNC: 3.8 MG/DL (ref 2.5–4.5)
PLATELET # BLD AUTO: 203 10*3/MM3 (ref 140–450)
PMV BLD AUTO: 9.3 FL (ref 6–12)
POTASSIUM SERPL-SCNC: 4.2 MMOL/L (ref 3.5–5.2)
RBC # BLD AUTO: 4.5 10*6/MM3 (ref 4.14–5.8)
RBC # FLD AUTO: 300 /MM3
SODIUM SERPL-SCNC: 135 MMOL/L (ref 136–145)
URATE SERPL-MCNC: 13.5 MG/DL (ref 3.4–7)
WBC NRBC COR # BLD AUTO: 9.11 10*3/MM3 (ref 3.4–10.8)

## 2025-02-03 PROCEDURE — 0S9D3ZX DRAINAGE OF LEFT KNEE JOINT, PERCUTANEOUS APPROACH, DIAGNOSTIC: ICD-10-PCS | Performed by: STUDENT IN AN ORGANIZED HEALTH CARE EDUCATION/TRAINING PROGRAM

## 2025-02-03 PROCEDURE — 25010000002 MORPHINE PER 10 MG: Performed by: INTERNAL MEDICINE

## 2025-02-03 PROCEDURE — 63710000001 PREDNISONE PER 1 MG: Performed by: INTERNAL MEDICINE

## 2025-02-03 PROCEDURE — 99233 SBSQ HOSP IP/OBS HIGH 50: CPT | Performed by: INTERNAL MEDICINE

## 2025-02-03 PROCEDURE — 82948 REAGENT STRIP/BLOOD GLUCOSE: CPT | Performed by: HOSPITALIST

## 2025-02-03 PROCEDURE — 94761 N-INVAS EAR/PLS OXIMETRY MLT: CPT

## 2025-02-03 PROCEDURE — 20610 DRAIN/INJ JOINT/BURSA W/O US: CPT | Performed by: STUDENT IN AN ORGANIZED HEALTH CARE EDUCATION/TRAINING PROGRAM

## 2025-02-03 PROCEDURE — 89060 EXAM SYNOVIAL FLUID CRYSTALS: CPT | Performed by: STUDENT IN AN ORGANIZED HEALTH CARE EDUCATION/TRAINING PROGRAM

## 2025-02-03 PROCEDURE — 89051 BODY FLUID CELL COUNT: CPT | Performed by: STUDENT IN AN ORGANIZED HEALTH CARE EDUCATION/TRAINING PROGRAM

## 2025-02-03 PROCEDURE — G0378 HOSPITAL OBSERVATION PER HR: HCPCS

## 2025-02-03 PROCEDURE — 87070 CULTURE OTHR SPECIMN AEROBIC: CPT | Performed by: STUDENT IN AN ORGANIZED HEALTH CARE EDUCATION/TRAINING PROGRAM

## 2025-02-03 PROCEDURE — 80048 BASIC METABOLIC PNL TOTAL CA: CPT | Performed by: HOSPITALIST

## 2025-02-03 PROCEDURE — 63710000001 INSULIN LISPRO (HUMAN) PER 5 UNITS: Performed by: HOSPITALIST

## 2025-02-03 PROCEDURE — 82948 REAGENT STRIP/BLOOD GLUCOSE: CPT

## 2025-02-03 PROCEDURE — 83735 ASSAY OF MAGNESIUM: CPT | Performed by: HOSPITALIST

## 2025-02-03 PROCEDURE — 87205 SMEAR GRAM STAIN: CPT | Performed by: STUDENT IN AN ORGANIZED HEALTH CARE EDUCATION/TRAINING PROGRAM

## 2025-02-03 PROCEDURE — 85025 COMPLETE CBC W/AUTO DIFF WBC: CPT | Performed by: HOSPITALIST

## 2025-02-03 PROCEDURE — 84550 ASSAY OF BLOOD/URIC ACID: CPT | Performed by: HOSPITALIST

## 2025-02-03 PROCEDURE — 99221 1ST HOSP IP/OBS SF/LOW 40: CPT | Performed by: STUDENT IN AN ORGANIZED HEALTH CARE EDUCATION/TRAINING PROGRAM

## 2025-02-03 PROCEDURE — 63710000001 INSULIN GLARGINE PER 5 UNITS: Performed by: INTERNAL MEDICINE

## 2025-02-03 PROCEDURE — 84100 ASSAY OF PHOSPHORUS: CPT | Performed by: HOSPITALIST

## 2025-02-03 PROCEDURE — 94799 UNLISTED PULMONARY SVC/PX: CPT

## 2025-02-03 RX ORDER — MORPHINE SULFATE 2 MG/ML
2 INJECTION, SOLUTION INTRAMUSCULAR; INTRAVENOUS EVERY 4 HOURS PRN
Status: DISCONTINUED | OUTPATIENT
Start: 2025-02-03 | End: 2025-02-05 | Stop reason: HOSPADM

## 2025-02-03 RX ORDER — ACETAMINOPHEN 325 MG/1
650 TABLET ORAL EVERY 6 HOURS PRN
Status: DISCONTINUED | OUTPATIENT
Start: 2025-02-03 | End: 2025-02-05 | Stop reason: HOSPADM

## 2025-02-03 RX ORDER — NICOTINE 21 MG/24HR
1 PATCH, TRANSDERMAL 24 HOURS TRANSDERMAL DAILY PRN
Status: DISCONTINUED | OUTPATIENT
Start: 2025-02-03 | End: 2025-02-05 | Stop reason: HOSPADM

## 2025-02-03 RX ORDER — HYDROXYZINE HYDROCHLORIDE 25 MG/1
25 TABLET, FILM COATED ORAL 3 TIMES DAILY PRN
Status: DISCONTINUED | OUTPATIENT
Start: 2025-02-03 | End: 2025-02-05 | Stop reason: HOSPADM

## 2025-02-03 RX ORDER — METOLAZONE 5 MG/1
2.5 TABLET ORAL DAILY PRN
Status: DISCONTINUED | OUTPATIENT
Start: 2025-02-03 | End: 2025-02-05 | Stop reason: HOSPADM

## 2025-02-03 RX ORDER — ALUMINA, MAGNESIA, AND SIMETHICONE 2400; 2400; 240 MG/30ML; MG/30ML; MG/30ML
15 SUSPENSION ORAL EVERY 6 HOURS PRN
Status: DISCONTINUED | OUTPATIENT
Start: 2025-02-03 | End: 2025-02-05 | Stop reason: HOSPADM

## 2025-02-03 RX ORDER — METOPROLOL SUCCINATE 25 MG/1
12.5 TABLET, EXTENDED RELEASE ORAL
Status: DISCONTINUED | OUTPATIENT
Start: 2025-02-03 | End: 2025-02-03

## 2025-02-03 RX ORDER — LIDOCAINE 4 G/G
1 PATCH TOPICAL DAILY PRN
Status: DISCONTINUED | OUTPATIENT
Start: 2025-02-03 | End: 2025-02-05 | Stop reason: HOSPADM

## 2025-02-03 RX ORDER — LISINOPRIL 20 MG/1
20 TABLET ORAL DAILY
Status: DISCONTINUED | OUTPATIENT
Start: 2025-02-03 | End: 2025-02-05 | Stop reason: HOSPADM

## 2025-02-03 RX ORDER — METOPROLOL SUCCINATE 25 MG/1
37.5 TABLET, EXTENDED RELEASE ORAL
Status: DISCONTINUED | OUTPATIENT
Start: 2025-02-04 | End: 2025-02-05 | Stop reason: HOSPADM

## 2025-02-03 RX ORDER — COLCHICINE 0.6 MG/1
0.6 TABLET ORAL DAILY
Status: DISCONTINUED | OUTPATIENT
Start: 2025-02-03 | End: 2025-02-05 | Stop reason: HOSPADM

## 2025-02-03 RX ORDER — ONDANSETRON 2 MG/ML
4 INJECTION INTRAMUSCULAR; INTRAVENOUS EVERY 4 HOURS PRN
Status: DISCONTINUED | OUTPATIENT
Start: 2025-02-03 | End: 2025-02-05 | Stop reason: HOSPADM

## 2025-02-03 RX ORDER — METOPROLOL SUCCINATE 25 MG/1
25 TABLET, EXTENDED RELEASE ORAL ONCE
Status: COMPLETED | OUTPATIENT
Start: 2025-02-03 | End: 2025-02-03

## 2025-02-03 RX ORDER — PREDNISONE 20 MG/1
40 TABLET ORAL
Status: DISCONTINUED | OUTPATIENT
Start: 2025-02-03 | End: 2025-02-05 | Stop reason: HOSPADM

## 2025-02-03 RX ADMIN — APIXABAN 5 MG: 5 TABLET, FILM COATED ORAL at 20:34

## 2025-02-03 RX ADMIN — METOPROLOL SUCCINATE 12.5 MG: 25 TABLET, FILM COATED, EXTENDED RELEASE ORAL at 12:42

## 2025-02-03 RX ADMIN — MORPHINE SULFATE 2 MG: 2 INJECTION, SOLUTION INTRAMUSCULAR; INTRAVENOUS at 03:04

## 2025-02-03 RX ADMIN — Medication 10 ML: at 20:35

## 2025-02-03 RX ADMIN — INSULIN LISPRO 6 UNITS: 100 INJECTION, SOLUTION INTRAVENOUS; SUBCUTANEOUS at 20:34

## 2025-02-03 RX ADMIN — Medication 10 ML: at 08:59

## 2025-02-03 RX ADMIN — INSULIN LISPRO 8 UNITS: 100 INJECTION, SOLUTION INTRAVENOUS; SUBCUTANEOUS at 17:20

## 2025-02-03 RX ADMIN — MORPHINE SULFATE 2 MG: 2 INJECTION, SOLUTION INTRAMUSCULAR; INTRAVENOUS at 21:54

## 2025-02-03 RX ADMIN — PREDNISONE 40 MG: 20 TABLET ORAL at 08:53

## 2025-02-03 RX ADMIN — COLCHICINE 0.6 MG: 0.6 TABLET ORAL at 08:53

## 2025-02-03 RX ADMIN — SENNOSIDES AND DOCUSATE SODIUM 2 TABLET: 50; 8.6 TABLET ORAL at 20:34

## 2025-02-03 RX ADMIN — MORPHINE SULFATE 2 MG: 2 INJECTION, SOLUTION INTRAMUSCULAR; INTRAVENOUS at 11:51

## 2025-02-03 RX ADMIN — MORPHINE SULFATE 2 MG: 2 INJECTION, SOLUTION INTRAMUSCULAR; INTRAVENOUS at 17:23

## 2025-02-03 RX ADMIN — INSULIN LISPRO 4 UNITS: 100 INJECTION, SOLUTION INTRAVENOUS; SUBCUTANEOUS at 12:42

## 2025-02-03 RX ADMIN — MORPHINE SULFATE 2 MG: 2 INJECTION, SOLUTION INTRAMUSCULAR; INTRAVENOUS at 07:27

## 2025-02-03 RX ADMIN — INSULIN GLARGINE 10 UNITS: 100 INJECTION, SOLUTION SUBCUTANEOUS at 12:42

## 2025-02-03 RX ADMIN — INSULIN LISPRO 2 UNITS: 100 INJECTION, SOLUTION INTRAVENOUS; SUBCUTANEOUS at 08:53

## 2025-02-03 RX ADMIN — PANTOPRAZOLE SODIUM 40 MG: 40 TABLET, DELAYED RELEASE ORAL at 07:26

## 2025-02-03 RX ADMIN — APIXABAN 5 MG: 5 TABLET, FILM COATED ORAL at 08:53

## 2025-02-03 RX ADMIN — METOPROLOL SUCCINATE 25 MG: 25 TABLET, FILM COATED, EXTENDED RELEASE ORAL at 17:20

## 2025-02-03 RX ADMIN — ATORVASTATIN CALCIUM 10 MG: 10 TABLET, FILM COATED ORAL at 20:34

## 2025-02-03 NOTE — PROGRESS NOTES
[facility]   Hospitalist Progress Note    Date of admission: 2/2/2025  Patient Name: Edi Weston  1945  Date: 2/3/2025      Subjective     Chief Complaint   Patient presents with    Other     Pt arrives to the ED via triage c/o fluid overload, hx of CHF. Denies SOA, asymptomatic. Sent by teleNURSE r/t fluid overload and gout on left foot. Denies pain.        Interval Followup: Pain doing little better today.  Discussed treatment changes/uric acid and lab test results.  Denies any recent left knee trauma.  Apart from taking NSAIDs recently does not take any thing for gout.  His diet has been changed recently with a low red meat.      Objective     Vitals:   Temp:  [97.3 °F (36.3 °C)-98.2 °F (36.8 °C)] 97.5 °F (36.4 °C)  Heart Rate:  [52-74] 54  Resp:  [11-18] 18  BP: ()/(63-73) 101/69    Physical Exam  Awake conversant   CTAB no wheezing  RRR, trace edema in left foot   Notable arthritic changes in bilateral feet,, left knee with some infrapatellar swelling, no erythema mildly tender, left foot some swelling the dorsum, no erythema mildly tender    Result Review:  Vital signs, labs and recent relevant imaging reviewed.      CBC          6/19/2024    08:59 2/2/2025    09:40 2/3/2025    04:45   CBC   WBC 7.80  9.16  9.11    RBC 3.67  4.66  4.50    Hemoglobin 8.6  13.1  12.5    Hematocrit 29.8  41.1  39.8    MCV 81.2  88.2  88.4    MCH 23.4  28.1  27.8    MCHC 28.9  31.9  31.4    RDW 17.9  14.8  15.0    Platelets 227  197  203      CMP          9/27/2024    06:44 2/2/2025    09:40 2/3/2025    04:45   CMP   Glucose 131  177  149    BUN 41  94  82    Creatinine 1.59  2.65  2.10    EGFR 44.4  23.9  31.6    Sodium 140  134  135    Potassium 5.0  4.6  4.2    Chloride 107  97  98    Calcium 9.7  8.9  9.1    Total Protein  7.4     Albumin  4.0     Globulin  3.4     Total Bilirubin  0.3     Alkaline Phosphatase  127     AST (SGOT)  20     ALT (SGPT)  14     Albumin/Globulin Ratio  1.2     BUN/Creatinine Ratio  25.8  35.5  39.0    Anion Gap 7.7  13.2  13.3          acetaminophen **OR** acetaminophen **OR** acetaminophen    acetaminophen    aluminum-magnesium hydroxide-simethicone    senna-docusate sodium **AND** polyethylene glycol **AND** bisacodyl **AND** bisacodyl    dextrose    dextrose    Diclofenac Sodium    glucagon (human recombinant)    hydrOXYzine    Lidocaine    melatonin    [Held by provider] metOLazone    Morphine    nicotine    ondansetron ODT **OR** ondansetron    ondansetron    sodium chloride    sodium chloride    traMADol    apixaban, 5 mg, Oral, BID  atorvastatin, 10 mg, Oral, Nightly  colchicine, 0.6 mg, Oral, Daily  insulin glargine, 10 Units, Subcutaneous, Daily  insulin lispro, 2-9 Units, Subcutaneous, 4x Daily AC & at Bedtime  [Held by provider] lisinopril, 20 mg, Oral, Daily  metoprolol succinate XL, 50 mg, Oral, QAM  pantoprazole, 40 mg, Oral, QAM  predniSONE, 40 mg, Oral, Daily With Breakfast  senna-docusate sodium, 2 tablet, Oral, BID  sodium chloride, 10 mL, Intravenous, Q12H        US Renal Bilateral    Result Date: 2/2/2025  Impression: 1. Probable senescent related renal cortical thinning otherwise symmetric in size and echotexture. No hydronephrosis. 2. Incidental hepatic steatosis, at least moderate severity. Electronically Signed: Eduard Leal MD  2/2/2025 7:16 PM EST  Workstation ID: [id number]    XR Chest 1 View    Result Date: 2/2/2025  Impression: No active pulmonary process. Electronically Signed: Eduard Leal MD  2/2/2025 10:26 AM EST  Workstation ID: [id number]    XR Knee 3 View Left    Result Date: 2/2/2025  Impression: Multiple views of the left knee were obtained. No acute fracture or dislocation is identified. There are moderate osteoarthritic changes of the knee. Several calcifications about the knee measuring up to approximately 1 cm. Intracapsular osseous bodies cannot be excluded. Probable small knee effusion. Soft tissues demonstrate no acute abnormality. Electronically  Signed: Mohsen Contreras MD  2/2/2025 10:21 AM EST  Workstation ID: [id number]     Assessment / Plan     Summary: 80 y.o. A-fib on Eliquis, DM2, HFpEF, history of morbid obesity with prior lap band procedure and recent loosening with subsequent worsening of diet with lots of red meat presenting with left foot pain and swelling and frequent NSAID use.  Reports recent weight gain as well.  Admitting for BON on CKD 3 gout flare    Assessment/Plan (clinically significant if listed here)  BON on CKD 3 baseline 1.4-1.7, with frequent recent NSAID use previously attributed to diabetes and hypertension  Gout flare uric acid of 13.5  Left foot swelling   History of Lap-Band surgery recently loosened  HFpEF  A-fib on Eliquis  Hypertension  DM2 A1c 7.3  GERD  History of CVA  Hepatic steatosis, recent monitor incidental on renal ultrasound    Chest x-ray no acute process, mild cardiomegaly, no no significant effusion appreciated on my review portable film  Left knee film no fractures, moderate OA, probable small lesion suspected, several calcifications/intracapsular osseous bodies  Renal ultrasound-suspected stenosis related renal cortical thickening but otherwise symmetric no hydronephrosis.    UA negative white count within normal limits creatinine 2.1 from 2.6, Uric acid 13.5    BNP within normal limits, holding home Lasix for now, hold lisinopril and metolazone, monitor renal function, I's and O's, avoid nephrotoxic agents.  Received additional liter of fluids, defer additional for now encourage p.o. and have  Suspect NSAID related BON in setting of underlying CKD3, and possibly hypotension given low blood pressure even with holding several of his home medications.  Check bladder scan  Lle duplex is pending given fluid asymmetry, although low suspicion for clot given baseline apixaban use; likely inflammatory related edema   holding lisinopril, lasix, metolazone, monitor bp  cont metoprolol at reduced dose given softer blood  pressure, apixaban    Ultrasound noted as above  Appreciate nephrology assistance  Ortho consulted for left knee effusion, aspiration, follow-up studies, low suspicion for septic joint, appreciate assistance with aspiration, f/u studies   start prednisone for gout flare, possible taper in a few days based on response, ultimately place on allopurinol (needs maintenance medication), start on colchicine 0.6 daily for now given variable renal fxn,   ssi, add SOUSA, outpt takes metformin and glyburide for diabetes, monitor with steroid initiation may need further titration for discharge  cont statin  PT/OT  Check a.m. BMP, magnesium, phosphorus  Continue hospitalization at current level of care    Dispo: likely home once medically stable.   D/w SW    VTE Prophylaxis:  Pharmacologic & mechanical VTE prophylaxis orders are present.      Level Of Support Discussed With: Patient  Code Status (Patient has no pulse and is not breathing): CPR (Attempt to Resuscitate)  Medical Interventions (Patient has pulse or is breathing): Full Support    Greater than 50 minutes on this encounter including review of labs, imaging, documentation, orders, vitals, monitoring and adjusting treatment and orders as indicated, discussion with the patient, ortho, , and documenting.

## 2025-02-03 NOTE — PLAN OF CARE
Goal Outcome Evaluation:              Outcome Evaluation: Patient A&Ox4 and able to make needs known. Left leg pain treated per mar. VSS on room air. No acute changes this shift with patient sleeping between care. Continue with POC

## 2025-02-03 NOTE — CONSULTS
Psychiatric   Consult Note    Patient Name: Edi Weston  : 1945  MRN: [id number]  Primary Care Physician:  Jesus Lux DO  Referring Physician: No ref. provider found  Date of admission: 2025    Subjective   Subjective     Reason for Consult/ Chief Complaint: Left knee pain    HPI:  Edi Weston is a 80 y.o. male admitted with a gout flareup and acute kidney injury.  He has known gout in the foot and he is being treated for that.  He is currently on low-dose colchicine and oral steroids.  He had new knee pain with swelling.  I was consulted for aspiration and evaluation.    Review of Systems   14 Point ROS is negative except as noted above.     Personal History     Past Medical History:   Diagnosis Date    Arthritis     Atrial fibrillation     Cellulitis of right lower limb     CHF (congestive heart failure)     Chronic heart failure with preserved ejection fraction (HFpEF) 2023    Colon polyps     Decubitus ulcer of foot, stage 1 08/10/2018    Decubitus ulcer of foot, stage 3 2018    Foot pain, left 08/10/2018    Foot pain, right     Gout     Hammertoe 2019    History of colonoscopy with polypectomy 2020 TUBULAR ADENOMA, COLONOSCOPY SCHEDULED FOR 10/19/2020 WITH DR SULTANA    Ingrowing nail 2018    Medication management 2019    Nail dystrophy     PAT (paroxysmal atrial tachycardia) 2018    Pressure ulcer, stage 1     Tinea unguium     Type 2 diabetes, controlled, with peripheral neuropathy 08/10/2018       Past Surgical History:   Procedure Laterality Date    ABDOMINAL SURGERY      lap band    COLONOSCOPY      COLONOSCOPY N/A 2024    Procedure: COLONOSCOPY;  Surgeon: Live Amador MD;  Location: [facility] ENDOSCOPY;  Service: General;  Laterality: N/A;  NORMAL COLON    GASTRIC BANDING         Family History: family history includes Cancer in his father; Colon cancer (age of onset: 50) in his father; Colon cancer (age of  onset: 60) in his paternal grandfather; Kidney disease in his mother; Nephrolithiasis in his mother. Otherwise pertinent FHx was reviewed and not pertinent to current issue.    Social History:  reports that he quit smoking about 38 years ago. His smoking use included cigarettes. He started smoking about 60 years ago. He has a 44 pack-year smoking history. He has never used smokeless tobacco. He reports that he does not currently use alcohol. He reports that he does not use drugs.    Home Medications:  acetaminophen, apixaban, furosemide, glyburide, lisinopril, metFORMIN ER, metOLazone, metoprolol succinate XL, pantoprazole, potassium chloride, and simvastatin    Allergies:  Allergies   Allergen Reactions    Sulfa Antibiotics Unknown - Low Severity     Unsure of reaction       Objective    Objective     Vitals:   Temp:  [97.3 °F (36.3 °C)-98.2 °F (36.8 °C)] 98.1 °F (36.7 °C)  Heart Rate:  [52-75] 75  Resp:  [14-18] 14  BP: ()/(63-73) 112/68    Physical Exam:   Constitutional: Awake, alert   HENT: Atraumatic, Normocephalic   Respiratory: Nonlabored respirations    Cardiovascular: Intact peripheral pulses    Musculoskeletal:     Left knee: Mild to moderate effusion.  Full extension.  110 degrees of flexion.  Arthritic deformity.  Knee stable to varus and valgus stress.  No redness.  Calf nontender.  Distal swelling in the foot secondary to gout.  Palpable pedal pulse.     Imaging:  Imaging Results (Last 24 Hours)       Procedure Component Value Units Date/Time    US Renal Bilateral [[id number]] Collected: 02/02/25 1913     Updated: 02/02/25 1918    Narrative:      US RENAL BILATERAL    Date of Exam: 2/2/2025 5:50 PM EST    Indication: ckd.    Comparison: No comparisons available.    Technique: Grayscale and color Doppler ultrasound evaluation of the kidneys and urinary bladder was performed.      Findings:  Increased hepatic echogenicity consistent with steatosis. Right kidney measures 9.8 cm in length on the left  9.6 cm in length. Renal cortical thinning favor senescent change. No hydronephrosis, solid appearing mass or echogenic shadowing stone. Small   anechoic thin-walled cyst measuring 1.7 cm projecting off the right midpole. No bladder wall thickening or layering debris. Thin-walled simple appearing 3.9 cm partially exophytic left midpole renal cyst. No suspicious complexity.      Impression:      Impression:  1. Probable senescent related renal cortical thinning otherwise symmetric in size and echotexture. No hydronephrosis.  2. Incidental hepatic steatosis, at least moderate severity.        Electronically Signed: Eduard Leal MD    2/2/2025 7:16 PM EST    Workstation ID: [id number]             Result Review    Result Review:  I have personally reviewed the results from the time of this admission to 2/3/2025 11:48 EST and agree with these findings:  []  Laboratory  []  Microbiology  []  Radiology  []  EKG/Telemetry   []  Cardiology/Vascular   []  Pathology  []  Old records  []  Other:      Assessment & Plan   Assessment / Plan     Brief Patient Summary:  Edi Weston is a 80 y.o. male left knee arthritis and gout with acute effusion    Active Hospital Problems:  Active Hospital Problems    Diagnosis     **BON (acute kidney injury)      Plan:   We discussed the risk, benefits, indications, and alternatives to a left knee aspiration.  He elected proceed and tolerated the aspiration well.  10 cc of yellow fluid was sent for synovial evaluation and culture.  Follow aspiration results  Continue oral medication for gout management  Weight-bear as tolerated  No surgical plans  For the care per primary team  May follow-up outpatient      Left knee aspiration procedure:    We discussed the risk, benefits, indications, and alternatives to a left knee aspiration.  The patient elected to proceed and verbal consent was obtained.  Timeout was taken to ensure the appropriate patient, procedure, and procedural site.  The lateral  aspect of the left knee was prepped with ChloraPrep solution.  An 18-gauge needle was inserted into the suprapatellar pouch.  10 cc of yellow appearing cloudy fluid was aspirated.  The needle was withdrawn and adequate hemostasis was obtained.  A sterile bandage was applied.  The patient tolerated the procedure well and there were no complications noted.      Electronically signed by Alphonso Dejesus MD, 02/03/25, 11:48 AM EST.

## 2025-02-03 NOTE — PROGRESS NOTES
[facility]     Nephrology Progress Note      Patient Name: Edi Weston  : 1945  MRN: [id number]  Primary Care Physician:  Jesus Lux DO  Date of admission: 2025    Subjective   Subjective     Interval History:  Patient Reports feeling better.  Able to walk now.  No shortness of breath or chest pain.    Review of Systems   All systems were reviewed and negative except for: Was mentioned above.    Objective   Objective     Vitals:   Temp:  [97.3 °F (36.3 °C)-98.2 °F (36.8 °C)] 98.1 °F (36.7 °C)  Heart Rate:  [54-98] 98  Resp:  [14-18] 16  BP: ()/(63-79) 114/79  Physical Exam:   Constitutional: Awake, alert   Eyes: sclerae anicteric, no conjunctival injection   HENT: mucous membranes moist   Neck: Supple, no thyromegaly, no lymphadenopathy, trachea midline, No JVD   Respiratory: Clear to auscultation bilaterally, nonlabored respirations    Cardiovascular: RRR, no murmurs, rubs, or gallops.   Gastrointestinal: Positive bowel sounds, soft, nontender, nondistended   Musculoskeletal: No edema, no clubbing or cyanosis   Psychiatric: Appropriate affect, cooperative   Neurologic: Oriented x 3, moving all extremities, Cranial Nerves grossly intact, speech clear   Skin: warm and dry, no rashes.    Result Review    Result Reviewed:  I have personally reviewed the results from the time of this admission to 2/3/2025 17:36 EST and agree with these findings:  [x]  Laboratory  []  Microbiology  [x]  Radiology  []  EKG/Telemetry   []  Cardiology/Vascular   []  Pathology  [x]  Old records  []  Other:        Lab 25  0445 25  1431 25  0940   SODIUM 135*  --  134*   POTASSIUM 4.2  --  4.6   CHLORIDE 98  --  97*   CO2 23.7  --  23.8   BUN 82*  --  94*   CREATININE 2.10*  --  2.65*   GLUCOSE 149*  --  177*   EGFR 31.6*  --  23.9*   ANION GAP 13.3  --  13.2   MAGNESIUM 2.0 2.2  --    PHOSPHORUS 3.8 4.2  --      US Renal Bilateral    Result Date: 2025  US RENAL BILATERAL Date of  Exam: 2/2/2025 5:50 PM EST Indication: ckd. Comparison: No comparisons available. Technique: Grayscale and color Doppler ultrasound evaluation of the kidneys and urinary bladder was performed. Findings: Increased hepatic echogenicity consistent with steatosis. Right kidney measures 9.8 cm in length on the left 9.6 cm in length. Renal cortical thinning favor senescent change. No hydronephrosis, solid appearing mass or echogenic shadowing stone. Small anechoic thin-walled cyst measuring 1.7 cm projecting off the right midpole. No bladder wall thickening or layering debris. Thin-walled simple appearing 3.9 cm partially exophytic left midpole renal cyst. No suspicious complexity.     Impression: Impression: 1. Probable senescent related renal cortical thinning otherwise symmetric in size and echotexture. No hydronephrosis. 2. Incidental hepatic steatosis, at least moderate severity. Electronically Signed: Eduard Leal MD  2/2/2025 7:16 PM EST  Workstation ID: [id number]      Most notable findings include: As above.  Uric acid 13.5.  Urinalysis bland.    Assessment & Plan   Assessment / Plan       Active Hospital Problems:  Active Hospital Problems    Diagnosis     **BON (acute kidney injury)    CKD stage IIIb  Hypertension  CHF  Diabetes  Acute gout    Assessment and Plan:    BON on CKD 3 - baseline 1.4 to 1.7 with H/o DM/HTN.  Suspect BON from extra diuretics and 4 Advil while on ACEi.  Lasix on hold.  Status post IV fluid.  Creatinine is improving.  Close to baseline.  UA bland, ultrasound without hydronephrosis.       2. HTN- controlled.     3. CHFpEF- compensated.  Looks to be taking too much diuretic ( ? no need for metolazone).     4. DM2-  great candiate for Farxiga in place of metformin     5. ? Gout-markedly elevated uric acid level.  Patient has been eating more red meat lately.  Clinically better.  Would recommend low purine diet and allopurinol.  Patient wants to think about that.      From renal  standpoint, I am okay with discharge tomorrow.  Would would resume Lasix and lisinopril upon discharge would avoid metolazone.  Would recommend allopurinol but he can discuss that with his PCP Dr. Lux.  Would like to see him in office in 2 weeks for follow-up.  Please call with any questions.      Electronically signed by Benjamín Moran MD, 2/3/2025, 17:36 EST.

## 2025-02-03 NOTE — PLAN OF CARE
Goal Outcome Evaluation:  Plan of Care Reviewed With: patient        Progress: improving  Outcome Evaluation: VSS. Pain in left foot managed per mar. Patient walked a couple laps on unit today, no assistance needed.

## 2025-02-04 ENCOUNTER — APPOINTMENT (OUTPATIENT)
Facility: HOSPITAL | Age: 80
DRG: 683 | End: 2025-02-04
Payer: MEDICARE

## 2025-02-04 LAB
ANION GAP SERPL CALCULATED.3IONS-SCNC: 11.5 MMOL/L (ref 5–15)
BH CV LOWER VASCULAR LEFT COMMON FEMORAL AUGMENT: NORMAL
BH CV LOWER VASCULAR LEFT COMMON FEMORAL COMPETENT: NORMAL
BH CV LOWER VASCULAR LEFT COMMON FEMORAL COMPRESS: NORMAL
BH CV LOWER VASCULAR LEFT COMMON FEMORAL PHASIC: NORMAL
BH CV LOWER VASCULAR LEFT COMMON FEMORAL SPONT: NORMAL
BH CV LOWER VASCULAR LEFT DISTAL FEMORAL COMPRESS: NORMAL
BH CV LOWER VASCULAR LEFT GASTRONEMIUS COMPRESS: NORMAL
BH CV LOWER VASCULAR LEFT GREATER SAPH AK AUGMENT: NORMAL
BH CV LOWER VASCULAR LEFT GREATER SAPH AK COMPETENT: NORMAL
BH CV LOWER VASCULAR LEFT GREATER SAPH AK COMPRESS: NORMAL
BH CV LOWER VASCULAR LEFT GREATER SAPH AK PHASIC: NORMAL
BH CV LOWER VASCULAR LEFT GREATER SAPH AK SPONT: NORMAL
BH CV LOWER VASCULAR LEFT GREATER SAPH BK COMPRESS: NORMAL
BH CV LOWER VASCULAR LEFT LESSER SAPH COMPRESS: NORMAL
BH CV LOWER VASCULAR LEFT MID FEMORAL AUGMENT: NORMAL
BH CV LOWER VASCULAR LEFT MID FEMORAL COMPETENT: NORMAL
BH CV LOWER VASCULAR LEFT MID FEMORAL COMPRESS: NORMAL
BH CV LOWER VASCULAR LEFT MID FEMORAL PHASIC: NORMAL
BH CV LOWER VASCULAR LEFT MID FEMORAL SPONT: NORMAL
BH CV LOWER VASCULAR LEFT PERONEAL AUGMENT: NORMAL
BH CV LOWER VASCULAR LEFT PERONEAL COMPETENT: NORMAL
BH CV LOWER VASCULAR LEFT PERONEAL COMPRESS: NORMAL
BH CV LOWER VASCULAR LEFT PERONEAL PHASIC: NORMAL
BH CV LOWER VASCULAR LEFT PERONEAL SPONT: NORMAL
BH CV LOWER VASCULAR LEFT POPLITEAL AUGMENT: NORMAL
BH CV LOWER VASCULAR LEFT POPLITEAL COMPETENT: NORMAL
BH CV LOWER VASCULAR LEFT POPLITEAL COMPRESS: NORMAL
BH CV LOWER VASCULAR LEFT POPLITEAL PHASIC: NORMAL
BH CV LOWER VASCULAR LEFT POPLITEAL SPONT: NORMAL
BH CV LOWER VASCULAR LEFT POSTERIOR TIBIAL AUGMENT: NORMAL
BH CV LOWER VASCULAR LEFT POSTERIOR TIBIAL COMPETENT: NORMAL
BH CV LOWER VASCULAR LEFT POSTERIOR TIBIAL COMPRESS: NORMAL
BH CV LOWER VASCULAR LEFT POSTERIOR TIBIAL PHASIC: NORMAL
BH CV LOWER VASCULAR LEFT POSTERIOR TIBIAL SPONT: NORMAL
BH CV LOWER VASCULAR LEFT PROXIMAL FEMORAL COMPRESS: NORMAL
BH CV LOWER VASCULAR RIGHT COMMON FEMORAL AUGMENT: NORMAL
BH CV LOWER VASCULAR RIGHT COMMON FEMORAL COMPETENT: NORMAL
BH CV LOWER VASCULAR RIGHT COMMON FEMORAL COMPRESS: NORMAL
BH CV LOWER VASCULAR RIGHT COMMON FEMORAL PHASIC: NORMAL
BH CV LOWER VASCULAR RIGHT COMMON FEMORAL SPONT: NORMAL
BUN SERPL-MCNC: 71 MG/DL (ref 8–23)
BUN/CREAT SERPL: 37.4 (ref 7–25)
CALCIUM SPEC-SCNC: 9.1 MG/DL (ref 8.6–10.5)
CHLORIDE SERPL-SCNC: 100 MMOL/L (ref 98–107)
CO2 SERPL-SCNC: 24.5 MMOL/L (ref 22–29)
CREAT SERPL-MCNC: 1.9 MG/DL (ref 0.76–1.27)
EGFRCR SERPLBLD CKD-EPI 2021: 35.7 ML/MIN/1.73
GLUCOSE BLDC GLUCOMTR-MCNC: 150 MG/DL (ref 70–99)
GLUCOSE BLDC GLUCOMTR-MCNC: 238 MG/DL (ref 70–99)
GLUCOSE BLDC GLUCOMTR-MCNC: 266 MG/DL (ref 70–99)
GLUCOSE BLDC GLUCOMTR-MCNC: 301 MG/DL (ref 70–99)
GLUCOSE SERPL-MCNC: 157 MG/DL (ref 65–99)
MAGNESIUM SERPL-MCNC: 2.2 MG/DL (ref 1.6–2.4)
PHOSPHATE SERPL-MCNC: 3.3 MG/DL (ref 2.5–4.5)
POTASSIUM SERPL-SCNC: 4.4 MMOL/L (ref 3.5–5.2)
SODIUM SERPL-SCNC: 136 MMOL/L (ref 136–145)
WHOLE BLOOD HOLD SPECIMEN: NORMAL

## 2025-02-04 PROCEDURE — 93971 EXTREMITY STUDY: CPT

## 2025-02-04 PROCEDURE — 99232 SBSQ HOSP IP/OBS MODERATE 35: CPT | Performed by: INTERNAL MEDICINE

## 2025-02-04 PROCEDURE — 63710000001 PREDNISONE PER 1 MG: Performed by: INTERNAL MEDICINE

## 2025-02-04 PROCEDURE — 25010000002 MORPHINE PER 10 MG: Performed by: INTERNAL MEDICINE

## 2025-02-04 PROCEDURE — 63710000001 INSULIN GLARGINE PER 5 UNITS: Performed by: INTERNAL MEDICINE

## 2025-02-04 PROCEDURE — 83735 ASSAY OF MAGNESIUM: CPT | Performed by: INTERNAL MEDICINE

## 2025-02-04 PROCEDURE — 82948 REAGENT STRIP/BLOOD GLUCOSE: CPT

## 2025-02-04 PROCEDURE — 63710000001 INSULIN LISPRO (HUMAN) PER 5 UNITS: Performed by: HOSPITALIST

## 2025-02-04 PROCEDURE — 80048 BASIC METABOLIC PNL TOTAL CA: CPT | Performed by: INTERNAL MEDICINE

## 2025-02-04 PROCEDURE — 99024 POSTOP FOLLOW-UP VISIT: CPT | Performed by: STUDENT IN AN ORGANIZED HEALTH CARE EDUCATION/TRAINING PROGRAM

## 2025-02-04 PROCEDURE — 84100 ASSAY OF PHOSPHORUS: CPT | Performed by: INTERNAL MEDICINE

## 2025-02-04 PROCEDURE — 82948 REAGENT STRIP/BLOOD GLUCOSE: CPT | Performed by: HOSPITALIST

## 2025-02-04 PROCEDURE — 93971 EXTREMITY STUDY: CPT | Performed by: SURGERY

## 2025-02-04 RX ORDER — OXYCODONE AND ACETAMINOPHEN 5; 325 MG/1; MG/1
1 TABLET ORAL EVERY 4 HOURS PRN
Status: DISCONTINUED | OUTPATIENT
Start: 2025-02-04 | End: 2025-02-05 | Stop reason: HOSPADM

## 2025-02-04 RX ADMIN — METOPROLOL SUCCINATE 37.5 MG: 25 TABLET, EXTENDED RELEASE ORAL at 08:27

## 2025-02-04 RX ADMIN — INSULIN LISPRO 4 UNITS: 100 INJECTION, SOLUTION INTRAVENOUS; SUBCUTANEOUS at 12:51

## 2025-02-04 RX ADMIN — INSULIN LISPRO 2 UNITS: 100 INJECTION, SOLUTION INTRAVENOUS; SUBCUTANEOUS at 08:25

## 2025-02-04 RX ADMIN — INSULIN LISPRO 7 UNITS: 100 INJECTION, SOLUTION INTRAVENOUS; SUBCUTANEOUS at 21:00

## 2025-02-04 RX ADMIN — PANTOPRAZOLE SODIUM 40 MG: 40 TABLET, DELAYED RELEASE ORAL at 07:34

## 2025-02-04 RX ADMIN — MORPHINE SULFATE 2 MG: 2 INJECTION, SOLUTION INTRAMUSCULAR; INTRAVENOUS at 05:57

## 2025-02-04 RX ADMIN — COLCHICINE 0.6 MG: 0.6 TABLET ORAL at 08:26

## 2025-02-04 RX ADMIN — OXYCODONE HYDROCHLORIDE AND ACETAMINOPHEN 1 TABLET: 5; 325 TABLET ORAL at 17:15

## 2025-02-04 RX ADMIN — SENNOSIDES AND DOCUSATE SODIUM 2 TABLET: 50; 8.6 TABLET ORAL at 21:00

## 2025-02-04 RX ADMIN — OXYCODONE HYDROCHLORIDE AND ACETAMINOPHEN 1 TABLET: 5; 325 TABLET ORAL at 12:51

## 2025-02-04 RX ADMIN — APIXABAN 5 MG: 5 TABLET, FILM COATED ORAL at 21:00

## 2025-02-04 RX ADMIN — Medication 10 ML: at 21:01

## 2025-02-04 RX ADMIN — MORPHINE SULFATE 2 MG: 2 INJECTION, SOLUTION INTRAMUSCULAR; INTRAVENOUS at 01:54

## 2025-02-04 RX ADMIN — ATORVASTATIN CALCIUM 10 MG: 10 TABLET, FILM COATED ORAL at 21:00

## 2025-02-04 RX ADMIN — SENNOSIDES AND DOCUSATE SODIUM 2 TABLET: 50; 8.6 TABLET ORAL at 08:26

## 2025-02-04 RX ADMIN — INSULIN LISPRO 6 UNITS: 100 INJECTION, SOLUTION INTRAVENOUS; SUBCUTANEOUS at 17:15

## 2025-02-04 RX ADMIN — APIXABAN 5 MG: 5 TABLET, FILM COATED ORAL at 08:26

## 2025-02-04 RX ADMIN — OXYCODONE HYDROCHLORIDE AND ACETAMINOPHEN 1 TABLET: 5; 325 TABLET ORAL at 23:52

## 2025-02-04 RX ADMIN — Medication 10 ML: at 08:27

## 2025-02-04 RX ADMIN — INSULIN GLARGINE 10 UNITS: 100 INJECTION, SOLUTION SUBCUTANEOUS at 08:27

## 2025-02-04 RX ADMIN — PREDNISONE 40 MG: 20 TABLET ORAL at 07:35

## 2025-02-04 NOTE — PROGRESS NOTES
[facility]   Hospitalist Progress Note  Date: 2025  Patient Name: Edi Weston  : 1945  MRN: [id number]  Date of admission: 2025      Subjective   Subjective     Chief Complaint: Left knee swelling    Summary: 80 y.o. male with a past medical history of A-fib on Eliquis, DM2, HFpEF, history of morbid obesity with prior lap band procedure and recent loosening with subsequent worsening of diet with lots of red meat presenting with left foot pain and swelling and frequent NSAID use.  Reports recent weight gain as well.  Admitting for BON on CKD 3 gout flare. Patient underwent aspiration of his left knee. Fluid appears noninfectious, negative for crystals.Likely reactive effusion secondary to arthritis.     Interval Followup: Patient states his left toe/foot pain becomes intolerable after morphine wears off. Patient transitioned to Percocet.       Objective   Objective     Vitals:   Temp:  [97.3 °F (36.3 °C)-98.2 °F (36.8 °C)] 98.1 °F (36.7 °C)  Heart Rate:  [60-99] 60  Resp:  [16-20] 18  BP: ()/(67-81) 104/71    Physical Exam   GEN: No acute distress  HEENT: Moist mucous membranes  LUNGS: Equal chest rise bilaterally  CARDIAC: Regular rate and rhythm  NEURO: Moving all 4 extremities spontaneously  SKIN: No obvious breakdown    Result Review    Result Review:  I have personally reviewed the results from the time of this admission to 2025 12:39 EST and agree with these findings:  []  Laboratory  []  Microbiology  []  Radiology  []  EKG/Telemetry   []  Cardiology/Vascular   []  Pathology  []  Old records  []  Other:    Assessment & Plan   Assessment / Plan     Assessment:  BON on CKD 3 baseline 1.4-1.7, with frequent recent NSAID use previously attributed to diabetes and hypertension  Gout flare uric acid of 13.5  Left foot swelling   Left knee swelling  History of Lap-Band surgery recently loosened  HFpEF  A-fib on Eliquis  Hypertension  DM2 A1c 7.3  GERD  History of CVA  Hepatic  steatosis, recent monitor incidental on renal ultrasound    Plan:  Continue to monitor in the hospital further workup management of the above  Continue prednisone, allopurinol, colchicine  Percocet d/t complaints of severe gout pain  Orthopedics consulted.  Left knee aspiration on 1/3 fluid noninfectious, negative for crystals  Nephrology consulted.  Recommendations appreciated.  Ultrasound reviewed negative for hydronephrosis  Nutritionist consulted.  Continue to hold lisinopril, metolazone, and Lasix  SSI-add SOUSA, outpt takes metformin and glyburide for diabetes, monitor with steroid initiation may need further titration for discharge   LLE duplex negative for DVT  CBC, BMP reviewed--creatinine 1.9 improving-close to baseline.  A.m. labs       Discussed plan with RN.    VTE Prophylaxis:  Pharmacologic & mechanical VTE prophylaxis orders are present.        CODE STATUS:   Level Of Support Discussed With: Patient  Code Status (Patient has no pulse and is not breathing): CPR (Attempt to Resuscitate)  Medical Interventions (Patient has pulse or is breathing): Full Support        Electronically signed by Lindsey Jones PA-C, 02/04/25, 12:39 PM EST.    Patient independently seen and evaluated, agree with assessment and plan, above documentation reflects plan put forth during bedside rounds.  More than 51% of the time of this patient encounter was performed by me.    Interval history:  No acute events overnight, patient ambulating about the unit    GEN: No acute distress  HEENT: Moist mucous membranes  LUNGS: Equal chest rise bilaterally  CARDIAC: Regular rate and rhythm  NEURO: Moving all 4 extremities spontaneously  SKIN: No obvious breakdown    Plan:  Agree with assessment and plan as above  Continue prednisone, allopurinol and colchicine  Avoid IV narcotics if able  Started on Percocet to see if this helps with pain control  Nephrology consulted and following, renal function improved  Continue to avoid  NSAIDs  Continue to hold lisinopril, metolazone and Lasix at this time, does not appear volume overloaded  Sliding-scale insulin, plan on SGL 2 inhibitor on discharge  CBC, CMP reviewed  Repeat CBC, CMP, mag and Phos in a.m.      Electronically signed by Ant Armstrong MD, 2/4/2025, 13:10 EST.

## 2025-02-04 NOTE — PLAN OF CARE
Goal Outcome Evaluation:  Plan of Care Reviewed With: patient        Progress: improving  Outcome Evaluation: Patient has been ambulating around the unit, no foot pain noted. Patient concerned about pain getting out of control, requesting meds with a pain level of 0. Swelling has decreased to just a trace, in left foot and ankle. All questions and concerns addressed by staff, no other issues at this time, call light in reach.

## 2025-02-04 NOTE — PLAN OF CARE
Goal Outcome Evaluation:  Plan of Care Reviewed With: patient        Progress: no change  Outcome Evaluation: VSS. Medicated for L. foot pain per MAR with relief. No acute changes. Call light within reach.

## 2025-02-04 NOTE — CONSULTS
"Nutrition Services    Patient Name: Edi Weston  YOB: 1945  MRN: [id number]  Admission date: 2/2/2025      CLINICAL NUTRITION ASSESSMENT      Reason for Assessment  Physician Consult and Medical Nutrition Therapy     H&P:  Past Medical History:   Diagnosis Date    Arthritis     Atrial fibrillation     Cellulitis of right lower limb     CHF (congestive heart failure)     Chronic heart failure with preserved ejection fraction (HFpEF) 03/20/2023    Colon polyps     Decubitus ulcer of foot, stage 1 08/10/2018    Decubitus ulcer of foot, stage 3 02/07/2018    Foot pain, left 08/10/2018    Foot pain, right     Gout     Hammertoe 12/30/2019    History of colonoscopy with polypectomy 08/25/2020 2018 TUBULAR ADENOMA, COLONOSCOPY SCHEDULED FOR 10/19/2020 WITH DR SULTANA    Ingrowing nail 11/02/2018    Medication management 02/18/2019    Nail dystrophy     PAT (paroxysmal atrial tachycardia) 08/06/2018    Pressure ulcer, stage 1     Tinea unguium     Type 2 diabetes, controlled, with peripheral neuropathy 08/10/2018        Current Problems:   Active Hospital Problems    Diagnosis     **BON (acute kidney injury)         Nutrition/Diet History         Narrative   Consult received for diet education, no particular topic noted. Pt has history of Lap-Band which was recently loosened, noted wt gain of 12#/5% in 5 mo, 5# of which was past 2 days. BMI currently 30.12, healthy range for age is 23-30. Suspect current wt inflated by fluid. Pt also having gout flare, provider recommending allopurinol and low purine diet.     Provided extensive diet education on low sodium, carb consistent diet as well as education on purine content of foods. Answered all of patient's questions, pt was very engaged and verbalized understanding.     Anthropometrics        Current Height, Weight Height: 195.6 cm (77\")  Weight: 115 kg (253 lb 15.5 oz)   Current BMI Body mass index is 30.12 kg/m².   BMI Classification Overweight - healthy " BMI for age 23-30   % %   Adjusted Body Weight (ABW)    Weight Hx  Wt Readings from Last 30 Encounters:   02/02/25 1428 115 kg (253 lb 15.5 oz)   02/02/25 0801 117 kg (258 lb 2.5 oz)   02/02/25 0736 116 kg (255 lb)   12/26/24 1554 116 kg (255 lb)   12/18/24 0800 114 kg (251 lb 12.3 oz)   11/06/24 1349 116 kg (255 lb)   10/10/24 1301 112 kg (246 lb)   09/06/24 1337 109 kg (241 lb)   06/19/24 0813 114 kg (250 lb 12.8 oz)   06/07/24 1235 113 kg (248 lb 7.3 oz)   06/07/24 0734 113 kg (248 lb 7.3 oz)   06/07/24 0507 111 kg (245 lb 6 oz)   05/06/24 1011 110 kg (242 lb)   04/24/24 1400 111 kg (244 lb)   01/02/24 1008 115 kg (254 lb)   10/04/23 0951 116 kg (255 lb)   09/21/23 1419 116 kg (256 lb)   07/06/23 1443 123 kg (272 lb)   07/05/23 1512 124 kg (272 lb 12.8 oz)   06/05/23 1032 123 kg (272 lb)   05/23/23 0505 128 kg (283 lb 1.1 oz)   05/11/23 1019 129 kg (284 lb)   04/04/23 1435 129 kg (284 lb)   03/20/23 1419 128 kg (282 lb)   03/20/23 1049 128 kg (282 lb 3.2 oz)   03/16/23 0930 129 kg (285 lb)   03/13/23 2357 132 kg (290 lb 2 oz)   11/30/22 1454 131 kg (289 lb 12.8 oz)   10/04/22 1306 130 kg (286 lb)   05/17/22 1418 125 kg (276 lb)   05/11/22 1348 126 kg (278 lb)   04/04/22 1548 127 kg (280 lb)   03/01/22 0826 126 kg (277 lb)          Wt Change Observation +5% x 5mo     Estimated/Assessed Needs  Estimated Needs based on: Ideal Body Weight - used d/t fluid retention       Energy Requirements 25-30 kcal/kg   EST Needs (kcal/day) 2224-6945       Protein Requirements 1.0-1.2 g/kg   EST Daily Needs (g/day)        Fluid Requirements 20-25 ml/kg    Estimated Needs (mL/day) 2464-7581     Labs/Medications         Pertinent Labs Reviewed.   Results from last 7 days   Lab Units 02/04/25  0440 02/03/25  0445 02/02/25  0940   SODIUM mmol/L 136 135* 134*   POTASSIUM mmol/L 4.4 4.2 4.6   CHLORIDE mmol/L 100 98 97*   CO2 mmol/L 24.5 23.7 23.8   BUN mg/dL 71* 82* 94*   CREATININE mg/dL 1.90* 2.10* 2.65*   CALCIUM mg/dL  9.1 9.1 8.9   BILIRUBIN mg/dL  --   --  0.3   ALK PHOS U/L  --   --  127*   ALT (SGPT) U/L  --   --  14   AST (SGOT) U/L  --   --  20   GLUCOSE mg/dL 157* 149* 177*     Results from last 7 days   Lab Units 02/04/25  0440 02/03/25  0445 02/02/25  1431   MAGNESIUM mg/dL 2.2 2.0 2.2   PHOSPHORUS mg/dL 3.3 3.8 4.2   HEMOGLOBIN g/dL  --  12.5*  --    HEMATOCRIT %  --  39.8  --      COVID19   Date Value Ref Range Status   05/23/2023 Not Detected Not Detected - Ref. Range Final     Lab Results   Component Value Date    HGBA1C 7.30 (H) 02/02/2025         Pertinent Medications Reviewed.     Malnutrition Severity Assessment              Nutrition Diagnosis         Nutrition Dx Problem 1 Limited adherence to diet modifications related to  food and nutrition-related knowledge deficit  as evidenced by patient report. and request for education on multiple diet topics.  - improving     Nutrition Intervention           Current Nutrition Orders & Evaluation of Intake       Current PO Diet Diet: Renal; Low Sodium (2-3g), Low Potassium, Low Phosphorus; Fluid Consistency: Thin (IDDSI 0)   Supplement No active supplement orders           Nutrition Intervention/Prescription        Modify diet order to low sodium  Outpatient follow up for further education if desired        Medical Nutrition Therapy/Nutrition Education          Learner     Readiness Patient  Eager     Method     Response Explanation and Written Material  Verbalizes understanding     Monitor/Evaluation        Monitor Per protocol, PO intake, Weight     Nutrition Discharge Plan         No nutrition discharge needs identified at this time     Electronically signed by:  Lisbet Bustamante RD  02/04/25 13:52 EST

## 2025-02-04 NOTE — PROGRESS NOTES
[facility]     Nephrology Progress Note      Patient Name: Edi Weston  : 1945  MRN: [id number]  Primary Care Physician:  Jesus Lux DO  Date of admission: 2025    Subjective   Subjective     Interval History:  Patient Reports feeling better.  Able to walk now.  No shortness of breath or chest pain.    Review of Systems   All systems were reviewed and negative except for: Was mentioned above.    Objective   Objective     Vitals:   Temp:  [97.3 °F (36.3 °C)-98.2 °F (36.8 °C)] 97.3 °F (36.3 °C)  Heart Rate:  [66-99] 79  Resp:  [14-20] 18  BP: ()/(67-81) 107/76  Physical Exam:   Constitutional: Awake, alert   Eyes: sclerae anicteric, no conjunctival injection   HENT: mucous membranes moist   Neck: Supple, no thyromegaly, no lymphadenopathy, trachea midline, No JVD   Respiratory: Clear to auscultation bilaterally, nonlabored respirations    Cardiovascular: RRR, no murmurs, rubs, or gallops.   Gastrointestinal: Positive bowel sounds, soft, nontender, nondistended   Musculoskeletal: No edema, no clubbing or cyanosis   Psychiatric: Appropriate affect, cooperative   Neurologic: Oriented x 3, moving all extremities, Cranial Nerves grossly intact, speech clear   Skin: warm and dry, no rashes.    Result Review    Result Reviewed:  I have personally reviewed the results from the time of this admission to 2025 08:53 EST and agree with these findings:  [x]  Laboratory  []  Microbiology  [x]  Radiology  []  EKG/Telemetry   []  Cardiology/Vascular   []  Pathology  [x]  Old records  []  Other:        Lab 25  0440 25  0445 25  1431 25  0940   SODIUM 136 135*  --  134*   POTASSIUM 4.4 4.2  --  4.6   CHLORIDE 100 98  --  97*   CO2 24.5 23.7  --  23.8   BUN 71* 82*  --  94*   CREATININE 1.90* 2.10*  --  2.65*   GLUCOSE 157* 149*  --  177*   EGFR 35.7* 31.6*  --  23.9*   ANION GAP 11.5 13.3  --  13.2   MAGNESIUM 2.2 2.0 2.2  --    PHOSPHORUS 3.3 3.8 4.2  --      US Renal  Bilateral    Result Date: 2/2/2025  US RENAL BILATERAL Date of Exam: 2/2/2025 5:50 PM EST Indication: ckd. Comparison: No comparisons available. Technique: Grayscale and color Doppler ultrasound evaluation of the kidneys and urinary bladder was performed. Findings: Increased hepatic echogenicity consistent with steatosis. Right kidney measures 9.8 cm in length on the left 9.6 cm in length. Renal cortical thinning favor senescent change. No hydronephrosis, solid appearing mass or echogenic shadowing stone. Small anechoic thin-walled cyst measuring 1.7 cm projecting off the right midpole. No bladder wall thickening or layering debris. Thin-walled simple appearing 3.9 cm partially exophytic left midpole renal cyst. No suspicious complexity.     Impression: Impression: 1. Probable senescent related renal cortical thinning otherwise symmetric in size and echotexture. No hydronephrosis. 2. Incidental hepatic steatosis, at least moderate severity. Electronically Signed: Eduard Leal MD  2/2/2025 7:16 PM EST  Workstation ID: [id number]        Assessment & Plan   Assessment / Plan       Active Hospital Problems:  Active Hospital Problems    Diagnosis     **BON (acute kidney injury)    CKD stage IIIb  Hypertension  CHF  Diabetes  Acute gout    Assessment and Plan:    BON on CKD 3 - baseline 1.4 to 1.7 with H/o DM/HTN.  Suspect BON from extra diuretics and 4 Advil while on ACEi.  Lasix on hold.  Status post IV fluid.  Creatinine is improving.  Close to baseline.  UA bland, ultrasound without hydronephrosis.  OK for d/c from renal.     2. HTN- controlled.     3. CHFpEF- compensated.  Looks to be taking too much diuretic ( ? no need for metolazone).     4. DM2-  great candiate for Farxiga in place of metformin     5. ? Gout-markedly elevated uric acid level.  Patient has been eating more red meat lately.  Clinically better.  Would recommend low purine diet and allopurinol.

## 2025-02-04 NOTE — PROGRESS NOTES
Aspiration reviewed.  This appears noninfectious.  No crystals noted.  It likely represents a reactive effusion secondary to his underlying arthritis.  No further management needed at this time.  I would happy to see the patient in the outpatient setting if desired.    Electronically signed by Alphonso Dejesus MD, 02/04/25, 8:12 AM EST.

## 2025-02-05 ENCOUNTER — READMISSION MANAGEMENT (OUTPATIENT)
Dept: CALL CENTER | Facility: HOSPITAL | Age: 80
End: 2025-02-05
Payer: MEDICARE

## 2025-02-05 VITALS
BODY MASS INDEX: 29.99 KG/M2 | WEIGHT: 253.97 LBS | SYSTOLIC BLOOD PRESSURE: 122 MMHG | HEIGHT: 77 IN | DIASTOLIC BLOOD PRESSURE: 77 MMHG | OXYGEN SATURATION: 100 % | HEART RATE: 61 BPM | RESPIRATION RATE: 18 BRPM | TEMPERATURE: 97.3 F

## 2025-02-05 LAB
ANION GAP SERPL CALCULATED.3IONS-SCNC: 12.2 MMOL/L (ref 5–15)
BUN SERPL-MCNC: 63 MG/DL (ref 8–23)
BUN/CREAT SERPL: 37.3 (ref 7–25)
CALCIUM SPEC-SCNC: 9.6 MG/DL (ref 8.6–10.5)
CHLORIDE SERPL-SCNC: 101 MMOL/L (ref 98–107)
CO2 SERPL-SCNC: 24.8 MMOL/L (ref 22–29)
CREAT SERPL-MCNC: 1.69 MG/DL (ref 0.76–1.27)
EGFRCR SERPLBLD CKD-EPI 2021: 41 ML/MIN/1.73
GLUCOSE BLDC GLUCOMTR-MCNC: 129 MG/DL (ref 70–99)
GLUCOSE BLDC GLUCOMTR-MCNC: 227 MG/DL (ref 70–99)
GLUCOSE SERPL-MCNC: 120 MG/DL (ref 65–99)
MAGNESIUM SERPL-MCNC: 2.4 MG/DL (ref 1.6–2.4)
PHOSPHATE SERPL-MCNC: 3.6 MG/DL (ref 2.5–4.5)
POTASSIUM SERPL-SCNC: 4.1 MMOL/L (ref 3.5–5.2)
SODIUM SERPL-SCNC: 138 MMOL/L (ref 136–145)

## 2025-02-05 PROCEDURE — 82948 REAGENT STRIP/BLOOD GLUCOSE: CPT

## 2025-02-05 PROCEDURE — 82948 REAGENT STRIP/BLOOD GLUCOSE: CPT | Performed by: HOSPITALIST

## 2025-02-05 PROCEDURE — 84100 ASSAY OF PHOSPHORUS: CPT | Performed by: INTERNAL MEDICINE

## 2025-02-05 PROCEDURE — 80048 BASIC METABOLIC PNL TOTAL CA: CPT | Performed by: INTERNAL MEDICINE

## 2025-02-05 PROCEDURE — 83735 ASSAY OF MAGNESIUM: CPT | Performed by: INTERNAL MEDICINE

## 2025-02-05 PROCEDURE — 63710000001 INSULIN GLARGINE PER 5 UNITS: Performed by: INTERNAL MEDICINE

## 2025-02-05 PROCEDURE — 99239 HOSP IP/OBS DSCHRG MGMT >30: CPT | Performed by: INTERNAL MEDICINE

## 2025-02-05 PROCEDURE — 63710000001 INSULIN LISPRO (HUMAN) PER 5 UNITS: Performed by: HOSPITALIST

## 2025-02-05 PROCEDURE — 63710000001 PREDNISONE PER 1 MG: Performed by: INTERNAL MEDICINE

## 2025-02-05 RX ORDER — PREDNISONE 20 MG/1
40 TABLET ORAL
Qty: 4 TABLET | Refills: 0 | Status: SHIPPED | OUTPATIENT
Start: 2025-02-06 | End: 2025-02-07

## 2025-02-05 RX ORDER — OXYCODONE AND ACETAMINOPHEN 5; 325 MG/1; MG/1
1 TABLET ORAL EVERY 4 HOURS PRN
Qty: 12 TABLET | Refills: 0 | Status: SHIPPED | OUTPATIENT
Start: 2025-02-05 | End: 2025-02-08

## 2025-02-05 RX ORDER — FUROSEMIDE 40 MG/1
20 TABLET ORAL DAILY
Start: 2025-02-05 | End: 2025-02-07 | Stop reason: SDUPTHER

## 2025-02-05 RX ORDER — METOPROLOL SUCCINATE 25 MG/1
25 TABLET, EXTENDED RELEASE ORAL DAILY
Qty: 30 TABLET | Refills: 0 | Status: SHIPPED | OUTPATIENT
Start: 2025-02-05 | End: 2025-02-07 | Stop reason: SDUPTHER

## 2025-02-05 RX ORDER — ALLOPURINOL 100 MG/1
100 TABLET ORAL DAILY
Qty: 30 TABLET | Refills: 0 | Status: SHIPPED | OUTPATIENT
Start: 2025-02-06 | End: 2025-02-07 | Stop reason: SDUPTHER

## 2025-02-05 RX ORDER — ALLOPURINOL 100 MG/1
100 TABLET ORAL DAILY
Status: DISCONTINUED | OUTPATIENT
Start: 2025-02-05 | End: 2025-02-05 | Stop reason: HOSPADM

## 2025-02-05 RX ADMIN — INSULIN LISPRO 4 UNITS: 100 INJECTION, SOLUTION INTRAVENOUS; SUBCUTANEOUS at 12:50

## 2025-02-05 RX ADMIN — Medication 10 ML: at 08:37

## 2025-02-05 RX ADMIN — APIXABAN 5 MG: 5 TABLET, FILM COATED ORAL at 08:38

## 2025-02-05 RX ADMIN — PANTOPRAZOLE SODIUM 40 MG: 40 TABLET, DELAYED RELEASE ORAL at 07:36

## 2025-02-05 RX ADMIN — SENNOSIDES AND DOCUSATE SODIUM 2 TABLET: 50; 8.6 TABLET ORAL at 08:38

## 2025-02-05 RX ADMIN — INSULIN GLARGINE 10 UNITS: 100 INJECTION, SOLUTION SUBCUTANEOUS at 08:38

## 2025-02-05 RX ADMIN — METOPROLOL SUCCINATE 37.5 MG: 25 TABLET, EXTENDED RELEASE ORAL at 08:38

## 2025-02-05 RX ADMIN — COLCHICINE 0.6 MG: 0.6 TABLET ORAL at 08:38

## 2025-02-05 RX ADMIN — ALLOPURINOL 100 MG: 100 TABLET ORAL at 10:11

## 2025-02-05 RX ADMIN — PREDNISONE 40 MG: 20 TABLET ORAL at 07:36

## 2025-02-05 RX ADMIN — OXYCODONE HYDROCHLORIDE AND ACETAMINOPHEN 1 TABLET: 5; 325 TABLET ORAL at 07:36

## 2025-02-05 NOTE — PLAN OF CARE
Goal Outcome Evaluation:  Progress: improving  Outcome Evaluation: Pt AOx4, VSS, Room air, PRN pain medication given for lower extremity pain. Slept between care through the shift. Only complaint is constipation scheduled stool softner given.

## 2025-02-05 NOTE — PLAN OF CARE
Goal Outcome Evaluation:  Plan of Care Reviewed With: patient        Progress: improving  Outcome Evaluation: Patient has been ambulating around the unit several times a shift, tolerating well, states the swelling and pain have gotten better in his left foot. All questions and concerns addressed by staff, no other issues at this time. Patient is ready to get back home, call light in reach.

## 2025-02-05 NOTE — PROGRESS NOTES
[facility]     Nephrology Progress Note      Patient Name: Edi Weston  : 1945  MRN: [id number]  Primary Care Physician:  Jesus Lux DO  Date of admission: 2025    Subjective   Subjective     Interval History:  Patient Reports feeling better.  Able to walk now.  No shortness of breath or chest pain.  Hoping to go home today.    Review of Systems   All systems were reviewed and negative except for: Was mentioned above.    Objective   Objective     Vitals:   Temp:  [97.2 °F (36.2 °C)-97.7 °F (36.5 °C)] 97.3 °F (36.3 °C)  Heart Rate:  [52-71] 61  Resp:  [15-20] 18  BP: ()/(54-94) 122/77  Physical Exam:   Constitutional: Awake, alert, no distress, conversant and pleasant   Eyes: sclerae anicteric, no conjunctival injection   HENT: mucous membranes moist   Neck: Supple, no thyromegaly, no lymphadenopathy, trachea midline, No JVD   Respiratory: Clear to auscultation bilaterally, nonlabored respirations    Cardiovascular: RRR, no murmurs, rubs, or gallops.   Gastrointestinal: Positive bowel sounds, soft, nontender, nondistended   Musculoskeletal: No edema, no clubbing or cyanosis   Psychiatric: Appropriate affect, cooperative   Neurologic: Oriented x 3, moving all extremities, Cranial Nerves grossly intact, speech clear   Skin: warm and dry, no rashes.    Result Review    Result Reviewed:  I have personally reviewed the results from the time of this admission to 2025 12:33 EST and agree with these findings:  [x]  Laboratory  []  Microbiology  [x]  Radiology  []  EKG/Telemetry   []  Cardiology/Vascular   []  Pathology  [x]  Old records  []  Other:        Lab 25  0457 25  0440 25  0445 25  1431 25  0940   SODIUM 138 136 135*  --  134*   POTASSIUM 4.1 4.4 4.2  --  4.6   CHLORIDE 101 100 98  --  97*   CO2 24.8 24.5 23.7  --  23.8   BUN 63* 71* 82*  --  94*   CREATININE 1.69* 1.90* 2.10*  --  2.65*   GLUCOSE 120* 157* 149*  --  177*   EGFR 41.0* 35.7*  31.6*  --  23.9*   ANION GAP 12.2 11.5 13.3  --  13.2   MAGNESIUM 2.4 2.2 2.0 2.2  --    PHOSPHORUS 3.6 3.3 3.8 4.2  --      US Renal Bilateral    Result Date: 2/2/2025  US RENAL BILATERAL Date of Exam: 2/2/2025 5:50 PM EST Indication: ckd. Comparison: No comparisons available. Technique: Grayscale and color Doppler ultrasound evaluation of the kidneys and urinary bladder was performed. Findings: Increased hepatic echogenicity consistent with steatosis. Right kidney measures 9.8 cm in length on the left 9.6 cm in length. Renal cortical thinning favor senescent change. No hydronephrosis, solid appearing mass or echogenic shadowing stone. Small anechoic thin-walled cyst measuring 1.7 cm projecting off the right midpole. No bladder wall thickening or layering debris. Thin-walled simple appearing 3.9 cm partially exophytic left midpole renal cyst. No suspicious complexity.     Impression: Impression: 1. Probable senescent related renal cortical thinning otherwise symmetric in size and echotexture. No hydronephrosis. 2. Incidental hepatic steatosis, at least moderate severity. Electronically Signed: Eduard Leal MD  2/2/2025 7:16 PM EST  Workstation ID: [id number]        Assessment & Plan   Assessment / Plan       Active Hospital Problems:  Active Hospital Problems    Diagnosis     **BON (acute kidney injury)    CKD stage IIIb  Hypertension  CHF  Diabetes  Acute gout    Assessment and Plan:    BON on CKD 3 - baseline 1.4 to 1.7 with H/o DM/HTN.  Suspect BON from extra diuretics and 4 Advil while on ACEi.  Lasix on hold.  Status post IV fluid.  Creatinine improved back to baseline.  UA bland, ultrasound without hydronephrosis.  OK for d/c from renal.     2. HTN- controlled.     3. CHFpEF- compensated.   maybe he was on too much diuretics ( ? no need for metolazone).     4. DM2-  great candiate for Farxiga in place of metformin     5. ? Gout-markedly elevated uric acid level.  Patient has been eating more red meat lately.   Clinically better.      Discussed with patient and primary.  I recommend stopping metolazone, reducing Lasix to 20 mg daily and monitor edema and weight, continue low-sodium diet.  Low purine diet is paramount.  Started allopurinol 100 mg daily.  Consider replacing metformin with SGLT2 inhibitor.  Will continue off lisinopril until office follow-up.    Please call with any questions.

## 2025-02-05 NOTE — OUTREACH NOTE
Prep Survey      Flowsheet Row Responses   [facility] patient discharged from? [facility]   Is LACE score < 7 ? No   Eligibility [facility]   Date of Admission 02/02/25   Date of Discharge 02/05/25   Discharge Disposition Home or Self Care   Discharge diagnosis BON   Does the patient have one of the following disease processes/diagnoses(primary or secondary)? Other   Does the patient have Home health ordered? No   Is there a DME ordered? No   Prep survey completed? Yes            HAYDE ADAM - Registered Nurse

## 2025-02-06 ENCOUNTER — TRANSITIONAL CARE MANAGEMENT TELEPHONE ENCOUNTER (OUTPATIENT)
Dept: CALL CENTER | Facility: HOSPITAL | Age: 80
End: 2025-02-06
Payer: MEDICARE

## 2025-02-06 NOTE — OUTREACH NOTE
Call Center TCM Note      Flowsheet Row Responses   [facility] patient discharged from? [facility]   Does the patient have one of the following disease processes/diagnoses(primary or secondary)? Other   TCM attempt successful? Yes   Call start time 1326   Call end time 1330   Discharge diagnosis BON   Is patient permission given to speak with other caregiver? Yes   List who call center can speak with Spouse--Eli Weston   Person spoke with today (if not patient) and relationship Patient   Medication alerts for this patient Allopurinol, Jardiance, Percocet, Prednisone, Lasix, Toprol XL.   Meds reviewed with patient/caregiver? Yes   Is the patient having any side effects they believe may be caused by any medication additions or changes? No   Does the patient have all medications ordered at discharge? Yes   Prescription comments No questions or concerns with medications.   Is the patient taking all medications as directed (includes completed medication regime)? Yes   Comments PCP OFFICE VISIT tomorrow, 2/7/25 at 3:30 PM with Dr. Jesus Lux. This appt falls within TCM timeframe, will message PCP office to change to HOSPITAL f/u appt if possible.   Does the patient have an appointment with their PCP within 7-14 days of discharge? Yes   Has home health visited the patient within 72 hours of discharge? N/A   Psychosocial issues? No   Comments Patient states he is doing well. Knee pain is totally resolved, no swelling. He sees his PCP tomorrow for hospital f/u appt.   Did the patient receive a copy of their discharge instructions? Yes   Nursing interventions Reviewed instructions with patient   What is the patient's perception of their health status since discharge? Improving   Is the patient/caregiver able to teach back signs and symptoms related to disease process for when to call PCP? Yes   Is the patient/caregiver able to teach back signs and symptoms related to disease process for when to call 911? Yes   Is the  patient/caregiver able to teach back the hierarchy of who to call/visit for symptoms/problems? PCP, Specialist, Home health nurse, Urgent Care, ED, 911 Yes   If the patient is a current smoker, are they able to teach back resources for cessation? Not a smoker   TCM call completed? Yes   Wrap up additional comments Patient denies any needs or concerns.  PCP OFFICE VISIT tomorrow, 2/7/25 at 3:30 PM with Dr. Jesus Lux. This appt falls within TCM timeframe, will message PCP office to change to HOSPITAL f/u appt if possible.   Call end time 1330   Would this patient benefit from a Referral to [facility] Social Work? No   Is the patient interested in additional calls from an ambulatory ? No            Patricia Vigil, RN    2/6/2025, 13:31 EST

## 2025-02-07 ENCOUNTER — OFFICE VISIT (OUTPATIENT)
Dept: FAMILY MEDICINE CLINIC | Facility: CLINIC | Age: 80
End: 2025-02-07
Payer: MEDICARE

## 2025-02-07 VITALS
SYSTOLIC BLOOD PRESSURE: 160 MMHG | BODY MASS INDEX: 30.23 KG/M2 | HEART RATE: 70 BPM | WEIGHT: 256 LBS | TEMPERATURE: 97.4 F | HEIGHT: 77 IN | OXYGEN SATURATION: 97 % | DIASTOLIC BLOOD PRESSURE: 100 MMHG

## 2025-02-07 DIAGNOSIS — I10 HYPERTENSION, ESSENTIAL: Primary | ICD-10-CM

## 2025-02-07 DIAGNOSIS — M1A.0720 IDIOPATHIC CHRONIC GOUT OF LEFT FOOT WITHOUT TOPHUS: ICD-10-CM

## 2025-02-07 DIAGNOSIS — I50.32 CHRONIC HEART FAILURE WITH PRESERVED EJECTION FRACTION (HFPEF): ICD-10-CM

## 2025-02-07 DIAGNOSIS — N18.32 STAGE 3B CHRONIC KIDNEY DISEASE: ICD-10-CM

## 2025-02-07 DIAGNOSIS — I48.92 ATRIAL FLUTTER WITH CONTROLLED RESPONSE: ICD-10-CM

## 2025-02-07 DIAGNOSIS — I48.19 PERSISTENT ATRIAL FIBRILLATION: ICD-10-CM

## 2025-02-07 DIAGNOSIS — G63 NEUROPATHY DUE TO MEDICAL CONDITION: ICD-10-CM

## 2025-02-07 RX ORDER — GABAPENTIN 100 MG/1
100 CAPSULE ORAL
Qty: 30 CAPSULE | Refills: 0 | Status: SHIPPED | OUTPATIENT
Start: 2025-02-07

## 2025-02-07 RX ORDER — METOPROLOL SUCCINATE 50 MG/1
50 TABLET, EXTENDED RELEASE ORAL DAILY
Start: 2025-02-07 | End: 2025-03-09

## 2025-02-07 RX ORDER — ALLOPURINOL 100 MG/1
100 TABLET ORAL DAILY
Qty: 90 TABLET | Refills: 3 | Status: SHIPPED | OUTPATIENT
Start: 2025-02-07

## 2025-02-07 RX ORDER — FUROSEMIDE 20 MG/1
20 TABLET ORAL DAILY
Qty: 90 TABLET | Refills: 3 | Status: SHIPPED | OUTPATIENT
Start: 2025-02-07

## 2025-02-07 NOTE — ASSESSMENT & PLAN NOTE
His blood pressure is somewhat elevated here today.  During his acute hospitalization they did discontinue and reduce much of his medication.  Will recheck his blood pressure 1 more time.

## 2025-02-07 NOTE — ASSESSMENT & PLAN NOTE
His gout is much improved since his acute hospitalization.  He is currently on low-dose allopurinol due to his chronic renal disease.  He needs to avoid excessive protein intake particularly shellfish and avoid alcohol especially beer.  With his chronic kidney disease he is somewhat limited his medication for his gout.

## 2025-02-07 NOTE — ASSESSMENT & PLAN NOTE
Clinically he is irregular today.  He will continue his metoprolol for rate control and his Eliquis twice daily.

## 2025-02-07 NOTE — ASSESSMENT & PLAN NOTE
He has just a numbness of both feet.  He has some tingling of his left foot primarily just at bedtime.  It is disrupting his sleep.  Will start him on some gabapentin at bedtime.  Will start with a reduced dosage due to his kidney disease.

## 2025-02-07 NOTE — ASSESSMENT & PLAN NOTE
He needs to hydrate adequately on a regular basis.  Maintain good blood pressure diabetic control and avoid NSAIDs at all cost.  He will continue to follow-up with nephrology.

## 2025-02-08 LAB
BACTERIA FLD CULT: NORMAL
GRAM STN SPEC: NORMAL

## 2025-02-11 LAB
QT INTERVAL: 498 MS
QTC INTERVAL: 509 MS

## 2025-02-14 DIAGNOSIS — M10.379 GOUT DUE TO RENAL IMPAIRMENT INVOLVING TOE, UNSPECIFIED CHRONICITY, UNSPECIFIED LATERALITY: ICD-10-CM

## 2025-02-14 RX ORDER — OXYCODONE AND ACETAMINOPHEN 5; 325 MG/1; MG/1
1 TABLET ORAL EVERY 4 HOURS PRN
Qty: 12 TABLET | Refills: 0 | Status: CANCELLED | OUTPATIENT
Start: 2025-02-14 | End: 2025-02-17

## 2025-02-18 DIAGNOSIS — G63 NEUROPATHY DUE TO MEDICAL CONDITION: ICD-10-CM

## 2025-02-18 DIAGNOSIS — M10.379 GOUT DUE TO RENAL IMPAIRMENT INVOLVING TOE, UNSPECIFIED CHRONICITY, UNSPECIFIED LATERALITY: ICD-10-CM

## 2025-02-18 RX ORDER — GABAPENTIN 100 MG/1
200 CAPSULE ORAL
Qty: 180 CAPSULE | Refills: 1 | Status: SHIPPED | OUTPATIENT
Start: 2025-02-18

## 2025-02-18 RX ORDER — OXYCODONE AND ACETAMINOPHEN 5; 325 MG/1; MG/1
1 TABLET ORAL EVERY 4 HOURS PRN
Qty: 20 TABLET | Refills: 0 | Status: SHIPPED | OUTPATIENT
Start: 2025-02-18 | End: 2025-02-21

## 2025-03-03 ENCOUNTER — LAB (OUTPATIENT)
Dept: LAB | Facility: HOSPITAL | Age: 80
End: 2025-03-03
Payer: MEDICARE

## 2025-03-03 ENCOUNTER — TRANSCRIBE ORDERS (OUTPATIENT)
Dept: LAB | Facility: HOSPITAL | Age: 80
End: 2025-03-03
Payer: MEDICARE

## 2025-03-03 DIAGNOSIS — N18.32 CHRONIC KIDNEY DISEASE (CKD) STAGE G3B/A1, MODERATELY DECREASED GLOMERULAR FILTRATION RATE (GFR) BETWEEN 30-44 ML/MIN/1.73 SQUARE METER AND ALBUMINURIA CREATININE RATIO LESS THAN 30 MG/G (CMS/H*: ICD-10-CM

## 2025-03-03 DIAGNOSIS — N18.32 CHRONIC KIDNEY DISEASE (CKD) STAGE G3B/A1, MODERATELY DECREASED GLOMERULAR FILTRATION RATE (GFR) BETWEEN 30-44 ML/MIN/1.73 SQUARE METER AND ALBUMINURIA CREATININE RATIO LESS THAN 30 MG/G (CMS/H*: Primary | ICD-10-CM

## 2025-03-03 DIAGNOSIS — E11.22 TYPE 2 DIABETES MELLITUS WITH DIABETIC CHRONIC KIDNEY DISEASE, UNSPECIFIED CKD STAGE, UNSPECIFIED WHETHER LONG TERM INSULIN USE: ICD-10-CM

## 2025-03-03 DIAGNOSIS — I10 HYPERTENSION, UNSPECIFIED TYPE: ICD-10-CM

## 2025-03-03 DIAGNOSIS — M10.00 IDIOPATHIC GOUT, UNSPECIFIED CHRONICITY, UNSPECIFIED SITE: ICD-10-CM

## 2025-03-03 LAB
25(OH)D3 SERPL-MCNC: 31.9 NG/ML (ref 30–100)
ALBUMIN SERPL-MCNC: 3.7 G/DL (ref 3.5–5.2)
ALBUMIN/GLOB SERPL: 1.2 G/DL
ALP SERPL-CCNC: 122 U/L (ref 39–117)
ALT SERPL W P-5'-P-CCNC: 12 U/L (ref 1–41)
ANION GAP SERPL CALCULATED.3IONS-SCNC: 11.9 MMOL/L (ref 5–15)
AST SERPL-CCNC: 20 U/L (ref 1–40)
BACTERIA UR QL AUTO: NORMAL /HPF
BASOPHILS # BLD AUTO: 0.04 10*3/MM3 (ref 0–0.2)
BASOPHILS NFR BLD AUTO: 0.5 % (ref 0–1.5)
BILIRUB SERPL-MCNC: 0.4 MG/DL (ref 0–1.2)
BILIRUB UR QL STRIP: NEGATIVE
BUN SERPL-MCNC: 32 MG/DL (ref 8–23)
BUN/CREAT SERPL: 21.1 (ref 7–25)
CALCIUM SPEC-SCNC: 9.3 MG/DL (ref 8.6–10.5)
CHLORIDE SERPL-SCNC: 103 MMOL/L (ref 98–107)
CHOLEST SERPL-MCNC: 86 MG/DL (ref 0–200)
CLARITY UR: CLEAR
CO2 SERPL-SCNC: 25.1 MMOL/L (ref 22–29)
COLOR UR: YELLOW
CREAT SERPL-MCNC: 1.52 MG/DL (ref 0.76–1.27)
CREAT UR-MCNC: 103.8 MG/DL
DEPRECATED RDW RBC AUTO: 45 FL (ref 37–54)
EGFRCR SERPLBLD CKD-EPI 2021: 46.6 ML/MIN/1.73
EOSINOPHIL # BLD AUTO: 0.3 10*3/MM3 (ref 0–0.4)
EOSINOPHIL NFR BLD AUTO: 4 % (ref 0.3–6.2)
ERYTHROCYTE [DISTWIDTH] IN BLOOD BY AUTOMATED COUNT: 14.3 % (ref 12.3–15.4)
GLOBULIN UR ELPH-MCNC: 3.2 GM/DL
GLUCOSE SERPL-MCNC: 202 MG/DL (ref 65–99)
GLUCOSE UR STRIP-MCNC: ABNORMAL MG/DL
HBA1C MFR BLD: 8.4 % (ref 4.8–5.6)
HCT VFR BLD AUTO: 39.4 % (ref 37.5–51)
HDLC SERPL-MCNC: 32 MG/DL (ref 40–60)
HGB BLD-MCNC: 12.8 G/DL (ref 13–17.7)
HGB UR QL STRIP.AUTO: NEGATIVE
HYALINE CASTS UR QL AUTO: NORMAL /LPF
IMM GRANULOCYTES # BLD AUTO: 0.04 10*3/MM3 (ref 0–0.05)
IMM GRANULOCYTES NFR BLD AUTO: 0.5 % (ref 0–0.5)
KETONES UR QL STRIP: NEGATIVE
LDLC SERPL CALC-MCNC: 38 MG/DL (ref 0–100)
LDLC/HDLC SERPL: 1.22 {RATIO}
LEUKOCYTE ESTERASE UR QL STRIP.AUTO: NEGATIVE
LYMPHOCYTES # BLD AUTO: 1.14 10*3/MM3 (ref 0.7–3.1)
LYMPHOCYTES NFR BLD AUTO: 15.2 % (ref 19.6–45.3)
MCH RBC QN AUTO: 28.6 PG (ref 26.6–33)
MCHC RBC AUTO-ENTMCNC: 32.5 G/DL (ref 31.5–35.7)
MCV RBC AUTO: 87.9 FL (ref 79–97)
MONOCYTES # BLD AUTO: 0.97 10*3/MM3 (ref 0.1–0.9)
MONOCYTES NFR BLD AUTO: 12.9 % (ref 5–12)
NEUTROPHILS NFR BLD AUTO: 5.02 10*3/MM3 (ref 1.7–7)
NEUTROPHILS NFR BLD AUTO: 66.9 % (ref 42.7–76)
NITRITE UR QL STRIP: NEGATIVE
NRBC BLD AUTO-RTO: 0 /100 WBC (ref 0–0.2)
PH UR STRIP.AUTO: 5.5 [PH] (ref 5–8)
PHOSPHATE SERPL-MCNC: 3.6 MG/DL (ref 2.5–4.5)
PLATELET # BLD AUTO: 236 10*3/MM3 (ref 140–450)
PMV BLD AUTO: 9.4 FL (ref 6–12)
POTASSIUM SERPL-SCNC: 4.2 MMOL/L (ref 3.5–5.2)
PROT ?TM UR-MCNC: 13.9 MG/DL
PROT SERPL-MCNC: 6.9 G/DL (ref 6–8.5)
PROT UR QL STRIP: ABNORMAL
PROT/CREAT UR: 0.13 MG/G{CREAT}
PSA SERPL-MCNC: 1.05 NG/ML (ref 0–4)
PTH-INTACT SERPL-MCNC: 106 PG/ML (ref 15–65)
RBC # BLD AUTO: 4.48 10*6/MM3 (ref 4.14–5.8)
RBC # UR STRIP: NORMAL /HPF
REF LAB TEST METHOD: NORMAL
SODIUM SERPL-SCNC: 140 MMOL/L (ref 136–145)
SP GR UR STRIP: >1.03 (ref 1–1.03)
SQUAMOUS #/AREA URNS HPF: NORMAL /HPF
TRIGL SERPL-MCNC: 75 MG/DL (ref 0–150)
UROBILINOGEN UR QL STRIP: ABNORMAL
VLDLC SERPL-MCNC: 16 MG/DL (ref 5–40)
WBC # UR STRIP: NORMAL /HPF
WBC NRBC COR # BLD AUTO: 7.51 10*3/MM3 (ref 3.4–10.8)

## 2025-03-03 PROCEDURE — 84100 ASSAY OF PHOSPHORUS: CPT

## 2025-03-03 PROCEDURE — 36415 COLL VENOUS BLD VENIPUNCTURE: CPT

## 2025-03-03 PROCEDURE — 81001 URINALYSIS AUTO W/SCOPE: CPT

## 2025-03-03 PROCEDURE — 83970 ASSAY OF PARATHORMONE: CPT

## 2025-03-03 PROCEDURE — 82306 VITAMIN D 25 HYDROXY: CPT

## 2025-03-03 PROCEDURE — G0103 PSA SCREENING: HCPCS | Performed by: FAMILY MEDICINE

## 2025-03-03 PROCEDURE — 80061 LIPID PANEL: CPT | Performed by: FAMILY MEDICINE

## 2025-03-03 PROCEDURE — 83036 HEMOGLOBIN GLYCOSYLATED A1C: CPT | Performed by: FAMILY MEDICINE

## 2025-03-03 PROCEDURE — 80053 COMPREHEN METABOLIC PANEL: CPT | Performed by: FAMILY MEDICINE

## 2025-03-03 PROCEDURE — 84156 ASSAY OF PROTEIN URINE: CPT

## 2025-03-03 PROCEDURE — 82570 ASSAY OF URINE CREATININE: CPT

## 2025-03-03 PROCEDURE — 85025 COMPLETE CBC W/AUTO DIFF WBC: CPT

## 2025-03-07 ENCOUNTER — OFFICE VISIT (OUTPATIENT)
Dept: FAMILY MEDICINE CLINIC | Facility: CLINIC | Age: 80
End: 2025-03-07
Payer: MEDICARE

## 2025-03-07 VITALS
SYSTOLIC BLOOD PRESSURE: 119 MMHG | WEIGHT: 254 LBS | TEMPERATURE: 98 F | OXYGEN SATURATION: 100 % | HEART RATE: 97 BPM | BODY MASS INDEX: 29.99 KG/M2 | HEIGHT: 77 IN | DIASTOLIC BLOOD PRESSURE: 78 MMHG

## 2025-03-07 DIAGNOSIS — E11.42 TYPE 2 DIABETES, CONTROLLED, WITH PERIPHERAL NEUROPATHY: ICD-10-CM

## 2025-03-07 DIAGNOSIS — E11.65 TYPE 2 DIABETES MELLITUS WITH HYPERGLYCEMIA, WITHOUT LONG-TERM CURRENT USE OF INSULIN: ICD-10-CM

## 2025-03-07 DIAGNOSIS — I10 HYPERTENSION, ESSENTIAL: Primary | ICD-10-CM

## 2025-03-07 DIAGNOSIS — I50.32 CHRONIC HEART FAILURE WITH PRESERVED EJECTION FRACTION (HFPEF): ICD-10-CM

## 2025-03-07 RX ORDER — FUROSEMIDE 20 MG/1
TABLET ORAL
Start: 2025-03-07

## 2025-03-07 RX ORDER — METOLAZONE 2.5 MG/1
2.5 TABLET ORAL
COMMUNITY
Start: 2025-01-09 | End: 2025-03-07

## 2025-03-07 RX ORDER — METFORMIN HYDROCHLORIDE 500 MG/1
500 TABLET, EXTENDED RELEASE ORAL 2 TIMES DAILY WITH MEALS
Start: 2025-03-07

## 2025-03-07 RX ORDER — OXYCODONE AND ACETAMINOPHEN 5; 325 MG/1; MG/1
1 TABLET ORAL
COMMUNITY

## 2025-03-07 NOTE — ASSESSMENT & PLAN NOTE
He is not overtly symptomatic with his heart failure but he is having some increased weight gain.  Will have him increase his Lasix 3 days a week to 40 mg daily.  He will do this Monday Wednesday and Friday and 20 mg all other days and he will continue to watch and moderate his sodium intake.

## 2025-03-07 NOTE — PROGRESS NOTES
Chief Complaint   Patient presents with    Follow-up     1 mo f/u    Hypertension        Subjective     Edi Weston  has a past medical history of Arthritis, Atrial fibrillation, Cellulitis of right lower limb, CHF (congestive heart failure), Chronic heart failure with preserved ejection fraction (HFpEF) (03/20/2023), Colon polyps, Decubitus ulcer of foot, stage 1 (08/10/2018), Decubitus ulcer of foot, stage 3 (02/07/2018), Foot pain, left (08/10/2018), Foot pain, right, Gout, Hammertoe (12/30/2019), History of colonoscopy with polypectomy (08/25/2020), Ingrowing nail (11/02/2018), Medication management (02/18/2019), Nail dystrophy, PAT (paroxysmal atrial tachycardia) (08/06/2018), Pressure ulcer, stage 1, Tinea unguium, and Type 2 diabetes, controlled, with peripheral neuropathy (08/10/2018).    CHF-overall he is doing fine.  He denies any increased shortness of breath or swelling.  He does notice that with the low dose of his Lasix his weight does seem to go up over a couple of days of 2 or 3 pounds.  If he adds a second dose he diuresis well and his weight comes down.    Hypertension  This is a recurrent problem. The current episode started more than 1 year ago. The problem has been improved since onset. The problem is controlled. Associated symptoms include peripheral edema. Pertinent negatives include no anxiety, blurred vision, chest pain, headaches, malaise/fatigue, orthopnea, palpitations or shortness of breath. Agents associated with hypertension include decongestants and NSAIDs. Current antihypertension treatment includes beta blockers and diuretics. The current treatment provides significant improvement. There are no compliance problems.    Diabetes  He presents for his follow-up diabetic visit. He has type 2 diabetes mellitus. His disease course has been worsening. Pertinent negatives for hypoglycemia include no headaches. Pertinent negatives for diabetes include no blurred vision, no chest pain, no  fatigue, no polydipsia and no polyuria. Diabetic complications include peripheral neuropathy. Risk factors for coronary artery disease include diabetes mellitus, dyslipidemia and male sex. Current diabetic treatment includes oral agent (dual therapy). He is compliant with treatment all of the time. His weight is stable. There is no change in his home blood glucose trend. His overall blood glucose range is 180-200 mg/dl. His blood sugar drops below 70 never.      PHQ-2 Depression Screening  Little interest or pleasure in doing things?     Feeling down, depressed, or hopeless?     PHQ-2 Total Score     PHQ-9 Depression Screening  Little interest or pleasure in doing things?     Feeling down, depressed, or hopeless?     Trouble falling or staying asleep, or sleeping too much?     Feeling tired or having little energy?     Poor appetite or overeating?     Feeling bad about yourself - or that you are a failure or have let yourself or your family down?     Trouble concentrating on things, such as reading the newspaper or watching television?     Moving or speaking so slowly that other people could have noticed? Or the opposite - being so fidgety or restless that you have been moving around a lot more than usual?     Thoughts that you would be better off dead, or of hurting yourself in some way?     PHQ-9 Total Score     If you checked off any problems, how difficult have these problems made it for you to do your work, take care of things at home, or get along with other people?       Allergies   Allergen Reactions    Sulfa Antibiotics Unknown - Low Severity     Unsure of reaction       Prior to Admission medications    Medication Sig Start Date End Date Taking? Authorizing Provider   acetaminophen (TYLENOL) 500 MG tablet Take 2 tablets by mouth Every Morning.   Yes Provider, MD Cesar   allopurinol (ZYLOPRIM) 100 MG tablet Take 1 tablet by mouth Daily. 2/7/25  Yes Jesus Lux, DO   apixaban (Eliquis) 5 MG  tablet tablet Take 1 tablet by mouth 2 (Two) Times a Day. Hold for 3 more days and then restart it. Stop if patient has Nosebleed again and contact primary provider regarding it 11/6/24  Yes Kar Ledezma MD   empagliflozin (Jardiance) 10 MG tablet tablet Take 1 tablet by mouth Daily. 2/7/25  Yes Jesus Lux DO   furosemide (Lasix) 20 MG tablet Take 1 tablet by mouth Daily. 2/7/25  Yes Jesus Lux DO   gabapentin (NEURONTIN) 100 MG capsule Take 2 capsules by mouth every night at bedtime. 2/18/25  Yes Jesus Lux DO   glyburide (DIAbeta) 2.5 MG tablet Take 1 tablet by mouth Daily With Breakfast.  Patient taking differently: Take 1 tablet by mouth Every Night. 1/2/24  Yes Jesus Lux DO   metoprolol succinate XL (TOPROL-XL) 50 MG 24 hr tablet Take 1 tablet by mouth Daily for 30 days. 2/7/25 3/9/25 Yes Jesus Lux DO   oxyCODONE-acetaminophen (PERCOCET) 5-325 MG per tablet Take 1 tablet by mouth.   Yes Cesar Allen MD   pantoprazole (PROTONIX) 40 MG EC tablet Take 1 tablet by mouth Every Morning. 11/4/24  Yes Jesus Lux DO   simvastatin (ZOCOR) 20 MG tablet Take 1 tablet by mouth Every Evening. 1/2/24  Yes Jesus Lux DO   metOLazone (ZAROXOLYN) 2.5 MG tablet Take 1 tablet by mouth.  Patient not taking: Reported on 3/7/2025 1/9/25 3/7/25  ProviderCesar MD        Patient Active Problem List   Diagnosis    Type 2 diabetes, controlled, with peripheral neuropathy    History of colonoscopy with polypectomy    Gout    Hammertoe    Arthritis    Atrial flutter with controlled response    LBBB (left bundle branch block)    Colon polyps    Hyperlipidemia    Hypertension, essential    Chronic heart failure with preserved ejection fraction (HFpEF)    Sciatica of right side    Left-sided nosebleed    Acute hypoxic respiratory failure    Coronary artery calcification seen on CT scan    Medicare annual wellness visit,  subsequent    Stage 3b chronic kidney disease    Screening for prostate cancer    BON (acute kidney injury)    Neuropathy due to medical condition        Past Surgical History:   Procedure Laterality Date    ABDOMINAL SURGERY      lap band    COLONOSCOPY      COLONOSCOPY N/A 2024    Procedure: COLONOSCOPY;  Surgeon: Live Amador MD;  Location: [facility] ENDOSCOPY;  Service: General;  Laterality: N/A;  NORMAL COLON    GASTRIC BANDING         Social History     Socioeconomic History    Marital status:    Tobacco Use    Smoking status: Former     Current packs/day: 0.00     Average packs/day: 2.0 packs/day for 22.0 years (44.0 ttl pk-yrs)     Types: Cigarettes     Start date: 1965     Quit date:      Years since quittin.2    Smokeless tobacco: Never    Tobacco comments:     AGE STARTED 19 QUIT AGE 31 - SMOKED X 20 YEARS QUIT    Vaping Use    Vaping status: Never Used   Substance and Sexual Activity    Alcohol use: Not Currently     Comment: CURRENT SOME DAY 2020,2017    Drug use: Never    Sexual activity: Yes     Partners: Female     Birth control/protection: Vasectomy       Family History   Problem Relation Age of Onset    Nephrolithiasis Mother         RENAL CALCULUS    Kidney disease Mother     Colon cancer Father 50        FAMILY HISTORY OF COLON CANCER    Cancer Father     Colon cancer Paternal Grandfather 60        FAMILY HISTORY OF COLON CANCER        Family history, surgical history, past medical history, Allergies and meds reviewed with patient today and updated in [employer].     ROS:  Review of Systems   Constitutional:  Negative for fatigue and malaise/fatigue.   Eyes:  Negative for blurred vision.   Respiratory:  Negative for cough, chest tightness, shortness of breath and wheezing.    Cardiovascular:  Negative for chest pain, palpitations, orthopnea and leg swelling.   Endocrine: Negative for polydipsia and polyuria.   Neurological:  Negative for headache.  "      OBJECTIVE:  Vitals:    03/07/25 1006   BP: 119/78   BP Location: Left arm   Patient Position: Sitting   Cuff Size: Large Adult   Pulse: 97   Temp: 98 °F (36.7 °C)   TempSrc: Temporal   SpO2: 100%   Weight: 115 kg (254 lb)   Height: 195.6 cm (77\")     No results found.   Body mass index is 30.12 kg/m².  No LMP for male patient.    The ASCVD Risk score (Logan DK, et al., 2019) failed to calculate for the following reasons:    The valid total cholesterol range is 130 to 320 mg/dL     Physical Exam  Vitals and nursing note reviewed.   Constitutional:       General: He is not in acute distress.     Appearance: Normal appearance. He is normal weight.   HENT:      Head: Normocephalic.      Right Ear: Tympanic membrane, ear canal and external ear normal.      Left Ear: Tympanic membrane, ear canal and external ear normal.      Nose: Nose normal.      Mouth/Throat:      Mouth: Mucous membranes are moist.      Pharynx: Oropharynx is clear.   Eyes:      General: No scleral icterus.     Conjunctiva/sclera: Conjunctivae normal.      Pupils: Pupils are equal, round, and reactive to light.   Cardiovascular:      Rate and Rhythm: Normal rate and regular rhythm.      Pulses: Normal pulses.      Heart sounds: Normal heart sounds. No murmur heard.  Pulmonary:      Effort: Pulmonary effort is normal.      Breath sounds: Normal breath sounds. No wheezing, rhonchi or rales.   Musculoskeletal:      Cervical back: Neck supple. No rigidity or tenderness.   Lymphadenopathy:      Cervical: No cervical adenopathy.   Skin:     General: Skin is warm and dry.      Coloration: Skin is not jaundiced.      Findings: No rash.   Neurological:      General: No focal deficit present.      Mental Status: He is alert and oriented to person, place, and time.   Psychiatric:         Mood and Affect: Mood normal.         Thought Content: Thought content normal.         Judgment: Judgment normal.         Procedures    Lab on 03/03/2025   Component Date " Value Ref Range Status    PTH, Intact 03/03/2025 106.0 (H)  15.0 - 65.0 pg/mL Final    25 Hydroxy, Vitamin D 03/03/2025 31.9  30.0 - 100.0 ng/ml Final    WBC 03/03/2025 7.51  3.40 - 10.80 10*3/mm3 Final    RBC 03/03/2025 4.48  4.14 - 5.80 10*6/mm3 Final    Hemoglobin 03/03/2025 12.8 (L)  13.0 - 17.7 g/dL Final    Hematocrit 03/03/2025 39.4  37.5 - 51.0 % Final    MCV 03/03/2025 87.9  79.0 - 97.0 fL Final    MCH 03/03/2025 28.6  26.6 - 33.0 pg Final    MCHC 03/03/2025 32.5  31.5 - 35.7 g/dL Final    RDW 03/03/2025 14.3  12.3 - 15.4 % Final    RDW-SD 03/03/2025 45.0  37.0 - 54.0 fl Final    MPV 03/03/2025 9.4  6.0 - 12.0 fL Final    Platelets 03/03/2025 236  140 - 450 10*3/mm3 Final    Neutrophil % 03/03/2025 66.9  42.7 - 76.0 % Final    Lymphocyte % 03/03/2025 15.2 (L)  19.6 - 45.3 % Final    Monocyte % 03/03/2025 12.9 (H)  5.0 - 12.0 % Final    Eosinophil % 03/03/2025 4.0  0.3 - 6.2 % Final    Basophil % 03/03/2025 0.5  0.0 - 1.5 % Final    Immature Grans % 03/03/2025 0.5  0.0 - 0.5 % Final    Neutrophils, Absolute 03/03/2025 5.02  1.70 - 7.00 10*3/mm3 Final    Lymphocytes, Absolute 03/03/2025 1.14  0.70 - 3.10 10*3/mm3 Final    Monocytes, Absolute 03/03/2025 0.97 (H)  0.10 - 0.90 10*3/mm3 Final    Eosinophils, Absolute 03/03/2025 0.30  0.00 - 0.40 10*3/mm3 Final    Basophils, Absolute 03/03/2025 0.04  0.00 - 0.20 10*3/mm3 Final    Immature Grans, Absolute 03/03/2025 0.04  0.00 - 0.05 10*3/mm3 Final    nRBC 03/03/2025 0.0  0.0 - 0.2 /100 WBC Final    Phosphorus 03/03/2025 3.6  2.5 - 4.5 mg/dL Final    Creatinine, Urine 03/03/2025 103.8  mg/dL Final    Total Protein, Urine 03/03/2025 13.9  mg/dL Final    Protein/Creatinine Ratio, Urine 03/03/2025 0.13   Final    Color, UA 03/03/2025 Yellow  Yellow, Straw Final    Appearance, UA 03/03/2025 Clear  Clear Final    pH, UA 03/03/2025 5.5  5.0 - 8.0 Final    Specific Gravity, UA 03/03/2025 >1.030 (H)  1.005 - 1.030 Final    Glucose, UA 03/03/2025 >=1000 mg/dL (3+) (A)   Negative Final    Ketones, UA 03/03/2025 Negative  Negative Final    Bilirubin, UA 03/03/2025 Negative  Negative Final    Blood, UA 03/03/2025 Negative  Negative Final    Protein, UA 03/03/2025 Trace (A)  Negative Final    Leuk Esterase, UA 03/03/2025 Negative  Negative Final    Nitrite, UA 03/03/2025 Negative  Negative Final    Urobilinogen, UA 03/03/2025 0.2 E.U./dL  0.2 - 1.0 E.U./dL Final    RBC, UA 03/03/2025 0-2  None Seen, 0-2 /HPF Final    WBC, UA 03/03/2025 0-2  None Seen, 0-2 /HPF Final    Bacteria, UA 03/03/2025 None Seen  None Seen /HPF Final    Squamous Epithelial Cells, UA 03/03/2025 0-2  None Seen, 0-2 /HPF Final    Hyaline Casts, UA 03/03/2025 None Seen  None Seen /LPF Final    Methodology 03/03/2025 Automated Microscopy   Final       ASSESSMENT/ PLAN:    Diagnoses and all orders for this visit:    1. Hypertension, essential (Primary)  Assessment & Plan:  His blood pressure is good here today.  Will hold off on any additional changes at this time.      2. Chronic heart failure with preserved ejection fraction (HFpEF)  Assessment & Plan:  He is not overtly symptomatic with his heart failure but he is having some increased weight gain.  Will have him increase his Lasix 3 days a week to 40 mg daily.  He will do this Monday Wednesday and Friday and 20 mg all other days and he will continue to watch and moderate his sodium intake.    Orders:  -     furosemide (Lasix) 20 MG tablet; Take 20 mg every day except Monday Wednesday and Friday 40 mg.    3. Type 2 diabetes, controlled, with peripheral neuropathy  Assessment & Plan:  His blood sugars continue to remain somewhat elevated.  Will add his metformin back at 500 mg twice daily.  His kidney function although impaired is still adequate enough to do his metformin.      4. Type 2 diabetes mellitus with hyperglycemia, without long-term current use of insulin  -     metFORMIN ER (GLUCOPHAGE-XR) 500 MG 24 hr tablet; Take 1 tablet by mouth 2 (Two) Times a Day  With Meals.               Orders Placed Today:     New Medications Ordered This Visit   Medications    metFORMIN ER (GLUCOPHAGE-XR) 500 MG 24 hr tablet     Sig: Take 1 tablet by mouth 2 (Two) Times a Day With Meals.    furosemide (Lasix) 20 MG tablet     Sig: Take 20 mg every day except Monday Wednesday and Friday 40 mg.        Management Plan:     An After Visit Summary was printed and given to the patient at discharge.    Follow-up: Return in about 2 months (around 5/7/2025) for Recheck.    Jesus Lux,  3/7/2025 10:46 EST  This note was electronically signed.

## 2025-03-07 NOTE — ASSESSMENT & PLAN NOTE
His blood sugars continue to remain somewhat elevated.  Will add his metformin back at 500 mg twice daily.  His kidney function although impaired is still adequate enough to do his metformin.

## 2025-03-10 ENCOUNTER — APPOINTMENT (OUTPATIENT)
Dept: GENERAL RADIOLOGY | Facility: HOSPITAL | Age: 80
End: 2025-03-10
Payer: MEDICARE

## 2025-03-10 LAB
ALBUMIN SERPL-MCNC: 3.8 G/DL (ref 3.5–5.2)
ALBUMIN/GLOB SERPL: 1.1 G/DL
ALP SERPL-CCNC: 128 U/L (ref 39–117)
ALT SERPL W P-5'-P-CCNC: 14 U/L (ref 1–41)
ANION GAP SERPL CALCULATED.3IONS-SCNC: 12.1 MMOL/L (ref 5–15)
AST SERPL-CCNC: 25 U/L (ref 1–40)
BASOPHILS # BLD AUTO: 0.05 10*3/MM3 (ref 0–0.2)
BASOPHILS NFR BLD AUTO: 0.5 % (ref 0–1.5)
BILIRUB SERPL-MCNC: 0.5 MG/DL (ref 0–1.2)
BUN SERPL-MCNC: 30 MG/DL (ref 8–23)
BUN/CREAT SERPL: 16 (ref 7–25)
CALCIUM SPEC-SCNC: 9 MG/DL (ref 8.6–10.5)
CHLORIDE SERPL-SCNC: 103 MMOL/L (ref 98–107)
CO2 SERPL-SCNC: 24.9 MMOL/L (ref 22–29)
CREAT SERPL-MCNC: 1.88 MG/DL (ref 0.76–1.27)
DEPRECATED RDW RBC AUTO: 47.8 FL (ref 37–54)
EGFRCR SERPLBLD CKD-EPI 2021: 36.1 ML/MIN/1.73
EOSINOPHIL # BLD AUTO: 0.09 10*3/MM3 (ref 0–0.4)
EOSINOPHIL NFR BLD AUTO: 0.8 % (ref 0.3–6.2)
ERYTHROCYTE [DISTWIDTH] IN BLOOD BY AUTOMATED COUNT: 14.8 % (ref 12.3–15.4)
GLOBULIN UR ELPH-MCNC: 3.6 GM/DL
GLUCOSE SERPL-MCNC: 224 MG/DL (ref 65–99)
HCT VFR BLD AUTO: 39.3 % (ref 37.5–51)
HGB BLD-MCNC: 12.1 G/DL (ref 13–17.7)
IMM GRANULOCYTES # BLD AUTO: 0.09 10*3/MM3 (ref 0–0.05)
IMM GRANULOCYTES NFR BLD AUTO: 0.8 % (ref 0–0.5)
LYMPHOCYTES # BLD AUTO: 0.98 10*3/MM3 (ref 0.7–3.1)
LYMPHOCYTES NFR BLD AUTO: 8.9 % (ref 19.6–45.3)
MCH RBC QN AUTO: 26.9 PG (ref 26.6–33)
MCHC RBC AUTO-ENTMCNC: 30.8 G/DL (ref 31.5–35.7)
MCV RBC AUTO: 87.5 FL (ref 79–97)
MONOCYTES # BLD AUTO: 1.15 10*3/MM3 (ref 0.1–0.9)
MONOCYTES NFR BLD AUTO: 10.4 % (ref 5–12)
NEUTROPHILS NFR BLD AUTO: 78.6 % (ref 42.7–76)
NEUTROPHILS NFR BLD AUTO: 8.67 10*3/MM3 (ref 1.7–7)
NRBC BLD AUTO-RTO: 0 /100 WBC (ref 0–0.2)
NT-PROBNP SERPL-MCNC: 2442 PG/ML (ref 0–1800)
PLATELET # BLD AUTO: 254 10*3/MM3 (ref 140–450)
PMV BLD AUTO: 8.9 FL (ref 6–12)
POTASSIUM SERPL-SCNC: 4.1 MMOL/L (ref 3.5–5.2)
PROT SERPL-MCNC: 7.4 G/DL (ref 6–8.5)
RBC # BLD AUTO: 4.49 10*6/MM3 (ref 4.14–5.8)
SODIUM SERPL-SCNC: 140 MMOL/L (ref 136–145)
WBC NRBC COR # BLD AUTO: 11.03 10*3/MM3 (ref 3.4–10.8)

## 2025-03-10 PROCEDURE — 80053 COMPREHEN METABOLIC PANEL: CPT | Performed by: EMERGENCY MEDICINE

## 2025-03-10 PROCEDURE — 85025 COMPLETE CBC W/AUTO DIFF WBC: CPT | Performed by: EMERGENCY MEDICINE

## 2025-03-10 PROCEDURE — 93010 ELECTROCARDIOGRAM REPORT: CPT | Performed by: INTERNAL MEDICINE

## 2025-03-10 PROCEDURE — 83880 ASSAY OF NATRIURETIC PEPTIDE: CPT | Performed by: EMERGENCY MEDICINE

## 2025-03-10 PROCEDURE — 93005 ELECTROCARDIOGRAM TRACING: CPT | Performed by: EMERGENCY MEDICINE

## 2025-03-10 PROCEDURE — 99284 EMERGENCY DEPT VISIT MOD MDM: CPT

## 2025-03-10 PROCEDURE — 71045 X-RAY EXAM CHEST 1 VIEW: CPT

## 2025-03-10 PROCEDURE — 36415 COLL VENOUS BLD VENIPUNCTURE: CPT | Performed by: EMERGENCY MEDICINE

## 2025-03-11 ENCOUNTER — HOSPITAL ENCOUNTER (EMERGENCY)
Facility: HOSPITAL | Age: 80
Discharge: HOME | End: 2025-03-11
Attending: EMERGENCY MEDICINE | Admitting: EMERGENCY MEDICINE
Payer: MEDICARE

## 2025-03-11 VITALS
RESPIRATION RATE: 16 BRPM | SYSTOLIC BLOOD PRESSURE: 103 MMHG | HEIGHT: 77 IN | OXYGEN SATURATION: 95 % | BODY MASS INDEX: 30.12 KG/M2 | WEIGHT: 255.07 LBS | TEMPERATURE: 100.1 F | HEART RATE: 80 BPM | DIASTOLIC BLOOD PRESSURE: 66 MMHG

## 2025-03-11 DIAGNOSIS — L03.115 CELLULITIS OF RIGHT LOWER EXTREMITY: Primary | ICD-10-CM

## 2025-03-11 RX ORDER — CEPHALEXIN 500 MG/1
500 CAPSULE ORAL 4 TIMES DAILY
Qty: 28 CAPSULE | Refills: 0 | Status: SHIPPED | OUTPATIENT
Start: 2025-03-11 | End: 2025-03-18

## 2025-03-11 RX ADMIN — CEPHALEXIN 500 MG: 250 CAPSULE ORAL at 02:59

## 2025-03-11 NOTE — ED PROVIDER NOTES
Time: 3:18 AM EDT  Date of encounter:  3/10/2025  Independent Historian/Clinical History and Information was obtained by:   Patient    History is limited by: N/A    Chief Complaint: erythema/swelling RLE      History of Present Illness:  Patient is a 80 y.o. year old male who presents to the emergency department for evaluation of Swelling with erythema.  Patient states this has been getting worse for the last several weeks.  He is currently on anticoagulation.  He has been followed by his primary care provider.  He denies shortness of breath or chest pain.        Patient Care Team  Primary Care Provider: Jesus Lux DO    Past Medical History:     Allergies   Allergen Reactions    Sulfa Antibiotics Unknown - Low Severity     Unsure of reaction     Past Medical History:   Diagnosis Date    Arthritis     Atrial fibrillation     Cellulitis of right lower limb     CHF (congestive heart failure)     Chronic heart failure with preserved ejection fraction (HFpEF) 03/20/2023    Colon polyps     Decubitus ulcer of foot, stage 1 08/10/2018    Decubitus ulcer of foot, stage 3 02/07/2018    Foot pain, left 08/10/2018    Foot pain, right     Gout     Hammertoe 12/30/2019    History of colonoscopy with polypectomy 08/25/2020    2018 TUBULAR ADENOMA, COLONOSCOPY SCHEDULED FOR 10/19/2020 WITH DR SULTANA    Ingrowing nail 11/02/2018    Medication management 02/18/2019    Nail dystrophy     PAT (paroxysmal atrial tachycardia) 08/06/2018    Pressure ulcer, stage 1     Tinea unguium     Type 2 diabetes, controlled, with peripheral neuropathy 08/10/2018     Past Surgical History:   Procedure Laterality Date    ABDOMINAL SURGERY      lap band    COLONOSCOPY      COLONOSCOPY N/A 12/18/2024    Procedure: COLONOSCOPY;  Surgeon: Live Amador MD;  Location: [facility] ENDOSCOPY;  Service: General;  Laterality: N/A;  NORMAL COLON    GASTRIC BANDING  2007     Family History   Problem Relation Age of Onset    Nephrolithiasis Mother          RENAL CALCULUS    Kidney disease Mother     Colon cancer Father 50        FAMILY HISTORY OF COLON CANCER    Cancer Father     Colon cancer Paternal Grandfather 60        FAMILY HISTORY OF COLON CANCER        Home Medications:  Prior to Admission medications    Medication Sig Start Date End Date Taking? Authorizing Provider   acetaminophen (TYLENOL) 500 MG tablet Take 2 tablets by mouth Every Morning.    ProviderCesar MD   allopurinol (ZYLOPRIM) 100 MG tablet Take 1 tablet by mouth Daily. 2/7/25   Jesus Lux DO   apixaban (Eliquis) 5 MG tablet tablet Take 1 tablet by mouth 2 (Two) Times a Day. Hold for 3 more days and then restart it. Stop if patient has Nosebleed again and contact primary provider regarding it 11/6/24   Kar Ledezma MD   empagliflozin (Jardiance) 10 MG tablet tablet Take 1 tablet by mouth Daily. 2/7/25   Jesus Lux DO   furosemide (Lasix) 20 MG tablet Take 20 mg every day except Monday Wednesday and Friday 40 mg. 3/7/25   Jesus Lux DO   gabapentin (NEURONTIN) 100 MG capsule Take 2 capsules by mouth every night at bedtime. 2/18/25   Jesus Lux DO   glyburide (DIAbeta) 2.5 MG tablet Take 1 tablet by mouth Daily With Breakfast.  Patient taking differently: Take 1 tablet by mouth Every Night. 1/2/24   Jesus Lux DO   metFORMIN ER (GLUCOPHAGE-XR) 500 MG 24 hr tablet Take 1 tablet by mouth 2 (Two) Times a Day With Meals. 3/7/25   Jesus Lux DO   metoprolol succinate XL (TOPROL-XL) 50 MG 24 hr tablet Take 1 tablet by mouth Daily for 30 days. 2/7/25 3/9/25  Jesus Lux DO   oxyCODONE-acetaminophen (PERCOCET) 5-325 MG per tablet Take 1 tablet by mouth.    ProviderCesar MD   pantoprazole (PROTONIX) 40 MG EC tablet Take 1 tablet by mouth Every Morning. 11/4/24   Jesus Lux DO   simvastatin (ZOCOR) 20 MG tablet Take 1 tablet by mouth Every Evening. 1/2/24   Jesus Lux  "DO Bharat        Social History:   Social History     Tobacco Use    Smoking status: Former     Current packs/day: 0.00     Average packs/day: 2.0 packs/day for 22.0 years (44.0 ttl pk-yrs)     Types: Cigarettes     Start date: 1965     Quit date:      Years since quittin.2    Smokeless tobacco: Never    Tobacco comments:     AGE STARTED 19 QUIT AGE 31 - SMOKED X 20 YEARS QUIT    Vaping Use    Vaping status: Never Used   Substance Use Topics    Alcohol use: Not Currently     Comment: CURRENT SOME DAY 2020,2017    Drug use: Never         Review of Systems:  Review of Systems   Constitutional:  Negative for chills and fever.   HENT:  Negative for congestion, ear pain and sore throat.    Eyes:  Negative for pain.   Respiratory:  Negative for cough, chest tightness and shortness of breath.    Cardiovascular:  Negative for chest pain.   Gastrointestinal:  Negative for abdominal pain, diarrhea, nausea and vomiting.   Genitourinary:  Negative for flank pain and hematuria.   Musculoskeletal:  Negative for joint swelling.   Skin:  Positive for color change. Negative for pallor.   Neurological:  Negative for seizures and headaches.   All other systems reviewed and are negative.       Physical Exam:  /66 (BP Location: Left arm, Patient Position: Lying)   Pulse 80   Temp 100.1 °F (37.8 °C) (Oral)   Resp 16   Ht 195.6 cm (77\")   Wt 116 kg (255 lb 1.2 oz)   SpO2 95%   BMI 30.25 kg/m²     Physical Exam  HENT:      Head: Normocephalic.      Right Ear: External ear normal.      Left Ear: External ear normal.      Mouth/Throat:      Mouth: Mucous membranes are moist.   Eyes:      Extraocular Movements: Extraocular movements intact.      Conjunctiva/sclera: Conjunctivae normal.   Cardiovascular:      Rate and Rhythm: Normal rate and regular rhythm.      Pulses: Normal pulses.   Pulmonary:      Effort: Pulmonary effort is normal.   Abdominal:      General: There is no distension. "   Musculoskeletal:         General: Normal range of motion.   Skin:     General: Skin is warm.      Comments: Bilateral lower extremity edema right worse than left with erythema of right leg extending to mid lower leg.   Neurological:      General: No focal deficit present.      Mental Status: He is alert and oriented to person, place, and time.        \            Medical Decision Making:      Comorbidities that affect care:    Atrial Fibrillation, Chronic Kidney Disease, Congestive Heart Failure, Diabetes    External Notes reviewed:    Previous Clinic Note: Patient was seen by his primary care provider on 3/7/2025 for hypertension, chronic heart failure, diabetes.      The following orders were placed and all results were independently analyzed by me:  Orders Placed This Encounter   Procedures    XR Chest 1 View    Comprehensive Metabolic Panel    BNP    CBC Auto Differential    ECG 12 Lead Dyspnea    CBC & Differential       Medications Given in the Emergency Department:  Medications   cephalexin (KEFLEX) capsule 500 mg (500 mg Oral Given 3/11/25 0259)        ED Course:         Labs:    Lab Results (last 24 hours)       Procedure Component Value Units Date/Time    CBC & Differential [[id number]]  (Abnormal) Collected: 03/10/25 2259    Specimen: Blood from Arm, Right Updated: 03/10/25 2305    Narrative:      The following orders were created for panel order CBC & Differential.  Procedure                               Abnormality         Status                     ---------                               -----------         ------                     CBC Auto Differential[[id number]]        Abnormal            Final result                 Please view results for these tests on the individual orders.    Comprehensive Metabolic Panel [[id number]]  (Abnormal) Collected: 03/10/25 2259    Specimen: Blood from Arm, Right Updated: 03/10/25 2326     Glucose 224 mg/dL      BUN 30 mg/dL      Creatinine 1.88 mg/dL      Sodium 140  mmol/L      Potassium 4.1 mmol/L      Chloride 103 mmol/L      CO2 24.9 mmol/L      Calcium 9.0 mg/dL      Total Protein 7.4 g/dL      Albumin 3.8 g/dL      ALT (SGPT) 14 U/L      AST (SGOT) 25 U/L      Alkaline Phosphatase 128 U/L      Total Bilirubin 0.5 mg/dL      Globulin 3.6 gm/dL      A/G Ratio 1.1 g/dL      BUN/Creatinine Ratio 16.0     Anion Gap 12.1 mmol/L      eGFR 36.1 mL/min/1.73     Narrative:      GFR Categories in Chronic Kidney Disease (CKD)      GFR Category          GFR (mL/min/1.73)    Interpretation  G1                     90 or greater         Normal or high (1)  G2                      60-89                Mild decrease (1)  G3a                   45-59                Mild to moderate decrease  G3b                   30-44                Moderate to severe decrease  G4                    15-29                Severe decrease  G5                    14 or less           Kidney failure          (1)In the absence of evidence of kidney disease, neither GFR category G1 or G2 fulfill the criteria for CKD.    eGFR calculation 2021 CKD-EPI creatinine equation, which does not include race as a factor    BNP [[id number]]  (Abnormal) Collected: 03/10/25 2259    Specimen: Blood from Arm, Right Updated: 03/10/25 2324     proBNP 2,442.0 pg/mL     Narrative:      This assay is used as an aid in the diagnosis of individuals suspected of having heart failure. It can be used as an aid in the diagnosis of acute decompensated heart failure (ADHF) in patients presenting with signs and symptoms of ADHF to the emergency department (ED). In addition, NT-proBNP of <300 pg/mL indicates ADHF is not likely.    Age Range Result Interpretation  NT-proBNP Concentration (pg/mL:      <50             Positive            >450                   Gray                 300-450                    Negative             <300    50-75           Positive            >900                  Gray                300-900                  Negative             <300      >75             Positive            >1800                  Gray                300-1800                  Negative            <300    CBC Auto Differential [[id number]]  (Abnormal) Collected: 03/10/25 2259    Specimen: Blood from Arm, Right Updated: 03/10/25 2305     WBC 11.03 10*3/mm3      RBC 4.49 10*6/mm3      Hemoglobin 12.1 g/dL      Hematocrit 39.3 %      MCV 87.5 fL      MCH 26.9 pg      MCHC 30.8 g/dL      RDW 14.8 %      RDW-SD 47.8 fl      MPV 8.9 fL      Platelets 254 10*3/mm3      Neutrophil % 78.6 %      Lymphocyte % 8.9 %      Monocyte % 10.4 %      Eosinophil % 0.8 %      Basophil % 0.5 %      Immature Grans % 0.8 %      Neutrophils, Absolute 8.67 10*3/mm3      Lymphocytes, Absolute 0.98 10*3/mm3      Monocytes, Absolute 1.15 10*3/mm3      Eosinophils, Absolute 0.09 10*3/mm3      Basophils, Absolute 0.05 10*3/mm3      Immature Grans, Absolute 0.09 10*3/mm3      nRBC 0.0 /100 WBC              Imaging:    XR Chest 1 View  Result Date: 3/10/2025  XR CHEST 1 VW Date of exam: 3/10/2025, 11:23 P.M., EDT. Indication: SOA/SOB/shortness of air/shortness of breath. Comparison: 2/2/2025. FINDINGS: A single AP (or PA) upright portable chest radiograph was performed. Borderline cardiac enlargement is seen. No acute infiltrate is appreciated. No pleural effusion or pneumothorax is identified. The thoracic aorta is atherosclerotic and tortuous. No significant interval change is seen since the prior study (or studies).      No acute infiltrate is appreciated.    Portions of this note were completed with a voice recognition program. Electronically Signed: Edi Kwan MD  3/10/2025 11:26 PM EDT  Workstation ID: [id number]        Differential Diagnosis and Discussion:    Edema: Based on the patient's history, signs, and symptoms, the differential diagnosis includes but is not limited to heart failure, kidney disease, liver disease, venous insufficiency, lymphedema, pregnancy, medications, allergic  reactions.  Rash: Differential diagnosis includes but is not limited to sepsis, cellulitis, [place] Spotted Fever, meningitis, meningococcemia, Varicella, Strep infection, dermatitis, allergic reaction, Lyme disease, and toxic shock syndrome.  Wound Evaluation: Differential diagnosis includes but is not limited to laceration, abrasion, puncture, burn, ulcer, cellulitis, abscess, vasculitis, malignancy, and rash.    PROCEDURES:    Labs were collected in the emergency department and all labs were reviewed and interpreted by me.  X-ray were performed in the emergency department and all X-ray impressions were independently interpreted by me.  An EKG was performed and the EKG was interpreted by me.    ECG 12 Lead Dyspnea   Preliminary Result   HEART RATE=91  bpm   RR Gkigdraq=108  ms   DC Interval=  ms   P Horizontal Axis=  deg   P Front Axis=  deg   QRSD Dnccress=702  ms   QT Tebbnoge=325  ms   VSiX=225  ms   QRS Axis=-56  deg   T Wave Axis=67  deg   - ABNORMAL ECG -   Atrial flutter   Left anterior fascicular block   Minimal ST depression, lateral leads   Date and Time of Study:2025-03-10 22:50:51          Procedures    MDM  Number of Diagnoses or Management Options  Diagnosis management comments:   Patient presented to the emergency department with bilateral lower extremity REYNA.  Right worse than left.  He has erythema of his right lower extremity extending up to mid lower leg.  Presentation concerning for cellulitis.  Labs showed mildly elevated white count of 11.  Creatinine 1.8 which is slightly up from previous.  BNP is also slightly elevated 2400.  Chest x-ray did not show acute findings.  Patient is currently on Lasix being closely followed by his primary care provider.  Patient was given dose of Keflex.  Will give prescription for Keflex.  Recommend that he follows up with his primary care physician in 2 days for reevaluation.  Discussed return precautions, discharge instructions and answered all his  questions.       Amount and/or Complexity of Data Reviewed  Clinical lab tests: reviewed  Tests in the radiology section of CPT®: reviewed  Review and summarize past medical records: yes  Independent visualization of images, tracings, or specimens: yes    Risk of Complications, Morbidity, and/or Mortality  Presenting problems: moderate  Management options: moderate                       Patient Care Considerations:    SEPSIS was considered but is NOT present in the emergency department as SIRS criteria is not present.      Consultants/Shared Management Plan:    None    Social Determinants of Health:    Patient is independent, reliable, and has access to care.       Disposition and Care Coordination:    Discharged: The patient is suitable and stable for discharge with no need for consideration of admission.    I have explained the patient´s condition, diagnoses and treatment plan based on the information available to me at this time. I have answered questions and addressed any concerns. The patient has a good  understanding of the patient´s diagnosis, condition, and treatment plan as can be expected at this point. The vital signs have been stable. The patient´s condition is stable and appropriate for discharge from the emergency department.      The patient will pursue further outpatient evaluation with the primary care physician or other designated or consulting physician as outlined in the discharge instructions. They are agreeable to this plan of care and follow-up instructions have been explained in detail. The patient has received these instructions in written format and has expressed an understanding of the discharge instructions. The patient is aware that any significant change in condition or worsening of symptoms should prompt an immediate return to this or the closest emergency department or call to 911.  I have explained discharge medications and the need for follow up with the patient/caretakers. This was  also printed in the discharge instructions. Patient was discharged with the following medications and follow up:      Medication List        New Prescriptions      cephalexin 500 MG capsule  Commonly known as: KEFLEX  Take 1 capsule by mouth 4 (Four) Times a Day for 7 days.            Changed      glyburide 2.5 MG tablet  Commonly known as: DIAbeta  Take 1 tablet by mouth Daily With Breakfast.  What changed: when to take this               Where to Get Your Medications        These medications were sent to [facility] - ERIKA Diaz - [address] - [phone]  - [phone]   [address], [place] [address]      Phone: [phone]   cephalexin 500 MG capsule      Jesus Lux, DO  [address] [place] [address]  [phone]    In 2 days         Final diagnoses:   Cellulitis of right lower extremity        ED Disposition       ED Disposition   Discharge    Condition   Stable    Comment   --               This medical record created using voice recognition software.             Todd Nguyen MD  03/11/25 0324

## 2025-03-14 ENCOUNTER — OFFICE VISIT (OUTPATIENT)
Dept: FAMILY MEDICINE CLINIC | Facility: CLINIC | Age: 80
End: 2025-03-14
Payer: MEDICARE

## 2025-03-14 VITALS
BODY MASS INDEX: 30.11 KG/M2 | HEART RATE: 90 BPM | HEIGHT: 77 IN | DIASTOLIC BLOOD PRESSURE: 69 MMHG | OXYGEN SATURATION: 98 % | SYSTOLIC BLOOD PRESSURE: 117 MMHG | WEIGHT: 255 LBS | TEMPERATURE: 97.4 F

## 2025-03-14 DIAGNOSIS — L03.031 CELLULITIS OF TOE OF RIGHT FOOT: Primary | ICD-10-CM

## 2025-03-14 PROCEDURE — 1126F AMNT PAIN NOTED NONE PRSNT: CPT | Performed by: FAMILY MEDICINE

## 2025-03-14 PROCEDURE — 3074F SYST BP LT 130 MM HG: CPT | Performed by: FAMILY MEDICINE

## 2025-03-14 PROCEDURE — 99213 OFFICE O/P EST LOW 20 MIN: CPT | Performed by: FAMILY MEDICINE

## 2025-03-14 PROCEDURE — 3078F DIAST BP <80 MM HG: CPT | Performed by: FAMILY MEDICINE

## 2025-03-14 NOTE — ASSESSMENT & PLAN NOTE
He feels that his toe was somewhat improved.  He will continue with his cephalexin 4 times a day elevation and heat.  Will also have him see podiatry.  He does have an appointment to see podiatry already this coming Monday.

## 2025-03-14 NOTE — PROGRESS NOTES
Chief Complaint   Patient presents with   • Toe Pain     Right middle toe         Subjective     Edi Weston  has a past medical history of Arthritis, Atrial fibrillation, Cellulitis of right lower limb, CHF (congestive heart failure), Chronic heart failure with preserved ejection fraction (HFpEF) (03/20/2023), Colon polyps, Decubitus ulcer of foot, stage 1 (08/10/2018), Decubitus ulcer of foot, stage 3 (02/07/2018), Foot pain, left (08/10/2018), Foot pain, right, Gout, Hammertoe (12/30/2019), History of colonoscopy with polypectomy (08/25/2020), Ingrowing nail (11/02/2018), Medication management (02/18/2019), Nail dystrophy, PAT (paroxysmal atrial tachycardia) (08/06/2018), Pressure ulcer, stage 1, Tinea unguium, and Type 2 diabetes, controlled, with peripheral neuropathy (08/10/2018).      ER follow up  Symptoms are: new.   Onset was in the past 7 days.   Symptoms occur: constantly.  Symptoms have been improved since onset.   Symptoms include: nasal congestion, cough and fatigue.   Pertinent negative symptoms include no abdominal pain, no anorexia, no joint pain, no change in stool, no chest pain, no chills, no diaphoresis, no fever, no headaches, no joint swelling, no myalgias, no nausea, no neck pain, no numbness, no rash, no sore throat, no swollen glands, no dysuria, no vertigo, no visual change, no vomiting and no weakness.   Other symptom:  Chills  Treatments tried: Cephalexin.   Improvement on treatment was mild.   Treatment and/or Medications comments include: Cephalexin   Toe Pain   The incident occurred 3 to 5 days ago. There was no injury mechanism. Pain location: Right second digit. The pain has been Constant since onset. Pertinent negatives include no numbness. Nothing aggravates the symptoms. The treatment provided mild relief.       PHQ-2 Depression Screening  Little interest or pleasure in doing things?     Feeling down, depressed, or hopeless?     PHQ-2 Total Score     PHQ-9 Depression  Screening  Little interest or pleasure in doing things?     Feeling down, depressed, or hopeless?     Trouble falling or staying asleep, or sleeping too much?     Feeling tired or having little energy?     Poor appetite or overeating?     Feeling bad about yourself - or that you are a failure or have let yourself or your family down?     Trouble concentrating on things, such as reading the newspaper or watching television?     Moving or speaking so slowly that other people could have noticed? Or the opposite - being so fidgety or restless that you have been moving around a lot more than usual?     Thoughts that you would be better off dead, or of hurting yourself in some way?     PHQ-9 Total Score     If you checked off any problems, how difficult have these problems made it for you to do your work, take care of things at home, or get along with other people?       Allergies   Allergen Reactions   • Sulfa Antibiotics Unknown - Low Severity     Unsure of reaction       Prior to Admission medications    Medication Sig Start Date End Date Taking? Authorizing Provider   acetaminophen (TYLENOL) 500 MG tablet Take 2 tablets by mouth Every Morning.   Yes Provider, MD Cesar   allopurinol (ZYLOPRIM) 100 MG tablet Take 1 tablet by mouth Daily. 2/7/25  Yes Jesus Lux, DO   apixaban (Eliquis) 5 MG tablet tablet Take 1 tablet by mouth 2 (Two) Times a Day. Hold for 3 more days and then restart it. Stop if patient has Nosebleed again and contact primary provider regarding it 11/6/24  Yes Kar Ledezma MD   cephalexin (KEFLEX) 500 MG capsule Take 1 capsule by mouth 4 (Four) Times a Day for 7 days. 3/11/25 3/18/25 Yes Todd Nguyen MD   empagliflozin (Jardiance) 10 MG tablet tablet Take 1 tablet by mouth Daily. 2/7/25  Yes Jesus Lux DO   furosemide (Lasix) 20 MG tablet Take 20 mg every day except Monday Wednesday and Friday 40 mg. 3/7/25  Yes Jesus Lux DO   gabapentin  (NEURONTIN) 100 MG capsule Take 2 capsules by mouth every night at bedtime. 2/18/25  Yes Jesus Lux DO   glyburide (DIAbeta) 2.5 MG tablet Take 1 tablet by mouth Daily With Breakfast.  Patient taking differently: Take 1 tablet by mouth Every Night. 1/2/24  Yes Jesus Lux DO   metFORMIN ER (GLUCOPHAGE-XR) 500 MG 24 hr tablet Take 1 tablet by mouth 2 (Two) Times a Day With Meals. 3/7/25  Yes Jesus Lux DO   oxyCODONE-acetaminophen (PERCOCET) 5-325 MG per tablet Take 1 tablet by mouth.   Yes Provider, MD Cesar   pantoprazole (PROTONIX) 40 MG EC tablet Take 1 tablet by mouth Every Morning. 11/4/24  Yes Jesus Lux DO   simvastatin (ZOCOR) 20 MG tablet Take 1 tablet by mouth Every Evening. 1/2/24  Yes Jesus Lux DO   metoprolol succinate XL (TOPROL-XL) 50 MG 24 hr tablet Take 1 tablet by mouth Daily for 30 days. 2/7/25 3/9/25  Jesus Lux DO        Patient Active Problem List   Diagnosis   • Type 2 diabetes, controlled, with peripheral neuropathy   • History of colonoscopy with polypectomy   • Gout   • Hammertoe   • Arthritis   • Atrial flutter with controlled response   • LBBB (left bundle branch block)   • Colon polyps   • Hyperlipidemia   • Hypertension, essential   • Chronic heart failure with preserved ejection fraction (HFpEF)   • Sciatica of right side   • Left-sided nosebleed   • Acute hypoxic respiratory failure   • Coronary artery calcification seen on CT scan   • Medicare annual wellness visit, subsequent   • Stage 3b chronic kidney disease   • Screening for prostate cancer   • BON (acute kidney injury)   • Neuropathy due to medical condition   • Cellulitis of toe of right foot        Past Surgical History:   Procedure Laterality Date   • ABDOMINAL SURGERY      lap band   • COLONOSCOPY     • COLONOSCOPY N/A 12/18/2024    Procedure: COLONOSCOPY;  Surgeon: Live Amador MD;  Location: [facility] ENDOSCOPY;  Service: General;   Laterality: N/A;  NORMAL COLON   • GASTRIC BANDING         Social History     Socioeconomic History   • Marital status:    Tobacco Use   • Smoking status: Former     Current packs/day: 0.00     Average packs/day: 2.0 packs/day for 22.0 years (44.0 ttl pk-yrs)     Types: Cigarettes     Start date: 1965     Quit date:      Years since quittin.2   • Smokeless tobacco: Never   • Tobacco comments:     AGE STARTED 19 QUIT AGE 31 - SMOKED X 20 YEARS QUIT    Vaping Use   • Vaping status: Never Used   Substance and Sexual Activity   • Alcohol use: Not Currently     Comment: CURRENT SOME DAY 2020,2017   • Drug use: Never   • Sexual activity: Yes     Partners: Female     Birth control/protection: Vasectomy       Family History   Problem Relation Age of Onset   • Nephrolithiasis Mother         RENAL CALCULUS   • Kidney disease Mother    • Colon cancer Father 50        FAMILY HISTORY OF COLON CANCER   • Cancer Father    • Colon cancer Paternal Grandfather 60        FAMILY HISTORY OF COLON CANCER        Family history, surgical history, past medical history, Allergies and meds reviewed with patient today and updated in [employer] EMR.     ROS:  Review of Systems   Constitutional:  Positive for fatigue. Negative for chills, diaphoresis and fever.   HENT:  Positive for congestion. Negative for sore throat and swollen glands.    Respiratory:  Positive for cough.    Cardiovascular:  Negative for chest pain.   Gastrointestinal:  Negative for abdominal pain, anorexia, nausea and vomiting.   Genitourinary:  Negative for dysuria.   Musculoskeletal:  Negative for joint pain, myalgias and neck pain.   Skin:  Positive for color change (Erythema). Negative for rash.   Neurological:  Negative for vertigo, weakness and numbness.       OBJECTIVE:  Vitals:    25 1025   BP: 117/69   BP Location: Left arm   Patient Position: Sitting   Pulse: 90   Temp: 97.4 °F (36.3 °C)   SpO2: 98%   Weight: 116 kg (255 lb)  "  Height: 195.6 cm (77\")     No results found.   Body mass index is 30.24 kg/m².  No LMP for male patient.    The ASCVD Risk score (Shelia DK, et al., 2019) failed to calculate for the following reasons:    The valid total cholesterol range is 130 to 320 mg/dL     Physical Exam  Vitals and nursing note reviewed.   Constitutional:       General: He is not in acute distress.     Appearance: Normal appearance. He is normal weight. He is not ill-appearing.   HENT:      Head: Normocephalic.   Cardiovascular:      Pulses:           Dorsalis pedis pulses are 2+ on the right side.   Musculoskeletal:      Right foot: Bunion present.   Feet:      Right foot:      Skin integrity: Erythema and warmth present.      Toenail Condition: Right toenails are abnormally thick.   Neurological:      Mental Status: He is alert.       Procedures    Admission on 03/11/2025, Discharged on 03/11/2025   Component Date Value Ref Range Status   • Glucose 03/10/2025 224 (H)  65 - 99 mg/dL Final   • BUN 03/10/2025 30 (H)  8 - 23 mg/dL Final   • Creatinine 03/10/2025 1.88 (H)  0.76 - 1.27 mg/dL Final   • Sodium 03/10/2025 140  136 - 145 mmol/L Final   • Potassium 03/10/2025 4.1  3.5 - 5.2 mmol/L Final   • Chloride 03/10/2025 103  98 - 107 mmol/L Final   • CO2 03/10/2025 24.9  22.0 - 29.0 mmol/L Final   • Calcium 03/10/2025 9.0  8.6 - 10.5 mg/dL Final   • Total Protein 03/10/2025 7.4  6.0 - 8.5 g/dL Final   • Albumin 03/10/2025 3.8  3.5 - 5.2 g/dL Final   • ALT (SGPT) 03/10/2025 14  1 - 41 U/L Final   • AST (SGOT) 03/10/2025 25  1 - 40 U/L Final   • Alkaline Phosphatase 03/10/2025 128 (H)  39 - 117 U/L Final   • Total Bilirubin 03/10/2025 0.5  0.0 - 1.2 mg/dL Final   • Globulin 03/10/2025 3.6  gm/dL Final   • A/G Ratio 03/10/2025 1.1  g/dL Final   • BUN/Creatinine Ratio 03/10/2025 16.0  7.0 - 25.0 Final   • Anion Gap 03/10/2025 12.1  5.0 - 15.0 mmol/L Final   • eGFR 03/10/2025 36.1 (L)  >60.0 mL/min/1.73 Final   • proBNP 03/10/2025 2,442.0 (H)  0.0 " - 1,800.0 pg/mL Final   • WBC 03/10/2025 11.03 (H)  3.40 - 10.80 10*3/mm3 Final   • RBC 03/10/2025 4.49  4.14 - 5.80 10*6/mm3 Final   • Hemoglobin 03/10/2025 12.1 (L)  13.0 - 17.7 g/dL Final   • Hematocrit 03/10/2025 39.3  37.5 - 51.0 % Final   • MCV 03/10/2025 87.5  79.0 - 97.0 fL Final   • MCH 03/10/2025 26.9  26.6 - 33.0 pg Final   • MCHC 03/10/2025 30.8 (L)  31.5 - 35.7 g/dL Final   • RDW 03/10/2025 14.8  12.3 - 15.4 % Final   • RDW-SD 03/10/2025 47.8  37.0 - 54.0 fl Final   • MPV 03/10/2025 8.9  6.0 - 12.0 fL Final   • Platelets 03/10/2025 254  140 - 450 10*3/mm3 Final   • Neutrophil % 03/10/2025 78.6 (H)  42.7 - 76.0 % Final   • Lymphocyte % 03/10/2025 8.9 (L)  19.6 - 45.3 % Final   • Monocyte % 03/10/2025 10.4  5.0 - 12.0 % Final   • Eosinophil % 03/10/2025 0.8  0.3 - 6.2 % Final   • Basophil % 03/10/2025 0.5  0.0 - 1.5 % Final   • Immature Grans % 03/10/2025 0.8 (H)  0.0 - 0.5 % Final   • Neutrophils, Absolute 03/10/2025 8.67 (H)  1.70 - 7.00 10*3/mm3 Final   • Lymphocytes, Absolute 03/10/2025 0.98  0.70 - 3.10 10*3/mm3 Final   • Monocytes, Absolute 03/10/2025 1.15 (H)  0.10 - 0.90 10*3/mm3 Final   • Eosinophils, Absolute 03/10/2025 0.09  0.00 - 0.40 10*3/mm3 Final   • Basophils, Absolute 03/10/2025 0.05  0.00 - 0.20 10*3/mm3 Final   • Immature Grans, Absolute 03/10/2025 0.09 (H)  0.00 - 0.05 10*3/mm3 Final   • nRBC 03/10/2025 0.0  0.0 - 0.2 /100 WBC Final   • QT Interval 03/10/2025 376  ms Preliminary   • QTC Interval 03/10/2025 463  ms Preliminary   Lab on 03/03/2025   Component Date Value Ref Range Status   • PTH, Intact 03/03/2025 106.0 (H)  15.0 - 65.0 pg/mL Final   • 25 Hydroxy, Vitamin D 03/03/2025 31.9  30.0 - 100.0 ng/ml Final   • WBC 03/03/2025 7.51  3.40 - 10.80 10*3/mm3 Final   • RBC 03/03/2025 4.48  4.14 - 5.80 10*6/mm3 Final   • Hemoglobin 03/03/2025 12.8 (L)  13.0 - 17.7 g/dL Final   • Hematocrit 03/03/2025 39.4  37.5 - 51.0 % Final   • MCV 03/03/2025 87.9  79.0 - 97.0 fL Final   • MCH  03/03/2025 28.6  26.6 - 33.0 pg Final   • MCHC 03/03/2025 32.5  31.5 - 35.7 g/dL Final   • RDW 03/03/2025 14.3  12.3 - 15.4 % Final   • RDW-SD 03/03/2025 45.0  37.0 - 54.0 fl Final   • MPV 03/03/2025 9.4  6.0 - 12.0 fL Final   • Platelets 03/03/2025 236  140 - 450 10*3/mm3 Final   • Neutrophil % 03/03/2025 66.9  42.7 - 76.0 % Final   • Lymphocyte % 03/03/2025 15.2 (L)  19.6 - 45.3 % Final   • Monocyte % 03/03/2025 12.9 (H)  5.0 - 12.0 % Final   • Eosinophil % 03/03/2025 4.0  0.3 - 6.2 % Final   • Basophil % 03/03/2025 0.5  0.0 - 1.5 % Final   • Immature Grans % 03/03/2025 0.5  0.0 - 0.5 % Final   • Neutrophils, Absolute 03/03/2025 5.02  1.70 - 7.00 10*3/mm3 Final   • Lymphocytes, Absolute 03/03/2025 1.14  0.70 - 3.10 10*3/mm3 Final   • Monocytes, Absolute 03/03/2025 0.97 (H)  0.10 - 0.90 10*3/mm3 Final   • Eosinophils, Absolute 03/03/2025 0.30  0.00 - 0.40 10*3/mm3 Final   • Basophils, Absolute 03/03/2025 0.04  0.00 - 0.20 10*3/mm3 Final   • Immature Grans, Absolute 03/03/2025 0.04  0.00 - 0.05 10*3/mm3 Final   • nRBC 03/03/2025 0.0  0.0 - 0.2 /100 WBC Final   • Phosphorus 03/03/2025 3.6  2.5 - 4.5 mg/dL Final   • Creatinine, Urine 03/03/2025 103.8  mg/dL Final   • Total Protein, Urine 03/03/2025 13.9  mg/dL Final   • Protein/Creatinine Ratio, Urine 03/03/2025 0.13   Final   • Color, UA 03/03/2025 Yellow  Yellow, Straw Final   • Appearance, UA 03/03/2025 Clear  Clear Final   • pH, UA 03/03/2025 5.5  5.0 - 8.0 Final   • Specific Gravity, UA 03/03/2025 >1.030 (H)  1.005 - 1.030 Final   • Glucose, UA 03/03/2025 >=1000 mg/dL (3+) (A)  Negative Final   • Ketones, UA 03/03/2025 Negative  Negative Final   • Bilirubin, UA 03/03/2025 Negative  Negative Final   • Blood, UA 03/03/2025 Negative  Negative Final   • Protein, UA 03/03/2025 Trace (A)  Negative Final   • Leuk Esterase, UA 03/03/2025 Negative  Negative Final   • Nitrite, UA 03/03/2025 Negative  Negative Final   • Urobilinogen, UA 03/03/2025 0.2 E.U./dL  0.2 - 1.0  E.U./dL Final   • RBC, UA 03/03/2025 0-2  None Seen, 0-2 /HPF Final   • WBC, UA 03/03/2025 0-2  None Seen, 0-2 /HPF Final   • Bacteria, UA 03/03/2025 None Seen  None Seen /HPF Final   • Squamous Epithelial Cells, UA 03/03/2025 0-2  None Seen, 0-2 /HPF Final   • Hyaline Casts, UA 03/03/2025 None Seen  None Seen /LPF Final   • Methodology 03/03/2025 Automated Microscopy   Final       ASSESSMENT/ PLAN:    Diagnoses and all orders for this visit:    1. Cellulitis of toe of right foot (Primary)  Assessment & Plan:  He feels that his toe was somewhat improved.  He will continue with his cephalexin 4 times a day elevation and heat.  Will also have him see podiatry.  He does have an appointment to see podiatry already this coming Monday.                 Orders Placed Today:     No orders of the defined types were placed in this encounter.       Management Plan:     An After Visit Summary was printed and given to the patient at discharge.    Follow-up: Return for Recheck, Next scheduled follow up.    Jesus Lux DO 3/14/2025 10:45 EDT  This note was electronically signed.

## 2025-03-15 LAB
QT INTERVAL: 376 MS
QTC INTERVAL: 463 MS

## 2025-03-17 ENCOUNTER — OFFICE VISIT (OUTPATIENT)
Dept: PODIATRY | Facility: CLINIC | Age: 80
End: 2025-03-17
Payer: MEDICARE

## 2025-03-17 VITALS
BODY MASS INDEX: 30.48 KG/M2 | OXYGEN SATURATION: 96 % | WEIGHT: 257 LBS | SYSTOLIC BLOOD PRESSURE: 125 MMHG | DIASTOLIC BLOOD PRESSURE: 87 MMHG | TEMPERATURE: 98 F | HEART RATE: 72 BPM

## 2025-03-17 DIAGNOSIS — G62.9 NEUROPATHY: ICD-10-CM

## 2025-03-17 DIAGNOSIS — M10.371 ACUTE GOUT DUE TO RENAL IMPAIRMENT INVOLVING TOE OF RIGHT FOOT: Primary | ICD-10-CM

## 2025-03-17 DIAGNOSIS — M79.671 FOOT PAIN, RIGHT: ICD-10-CM

## 2025-03-17 PROCEDURE — 99204 OFFICE O/P NEW MOD 45 MIN: CPT | Performed by: PODIATRIST

## 2025-03-17 PROCEDURE — 1159F MED LIST DOCD IN RCRD: CPT | Performed by: PODIATRIST

## 2025-03-17 PROCEDURE — 3079F DIAST BP 80-89 MM HG: CPT | Performed by: PODIATRIST

## 2025-03-17 PROCEDURE — 3074F SYST BP LT 130 MM HG: CPT | Performed by: PODIATRIST

## 2025-03-17 PROCEDURE — 1160F RVW MEDS BY RX/DR IN RCRD: CPT | Performed by: PODIATRIST

## 2025-03-17 PROCEDURE — G2211 COMPLEX E/M VISIT ADD ON: HCPCS | Performed by: PODIATRIST

## 2025-03-17 RX ORDER — POVIDONE-IODINE 10 MG/ML
1 SOLUTION TOPICAL DAILY
Qty: 30 EACH | Refills: 11 | Status: SHIPPED | OUTPATIENT
Start: 2025-03-17 | End: 2026-03-17

## 2025-03-17 NOTE — PROGRESS NOTES
[facility]    Today's Date: 03/17/25    Patient Name: Edi Weston  MRN: [id number]  CSN: [id number]  PCP: Jesus Lux DO  Referring Provider: No ref. provider found    Patient or patient representative verbalized consent for the use of Ambient Listening during the visit with  Ralph Whyte DPM for chart documentation. 3/17/2025  09:09 EDT    SUBJECTIVE     Chief Complaint   Patient presents with   • Right Foot - Pain, Bunions, Establish Care     Right 2nd toe is inflammed. Recently in the hospital. He is finishing Keflex   • Left Foot - Establish Care, Pain     HPI: Edi Weston, a 80 y.o.male, presents to clinic.    New    History of Present Illness  The patient presents for evaluation of right second toe pain.    He reports that his right foot was swollen and had a lot of pain. He has congestive heart failure, so he gets swelling in his feet. He is on hydrochlorothiazide and Lasix. The pain got so bad in the left foot, which he thinks was gout related. He took some supercharged diuretics, which insulted the kidney, and he ended up in the hospital for 4 days. He was released from the hospital but still had pain in the left foot. They got him off IV morphine and sent him home with Percocet, which finally cleared up a little bit. He had gout on his left foot also. He is taking allopurinol now. About 2 weeks ago, the right foot started to get angry until last week it was so red that he went to the ER. He got the feeling it might be cellulitis. He spent a long time in the ER and was sent home with antibiotics, which he is finishing up. He finished up the Keflex, which solved everything in that foot except that toe. That toe got redder and redder. He is not under the care of a rheumatologist but is currently seeing a nephrologist.    MEDICATIONS  Current: Hydrochlorothiazide, Lasix, allopurinol, Percocet, Keflex    Patient denies any fevers, chills, nausea, vomiting,  shortness of breath, nor any other constitutional signs nor symptoms.    No other pedal complaints at this time.    Past Medical History:   Diagnosis Date   • Arthritis    • Atrial fibrillation    • Cellulitis of right lower limb    • CHF (congestive heart failure)    • Chronic heart failure with preserved ejection fraction (HFpEF) 2023   • Colon polyps    • Decubitus ulcer of foot, stage 1 08/10/2018   • Decubitus ulcer of foot, stage 3 2018   • Foot pain, left 08/10/2018   • Foot pain, right    • Gout    • Hammertoe 2019   • History of colonoscopy with polypectomy 2020    2018 TUBULAR ADENOMA, COLONOSCOPY SCHEDULED FOR 10/19/2020 WITH DR SULTANA   • Ingrowing nail 2018   • Medication management 2019   • Nail dystrophy    • PAT (paroxysmal atrial tachycardia) 2018   • Pressure ulcer, stage 1    • Tinea unguium    • Type 2 diabetes, controlled, with peripheral neuropathy 08/10/2018     Past Surgical History:   Procedure Laterality Date   • ABDOMINAL SURGERY      lap band   • COLONOSCOPY     • COLONOSCOPY N/A 2024    Procedure: COLONOSCOPY;  Surgeon: Live Amador MD;  [facility]: [facility] END[facility]COPY;  Service: General;  Laterality: N/A;  NORMAL COLON   • GASTRIC BANDING       Family History   Problem Relation Age of Onset   • Nephrolithiasis Mother         RENAL CALCULUS   • Kidney disease Mother    • Colon cancer Father 50        FAMILY HISTORY OF COLON CANCER   • Cancer Father    • Colon cancer Paternal Grandfather 60        FAMILY HISTORY OF COLON CANCER      Social History     Socioeconomic History   • Marital status:    Tobacco Use   • Smoking status: Former     Current packs/day: 0.00     Average packs/day: 2.0 packs/day for 22.0 years (44.0 ttl pk-yrs)     Types: Cigarettes     Start date: 1965     Quit date:      Years since quittin.2   • Smokeless tobacco: Never   • Tobacco comments:     AGE STARTED 19 QUIT AGE 31 - SMOKED X 20 YEARS QUIT  1990   Vaping Use   • Vaping status: Never Used   Substance and Sexual Activity   • Alcohol use: Not Currently     Comment: CURRENT SOME DAY 11/16/2020,06/26/2017   • Drug use: Never   • Sexual activity: Yes     Partners: Female     Birth control/protection: Vasectomy     Allergies   Allergen Reactions   • Sulfa Antibiotics Unknown - Low Severity     Unsure of reaction     Current Outpatient Medications   Medication Sig Dispense Refill   • acetaminophen (TYLENOL) 500 MG tablet Take 2 tablets by mouth Every Morning.     • allopurinol (ZYLOPRIM) 100 MG tablet Take 1 tablet by mouth Daily. 90 tablet 3   • apixaban (Eliquis) 5 MG tablet tablet Take 1 tablet by mouth 2 (Two) Times a Day. Hold for 3 more days and then restart it. Stop if patient has Nosebleed again and contact primary provider regarding it     • cephalexin (KEFLEX) 500 MG capsule Take 1 capsule by mouth 4 (Four) Times a Day for 7 days. 28 capsule 0   • empagliflozin (Jardiance) 10 MG tablet tablet Take 1 tablet by mouth Daily. 90 tablet 1   • furosemide (Lasix) 20 MG tablet Take 20 mg every day except Monday Wednesday and Friday 40 mg.     • gabapentin (NEURONTIN) 100 MG capsule Take 2 capsules by mouth every night at bedtime. 180 capsule 1   • glyburide (DIAbeta) 2.5 MG tablet Take 1 tablet by mouth Daily With Breakfast. (Patient taking differently: Take 1 tablet by mouth Every Night.) 90 tablet 3   • metFORMIN ER (GLUCOPHAGE-XR) 500 MG 24 hr tablet Take 1 tablet by mouth 2 (Two) Times a Day With Meals.     • oxyCODONE-acetaminophen (PERCOCET) 5-325 MG per tablet Take 1 tablet by mouth.     • pantoprazole (PROTONIX) 40 MG EC tablet Take 1 tablet by mouth Every Morning. 90 tablet 3   • simvastatin (ZOCOR) 20 MG tablet Take 1 tablet by mouth Every Evening. 90 tablet 3   • metoprolol succinate XL (TOPROL-XL) 50 MG 24 hr tablet Take 1 tablet by mouth Daily for 30 days.     • povidone-iodine (Betadine Swabsticks) 10 % swab Apply 1 Application topically to the  appropriate area as directed Daily. 30 each 11     No current facility-administered medications for this visit.     Review of Systems   Constitutional: Negative.    Skin:         Ulceration on the right second DIP joint from gout.   All other systems reviewed and are negative.      OBJECTIVE     Vitals:    03/17/25 0830   BP: 125/87   Pulse: 72   Temp: 98 °F (36.7 °C)   SpO2: 96%       WBC   Date Value Ref Range Status   03/10/2025 11.03 (H) 3.40 - 10.80 10*3/mm3 Final     RBC   Date Value Ref Range Status   03/10/2025 4.49 4.14 - 5.80 10*6/mm3 Final     Hemoglobin   Date Value Ref Range Status   03/10/2025 12.1 (L) 13.0 - 17.7 g/dL Final     Hematocrit   Date Value Ref Range Status   03/10/2025 39.3 37.5 - 51.0 % Final     MCV   Date Value Ref Range Status   03/10/2025 87.5 79.0 - 97.0 fL Final     MCH   Date Value Ref Range Status   03/10/2025 26.9 26.6 - 33.0 pg Final     MCHC   Date Value Ref Range Status   03/10/2025 30.8 (L) 31.5 - 35.7 g/dL Final     RDW   Date Value Ref Range Status   03/10/2025 14.8 12.3 - 15.4 % Final     RDW-SD   Date Value Ref Range Status   03/10/2025 47.8 37.0 - 54.0 fl Final     MPV   Date Value Ref Range Status   03/10/2025 8.9 6.0 - 12.0 fL Final     Platelets   Date Value Ref Range Status   03/10/2025 254 140 - 450 10*3/mm3 Final     Neutrophil %   Date Value Ref Range Status   03/10/2025 78.6 (H) 42.7 - 76.0 % Final     Lymphocyte %   Date Value Ref Range Status   03/10/2025 8.9 (L) 19.6 - 45.3 % Final     Monocyte %   Date Value Ref Range Status   03/10/2025 10.4 5.0 - 12.0 % Final     Eosinophil %   Date Value Ref Range Status   03/10/2025 0.8 0.3 - 6.2 % Final     Basophil %   Date Value Ref Range Status   03/10/2025 0.5 0.0 - 1.5 % Final     Immature Grans %   Date Value Ref Range Status   03/10/2025 0.8 (H) 0.0 - 0.5 % Final     Neutrophils, Absolute   Date Value Ref Range Status   03/10/2025 8.67 (H) 1.70 - 7.00 10*3/mm3 Final     Lymphocytes, Absolute   Date Value Ref Range  Status   03/10/2025 0.98 0.70 - 3.10 10*3/mm3 Final     Monocytes, Absolute   Date Value Ref Range Status   03/10/2025 1.15 (H) 0.10 - 0.90 10*3/mm3 Final     Eosinophils, Absolute   Date Value Ref Range Status   03/10/2025 0.09 0.00 - 0.40 10*3/mm3 Final     Basophils, Absolute   Date Value Ref Range Status   03/10/2025 0.05 0.00 - 0.20 10*3/mm3 Final     Immature Grans, Absolute   Date Value Ref Range Status   03/10/2025 0.09 (H) 0.00 - 0.05 10*3/mm3 Final     nRBC   Date Value Ref Range Status   03/10/2025 0.0 0.0 - 0.2 /100 WBC Final         Lab Results   Component Value Date    GLUCOSE 224 (H) 03/10/2025    BUN 30 (H) 03/10/2025    CREATININE 1.88 (H) 03/10/2025     03/10/2025    K 4.1 03/10/2025     03/10/2025    CALCIUM 9.0 03/10/2025    PROTEINTOT 7.4 03/10/2025    ALBUMIN 3.8 03/10/2025    ALT 14 03/10/2025    AST 25 03/10/2025    ALKPHOS 128 (H) 03/10/2025    BILITOT 0.5 03/10/2025    GLOB 3.6 03/10/2025    AGRATIO 1.1 03/10/2025    BCR 16.0 03/10/2025    ANIONGAP 12.1 03/10/2025    EGFR 36.1 (L) 03/10/2025       Patient seen in no apparent distress.      PHYSICAL EXAM:     Foot/Ankle Exam    GENERAL  Appearance:  appears stated age and elderly  Orientation:  AAOx3  Affect:  appropriate  Gait:  unimpaired  Assistance:  independent  Right shoe gear: casual shoe  Left shoe gear: casual shoe    VASCULAR     Right Foot Vascularity   Normal vascular exam    Dorsalis pedis:  2+  Posterior tibial:  2+  Skin temperature:  warm  Edema grading:  None  CFT:  < 3 seconds  Pedal hair growth:  Present  Varicosities:  none     Left Foot Vascularity   Normal vascular exam    Dorsalis pedis:  2+  Posterior tibial:  2+  Skin temperature:  warm  Edema grading:  None  CFT:  < 3 seconds  Pedal hair growth:  Present  Varicosities:  none     NEUROLOGIC     Right Foot Neurologic   Light touch sensation: diminished  Vibratory sensation: diminished  Hot/Cold sensation: diminished  Protective Sensation using  [place] Monofilament:   Sites intact: 2  Sites tested: 10     Left Foot Neurologic   Light touch sensation: diminished  Vibratory sensation: diminished  Hot/Cold sensation:  diminished  Protective Sensation using [place] Monofilament:   Sites intact: 2  Sites tested: 10    MUSCULOSKELETAL     Right Foot Musculoskeletal   Tenderness:  toe 2 tenderness    Hammertoe:  Second toe, third toe, fourth toe and fifth toe  Hallux valgus: Yes       Left Foot Musculoskeletal   Hammertoe:  Second toe, third toe, fourth toe and fifth toe    MUSCLE STRENGTH     Right Foot Muscle Strength   Foot dorsiflexion:  4  Foot plantar flexion:  4  Foot inversion:  4  Foot eversion:  4     Left Foot Muscle Strength   Foot dorsiflexion:  4  Foot plantar flexion:  4  Foot inversion:  4  Foot eversion:  4    RANGE OF MOTION     Right Foot Range of Motion   Foot and ankle ROM within normal limits       Left Foot Range of Motion   Foot and ankle ROM within normal limits      DERMATOLOGIC      Right Foot Dermatologic   Skin  Positive for ulcer (Gout ulcer dorsal aspect of right second DIP joint with tophi).      Left Foot Dermatologic   Skin  Left foot skin is intact.       Physical Exam        ASSESSMENT/PLAN     Diagnoses and all orders for this visit:    1. Acute gout due to renal impairment involving toe of right foot (Primary)  -     Ambulatory Referral to Rheumatology  -     povidone-iodine (Betadine Swabsticks) 10 % swab; Apply 1 Application topically to the appropriate area as directed Daily.  Dispense: 30 each; Refill: 11    2. Foot pain, right    3. Neuropathy        Assessment & Plan  1. Pain in the right second toe.  The patient's symptoms are indicative of gout, a condition often associated with renal complications. He has a history of congestive heart failure and chronic kidney disease, which complicates the treatment of gout. He was previously hospitalized due to kidney insult from diuretics and was treated with IV  morphine and Percocet. He has completed a course of Keflex for suspected cellulitis in the right foot, which resolved except for the affected toe. A referral to Dr. Macedo, a rheumatologist, will be initiated for further evaluation and management. Newer treatments, including infusion therapies, will be considered.    Wound care to right second toe: Dab with Betadine cover with Band-Aid daily.  The patient states understanding and agreement with this plan.    Comprehensive lower extremity examination and evaluation was performed.    Discussed findings and treatment plan including risks, benefits, and treatment options with patient in detail. Patient agreed with treatment plan.    Medications and allergies reviewed.  Reviewed available lab values along with other pertinent labs.  These were discussed with the patient.    The need for a referral to another physician was discussed with the patient.  They state understanding and agreement with this plan.  The patient is to referred to Dr. Macedo, rheumatology for evaluation for gout.    An After Visit Summary was printed and given to the patient at discharge, including (if requested) any available informative/educational handouts regarding diagnosis, treatment, or medications. All questions were answered to patient/family satisfaction. Should symptoms fail to improve or worsen they agree to call or return to clinic or to go to the Emergency Department. Discussed the importance of following up with any needed screening tests/labs/specialist appointments and any requested follow-up recommended by me today. Importance of maintaining follow-up discussed and patient accepts that missed appointments can delay diagnosis and potentially lead to worsening of conditions.    Return if symptoms worsen or fail to improve., or sooner if acute issues arise.    This document has been electronically signed by Ralph Whyte DPM on March 17, 2025 09:15 EDT

## 2025-04-18 ENCOUNTER — TRANSCRIBE ORDERS (OUTPATIENT)
Dept: ADMINISTRATIVE | Facility: HOSPITAL | Age: 80
End: 2025-04-18
Payer: MEDICARE

## 2025-04-18 ENCOUNTER — LAB (OUTPATIENT)
Dept: LAB | Facility: HOSPITAL | Age: 80
End: 2025-04-18
Payer: MEDICARE

## 2025-04-18 DIAGNOSIS — N18.9 CHRONIC KIDNEY DISEASE, UNSPECIFIED CKD STAGE: Primary | ICD-10-CM

## 2025-04-18 DIAGNOSIS — N18.9 CHRONIC KIDNEY DISEASE, UNSPECIFIED CKD STAGE: ICD-10-CM

## 2025-04-18 LAB
ANION GAP SERPL CALCULATED.3IONS-SCNC: 14.6 MMOL/L (ref 5–15)
BUN SERPL-MCNC: 25 MG/DL (ref 8–23)
BUN/CREAT SERPL: 15.6 (ref 7–25)
CALCIUM SPEC-SCNC: 9.3 MG/DL (ref 8.6–10.5)
CHLORIDE SERPL-SCNC: 99 MMOL/L (ref 98–107)
CO2 SERPL-SCNC: 24.4 MMOL/L (ref 22–29)
CREAT SERPL-MCNC: 1.6 MG/DL (ref 0.76–1.27)
EGFRCR SERPLBLD CKD-EPI 2021: 43.8 ML/MIN/1.73
GLUCOSE SERPL-MCNC: 263 MG/DL (ref 65–99)
POTASSIUM SERPL-SCNC: 3.7 MMOL/L (ref 3.5–5.2)
SODIUM SERPL-SCNC: 138 MMOL/L (ref 136–145)

## 2025-04-18 PROCEDURE — 80048 BASIC METABOLIC PNL TOTAL CA: CPT

## 2025-04-18 PROCEDURE — 36415 COLL VENOUS BLD VENIPUNCTURE: CPT

## 2025-04-18 RX ORDER — OXYCODONE AND ACETAMINOPHEN 5; 325 MG/1; MG/1
1 TABLET ORAL EVERY 6 HOURS PRN
Qty: 20 TABLET | Refills: 0 | Status: SHIPPED | OUTPATIENT
Start: 2025-04-18

## 2025-04-19 DIAGNOSIS — I48.19 PERSISTENT ATRIAL FIBRILLATION: ICD-10-CM

## 2025-04-19 DIAGNOSIS — E78.2 MIXED HYPERLIPIDEMIA: ICD-10-CM

## 2025-04-19 DIAGNOSIS — E11.65 TYPE 2 DIABETES MELLITUS WITH HYPERGLYCEMIA, WITHOUT LONG-TERM CURRENT USE OF INSULIN: ICD-10-CM

## 2025-04-21 RX ORDER — METOPROLOL SUCCINATE 50 MG/1
50 TABLET, EXTENDED RELEASE ORAL DAILY
Qty: 90 TABLET | Refills: 3 | Status: SHIPPED | OUTPATIENT
Start: 2025-04-21

## 2025-04-21 RX ORDER — SIMVASTATIN 20 MG
20 TABLET ORAL EVERY EVENING
Qty: 90 TABLET | Refills: 3 | Status: SHIPPED | OUTPATIENT
Start: 2025-04-21

## 2025-04-21 RX ORDER — GLYBURIDE 2.5 MG/1
2.5 TABLET ORAL
Qty: 90 TABLET | Refills: 3 | Status: SHIPPED | OUTPATIENT
Start: 2025-04-21

## 2025-04-21 RX ORDER — ALLOPURINOL 100 MG/1
100 TABLET ORAL DAILY
Qty: 90 TABLET | Refills: 3 | Status: SHIPPED | OUTPATIENT
Start: 2025-04-21

## 2025-04-21 RX ORDER — METFORMIN HYDROCHLORIDE 500 MG/1
TABLET, EXTENDED RELEASE ORAL
Qty: 180 TABLET | Refills: 3 | Status: SHIPPED | OUTPATIENT
Start: 2025-04-21

## 2025-05-05 ENCOUNTER — OFFICE VISIT (OUTPATIENT)
Dept: PODIATRY | Facility: CLINIC | Age: 80
End: 2025-05-05
Payer: MEDICARE

## 2025-05-05 VITALS
DIASTOLIC BLOOD PRESSURE: 69 MMHG | SYSTOLIC BLOOD PRESSURE: 105 MMHG | OXYGEN SATURATION: 98 % | BODY MASS INDEX: 29.99 KG/M2 | HEART RATE: 94 BPM | HEIGHT: 77 IN | TEMPERATURE: 95.8 F | WEIGHT: 254 LBS

## 2025-05-05 DIAGNOSIS — L60.0 ONYCHOCRYPTOSIS: ICD-10-CM

## 2025-05-05 DIAGNOSIS — L89.891 PRESSURE INJURY OF TOE OF LEFT FOOT, STAGE 1: ICD-10-CM

## 2025-05-05 DIAGNOSIS — E11.42 TYPE 2 DIABETES MELLITUS WITH POLYNEUROPATHY: Primary | ICD-10-CM

## 2025-05-05 DIAGNOSIS — E11.8 DM FEET: ICD-10-CM

## 2025-05-05 DIAGNOSIS — B35.1 ONYCHOMYCOSIS: ICD-10-CM

## 2025-05-05 DIAGNOSIS — M79.672 FOOT PAIN, BILATERAL: ICD-10-CM

## 2025-05-05 DIAGNOSIS — G62.9 NEUROPATHY: ICD-10-CM

## 2025-05-05 DIAGNOSIS — M79.671 FOOT PAIN, BILATERAL: ICD-10-CM

## 2025-05-05 PROCEDURE — 3078F DIAST BP <80 MM HG: CPT | Performed by: PODIATRIST

## 2025-05-05 PROCEDURE — 97597 DBRDMT OPN WND 1ST 20 CM/<: CPT | Performed by: PODIATRIST

## 2025-05-05 PROCEDURE — 1160F RVW MEDS BY RX/DR IN RCRD: CPT | Performed by: PODIATRIST

## 2025-05-05 PROCEDURE — 3074F SYST BP LT 130 MM HG: CPT | Performed by: PODIATRIST

## 2025-05-05 PROCEDURE — 11721 DEBRIDE NAIL 6 OR MORE: CPT | Performed by: PODIATRIST

## 2025-05-05 PROCEDURE — 1159F MED LIST DOCD IN RCRD: CPT | Performed by: PODIATRIST

## 2025-05-05 NOTE — PROGRESS NOTES
[facility]    Today's Date: 05/05/25    Patient Name: Edi Weston  MRN: [id number]  CSN: [id number]  PCP: Jesus Lux DO, last PCP Visit: 14 March 2025  Referring Provider: No ref. provider found    SUBJECTIVE     Chief Complaint   Patient presents with    Left Foot - Follow-up, Nail Problem, Diabetes     neuropathy    Right Foot - Follow-up, Nail Problem, Diabetes     neuropathy     HPI: Edi Weston, a 80 y.o.male, comes to clinic:    New, Established, New Problem:  New problem    Location:  Toenails    Duration:   Greater than one year    Onset:  Gradual    Nature:  sore with palpation.    Aggravating factors:  Pain with shoe gear and ambulation.  ______________________________________________________________________      New, Established, New Problem: Second new problem    Location:  hyperkeratotic lesion(s) on distal left second toe    Onset:  gradual    Nature:  sore    Aggravating factors:  Patient reports lesion(s) is painful with shoegear and ambulation.    ______________________________________________________________________      Patient reported last blood glucose:  135    Neuropathy.    States he was seen by rheumatology since his last appointment.  Patient states his gout is currently being managed by nephrology with allopurinol    Patient denies any fevers, chills, nausea, vomiting, shortness of breath, nor any other constitutional signs nor symptoms.       Past Medical History:   Diagnosis Date    Arthritis     Atrial fibrillation     Cellulitis of right lower limb     CHF (congestive heart failure)     Chronic heart failure with preserved ejection fraction (HFpEF) 03/20/2023    Colon polyps     Decubitus ulcer of foot, stage 1 08/10/2018    Decubitus ulcer of foot, stage 3 02/07/2018    Foot pain, left 08/10/2018    Foot pain, right     Gout     Hammertoe 12/30/2019    History of colonoscopy with polypectomy 08/25/2020 2018 TUBULAR ADENOMA,  COLONOSCOPY SCHEDULED FOR 10/19/2020 WITH DR KAYY Sheldon nail 2018    Medication management 2019    Nail dystrophy     PAT (paroxysmal atrial tachycardia) 2018    Pressure ulcer, stage 1     Tinea unguium     Type 2 diabetes, controlled, with peripheral neuropathy 08/10/2018     Past Surgical History:   Procedure Laterality Date    ABDOMINAL SURGERY      lap band    COLONOSCOPY      COLONOSCOPY N/A 2024    Procedure: COLONOSCOPY;  Surgeon: Live Amador MD;  Location: [facility] ENDOSCOPY;  Service: General;  Laterality: N/A;  NORMAL COLON    GASTRIC BANDING       Family History   Problem Relation Age of Onset    Nephrolithiasis Mother         RENAL CALCULUS    Kidney disease Mother     Colon cancer Father 50        FAMILY HISTORY OF COLON CANCER    Cancer Father     Colon cancer Paternal Grandfather 60        FAMILY HISTORY OF COLON CANCER      Social History     Socioeconomic History    Marital status:    Tobacco Use    Smoking status: Former     Current packs/day: 0.00     Average packs/day: 2.0 packs/day for 22.0 years (44.0 ttl pk-yrs)     Types: Cigarettes     Start date: 1965     Quit date:      Years since quittin.3    Smokeless tobacco: Never    Tobacco comments:     AGE STARTED 19 QUIT AGE 31 - SMOKED X 20 YEARS QUIT    Vaping Use    Vaping status: Never Used   Substance and Sexual Activity    Alcohol use: Not Currently     Comment: CURRENT SOME DAY 2020,2017    Drug use: Never    Sexual activity: Yes     Partners: Female     Birth control/protection: Vasectomy     Allergies   Allergen Reactions    Sulfa Antibiotics Unknown - Low Severity     Unsure of reaction     Current Outpatient Medications   Medication Sig Dispense Refill    acetaminophen (TYLENOL) 500 MG tablet Take 2 tablets by mouth Every Morning.      allopurinol (ZYLOPRIM) 100 MG tablet TAKE 1 TABLET EVERY DAY 90 tablet 3    apixaban (Eliquis) 5 MG tablet tablet Take 1 tablet  by mouth 2 (Two) Times a Day. Hold for 3 more days and then restart it. Stop if patient has Nosebleed again and contact primary provider regarding it      empagliflozin (Jardiance) 10 MG tablet tablet Take 1 tablet by mouth Daily. 90 tablet 1    furosemide (Lasix) 20 MG tablet Take 20 mg every day except Monday Wednesday and Friday 40 mg.      gabapentin (NEURONTIN) 100 MG capsule Take 2 capsules by mouth every night at bedtime. 180 capsule 1    glyburide (DIAbeta) 2.5 MG tablet TAKE 1 TABLET EVERY DAY WITH BREAKFAST 90 tablet 3    metFORMIN ER (GLUCOPHAGE-XR) 500 MG 24 hr tablet TAKE 2 TABLETS EVERY EVENING 180 tablet 3    metoprolol succinate XL (TOPROL-XL) 50 MG 24 hr tablet TAKE 1 TABLET EVERY DAY 90 tablet 3    oxyCODONE-acetaminophen (PERCOCET) 5-325 MG per tablet Take 1 tablet by mouth Every 6 (Six) Hours As Needed for Moderate Pain. 20 tablet 0    pantoprazole (PROTONIX) 40 MG EC tablet Take 1 tablet by mouth Every Morning. 90 tablet 3    povidone-iodine (Betadine Swabsticks) 10 % swab Apply 1 Application topically to the appropriate area as directed Daily. 30 each 11    simvastatin (ZOCOR) 20 MG tablet TAKE 1 TABLET EVERY EVENING 90 tablet 3     No current facility-administered medications for this visit.     Review of Systems    OBJECTIVE     Vitals:    05/05/25 0918   BP: 105/69   Pulse: 94   Temp: 95.8 °F (35.4 °C)   SpO2: 98%       Lab Results   Component Value Date    HGBA1C 8.40 (H) 03/03/2025       Lab Results   Component Value Date    GLUCOSE 263 (H) 04/18/2025    CALCIUM 9.3 04/18/2025     04/18/2025    K 3.7 04/18/2025    CO2 24.4 04/18/2025    CL 99 04/18/2025    BUN 25 (H) 04/18/2025    CREATININE 1.60 (H) 04/18/2025    EGFRIFNONA 56 (L) 02/14/2022    BCR 15.6 04/18/2025    ANIONGAP 14.6 04/18/2025       Patient seen in no apparent distress.      PHYSICAL EXAM:     Foot/Ankle Exam    GENERAL  Diabetic foot exam performed    Appearance:  appears stated age and obese  Orientation:   AAOx3  Affect:  appropriate  Gait:  unimpaired  Assistance:  independent  Right shoe gear: casual shoe  Left shoe gear: casual shoe    VASCULAR     Right Foot Vascularity   Normal vascular exam    Dorsalis pedis:  2+  Posterior tibial:  2+  Skin temperature:  warm  Edema grading:  None  CFT:  < 3 seconds  Pedal hair growth:  Present  Varicosities:  none     Left Foot Vascularity   Normal vascular exam    Dorsalis pedis:  2+  Posterior tibial:  2+  Skin temperature:  warm  Edema grading:  None  CFT:  < 3 seconds  Pedal hair growth:  Present  Varicosities:  none     NEUROLOGIC     Right Foot Neurologic   Light touch sensation: diminished  Vibratory sensation: diminished  Hot/Cold sensation: diminished  Protective Sensation using [place] Monofilament:   Sites intact: 2  Sites tested: 10     Left Foot Neurologic   Light touch sensation: diminished  Vibratory sensation: diminished  Hot/Cold sensation:  diminished  Protective Sensation using [place] Monofilament:   Sites intact: 2  Sites tested: 10    MUSCULOSKELETAL     Right Foot Musculoskeletal   Hammertoe:  Second toe, third toe, fourth toe and fifth toe  Hallux valgus: Yes       Left Foot Musculoskeletal   Hammertoe:  Second toe, fourth toe, third toe and fifth toe  Hallux valgus: Yes      MUSCLE STRENGTH     Right Foot Muscle Strength   Foot dorsiflexion:  4  Foot plantar flexion:  4  Foot inversion:  4  Foot eversion:  4     Left Foot Muscle Strength   Foot dorsiflexion:  4  Foot plantar flexion:  4  Foot inversion:  4  Foot eversion:  4    RANGE OF MOTION     Right Foot Range of Motion   Foot and ankle ROM within normal limits       Left Foot Range of Motion   Foot and ankle ROM within normal limits      DERMATOLOGIC      Right Foot Dermatologic   Skin  Right foot skin is intact.   Nails  1.  Positive for elongated, onychomycosis, abnormal thickness, subungual debris and ingrown toenail.  2.  Positive for elongated, onychomycosis, abnormal  thickness, subungual debris and ingrown toenail.  3.  Positive for elongated, onychomycosis, abnormal thickness, subungual debris and ingrown toenail.  4.  Positive for elongated, onychomycosis, abnormal thickness, subungual debris and ingrown toenail.  5.  Positive for elongated, onychomycosis, abnormal thickness, subungual debris and ingrown toenail.  Nails comment:  Toenails 1, 2, 3, 4, and 5     Left Foot Dermatologic   Skin  Positive for ulcer (Distal left second toe).   Nails comment:  Toenails 1, 2, 3, 4, and 5  Nails  1.  Positive for elongated, onychomycosis, abnormal thickness, subungual debris and ingrown toenail.  2.  Positive for elongated, onychomycosis, abnormal thickness, subungual debris and ingrown toenail.  3.  Positive for elongated, onychomycosis, abnormal thickness, subungual debris and ingrown toenail.  4.  Positive for elongated, onychomycosis, abnormally thick, subungual debris and ingrown toenail.  5.  Positive for elongated, onychomycosis, abnormally thick, subungual debris and ingrown toenail.     Left foot additional comments: Stage 1 ulceration on the distal second toe area of the foot.  Hyperkeratotic tissue with subepidermal hemosiderin deposition is present.  No surrounding, edema, erythema, lymphangitis, fluctuance, nor signs of infection.  No drainage present.  12 mm x 10 mm x 3 mm.  This area was debrided via excisional partial thickness debridement with a 10 scalpel blade.  Post debridement measurements were 9 mm x 8 mm x 1 mm in depth.    Diabetic Foot Exam Performed and Monofilament Test Performed    ASSESSMENT/PLAN     Diagnoses and all orders for this visit:    1. Type 2 diabetes mellitus with polyneuropathy (Primary)    2. Neuropathy    3. DM feet    4. Foot pain, bilateral    5. Onychocryptosis    6. Onychomycosis    7. Pressure injury of toe of left foot, stage 1    Comprehensive lower extremity examination and evaluation was performed.    Discussed findings and treatment  plan including risks, benefits, and treatment options with patient in detail. Patient agreed with treatment plan.    Toenails 1, 2, 3, 4, 5 on Right and 1, 2, 3, 4, 5 on Left were debrided with nail nippers then filed with a Dremel nail kashmir.  Patient tolerated procedure well without complications.    Medications and allergies reviewed.  Reviewed available blood glucose and HgB A1C lab values along with other pertinent labs.  These were discussed with the patient as to their importance of diabetic maintenance.    Diabetic foot exam performed and documented this date, compliant with CQM required standards. Detail of findings as noted in physical exam.  Lower extremity Neurologic exam for diabetic patient performed and documented this date, compliant with PQRS required standards. Detail of findings as noted in physical exam.  Advised patient importance of good routine lower extremity hygiene. Advised patient importance of evaluating for intact skin and pain free nail borders.  Advised patient to use mirror to evaluate plantar/ soles of feet for better visualization. Advised patient monitor and phone office to be seen if any cracking to skin, open lesions, painful nail borders or if nails become elongated prior to next visit. Advised patient importance of daily cleansing of lower extremities, followed by good skin cream to maintain normal hydration of skin. Also advised patient importance of close daily monitoring of blood sugar. Advised to regulate diet and medications to maintain control of blood sugar in optimal range. Contact primary care provider if difficulties maintaining blood sugar levels.  Advised Patient of presence of Diabetes Mellitus condition.  Advised Patient risk of progression and worsening or improvement, then return of condition.  Will monitor condition for any change in future. Treat with most appropriate treatment pending status of condition.  Counseled and advised patient extensively on nature  and ramifications of diabetes. Standard instructions given to patient for good diabetic foot care and maintenance. Advised importance of careful monitoring to avoid break down and complications secondary to diabetes. Advised patient importance of strict maintenance of blood sugar control. Advised patient of possible ominous results from neglect of condition, i.e.: amputation/ loss of digits, feet and legs, or even death.  Patient states understands counseling, will monitor closely, continue good hygiene and routine diabetic foot care. Patient will contact office if questions or problems.      An After Visit Summary was printed and given to the patient at discharge, including (if requested) any available informative/educational handouts regarding diagnosis, treatment, or medications. All questions were answered to patient/family satisfaction. Should symptoms fail to improve or worsen they agree to call or return to clinic or to go to the Emergency Department. Discussed the importance of following up with any needed screening tests/labs/specialist appointments and any requested follow-up recommended by me today. Importance of maintaining follow-up discussed and patient accepts that missed appointments can delay diagnosis and potentially lead to worsening of conditions.    Return in about 9 weeks (around 7/7/2025) for Toenail Care, Ulcer Follow-Up., or sooner if acute issues arise.    This document has been electronically signed by Ralph Whyte DPM on May 5, 2025 09:54 EDT

## 2025-05-07 ENCOUNTER — OFFICE VISIT (OUTPATIENT)
Dept: CARDIOLOGY | Facility: CLINIC | Age: 80
End: 2025-05-07
Payer: MEDICARE

## 2025-05-07 VITALS
BODY MASS INDEX: 29.57 KG/M2 | HEIGHT: 77 IN | HEART RATE: 78 BPM | SYSTOLIC BLOOD PRESSURE: 113 MMHG | WEIGHT: 250.4 LBS | DIASTOLIC BLOOD PRESSURE: 72 MMHG

## 2025-05-07 DIAGNOSIS — I25.10 CORONARY ARTERY CALCIFICATION SEEN ON CT SCAN: ICD-10-CM

## 2025-05-07 DIAGNOSIS — E78.2 MIXED HYPERLIPIDEMIA: ICD-10-CM

## 2025-05-07 DIAGNOSIS — I48.92 ATRIAL FLUTTER WITH CONTROLLED RESPONSE: Primary | ICD-10-CM

## 2025-05-07 DIAGNOSIS — I50.32 CHRONIC HEART FAILURE WITH PRESERVED EJECTION FRACTION (HFPEF): ICD-10-CM

## 2025-05-07 DIAGNOSIS — I10 HYPERTENSION, ESSENTIAL: ICD-10-CM

## 2025-05-07 PROBLEM — J96.01 ACUTE HYPOXIC RESPIRATORY FAILURE: Status: RESOLVED | Noted: 2024-06-07 | Resolved: 2025-05-07

## 2025-05-07 PROCEDURE — 3078F DIAST BP <80 MM HG: CPT | Performed by: NURSE PRACTITIONER

## 2025-05-07 PROCEDURE — 99214 OFFICE O/P EST MOD 30 MIN: CPT | Performed by: NURSE PRACTITIONER

## 2025-05-07 PROCEDURE — 3074F SYST BP LT 130 MM HG: CPT | Performed by: NURSE PRACTITIONER

## 2025-05-07 NOTE — PROGRESS NOTES
Chief Complaint  Follow-up, Atrial Flutter, Hyperlipidemia, and Hypertension    Subjective            History of Present Illness  Edi Weston is a 80-year-old male patient who presents to the office today for follow up.    History of Present Illness  The patient presents for follow-up of atrial flutter, coronary artery disease, congestive heart failure, blood pressure and cholesterol management.    He reports satisfactory tolerance of his current medication regimen. He has been experiencing a general feeling of weakness since the beginning of the year, which he attributes to his chronic kidney disease. This condition has led to an accumulation of uric acid, resulting in recurrent gout episodes. He experiences fatigue, coldness, and chills, often necessitating rest. Despite these symptoms, he maintains a daily routine of walking his dog for half a mile. He has also made dietary modifications, eliminating caffeine, sugar, and salt from his diet. He has experienced a weight loss of approximately 10 to 15 pounds but notes a recent slight increase in weight. He reports no new or worsening cardiac symptoms. His energy levels are low, which he believes is a consequence of his ongoing illness. His nephrologist added Jardiance around 02/2025 for chronic kidney disease.    MEDICATIONS  Eliquis 5 mg twice daily, Lasix 20 mg daily, metoprolol 50 mg daily, simvastatin 20 mg in the evening, Jardiance.        PMH  Past Medical History:   Diagnosis Date    Arthritis     Atrial fibrillation     Cellulitis of right lower limb     CHF (congestive heart failure)     Chronic heart failure with preserved ejection fraction (HFpEF) 03/20/2023    Colon polyps     Decubitus ulcer of foot, stage 1 08/10/2018    Decubitus ulcer of foot, stage 3 02/07/2018    Foot pain, left 08/10/2018    Foot pain, right     Gout     Hammertoe 12/30/2019    History of colonoscopy with polypectomy 08/25/2020 2018 TUBULAR ADENOMA, COLONOSCOPY SCHEDULED  FOR 10/19/2020 WITH DR SULTANA    Ingrowing nail 2018    Medication management 2019    Nail dystrophy     PAT (paroxysmal atrial tachycardia) 2018    Pressure ulcer, stage 1     Tinea unguium     Type 2 diabetes, controlled, with peripheral neuropathy 08/10/2018         ALLERGY  Allergies   Allergen Reactions    Sulfa Antibiotics Unknown - Low Severity     Unsure of reaction          SURGICALHX  Past Surgical History:   Procedure Laterality Date    ABDOMINAL SURGERY      lap band    COLONOSCOPY      COLONOSCOPY N/A 2024    Procedure: COLONOSCOPY;  Surgeon: Live Amador MD;  Location: [facility] ENDOSCOPY;  Service: General;  Laterality: N/A;  NORMAL COLON    GASTRIC BANDING            SOC  Social History     Socioeconomic History    Marital status:    Tobacco Use    Smoking status: Former     Current packs/day: 0.00     Average packs/day: 2.0 packs/day for 22.0 years (44.0 ttl pk-yrs)     Types: Cigarettes     Start date: 1965     Quit date:      Years since quittin.3    Smokeless tobacco: Never    Tobacco comments:     AGE STARTED 19 QUIT AGE 31 - SMOKED X 20 YEARS QUIT    Vaping Use    Vaping status: Never Used   Substance and Sexual Activity    Alcohol use: Not Currently     Comment: CURRENT SOME DAY 2020,2017    Drug use: Never    Sexual activity: Yes     Partners: Female     Birth control/protection: Vasectomy         FAMHX  Family History   Problem Relation Age of Onset    Nephrolithiasis Mother         RENAL CALCULUS    Kidney disease Mother     Colon cancer Father 50        FAMILY HISTORY OF COLON CANCER    Cancer Father     Colon cancer Paternal Grandfather 60        FAMILY HISTORY OF COLON CANCER           MEDSIGONLY  Current Outpatient Medications on File Prior to Visit   Medication Sig    acetaminophen (TYLENOL) 500 MG tablet Take 2 tablets by mouth Every Morning.    allopurinol (ZYLOPRIM) 100 MG tablet TAKE 1 TABLET EVERY DAY (Patient taking  "differently: Take 1 tablet by mouth 2 (Two) Times a Day.)    apixaban (Eliquis) 5 MG tablet tablet Take 1 tablet by mouth 2 (Two) Times a Day. Hold for 3 more days and then restart it. Stop if patient has Nosebleed again and contact primary provider regarding it    empagliflozin (Jardiance) 10 MG tablet tablet Take 1 tablet by mouth Daily. (Patient taking differently: Take 2.5 tablets by mouth Daily.)    furosemide (Lasix) 20 MG tablet Take 20 mg every day except Monday Wednesday and Friday 40 mg.    gabapentin (NEURONTIN) 100 MG capsule Take 2 capsules by mouth every night at bedtime.    glyburide (DIAbeta) 2.5 MG tablet TAKE 1 TABLET EVERY DAY WITH BREAKFAST    metFORMIN ER (GLUCOPHAGE-XR) 500 MG 24 hr tablet TAKE 2 TABLETS EVERY EVENING    metoprolol succinate XL (TOPROL-XL) 50 MG 24 hr tablet TAKE 1 TABLET EVERY DAY    oxyCODONE-acetaminophen (PERCOCET) 5-325 MG per tablet Take 1 tablet by mouth Every 6 (Six) Hours As Needed for Moderate Pain.    pantoprazole (PROTONIX) 40 MG EC tablet Take 1 tablet by mouth Every Morning.    povidone-iodine (Betadine Swabsticks) 10 % swab Apply 1 Application topically to the appropriate area as directed Daily.    simvastatin (ZOCOR) 20 MG tablet TAKE 1 TABLET EVERY EVENING     No current facility-administered medications on file prior to visit.         Objective   /72   Pulse 78   Ht 195.6 cm (77.01\")   Wt 114 kg (250 lb 6.4 oz)   BMI 29.69 kg/m²       Physical Exam  HENT:      Head: Normocephalic.   Neck:      Vascular: No carotid bruit.   Cardiovascular:      Rate and Rhythm: Normal rate and regular rhythm.      Pulses: Normal pulses.      Heart sounds: Normal heart sounds. No murmur heard.  Pulmonary:      Effort: Pulmonary effort is normal.      Breath sounds: Normal breath sounds.   Musculoskeletal:      Cervical back: Neck supple.      Right lower leg: No edema.      Left lower leg: No edema.   Skin:     General: Skin is dry.   Neurological:      Mental Status: " "He is alert and oriented to person, place, and time.   Psychiatric:         Behavior: Behavior normal.         Result Review :   The following data was reviewed by: DIONNA Mcpherson on 05/07/2025:  proBNP   Date Value Ref Range Status   03/10/2025 2,442.0 (H) 0.0 - 1,800.0 pg/mL Final     CMP          3/3/2025    06:40 3/10/2025    22:59 4/18/2025    15:09   CMP   Glucose 202  224  263    BUN 32  30  25    Creatinine 1.52  1.88  1.60    EGFR 46.6  36.1  43.8    Sodium 140  140  138    Potassium 4.2  4.1  3.7    Chloride 103  103  99    Calcium 9.3  9.0  9.3    Total Protein 6.9  7.4     Albumin 3.7  3.8     Globulin 3.2  3.6     Total Bilirubin 0.4  0.5     Alkaline Phosphatase 122  128     AST (SGOT) 20  25     ALT (SGPT) 12  14     Albumin/Globulin Ratio 1.2  1.1     BUN/Creatinine Ratio 21.1  16.0  15.6    Anion Gap 11.9  12.1  14.6      CBC w/diff          2/3/2025    04:45 3/3/2025    06:40 3/10/2025    22:59   CBC w/Diff   WBC 9.11  7.51  11.03    RBC 4.50  4.48  4.49    Hemoglobin 12.5  12.8  12.1    Hematocrit 39.8  39.4  39.3    MCV 88.4  87.9  87.5    MCH 27.8  28.6  26.9    MCHC 31.4  32.5  30.8    RDW 15.0  14.3  14.8    Platelets 203  236  254    Neutrophil Rel % 67.9  66.9  78.6    Immature Granulocyte Rel % 0.4  0.5  0.8    Lymphocyte Rel % 17.3  15.2  8.9    Monocyte Rel % 10.6  12.9  10.4    Eosinophil Rel % 3.3  4.0  0.8    Basophil Rel % 0.5  0.5  0.5       Lab Results   Component Value Date    TSH 2.080 04/13/2022      No results found for: \"FREET4\"   No results found for: \"DDIMERQUANT\"  Magnesium   Date Value Ref Range Status   02/05/2025 2.4 1.6 - 2.4 mg/dL Final      No results found for: \"DIGOXIN\"   Lab Results   Component Value Date    TROPONINT 34 (H) 02/02/2025           Lipid Panel          9/6/2024    14:19 3/3/2025    06:40   Lipid Panel   Total Cholesterol 86  86    Triglycerides 114  75    HDL Cholesterol 32  32    VLDL Cholesterol 21  16    LDL Cholesterol  33  38    LDL/HDL " Ratio 0.98  1.22        Results for orders placed during the hospital encounter of 06/07/24    Adult Transthoracic Echo Complete W/ Cont if Necessary Per Protocol    Interpretation Summary    Left ventricular systolic function is normal. Estimated left ventricular EF = 55%    The left atrial cavity is moderately dilated.    There is mild calcification of the aortic valve.    Mild dilation of the aortic root is present.           Assessment and Plan    Diagnoses and all orders for this visit:    1. Atrial flutter with controlled response (Primary)    2. Coronary artery calcification seen on CT scan    3. Chronic heart failure with preserved ejection fraction (HFpEF)    4. Hypertension, essential    5. Mixed hyperlipidemia      Assessment & Plan  1. Atrial flutter.  He is currently on Eliquis 5 mg twice daily for blood thinning to reduce stroke risk. He reports tolerating this medication well. No changes are needed as long as there are no new or worsening cardiac symptoms.    2. Coronary artery disease.  He is on metoprolol 50 mg daily to help regulate heart rate and blood pressure. His blood pressure readings are within normal limits today. No changes to his current medication regimen are necessary.    3. Congestive heart failure.  He is taking Lasix 20 mg daily to prevent fluid retention. He reports no new or worsening symptoms. Continue current medication regimen.    4. Blood pressure management.  His blood pressure readings are within normal limits today. He is on metoprolol 50 mg daily. Continue current medication regimen.    5. Cholesterol management.  His cholesterol levels, checked on March 3, are within the desired ranges. He is on simvastatin 20 mg in the evening. Continue current medication regimen.        Follow-up  The patient will follow up in 6 months.      Follow Up   Return in about 6 months (around 11/7/2025) for Follow up with Dr Ledezma.    Patient was given instructions and counseling regarding his  condition or for health maintenance advice. Please see specific information pulled into the AVS if appropriate.     Edi Weston  reports that he quit smoking about 38 years ago. His smoking use included cigarettes. He started smoking about 60 years ago. He has a 44 pack-year smoking history. He has never used smokeless tobacco.            Patient or patient representative verbalized consent for the use of Ambient Listening during the visit with  DIONNA Mcpherson for chart documentation. 5/12/2025  08:25 EDT    DIONNA Mcpherson  05/07/25  13:03 EDT    Dictated Utilizing Dragon Dictation

## 2025-05-08 ENCOUNTER — OFFICE VISIT (OUTPATIENT)
Dept: FAMILY MEDICINE CLINIC | Facility: CLINIC | Age: 80
End: 2025-05-08
Payer: MEDICARE

## 2025-05-08 VITALS
DIASTOLIC BLOOD PRESSURE: 74 MMHG | WEIGHT: 250 LBS | HEART RATE: 85 BPM | OXYGEN SATURATION: 100 % | HEIGHT: 77 IN | SYSTOLIC BLOOD PRESSURE: 122 MMHG | TEMPERATURE: 98 F | BODY MASS INDEX: 29.52 KG/M2

## 2025-05-08 DIAGNOSIS — M1A.0720 IDIOPATHIC CHRONIC GOUT OF LEFT FOOT WITHOUT TOPHUS: ICD-10-CM

## 2025-05-08 DIAGNOSIS — E78.2 MIXED HYPERLIPIDEMIA: ICD-10-CM

## 2025-05-08 DIAGNOSIS — E11.42 TYPE 2 DIABETES, CONTROLLED, WITH PERIPHERAL NEUROPATHY: ICD-10-CM

## 2025-05-08 DIAGNOSIS — I10 HYPERTENSION, ESSENTIAL: Primary | ICD-10-CM

## 2025-05-08 DIAGNOSIS — I48.92 ATRIAL FLUTTER WITH CONTROLLED RESPONSE: ICD-10-CM

## 2025-05-08 PROCEDURE — 3078F DIAST BP <80 MM HG: CPT | Performed by: FAMILY MEDICINE

## 2025-05-08 PROCEDURE — 3074F SYST BP LT 130 MM HG: CPT | Performed by: FAMILY MEDICINE

## 2025-05-08 PROCEDURE — 99214 OFFICE O/P EST MOD 30 MIN: CPT | Performed by: FAMILY MEDICINE

## 2025-05-08 PROCEDURE — 1126F AMNT PAIN NOTED NONE PRSNT: CPT | Performed by: FAMILY MEDICINE

## 2025-05-08 NOTE — PROGRESS NOTES
Chief Complaint   Patient presents with    Follow-up     2 month     Hypertension    Diabetes    Hyperlipidemia        Subjective     Edi Weston  has a past medical history of Arthritis, Cellulitis of right lower limb, CHF (congestive heart failure), Chronic heart failure with preserved ejection fraction (HFpEF) (03/20/2023), Colon polyps, Decubitus ulcer of foot, stage 1 (08/10/2018), Decubitus ulcer of foot, stage 3 (02/07/2018), Foot pain, left (08/10/2018), Foot pain, right, Gout, Hammertoe (12/30/2019), History of colonoscopy with polypectomy (08/25/2020), Ingrowing nail (11/02/2018), Medication management (02/18/2019), Nail dystrophy, PAT (paroxysmal atrial tachycardia) (08/06/2018), Pressure ulcer, stage 1, and Type 2 diabetes, controlled, with peripheral neuropathy (08/10/2018).    Gout-he does have somewhat of a gouty flare on his right great toe.  He is seeing nephrology.  They felt that despite his kidney function he could tolerate the allopurinol.  They are titrating it up slowly as long as he does well with that.  He has a blood test next week to assess and then follow-up with them afterwards.    Hypertension  Chronicity:  Chronic  Onset:  More than 1 year ago  Progression since onset:  Stable  Condition status:  Controlled  Associated symptoms: no blurred vision, no chest pain, no headaches, no palpitations, no shortness of breath and no sweats    CAD risks:  Diabetes mellitus, dyslipidemia and male gender  Current therapy:  Beta blockers  Improvement on treatment:  Significant  Compliance problems:  No compliance problems  End-organ damage: kidney disease    Diabetes  Visit type:  Follow-up  Diabetes type:  Type 2  MedicAlert ID: no    Disease duration:  30 years  Disease course:  Improving  Associated symptoms:     foot paresthesias and polyuria      no blurred vision, no chest pain, no fatigue, no foot ulcerations, no polydipsia and no weight loss    Symptom course:  Stable  Hypoglycemia  symptoms:     confusion      no headaches, is not nervous/anxious, no speech difficulty, no sweats and no tremors    Hypoglycemia complications:     no blackouts, no hospitalization, no nocturnal hypoglycemia, no required assistance and no required glucagon injection    CAD risks:  Diabetes mellitus, dyslipidemia, hypertension and male sex  Current treatments:  Oral agent (dual therapy)  Treatment compliance:  All of the time  Monitoring regimen:     Home blood tests:  1-2 x per day  Highest range:  180-200  Blood glucose ranges (mg/dl):      110-130  Weight trend:  Stable  Current diet:  Generally healthy and low salt  Meal planning:  Carbohydrate counting  Exercise:  Daily  Eye exam current: yes    Sees podiatrist: yes    Hyperlipidemia  This is a chronic problem. The current episode started more than 1 year ago. The problem is controlled. Recent lipid tests were reviewed and are normal. Exacerbating diseases include diabetes. Pertinent negatives include no chest pain or shortness of breath. Current antihyperlipidemic treatment includes statins. The current treatment provides significant improvement of lipids. There are no compliance problems.  Risk factors for coronary artery disease include diabetes mellitus, dyslipidemia, hypertension and male sex.       PHQ-2 Depression Screening  Little interest or pleasure in doing things?     Feeling down, depressed, or hopeless?     PHQ-2 Total Score     PHQ-9 Depression Screening  Little interest or pleasure in doing things?     Feeling down, depressed, or hopeless?     Trouble falling or staying asleep, or sleeping too much?     Feeling tired or having little energy?     Poor appetite or overeating?     Feeling bad about yourself - or that you are a failure or have let yourself or your family down?     Trouble concentrating on things, such as reading the newspaper or watching television?     Moving or speaking so slowly that other people could have noticed? Or the  opposite - being so fidgety or restless that you have been moving around a lot more than usual?     Thoughts that you would be better off dead, or of hurting yourself in some way?     PHQ-9 Total Score     If you checked off any problems, how difficult have these problems made it for you to do your work, take care of things at home, or get along with other people?       Allergies   Allergen Reactions    Sulfa Antibiotics Unknown - Low Severity     Unsure of reaction       Prior to Admission medications    Medication Sig Start Date End Date Taking? Authorizing Provider   acetaminophen (TYLENOL) 500 MG tablet Take 2 tablets by mouth Every Morning.   Yes Provider, MD Cesar   allopurinol (ZYLOPRIM) 100 MG tablet TAKE 1 TABLET EVERY DAY  Patient taking differently: Take 1 tablet by mouth 2 (Two) Times a Day. 4/21/25  Yes Jesus Lux DO   apixaban (ELIQUIS) 5 MG tablet tablet Take 1 tablet by mouth 2 (Two) Times a Day. 5/7/25  Yes Jamila Ty APRN   empagliflozin (Jardiance) 10 MG tablet tablet Take 1 tablet by mouth Daily.  Patient taking differently: Take 2.5 tablets by mouth Daily. 2/7/25  Yes Jesus Lux,    furosemide (Lasix) 20 MG tablet Take 20 mg every day except Monday Wednesday and Friday 40 mg. 3/7/25  Yes Jesus Lux DO   gabapentin (NEURONTIN) 100 MG capsule Take 2 capsules by mouth every night at bedtime. 2/18/25  Yes Jesus Lux DO   glyburide (DIAbeta) 2.5 MG tablet TAKE 1 TABLET EVERY DAY WITH BREAKFAST 4/21/25  Yes Jesus Lux DO   metFORMIN ER (GLUCOPHAGE-XR) 500 MG 24 hr tablet TAKE 2 TABLETS EVERY EVENING 4/21/25  Yes Jesus Lux, DO   metoprolol succinate XL (TOPROL-XL) 50 MG 24 hr tablet TAKE 1 TABLET EVERY DAY 4/21/25  Yes Jesus Lux DO   oxyCODONE-acetaminophen (PERCOCET) 5-325 MG per tablet Take 1 tablet by mouth Every 6 (Six) Hours As Needed for Moderate Pain. 4/18/25  Yes Jesus Lux  DO Bharat   pantoprazole (PROTONIX) 40 MG EC tablet Take 1 tablet by mouth Every Morning. 24  Yes Jesus Lux DO   povidone-iodine (Betadine Swabsticks) 10 % swab Apply 1 Application topically to the appropriate area as directed Daily. 3/17/25 3/17/26 Yes Ralph Whyte DPM   simvastatin (ZOCOR) 20 MG tablet TAKE 1 TABLET EVERY EVENING 25  Yes Jesus Lux DO        Patient Active Problem List   Diagnosis    Type 2 diabetes, controlled, with peripheral neuropathy    History of colonoscopy with polypectomy    Gout    Hammertoe    Arthritis    Atrial flutter with controlled response    LBBB (left bundle branch block)    Colon polyps    Hyperlipidemia    Hypertension, essential    Chronic heart failure with preserved ejection fraction (HFpEF)    Sciatica of right side    Left-sided nosebleed    Coronary artery calcification seen on CT scan    Medicare annual wellness visit, subsequent    Stage 3b chronic kidney disease    Screening for prostate cancer    BON (acute kidney injury)    Neuropathy due to medical condition    Cellulitis of toe of right foot        Past Surgical History:   Procedure Laterality Date    ABDOMINAL SURGERY      lap band    COLONOSCOPY      COLONOSCOPY N/A 2024    Procedure: COLONOSCOPY;  Surgeon: Live Amador MD;  Location: [facility] END[facility]COPY;  Service: General;  Laterality: N/A;  NORMAL COLON    GASTRIC BANDING         Social History     Socioeconomic History    Marital status:    Tobacco Use    Smoking status: Former     Current packs/day: 0.00     Average packs/day: 2.0 packs/day for 22.0 years (44.0 ttl pk-yrs)     Types: Cigarettes     Start date: 1965     Quit date:      Years since quittin.3    Smokeless tobacco: Never    Tobacco comments:     AGE STARTED 19 QUIT AGE 31 - SMOKED X 20 YEARS QUIT    Vaping Use    Vaping status: Never Used   Substance and Sexual Activity    Alcohol use: Not Currently      "Comment: CURRENT SOME DAY 11/16/2020,06/26/2017    Drug use: Never    Sexual activity: Yes     Partners: Female     Birth control/protection: Vasectomy       Family History   Problem Relation Age of Onset    Nephrolithiasis Mother         RENAL CALCULUS    Kidney disease Mother     Colon cancer Father 50        FAMILY HISTORY OF COLON CANCER    Cancer Father     Colon cancer Paternal Grandfather 60        FAMILY HISTORY OF COLON CANCER        Family history, surgical history, past medical history, Allergies and meds reviewed with patient today and updated in [facility] EMR.     ROS:  Review of Systems   Constitutional:  Negative for fatigue and unexpected weight loss.   HENT:  Negative for congestion, postnasal drip and rhinorrhea.    Eyes:  Negative for blurred vision and visual disturbance.   Respiratory:  Negative for cough, chest tightness, shortness of breath and wheezing.    Cardiovascular:  Negative for chest pain and palpitations.   Endocrine: Positive for polyuria. Negative for polydipsia.   Musculoskeletal:  Positive for arthralgias and joint swelling.   Allergic/Immunologic: Negative for environmental allergies.   Neurological:  Positive for confusion. Negative for tremors, speech difficulty, light-headedness and headache.   Psychiatric/Behavioral:  Negative for depressed mood. The patient is not nervous/anxious.        OBJECTIVE:  Vitals:    05/08/25 1407   BP: 122/74   BP Location: Left arm   Patient Position: Sitting   Pulse: 85   Temp: 98 °F (36.7 °C)   SpO2: 100%   Weight: 113 kg (250 lb)   Height: 195.6 cm (77.01\")     No results found.   Body mass index is 29.64 kg/m².  No LMP for male patient.    The ASCVD Risk score (Blakely DK, et al., 2019) failed to calculate for the following reasons:    The valid total cholesterol range is 130 to 320 mg/dL     Physical Exam  Vitals and nursing note reviewed.   Constitutional:       General: He is not in acute distress.     Appearance: Normal appearance. He is " normal weight.   HENT:      Head: Normocephalic.      Right Ear: Tympanic membrane, ear canal and external ear normal.      Left Ear: Tympanic membrane, ear canal and external ear normal.      Nose: Nose normal.      Mouth/Throat:      Mouth: Mucous membranes are moist.      Pharynx: Oropharynx is clear.   Eyes:      General: No scleral icterus.     Conjunctiva/sclera: Conjunctivae normal.      Pupils: Pupils are equal, round, and reactive to light.   Cardiovascular:      Rate and Rhythm: Normal rate and regular rhythm.      Pulses: Normal pulses.      Heart sounds: Normal heart sounds. No murmur heard.  Pulmonary:      Effort: Pulmonary effort is normal.      Breath sounds: Normal breath sounds. No wheezing, rhonchi or rales.   Musculoskeletal:      Cervical back: Neck supple. No rigidity or tenderness.      Right foot: Bunion present.        Feet:    Lymphadenopathy:      Cervical: No cervical adenopathy.   Skin:     General: Skin is warm and dry.      Coloration: Skin is not jaundiced.      Findings: No rash.   Neurological:      General: No focal deficit present.      Mental Status: He is alert and oriented to person, place, and time.   Psychiatric:         Mood and Affect: Mood normal.         Thought Content: Thought content normal.         Judgment: Judgment normal.         Procedures    Lab on 04/18/2025   Component Date Value Ref Range Status    Glucose 04/18/2025 263 (H)  65 - 99 mg/dL Final    BUN 04/18/2025 25 (H)  8 - 23 mg/dL Final    Creatinine 04/18/2025 1.60 (H)  0.76 - 1.27 mg/dL Final    Sodium 04/18/2025 138  136 - 145 mmol/L Final    Potassium 04/18/2025 3.7  3.5 - 5.2 mmol/L Final    Chloride 04/18/2025 99  98 - 107 mmol/L Final    CO2 04/18/2025 24.4  22.0 - 29.0 mmol/L Final    Calcium 04/18/2025 9.3  8.6 - 10.5 mg/dL Final    BUN/Creatinine Ratio 04/18/2025 15.6  7.0 - 25.0 Final    Anion Gap 04/18/2025 14.6  5.0 - 15.0 mmol/L Final    eGFR 04/18/2025 43.8 (L)  >60.0 mL/min/1.73 Final        ASSESSMENT/ PLAN:    Diagnoses and all orders for this visit:    1. Hypertension, essential (Primary)  Assessment & Plan:  His blood pressure is good here today.  He will continue his current meds.      2. Mixed hyperlipidemia  Assessment & Plan:   Recent lipid profile was good      3. Atrial flutter with controlled response  Assessment & Plan:  Clinically he remains regular      4. Type 2 diabetes, controlled, with peripheral neuropathy  Assessment & Plan:   His blood sugar is improved with the increased dosage of his Jardiance    Orders:  -     Comprehensive Metabolic Panel; Future  -     Lipid Panel; Future  -     Hemoglobin A1c; Future  -     Microalbumin / Creatinine Urine Ratio - Urine, Clean Catch; Future    5. Idiopathic chronic gout of left foot without tophus  Assessment & Plan:  His Nephrologist will monitor his Uric acid level          BMI is >= 25 and <30. (Overweight) The following options were offered after discussion;: exercise counseling/recommendations and nutrition counseling/recommendations      Orders Placed Today:     No orders of the defined types were placed in this encounter.       Management Plan:     An After Visit Summary was printed and given to the patient at discharge.    Follow-up: Return in about 2 months (around 7/8/2025) for Recheck.    Jesus Lux DO 5/8/2025 14:48 EDT  This note was electronically signed.  Answers submitted by the patient for this visit:  Diabetes Questionnaire (Submitted on 5/3/2025)  Chief Complaint: Diabetes problem  Below 70: never

## 2025-05-12 ENCOUNTER — LAB (OUTPATIENT)
Dept: LAB | Facility: HOSPITAL | Age: 80
End: 2025-05-12
Payer: MEDICARE

## 2025-05-12 ENCOUNTER — TRANSCRIBE ORDERS (OUTPATIENT)
Dept: LAB | Facility: HOSPITAL | Age: 80
End: 2025-05-12
Payer: MEDICARE

## 2025-05-12 DIAGNOSIS — N18.32 STAGE 3B CHRONIC KIDNEY DISEASE: Primary | ICD-10-CM

## 2025-05-12 DIAGNOSIS — N18.32 STAGE 3B CHRONIC KIDNEY DISEASE: ICD-10-CM

## 2025-05-12 DIAGNOSIS — M10.00 IDIOPATHIC GOUT, UNSPECIFIED CHRONICITY, UNSPECIFIED SITE: ICD-10-CM

## 2025-05-12 DIAGNOSIS — E55.9 VITAMIN D DEFICIENCY: ICD-10-CM

## 2025-05-12 LAB
25(OH)D3 SERPL-MCNC: 28 NG/ML (ref 30–100)
ALBUMIN SERPL-MCNC: 3.9 G/DL (ref 3.5–5.2)
ANION GAP SERPL CALCULATED.3IONS-SCNC: 9.7 MMOL/L (ref 5–15)
BACTERIA UR QL AUTO: NORMAL /HPF
BASOPHILS # BLD AUTO: 0.06 10*3/MM3 (ref 0–0.2)
BASOPHILS NFR BLD AUTO: 0.8 % (ref 0–1.5)
BILIRUB UR QL STRIP: NEGATIVE
BUN SERPL-MCNC: 28 MG/DL (ref 8–23)
BUN/CREAT SERPL: 17 (ref 7–25)
CALCIUM SPEC-SCNC: 9.3 MG/DL (ref 8.6–10.5)
CHLORIDE SERPL-SCNC: 105 MMOL/L (ref 98–107)
CLARITY UR: CLEAR
CO2 SERPL-SCNC: 24.3 MMOL/L (ref 22–29)
COLOR UR: YELLOW
CREAT SERPL-MCNC: 1.65 MG/DL (ref 0.76–1.27)
CREAT UR-MCNC: 163.6 MG/DL
DEPRECATED RDW RBC AUTO: 46.9 FL (ref 37–54)
EGFRCR SERPLBLD CKD-EPI 2021: 42.2 ML/MIN/1.73
EOSINOPHIL # BLD AUTO: 0.54 10*3/MM3 (ref 0–0.4)
EOSINOPHIL NFR BLD AUTO: 7.2 % (ref 0.3–6.2)
ERYTHROCYTE [DISTWIDTH] IN BLOOD BY AUTOMATED COUNT: 15.7 % (ref 12.3–15.4)
GLUCOSE SERPL-MCNC: 145 MG/DL (ref 65–99)
GLUCOSE UR STRIP-MCNC: ABNORMAL MG/DL
HCT VFR BLD AUTO: 38.5 % (ref 37.5–51)
HGB BLD-MCNC: 11.8 G/DL (ref 13–17.7)
HGB UR QL STRIP.AUTO: ABNORMAL
HYALINE CASTS UR QL AUTO: NORMAL /LPF
IMM GRANULOCYTES # BLD AUTO: 0.05 10*3/MM3 (ref 0–0.05)
IMM GRANULOCYTES NFR BLD AUTO: 0.7 % (ref 0–0.5)
KETONES UR QL STRIP: ABNORMAL
LEUKOCYTE ESTERASE UR QL STRIP.AUTO: NEGATIVE
LYMPHOCYTES # BLD AUTO: 1.42 10*3/MM3 (ref 0.7–3.1)
LYMPHOCYTES NFR BLD AUTO: 18.9 % (ref 19.6–45.3)
MCH RBC QN AUTO: 25.3 PG (ref 26.6–33)
MCHC RBC AUTO-ENTMCNC: 30.6 G/DL (ref 31.5–35.7)
MCV RBC AUTO: 82.4 FL (ref 79–97)
MONOCYTES # BLD AUTO: 0.85 10*3/MM3 (ref 0.1–0.9)
MONOCYTES NFR BLD AUTO: 11.3 % (ref 5–12)
NEUTROPHILS NFR BLD AUTO: 4.61 10*3/MM3 (ref 1.7–7)
NEUTROPHILS NFR BLD AUTO: 61.1 % (ref 42.7–76)
NITRITE UR QL STRIP: NEGATIVE
NRBC BLD AUTO-RTO: 0 /100 WBC (ref 0–0.2)
PH UR STRIP.AUTO: 6 [PH] (ref 5–8)
PHOSPHATE SERPL-MCNC: 3.8 MG/DL (ref 2.5–4.5)
PLATELET # BLD AUTO: 229 10*3/MM3 (ref 140–450)
PMV BLD AUTO: 9.3 FL (ref 6–12)
POTASSIUM SERPL-SCNC: 4.4 MMOL/L (ref 3.5–5.2)
PROT ?TM UR-MCNC: 23.4 MG/DL
PROT UR QL STRIP: ABNORMAL
PROT/CREAT UR: 0.14 MG/G{CREAT}
PTH-INTACT SERPL-MCNC: 52.4 PG/ML (ref 15–65)
RBC # BLD AUTO: 4.67 10*6/MM3 (ref 4.14–5.8)
RBC # UR STRIP: NORMAL /HPF
REF LAB TEST METHOD: NORMAL
SODIUM SERPL-SCNC: 139 MMOL/L (ref 136–145)
SP GR UR STRIP: 1.03 (ref 1–1.03)
SQUAMOUS #/AREA URNS HPF: NORMAL /HPF
URATE SERPL-MCNC: 5.1 MG/DL (ref 3.4–7)
UROBILINOGEN UR QL STRIP: ABNORMAL
WBC # UR STRIP: NORMAL /HPF
WBC NRBC COR # BLD AUTO: 7.53 10*3/MM3 (ref 3.4–10.8)

## 2025-05-12 PROCEDURE — 82306 VITAMIN D 25 HYDROXY: CPT

## 2025-05-12 PROCEDURE — 84156 ASSAY OF PROTEIN URINE: CPT

## 2025-05-12 PROCEDURE — 81001 URINALYSIS AUTO W/SCOPE: CPT

## 2025-05-12 PROCEDURE — 36415 COLL VENOUS BLD VENIPUNCTURE: CPT

## 2025-05-12 PROCEDURE — 84550 ASSAY OF BLOOD/URIC ACID: CPT

## 2025-05-12 PROCEDURE — 85025 COMPLETE CBC W/AUTO DIFF WBC: CPT

## 2025-05-12 PROCEDURE — 80069 RENAL FUNCTION PANEL: CPT

## 2025-05-12 PROCEDURE — 82570 ASSAY OF URINE CREATININE: CPT

## 2025-05-12 PROCEDURE — 83970 ASSAY OF PARATHORMONE: CPT

## 2025-05-17 DIAGNOSIS — I50.32 CHRONIC HEART FAILURE WITH PRESERVED EJECTION FRACTION (HFPEF): ICD-10-CM

## 2025-05-19 RX ORDER — FUROSEMIDE 20 MG/1
20 TABLET ORAL DAILY
Qty: 90 TABLET | Refills: 3 | Status: SHIPPED | OUTPATIENT
Start: 2025-05-19

## 2025-06-09 RX ORDER — ALLOPURINOL 100 MG/1
100 TABLET ORAL 2 TIMES DAILY
Qty: 180 TABLET | Refills: 1 | Status: SHIPPED | OUTPATIENT
Start: 2025-06-09

## 2025-07-07 ENCOUNTER — OFFICE VISIT (OUTPATIENT)
Dept: PODIATRY | Facility: CLINIC | Age: 80
End: 2025-07-07
Payer: MEDICARE

## 2025-07-07 VITALS
WEIGHT: 264.4 LBS | SYSTOLIC BLOOD PRESSURE: 114 MMHG | OXYGEN SATURATION: 98 % | HEIGHT: 77 IN | DIASTOLIC BLOOD PRESSURE: 79 MMHG | HEART RATE: 87 BPM | BODY MASS INDEX: 31.22 KG/M2

## 2025-07-07 DIAGNOSIS — G62.9 NEUROPATHY: ICD-10-CM

## 2025-07-07 DIAGNOSIS — M79.671 FOOT PAIN, BILATERAL: ICD-10-CM

## 2025-07-07 DIAGNOSIS — L60.0 ONYCHOCRYPTOSIS: ICD-10-CM

## 2025-07-07 DIAGNOSIS — M79.672 FOOT PAIN, BILATERAL: ICD-10-CM

## 2025-07-07 DIAGNOSIS — L89.891 PRESSURE INJURY OF TOE OF LEFT FOOT, STAGE 1: ICD-10-CM

## 2025-07-07 DIAGNOSIS — B35.1 ONYCHOMYCOSIS: ICD-10-CM

## 2025-07-07 DIAGNOSIS — E11.8 DM FEET: ICD-10-CM

## 2025-07-07 DIAGNOSIS — E11.42 TYPE 2 DIABETES MELLITUS WITH POLYNEUROPATHY: Primary | ICD-10-CM

## 2025-07-07 PROCEDURE — 3074F SYST BP LT 130 MM HG: CPT | Performed by: PODIATRIST

## 2025-07-07 PROCEDURE — 1159F MED LIST DOCD IN RCRD: CPT | Performed by: PODIATRIST

## 2025-07-07 PROCEDURE — 1160F RVW MEDS BY RX/DR IN RCRD: CPT | Performed by: PODIATRIST

## 2025-07-07 PROCEDURE — 97597 DBRDMT OPN WND 1ST 20 CM/<: CPT | Performed by: PODIATRIST

## 2025-07-07 PROCEDURE — 3078F DIAST BP <80 MM HG: CPT | Performed by: PODIATRIST

## 2025-07-07 PROCEDURE — 11721 DEBRIDE NAIL 6 OR MORE: CPT | Performed by: PODIATRIST

## 2025-07-07 NOTE — PROGRESS NOTES
[facility] - PODIATRY    Today's Date: 07/07/25    Patient Name: Edi Weston  MRN: [id number]  CSN: [id number]  PCP: Jesus Lux DO, last PCP Visit: 5/8/2025  Referring Provider: No ref. provider found    SUBJECTIVE     Chief Complaint   Patient presents with    Left Foot - Follow-up, Nail Problem, Diabetes     neuropathy    Right Foot - Follow-up, Nail Problem, Diabetes     neuropathy     HPI: Edi Weston, a 80 y.o.male, comes to clinic:    New, Established, New Problem:  New problem    Location:  Toenails    Duration:   Greater than one year    Onset:  Gradual    Nature:  sore with palpation.    Aggravating factors:  Pain with shoe gear and ambulation.  ______________________________________________________________________      New, Established, New Problem: Second new problem    Location:  hyperkeratotic lesion(s) on distal left second toe    Onset:  gradual    Nature:  sore    Aggravating factors:  Patient reports lesion(s) is painful with shoegear and ambulation.    ______________________________________________________________________      Patient reported last blood glucose:  136    Neuropathy.    Medical changes:  Being treated for gout by nephrologist.    Patient denies any fevers, chills, nausea, vomiting, shortness of breath, nor any other constitutional signs nor symptoms.       Past Medical History:   Diagnosis Date    Arthritis     Atrial fibrillation     Cellulitis of right lower limb     CHF (congestive heart failure)     Chronic heart failure with preserved ejection fraction (HFpEF) 03/20/2023    Chronic kidney disease     Colon polyps     Coronary artery disease     Decubitus ulcer of foot, stage 1 08/10/2018    Decubitus ulcer of foot, stage 3 02/07/2018    Foot pain, left 08/10/2018    Foot pain, right     Gout     Hammertoe 12/30/2019    History of colonoscopy with polypectomy 08/25/2020    2018 TUBULAR ADENOMA, COLONOSCOPY SCHEDULED FOR 10/19/2020 WITH   KAYY    Hypertension     Ingrowing nail 2018    Medication management 2019    Nail dystrophy     Obesity     Onychomycosis     PAT (paroxysmal atrial tachycardia) 2018    Pneumonia     Pressure ulcer, stage 1     Renal insufficiency     Type 2 diabetes, controlled, with peripheral neuropathy 08/10/2018     Past Surgical History:   Procedure Laterality Date    ABDOMINAL SURGERY      lap band    BARIATRIC SURGERY      COLONOSCOPY      COLONOSCOPY N/A 2024    Procedure: COLONOSCOPY;  Surgeon: Live Amador MD;  Location: [facility] ENDOSCOPY;  Service: General;  Laterality: N/A;  NORMAL COLON    CORRECTION HAMMER TOE      GASTRIC BANDING       Family History   Problem Relation Age of Onset    Nephrolithiasis Mother         RENAL CALCULUS    Kidney disease Mother     Colon cancer Father 50        FAMILY HISTORY OF COLON CANCER    Cancer Father     Colon cancer Paternal Grandfather 60        FAMILY HISTORY OF COLON CANCER      Social History     Socioeconomic History    Marital status:    Tobacco Use    Smoking status: Former     Current packs/day: 0.00     Average packs/day: 2.0 packs/day for 22.0 years (44.0 ttl pk-yrs)     Types: Cigarettes     Start date: 1965     Quit date:      Years since quittin.5    Smokeless tobacco: Never    Tobacco comments:     AGE STARTED 19 QUIT AGE 31 - SMOKED X 20 YEARS QUIT    Vaping Use    Vaping status: Never Used   Substance and Sexual Activity    Alcohol use: Not Currently     Comment: CURRENT SOME DAY 2020,2017    Drug use: Never    Sexual activity: Yes     Partners: Female     Birth control/protection: Vasectomy     Allergies   Allergen Reactions    Sulfa Antibiotics Unknown - Low Severity     Unsure of reaction     Current Outpatient Medications   Medication Sig Dispense Refill    acetaminophen (TYLENOL) 500 MG tablet Take 2 tablets by mouth Every Morning.      allopurinol (ZYLOPRIM) 100 MG tablet Take 1 tablet by  mouth 2 (Two) Times a Day. 180 tablet 1    apixaban (ELIQUIS) 5 MG tablet tablet Take 1 tablet by mouth 2 (Two) Times a Day. 180 tablet 3    furosemide (LASIX) 20 MG tablet TAKE 1 TABLET EVERY DAY 90 tablet 3    gabapentin (NEURONTIN) 100 MG capsule Take 2 capsules by mouth every night at bedtime. 180 capsule 1    glyburide (DIAbeta) 2.5 MG tablet TAKE 1 TABLET EVERY DAY WITH BREAKFAST 90 tablet 3    metFORMIN ER (GLUCOPHAGE-XR) 500 MG 24 hr tablet TAKE 2 TABLETS EVERY EVENING 180 tablet 3    metoprolol succinate XL (TOPROL-XL) 50 MG 24 hr tablet TAKE 1 TABLET EVERY DAY 90 tablet 3    oxyCODONE-acetaminophen (PERCOCET) 5-325 MG per tablet Take 1 tablet by mouth Every 6 (Six) Hours As Needed for Moderate Pain. 20 tablet 0    pantoprazole (PROTONIX) 40 MG EC tablet Take 1 tablet by mouth Every Morning. 90 tablet 3    povidone-iodine (Betadine Swabsticks) 10 % swab Apply 1 Application topically to the appropriate area as directed Daily. 30 each 11    simvastatin (ZOCOR) 20 MG tablet TAKE 1 TABLET EVERY EVENING 90 tablet 3     No current facility-administered medications for this visit.     Review of Systems    OBJECTIVE     Vitals:    07/07/25 0735   BP: 114/79   Pulse: 87   SpO2: 98%       Lab Results   Component Value Date    HGBA1C 8.40 (H) 03/03/2025       Lab Results   Component Value Date    GLUCOSE 145 (H) 05/12/2025    CALCIUM 9.3 05/12/2025     05/12/2025    K 4.4 05/12/2025    CO2 24.3 05/12/2025     05/12/2025    BUN 28 (H) 05/12/2025    CREATININE 1.65 (H) 05/12/2025    EGFRIFNONA 56 (L) 02/14/2022    BCR 17.0 05/12/2025    ANIONGAP 9.7 05/12/2025       Patient seen in no apparent distress.      PHYSICAL EXAM:     Foot/Ankle Exam    GENERAL  Appearance:  appears stated age and obese  Orientation:  AAOx3  Affect:  appropriate  Gait:  unimpaired  Assistance:  independent  Right shoe gear: casual shoe  Left shoe gear: casual shoe    VASCULAR     Right Foot Vascularity   Normal vascular exam     Dorsalis pedis:  2+  Posterior tibial:  2+  Skin temperature:  warm  Edema grading:  None  CFT:  < 3 seconds  Pedal hair growth:  Present  Varicosities:  none     Left Foot Vascularity   Normal vascular exam    Dorsalis pedis:  2+  Posterior tibial:  2+  Skin temperature:  warm  Edema grading:  None  CFT:  < 3 seconds  Pedal hair growth:  Present  Varicosities:  none     NEUROLOGIC     Right Foot Neurologic   Light touch sensation: diminished  Vibratory sensation: diminished  Hot/Cold sensation: diminished  Protective Sensation using [place] Monofilament:   Sites intact: 2  Sites tested: 10     Left Foot Neurologic   Light touch sensation: diminished  Vibratory sensation: diminished  Hot/Cold sensation:  diminished  Protective Sensation using [place] Monofilament:   Sites intact: 2  Sites tested: 10    MUSCULOSKELETAL     Right Foot Musculoskeletal   Hammertoe:  Second toe, third toe, fourth toe and fifth toe  Hallux valgus: Yes       Left Foot Musculoskeletal   Hammertoe:  Second toe, fourth toe, third toe and fifth toe  Hallux valgus: Yes      MUSCLE STRENGTH     Right Foot Muscle Strength   Foot dorsiflexion:  4  Foot plantar flexion:  4  Foot inversion:  4  Foot eversion:  4     Left Foot Muscle Strength   Foot dorsiflexion:  4  Foot plantar flexion:  4  Foot inversion:  4  Foot eversion:  4    RANGE OF MOTION     Right Foot Range of Motion   Foot and ankle ROM within normal limits       Left Foot Range of Motion   Foot and ankle ROM within normal limits      DERMATOLOGIC      Right Foot Dermatologic   Skin  Right foot skin is intact.   Nails  1.  Positive for elongated, onychomycosis, abnormal thickness, subungual debris and ingrown toenail.  2.  Positive for elongated, onychomycosis, abnormal thickness, subungual debris and ingrown toenail.  3.  Positive for elongated, onychomycosis, abnormal thickness, subungual debris and ingrown toenail.  4.  Positive for elongated, onychomycosis,  abnormal thickness, subungual debris and ingrown toenail.  5.  Positive for elongated, onychomycosis, abnormal thickness, subungual debris and ingrown toenail.  Nails comment:  Toenails 1, 2, 3, 4, and 5     Left Foot Dermatologic   Skin  Positive for ulcer (Distal left second toe).   Nails comment:  Toenails 1, 2, 3, 4, and 5  Nails  1.  Positive for elongated, onychomycosis, abnormal thickness, subungual debris and ingrown toenail.  2.  Positive for elongated, onychomycosis, abnormal thickness, subungual debris and ingrown toenail.  3.  Positive for elongated, onychomycosis, abnormal thickness, subungual debris and ingrown toenail.  4.  Positive for elongated, onychomycosis, abnormally thick, subungual debris and ingrown toenail.  5.  Positive for elongated, onychomycosis, abnormally thick, subungual debris and ingrown toenail.     Left foot additional comments: Stage 1 ulceration on the distal second toe area of the foot.  Hyperkeratotic tissue with subepidermal hemosiderin deposition is present.  No surrounding, edema, erythema, lymphangitis, fluctuance, nor signs of infection.  No drainage present.  11 mm x 9 mm x 3 mm.  This area was debrided via excisional partial thickness debridement with a 10 scalpel blade.  Post debridement measurements were 10 mm x 8 mm x 1 mm in depth.    Diabetic Foot Exam Performed and Monofilament Test Performed    ASSESSMENT/PLAN     Diagnoses and all orders for this visit:    1. Type 2 diabetes mellitus with polyneuropathy (Primary)    2. Neuropathy    3. Foot pain, bilateral    4. DM feet    5. Onychocryptosis    6. Onychomycosis    7. Pressure injury of toe of left foot, stage 1    Comprehensive lower extremity examination and evaluation was performed.    Discussed findings and treatment plan including risks, benefits, and treatment options with patient in detail. Patient agreed with treatment plan.    Toenails 1, 2, 3, 4, 5 on Right and 1, 2, 3, 4, 5 on Left were debrided with  nail nippers then filed with a Hollie nail k[place].  Patient tolerated procedure well without complications.    Medications and allergies reviewed.  Reviewed available blood glucose and HgB A1C lab values along with other pertinent labs.  These were discussed with the patient as to their importance of diabetic maintenance.    Diabetic foot exam performed and documented this date, compliant with CQM required standards. Detail of findings as noted in physical exam.  Lower extremity Neurologic exam for diabetic patient performed and documented this date, compliant with PQRS required standards. Detail of findings as noted in physical exam.  Advised patient importance of good routine lower extremity hygiene. Advised patient importance of evaluating for intact skin and pain free nail borders.  Advised patient to use mirror to evaluate plantar/ soles of feet for better visualization. Advised patient monitor and phone office to be seen if any cracking to skin, open lesions, painful nail borders or if nails become elongated prior to next visit. Advised patient importance of daily cleansing of lower extremities, followed by good skin cream to maintain normal hydration of skin. Also advised patient importance of close daily monitoring of blood sugar. Advised to regulate diet and medications to maintain control of blood sugar in optimal range. Contact primary care provider if difficulties maintaining blood sugar levels.  Advised Patient of presence of Diabetes Mellitus condition.  Advised Patient risk of progression and worsening or improvement, then return of condition.  Will monitor condition for any change in future. Treat with most appropriate treatment pending status of condition.  Counseled and advised patient extensively on nature and ramifications of diabetes. Standard instructions given to patient for good diabetic foot care and maintenance. Advised importance of careful monitoring to avoid break down and complications  secondary to diabetes. Advised patient importance of strict maintenance of blood sugar control. Advised patient of possible ominous results from neglect of condition, i.e.: amputation/ loss of digits, feet and legs, or even death.  Patient states understands counseling, will monitor closely, continue good hygiene and routine diabetic foot care. Patient will contact office if questions or problems.      An After Visit Summary was printed and given to the patient at discharge, including (if requested) any available informative/educational handouts regarding diagnosis, treatment, or medications. All questions were answered to patient/family satisfaction. Should symptoms fail to improve or worsen they agree to call or return to clinic or to go to the Emergency Department. Discussed the importance of following up with any needed screening tests/labs/specialist appointments and any requested follow-up recommended by me today. Importance of maintaining follow-up discussed and patient accepts that missed appointments can delay diagnosis and potentially lead to worsening of conditions.    Return in about 9 weeks (around 9/8/2025) for Toenail Care, Ulcer Follow-Up., or sooner if acute issues arise.    This document has been electronically signed by Ralph Whyte DPM on July 7, 2025 08:07 EDT

## 2025-07-08 ENCOUNTER — OFFICE VISIT (OUTPATIENT)
Dept: FAMILY MEDICINE CLINIC | Facility: CLINIC | Age: 80
End: 2025-07-08
Payer: MEDICARE

## 2025-07-08 VITALS
DIASTOLIC BLOOD PRESSURE: 80 MMHG | OXYGEN SATURATION: 97 % | WEIGHT: 260 LBS | SYSTOLIC BLOOD PRESSURE: 120 MMHG | HEIGHT: 77 IN | HEART RATE: 76 BPM | TEMPERATURE: 97 F | BODY MASS INDEX: 30.7 KG/M2

## 2025-07-08 DIAGNOSIS — I10 HYPERTENSION, ESSENTIAL: Primary | ICD-10-CM

## 2025-07-08 DIAGNOSIS — M1A.0720 IDIOPATHIC CHRONIC GOUT OF LEFT FOOT WITHOUT TOPHUS: ICD-10-CM

## 2025-07-08 DIAGNOSIS — I25.10 CORONARY ARTERY CALCIFICATION SEEN ON CT SCAN: ICD-10-CM

## 2025-07-08 DIAGNOSIS — N18.32 STAGE 3B CHRONIC KIDNEY DISEASE: ICD-10-CM

## 2025-07-08 DIAGNOSIS — I48.92 ATRIAL FLUTTER WITH CONTROLLED RESPONSE: ICD-10-CM

## 2025-07-08 DIAGNOSIS — E78.2 MIXED HYPERLIPIDEMIA: ICD-10-CM

## 2025-07-08 DIAGNOSIS — E11.42 TYPE 2 DIABETES, CONTROLLED, WITH PERIPHERAL NEUROPATHY: ICD-10-CM

## 2025-07-08 DIAGNOSIS — M25.512 ACUTE PAIN OF LEFT SHOULDER: ICD-10-CM

## 2025-07-08 LAB
ALBUMIN SERPL-MCNC: 4 G/DL (ref 3.5–5.2)
ALBUMIN UR-MCNC: <1.2 MG/DL
ALBUMIN/GLOB SERPL: 1.1 G/DL
ALP SERPL-CCNC: 125 U/L (ref 39–117)
ALT SERPL W P-5'-P-CCNC: 9 U/L (ref 1–41)
ANION GAP SERPL CALCULATED.3IONS-SCNC: 13.4 MMOL/L (ref 5–15)
AST SERPL-CCNC: 18 U/L (ref 1–40)
BILIRUB SERPL-MCNC: 0.6 MG/DL (ref 0–1.2)
BUN SERPL-MCNC: 27 MG/DL (ref 8–23)
BUN/CREAT SERPL: 16.4 (ref 7–25)
CALCIUM SPEC-SCNC: 9.5 MG/DL (ref 8.6–10.5)
CHLORIDE SERPL-SCNC: 100 MMOL/L (ref 98–107)
CHOLEST SERPL-MCNC: 82 MG/DL (ref 0–200)
CO2 SERPL-SCNC: 24.6 MMOL/L (ref 22–29)
CREAT SERPL-MCNC: 1.65 MG/DL (ref 0.76–1.27)
CREAT UR-MCNC: 67.8 MG/DL
EGFRCR SERPLBLD CKD-EPI 2021: 42.2 ML/MIN/1.73
GLOBULIN UR ELPH-MCNC: 3.6 GM/DL
GLUCOSE SERPL-MCNC: 125 MG/DL (ref 65–99)
HDLC SERPL-MCNC: 30 MG/DL (ref 40–60)
LDLC SERPL CALC-MCNC: 24 MG/DL (ref 0–100)
LDLC/HDLC SERPL: 0.58 {RATIO}
MICROALBUMIN/CREAT UR: NORMAL MG/G{CREAT}
POTASSIUM SERPL-SCNC: 4.1 MMOL/L (ref 3.5–5.2)
PROT SERPL-MCNC: 7.6 G/DL (ref 6–8.5)
SODIUM SERPL-SCNC: 138 MMOL/L (ref 136–145)
TRIGL SERPL-MCNC: 173 MG/DL (ref 0–150)
VIT B12 BLD-MCNC: 290 PG/ML (ref 211–946)
VLDLC SERPL-MCNC: 28 MG/DL (ref 5–40)

## 2025-07-08 PROCEDURE — 80061 LIPID PANEL: CPT | Performed by: FAMILY MEDICINE

## 2025-07-08 PROCEDURE — 82570 ASSAY OF URINE CREATININE: CPT | Performed by: FAMILY MEDICINE

## 2025-07-08 PROCEDURE — 82043 UR ALBUMIN QUANTITATIVE: CPT | Performed by: FAMILY MEDICINE

## 2025-07-08 PROCEDURE — 83036 HEMOGLOBIN GLYCOSYLATED A1C: CPT | Performed by: FAMILY MEDICINE

## 2025-07-08 PROCEDURE — 82607 VITAMIN B-12: CPT | Performed by: FAMILY MEDICINE

## 2025-07-08 PROCEDURE — 80053 COMPREHEN METABOLIC PANEL: CPT | Performed by: FAMILY MEDICINE

## 2025-07-08 NOTE — ASSESSMENT & PLAN NOTE
He currently sees nephrology.  He has some labs ordered by them.  He is on glyburide for his diabetes.  We discussed due to its half-life and his chronic kidney disease he may be a little bit more risk for hypoglycemia.  He is not had any hypoglycemic episodes.  Will hold off at this time changing it to glipizide unless he starts to notice some hypoglycemic episodes.  Last time he saw nephrology they also added Jardiance.  We talked also about adding some Ozempic which also has some additional renal protection.  If his A1c is not where it needs to be weak we will add that for better glycemic as well as additional renal protection.

## 2025-07-08 NOTE — ASSESSMENT & PLAN NOTE
He is doing well without any anginal-like symptoms.  He will continue to work on his cardiovascular risk

## 2025-07-08 NOTE — ASSESSMENT & PLAN NOTE
His blood pressure is great here as well as at home.  Will continue his current meds and update his labs

## 2025-07-08 NOTE — PROGRESS NOTES
Subjective   The ABCs of the Annual Wellness Visit  Medicare Wellness Visit      Edi Weston is a 80 y.o. patient who presents for a Medicare Wellness Visit.    The following portions of the patient's history were reviewed and   updated as appropriate: allergies, current medications, past family history, past medical history, past social history, past surgical history, and problem list.    Compared to one year ago, the patient's physical   health is better.  Compared to one year ago, the patient's mental   health is the same.    Recent Hospitalizations:  This patient has had a [facility] admission record on file within the last 365 days.  Current Medical Providers:  Patient Care Team:  Jessu Lux DO as PCP - General (Family Medicine)  Kar Ledezma MD as Consulting Physician (Cardiology)  Shikha Kramer APRN as Nurse Practitioner (Nurse Practitioner)    Outpatient Medications Prior to Visit   Medication Sig Dispense Refill    acetaminophen (TYLENOL) 500 MG tablet Take 2 tablets by mouth Every Morning.      allopurinol (ZYLOPRIM) 100 MG tablet Take 1 tablet by mouth 2 (Two) Times a Day. 180 tablet 1    apixaban (ELIQUIS) 5 MG tablet tablet Take 1 tablet by mouth 2 (Two) Times a Day. 180 tablet 3    empagliflozin (Jardiance) 25 MG tablet tablet Take 1 tablet by mouth Daily.      furosemide (LASIX) 20 MG tablet TAKE 1 TABLET EVERY DAY 90 tablet 3    gabapentin (NEURONTIN) 100 MG capsule Take 2 capsules by mouth every night at bedtime. 180 capsule 1    glyburide (DIAbeta) 2.5 MG tablet TAKE 1 TABLET EVERY DAY WITH BREAKFAST 90 tablet 3    metFORMIN ER (GLUCOPHAGE-XR) 500 MG 24 hr tablet TAKE 2 TABLETS EVERY EVENING 180 tablet 3    metoprolol succinate XL (TOPROL-XL) 50 MG 24 hr tablet TAKE 1 TABLET EVERY DAY 90 tablet 3    oxyCODONE-acetaminophen (PERCOCET) 5-325 MG per tablet Take 1 tablet by mouth Every 6 (Six) Hours As Needed for Moderate Pain. 20 tablet 0    pantoprazole (PROTONIX) 40 MG EC  "tablet Take 1 tablet by mouth Every Morning. 90 tablet 3    povidone-iodine (Betadine Swabsticks) 10 % swab Apply 1 Application topically to the appropriate area as directed Daily. 30 each 11    simvastatin (ZOCOR) 20 MG tablet TAKE 1 TABLET EVERY EVENING 90 tablet 3     No facility-administered medications prior to visit.     Opioid medication/s are on active medication list.  and I have evaluated his active treatment plan and pain score trends (see table).  There were no vitals filed for this visit.  I have reviewed the chart for potential of high risk medication and harmful drug interactions in the elderly.        Aspirin is not on active medication list.  Aspirin use is contraindicated for this patient due to: current use of Eliquis.  .    Patient Active Problem List   Diagnosis    Type 2 diabetes, controlled, with peripheral neuropathy    History of colonoscopy with polypectomy    Gout    Hammertoe    Arthritis    Atrial flutter with controlled response    LBBB (left bundle branch block)    Colon polyps    Hyperlipidemia    Hypertension, essential    Chronic heart failure with preserved ejection fraction (HFpEF)    Sciatica of right side    Left-sided nosebleed    Coronary artery calcification seen on CT scan    Medicare annual wellness visit, subsequent    Stage 3b chronic kidney disease    Screening for prostate cancer    Neuropathy due to medical condition    Cellulitis of toe of right foot    Acute pain of left shoulder     Advance Care Planning Advance Directive is on file.  ACP discussion was held with the patient during this visit. Patient has an advance directive in EMR which is still valid.             Objective   Vitals:    07/08/25 1424   BP: 120/80   BP Location: Left arm   Patient Position: Sitting   Pulse: 76   Temp: 97 °F (36.1 °C)   SpO2: 97%   Weight: 118 kg (260 lb)   Height: 195.6 cm (77.01\")       Estimated body mass index is 30.82 kg/m² as calculated from the following:    Height as of this " "encounter: 195.6 cm (77.01\").    Weight as of this encounter: 118 kg (260 lb).                Does the patient have evidence of cognitive impairment? No                                                                                                Health  Risk Assessment    Smoking Status:  Social History     Tobacco Use   Smoking Status Former    Current packs/day: 0.00    Average packs/day: 2.0 packs/day for 22.0 years (44.0 ttl pk-yrs)    Types: Cigarettes    Start date: 1965    Quit date:     Years since quittin.5   Smokeless Tobacco Never   Tobacco Comments    AGE STARTED 19 QUIT AGE 31 - SMOKED X 20 YEARS QUIT      Alcohol Consumption:  Social History     Substance and Sexual Activity   Alcohol Use Not Currently    Comment: CURRENT SOME DAY 2020,2017       Fall Risk Screen  STEADI Fall Risk Assessment has not been completed.    Depression Screening   The PHQ has not been completed during this encounter.     Health Habits and Functional and Cognitive Screenin/1/2025     8:55 AM   Functional & Cognitive Status   Do you have difficulty preparing food and eating? No   Do you have difficulty bathing yourself, getting dressed or grooming yourself? No   Do you have difficulty using the toilet? No   Do you have difficulty moving around from place to place? No   Do you have trouble with steps or getting out of a bed or a chair? No   Current Diet Diabetic Diet   Dental Exam Up to date   Eye Exam Up to date   Exercise (times per week) 7 times per week   Current Exercises Include Home Exercise Program (TV, Computer, Etc.);Walking   Do you need help using the phone?  No   Are you deaf or do you have serious difficulty hearing?  No   Do you need help to go to places out of walking distance? No   Do you need help shopping? No   Do you need help preparing meals?  No   Do you need help with housework?  No   Do you need help with laundry? No   Do you need help taking your medications? No "   Do you need help managing money? No   Do you ever drive or ride in a car without wearing a seat belt? No   Have you felt unusual fatigue (could be tiredness), stress, anger or loneliness in the last month? No   Who do you live with? Spouse   If you need help, do you have trouble finding someone available to you? No   Have you been bothered in the last four weeks by sexual problems? No   Do you have difficulty concentrating, remembering or making decisions? No           Age-appropriate Screening Schedule:  Refer to the list below for future screening recommendations based on patient's age, sex and/or medical conditions. Orders for these recommended tests are listed in the plan section. The patient has been provided with a written plan.    Health Maintenance List  Health Maintenance   Topic Date Due    URINE MICROALBUMIN-CREATININE RATIO (uACR)  03/30/2022    HEMOGLOBIN A1C  09/03/2025    COVID-19 Vaccine (11 - 2024-25 season) 11/08/2025 (Originally 4/15/2025)    INFLUENZA VACCINE  10/01/2025    ANNUAL WELLNESS VISIT  12/26/2025    LIPID PANEL  03/03/2026    DIABETIC EYE EXAM  06/19/2026    DIABETIC FOOT EXAM  07/07/2026    COLORECTAL CANCER SCREENING  12/18/2029    TDAP/TD VACCINES (2 - Td or Tdap) 03/04/2035    HEPATITIS C SCREENING  Completed    RSV Vaccine - Adults  Completed    Pneumococcal Vaccine 50+  Completed    ZOSTER VACCINE  Completed                                                                                                                                                CMS Preventative Services Quick Reference  Risk Factors Identified During Encounter  None Identified    The above risks/problems have been discussed with the patient.  Pertinent information has been shared with the patient in the After Visit Summary.  An After Visit Summary and PPPS were made available to the patient.    Follow Up:   Next Medicare Wellness visit to be scheduled in 1 year.          Additional E&M Note during same  "encounter follows:  Patient has multiple medical problems which are significant and separately identifiable that require additional work above and beyond the Medicare Wellness Visit.      Chief Complaint  Medicare Wellness-subsequent (2 month f/u ), Hypertension, and Diabetes    Edi Weston is a 80 y.o. male who presents to [facility]     Type 2 diabetes-he does check his blood sugar fasting.  He states they are usually about 130-150.  He denies any blurred vision or excessive thirst, but does urinate more frequently due to his diuretic.    Hypertension-he does check his blood pressure at home.  His home blood pressure readings are fairly consistent to what it is here today at 120/80.    Hyperlipidemia-he does take his simvastatin on a nightly basis.    Atrial fibrillation-he is doing well.  He denies any palpitations or tachycardia.  He is on metoprolol succinate for rate control and Eliquis twice daily for stroke prevention.    CKD-he has recently seen nephrology.  He has some routine labs to be repeated.    Review of Systems   Constitutional:  Negative for fatigue.   Eyes:  Negative for visual disturbance.   Respiratory:  Negative for cough, chest tightness, shortness of breath and wheezing.    Cardiovascular:  Negative for chest pain, palpitations and leg swelling.   Endocrine: Negative for polydipsia and polyuria.   Musculoskeletal:  Positive for arthralgias.   Neurological:  Negative for headaches.       Objective   Vital Signs:   Vitals:    07/08/25 1424   BP: 120/80   BP Location: Left arm   Patient Position: Sitting   Pulse: 76   Temp: 97 °F (36.1 °C)   SpO2: 97%   Weight: 118 kg (260 lb)   Height: 195.6 cm (77.01\")       Wt Readings from Last 3 Encounters:   07/08/25 118 kg (260 lb)   07/07/25 120 kg (264 lb 6.4 oz)   05/08/25 113 kg (250 lb)     BP Readings from Last 3 Encounters:   07/08/25 120/80   07/07/25 114/79   05/08/25 122/74       Physical Exam  Vitals and " nursing note reviewed.   Constitutional:       General: He is not in acute distress.     Appearance: Normal appearance. He is obese.   HENT:      Head: Normocephalic.      Right Ear: Tympanic membrane, ear canal and external ear normal.      Left Ear: Tympanic membrane, ear canal and external ear normal.      Nose: Nose normal.      Mouth/Throat:      Mouth: Mucous membranes are moist.      Pharynx: Oropharynx is clear.   Eyes:      General: No scleral icterus.     Conjunctiva/sclera: Conjunctivae normal.      Pupils: Pupils are equal, round, and reactive to light.   Cardiovascular:      Rate and Rhythm: Normal rate. Rhythm irregularly irregular.      Pulses: Normal pulses.      Heart sounds: Murmur heard.      Systolic murmur is present with a grade of 1/6.   Pulmonary:      Effort: Pulmonary effort is normal.      Breath sounds: Normal breath sounds. No wheezing, rhonchi or rales.   Musculoskeletal:      Left shoulder: Tenderness present. No swelling or deformity. Decreased range of motion.      Cervical back: Neck supple. No rigidity or tenderness.      Comments: Reduced abd and flex, NO int rot    Lymphadenopathy:      Cervical: No cervical adenopathy.   Skin:     General: Skin is warm and dry.      Coloration: Skin is not jaundiced.      Findings: No rash.   Neurological:      General: No focal deficit present.      Mental Status: He is alert and oriented to person, place, and time.   Psychiatric:         Mood and Affect: Mood normal.         Thought Content: Thought content normal.         Judgment: Judgment normal.         The following data was reviewed by Jesus Lux DO on 07/08/2025  CMP   CMP          3/10/2025    22:59 4/18/2025    15:09 5/12/2025    07:37   CMP   Glucose 224  263  145    BUN 30  25  28    Creatinine 1.88  1.60  1.65    EGFR 36.1  43.8  42.2    Sodium 140  138  139    Potassium 4.1  3.7  4.4    Chloride 103  99  105    Calcium 9.0  9.3  9.3    Total Protein 7.4      Albumin  3.8   3.9    Globulin 3.6      Total Bilirubin 0.5      Alkaline Phosphatase 128      AST (SGOT) 25      ALT (SGPT) 14      Albumin/Globulin Ratio 1.1      BUN/Creatinine Ratio 16.0  15.6  17.0    Anion Gap 12.1  14.6  9.7      CBC   CBC          3/3/2025    06:40 3/10/2025    22:59 5/12/2025    07:37   CBC   WBC 7.51  11.03  7.53    RBC 4.48  4.49  4.67    Hemoglobin 12.8  12.1  11.8    Hematocrit 39.4  39.3  38.5    MCV 87.9  87.5  82.4    MCH 28.6  26.9  25.3    MCHC 32.5  30.8  30.6    RDW 14.3  14.8  15.7    Platelets 236  254  229      LIPID   Lipid Panel          9/6/2024    14:19 3/3/2025    06:40   Lipid Panel   Total Cholesterol 86  86    Triglycerides 114  75    HDL Cholesterol 32  32    VLDL Cholesterol 21  16    LDL Cholesterol  33  38    LDL/HDL Ratio 0.98  1.22      A1C   Most Recent A1C          3/3/2025    06:40   HGBA1C Most Recent   Hemoglobin A1C 8.40          Assessment & Plan   Diagnoses and all orders for this visit:    1. Hypertension, essential (Primary)  Assessment & Plan:  His blood pressure is great here as well as at home.  Will continue his current meds and update his labs    Orders:  -     Comprehensive Metabolic Panel  -     Lipid Panel    2. Mixed hyperlipidemia  Assessment & Plan:   Will update his lipid profile with his routine labs.    Orders:  -     Comprehensive Metabolic Panel  -     Lipid Panel    3. Coronary artery calcification seen on CT scan  Assessment & Plan:  He is doing well without any anginal-like symptoms.  He will continue to work on his cardiovascular risk      4. Atrial flutter with controlled response  Assessment & Plan:  Current Ashok today he is irregular and clinically in atrial flutter.  He is though rate controlled at this time.  He will continue his metoprolol and his Eliquis      5. Type 2 diabetes, controlled, with peripheral neuropathy  Assessment & Plan:  Will update his A1c.    Orders:  -     Hemoglobin A1c  -     Vitamin B12    6. Stage 3b chronic  kidney disease  Assessment & Plan:  He currently sees nephrology.  He has some labs ordered by them.  He is on glyburide for his diabetes.  We discussed due to its half-life and his chronic kidney disease he may be a little bit more risk for hypoglycemia.  He is not had any hypoglycemic episodes.  Will hold off at this time changing it to glipizide unless he starts to notice some hypoglycemic episodes.  Last time he saw nephrology they also added Jardiance.  We talked also about adding some Ozempic which also has some additional renal protection.  If his A1c is not where it needs to be weak we will add that for better glycemic as well as additional renal protection.      7. Idiopathic chronic gout of left foot without tophus    8. Acute pain of left shoulder  Assessment & Plan:  He desires to see Ortho. Juliet appears to have a frozen shoulder.    Orders:  -     XR Shoulder 2+ View Left; Future  -     Ambulatory Referral to Orthopedic Surgery                  FOLLOW UP  Return in about 4 months (around 11/8/2025) for Recheck.  Patient was given instructions and counseling regarding his condition or for health maintenance advice. Please see specific information pulled into the AVS if appropriate.     Jesus Lux,   07/08/25  15:22 EDT

## 2025-07-08 NOTE — ASSESSMENT & PLAN NOTE
Current Ashok today he is irregular and clinically in atrial flutter.  He is though rate controlled at this time.  He will continue his metoprolol and his Eliquis

## 2025-07-09 LAB — HBA1C MFR BLD: 7.5 % (ref 4.8–5.6)

## 2025-07-11 ENCOUNTER — HOSPITAL ENCOUNTER (OUTPATIENT)
Dept: GENERAL RADIOLOGY | Facility: HOSPITAL | Age: 80
Discharge: HOME | End: 2025-07-11
Payer: MEDICARE

## 2025-07-11 DIAGNOSIS — M25.512 ACUTE PAIN OF LEFT SHOULDER: ICD-10-CM

## 2025-07-11 PROCEDURE — 73030 X-RAY EXAM OF SHOULDER: CPT

## 2025-08-01 DIAGNOSIS — I50.32 CHRONIC HEART FAILURE WITH PRESERVED EJECTION FRACTION (HFPEF): ICD-10-CM

## 2025-08-01 RX ORDER — FUROSEMIDE 20 MG/1
20 TABLET ORAL DAILY
Qty: 90 TABLET | Refills: 3 | Status: SHIPPED | OUTPATIENT
Start: 2025-08-01

## 2025-08-07 ENCOUNTER — OFFICE VISIT (OUTPATIENT)
Dept: ORTHOPEDIC SURGERY | Facility: CLINIC | Age: 80
End: 2025-08-07
Payer: MEDICARE

## 2025-08-07 ENCOUNTER — PREP FOR SURGERY (OUTPATIENT)
Dept: OTHER | Facility: HOSPITAL | Age: 80
End: 2025-08-07
Payer: MEDICARE

## 2025-08-07 VITALS — WEIGHT: 260 LBS | BODY MASS INDEX: 30.7 KG/M2 | HEIGHT: 77 IN

## 2025-08-07 DIAGNOSIS — M19.012 PRIMARY OSTEOARTHRITIS OF LEFT SHOULDER: ICD-10-CM

## 2025-08-07 DIAGNOSIS — M25.512 LEFT SHOULDER PAIN, UNSPECIFIED CHRONICITY: Primary | ICD-10-CM

## 2025-08-07 DIAGNOSIS — M19.012 PRIMARY OSTEOARTHRITIS OF LEFT SHOULDER: Primary | ICD-10-CM

## 2025-08-07 RX ORDER — TRANEXAMIC ACID 10 MG/ML
1000 INJECTION, SOLUTION INTRAVENOUS ONCE
OUTPATIENT
Start: 2025-08-07 | End: 2025-08-07

## 2025-08-07 RX ADMIN — TRIAMCINOLONE ACETONIDE 40 MG: 40 INJECTION, SUSPENSION INTRA-ARTICULAR; INTRAMUSCULAR at 12:54

## 2025-08-07 RX ADMIN — LIDOCAINE HYDROCHLORIDE 5 ML: 10 INJECTION, SOLUTION INFILTRATION; PERINEURAL at 12:54

## 2025-08-11 RX ORDER — OXYCODONE AND ACETAMINOPHEN 5; 325 MG/1; MG/1
1 TABLET ORAL EVERY 6 HOURS PRN
Qty: 20 TABLET | Refills: 0 | Status: SHIPPED | OUTPATIENT
Start: 2025-08-11

## 2025-08-14 RX ORDER — LIDOCAINE HYDROCHLORIDE 10 MG/ML
5 INJECTION, SOLUTION INFILTRATION; PERINEURAL
Status: COMPLETED | OUTPATIENT
Start: 2025-08-07 | End: 2025-08-07

## 2025-08-14 RX ORDER — TRIAMCINOLONE ACETONIDE 40 MG/ML
40 INJECTION, SUSPENSION INTRA-ARTICULAR; INTRAMUSCULAR
Status: COMPLETED | OUTPATIENT
Start: 2025-08-07 | End: 2025-08-07

## 2025-08-18 ENCOUNTER — TELEPHONE (OUTPATIENT)
Dept: CARDIOLOGY | Facility: CLINIC | Age: 80
End: 2025-08-18
Payer: MEDICARE

## 2025-08-26 ENCOUNTER — HOSPITAL ENCOUNTER (OUTPATIENT)
Dept: CT IMAGING | Facility: HOSPITAL | Age: 80
Discharge: HOME | End: 2025-08-26
Admitting: ORTHOPAEDIC SURGERY
Payer: MEDICARE

## 2025-08-26 DIAGNOSIS — M25.512 LEFT SHOULDER PAIN, UNSPECIFIED CHRONICITY: ICD-10-CM

## 2025-08-26 PROCEDURE — 73200 CT UPPER EXTREMITY W/O DYE: CPT

## 2025-08-28 ENCOUNTER — LAB (OUTPATIENT)
Dept: LAB | Facility: HOSPITAL | Age: 80
End: 2025-08-28
Payer: MEDICARE

## 2025-08-28 DIAGNOSIS — N18.32 CHRONIC KIDNEY DISEASE (CKD) STAGE G3B/A1, MODERATELY DECREASED GLOMERULAR FILTRATION RATE (GFR) BETWEEN 30-44 ML/MIN/1.73 SQUARE METER AND ALBUMINURIA CREATININE RATIO LESS THAN 30 MG/G (CMS/H*: ICD-10-CM

## 2025-08-28 DIAGNOSIS — I10 HYPERTENSION, ESSENTIAL: ICD-10-CM

## 2025-08-28 DIAGNOSIS — N18.6 TYPE 2 DIABETES MELLITUS WITH ESRD (END-STAGE RENAL DISEASE): ICD-10-CM

## 2025-08-28 DIAGNOSIS — M10.00 IDIOPATHIC GOUT, UNSPECIFIED CHRONICITY, UNSPECIFIED SITE: ICD-10-CM

## 2025-08-28 DIAGNOSIS — E11.22 TYPE 2 DIABETES MELLITUS WITH ESRD (END-STAGE RENAL DISEASE): ICD-10-CM

## 2025-08-28 DIAGNOSIS — E55.9 VITAMIN D DEFICIENCY DISEASE: ICD-10-CM

## 2025-08-28 DIAGNOSIS — M19.012 PRIMARY OSTEOARTHRITIS OF LEFT SHOULDER: ICD-10-CM

## 2025-08-28 LAB
ALBUMIN SERPL-MCNC: 3.8 G/DL (ref 3.5–5.2)
ANION GAP SERPL CALCULATED.3IONS-SCNC: 14 MMOL/L (ref 5–15)
BACTERIA UR QL AUTO: NORMAL /HPF
BASOPHILS # BLD AUTO: 0.05 10*3/MM3 (ref 0–0.2)
BASOPHILS NFR BLD AUTO: 0.7 % (ref 0–1.5)
BILIRUB UR QL STRIP: NEGATIVE
BUN SERPL-MCNC: 23 MG/DL (ref 8–23)
BUN/CREAT SERPL: 16.1 (ref 7–25)
CALCIUM SPEC-SCNC: 9.2 MG/DL (ref 8.6–10.5)
CHLORIDE SERPL-SCNC: 103 MMOL/L (ref 98–107)
CLARITY UR: CLEAR
CO2 SERPL-SCNC: 22 MMOL/L (ref 22–29)
COLOR UR: YELLOW
CREAT SERPL-MCNC: 1.43 MG/DL (ref 0.76–1.27)
CREAT UR-MCNC: 75.5 MG/DL
DEPRECATED RDW RBC AUTO: 51.8 FL (ref 37–54)
EGFRCR SERPLBLD CKD-EPI 2021: 50.2 ML/MIN/1.73
EOSINOPHIL # BLD AUTO: 0.26 10*3/MM3 (ref 0–0.4)
EOSINOPHIL NFR BLD AUTO: 3.5 % (ref 0.3–6.2)
ERYTHROCYTE [DISTWIDTH] IN BLOOD BY AUTOMATED COUNT: 17.3 % (ref 12.3–15.4)
GLUCOSE SERPL-MCNC: 271 MG/DL (ref 65–99)
GLUCOSE UR STRIP-MCNC: ABNORMAL MG/DL
HBA1C MFR BLD: 7.8 % (ref 4.8–5.6)
HCT VFR BLD AUTO: 39.5 % (ref 37.5–51)
HGB BLD-MCNC: 11.8 G/DL (ref 13–17.7)
HGB UR QL STRIP.AUTO: ABNORMAL
HYALINE CASTS UR QL AUTO: NORMAL /LPF
IMM GRANULOCYTES # BLD AUTO: 0.03 10*3/MM3 (ref 0–0.05)
IMM GRANULOCYTES NFR BLD AUTO: 0.4 % (ref 0–0.5)
KETONES UR QL STRIP: NEGATIVE
LEUKOCYTE ESTERASE UR QL STRIP.AUTO: NEGATIVE
LYMPHOCYTES # BLD AUTO: 1.19 10*3/MM3 (ref 0.7–3.1)
LYMPHOCYTES NFR BLD AUTO: 16 % (ref 19.6–45.3)
MCH RBC QN AUTO: 24.7 PG (ref 26.6–33)
MCHC RBC AUTO-ENTMCNC: 29.9 G/DL (ref 31.5–35.7)
MCV RBC AUTO: 82.6 FL (ref 79–97)
MONOCYTES # BLD AUTO: 0.67 10*3/MM3 (ref 0.1–0.9)
MONOCYTES NFR BLD AUTO: 9 % (ref 5–12)
NEUTROPHILS NFR BLD AUTO: 5.24 10*3/MM3 (ref 1.7–7)
NEUTROPHILS NFR BLD AUTO: 70.4 % (ref 42.7–76)
NITRITE UR QL STRIP: NEGATIVE
NRBC BLD AUTO-RTO: 0 /100 WBC (ref 0–0.2)
PH UR STRIP.AUTO: 6 [PH] (ref 5–8)
PHOSPHATE SERPL-MCNC: 3.6 MG/DL (ref 2.5–4.5)
PLATELET # BLD AUTO: 214 10*3/MM3 (ref 140–450)
PMV BLD AUTO: 9.2 FL (ref 6–12)
POTASSIUM SERPL-SCNC: 4 MMOL/L (ref 3.5–5.2)
PROT ?TM UR-MCNC: 9.3 MG/DL
PROT UR QL STRIP: NEGATIVE
PROT/CREAT UR: 0.12 MG/G{CREAT}
RBC # BLD AUTO: 4.78 10*6/MM3 (ref 4.14–5.8)
RBC # UR STRIP: NORMAL /HPF
REF LAB TEST METHOD: NORMAL
SODIUM SERPL-SCNC: 139 MMOL/L (ref 136–145)
SP GR UR STRIP: 1.02 (ref 1–1.03)
SQUAMOUS #/AREA URNS HPF: NORMAL /HPF
URATE SERPL-MCNC: 4.8 MG/DL (ref 3.4–7)
UROBILINOGEN UR QL STRIP: ABNORMAL
WBC # UR STRIP: NORMAL /HPF
WBC NRBC COR # BLD AUTO: 7.44 10*3/MM3 (ref 3.4–10.8)

## 2025-08-28 PROCEDURE — 36415 COLL VENOUS BLD VENIPUNCTURE: CPT

## 2025-08-28 PROCEDURE — 81001 URINALYSIS AUTO W/SCOPE: CPT

## 2025-08-28 PROCEDURE — 84156 ASSAY OF PROTEIN URINE: CPT

## 2025-08-28 PROCEDURE — 80069 RENAL FUNCTION PANEL: CPT

## 2025-08-28 PROCEDURE — 85025 COMPLETE CBC W/AUTO DIFF WBC: CPT

## 2025-08-28 PROCEDURE — 84550 ASSAY OF BLOOD/URIC ACID: CPT

## 2025-08-28 PROCEDURE — 83036 HEMOGLOBIN GLYCOSYLATED A1C: CPT

## 2025-08-28 PROCEDURE — 82570 ASSAY OF URINE CREATININE: CPT

## 2025-08-29 DIAGNOSIS — G63 NEUROPATHY DUE TO MEDICAL CONDITION: ICD-10-CM

## 2025-08-29 RX ORDER — GABAPENTIN 100 MG/1
100 CAPSULE ORAL
Qty: 30 CAPSULE | Refills: 0 | Status: SHIPPED | OUTPATIENT
Start: 2025-08-29

## (undated) DEVICE — SOL IRRG H2O PL/BG 1000ML STRL

## (undated) DEVICE — Device

## (undated) DEVICE — LINER SURG CANSTR SXN S/RIGD 1500CC

## (undated) DEVICE — Device: Brand: DEFENDO AIR/WATER/SUCTION AND BIOPSY VALVE

## (undated) DEVICE — SOLIDIFIER LIQLOC PLS 1500CC BT

## (undated) DEVICE — SOL IRR NACL 0.9PCT BO 1000ML

## (undated) DEVICE — GLV SURG BIOGEL LTX PF 7 1/2

## (undated) DEVICE — CONN JET HYDRA H20 AUXILIARY DISP